# Patient Record
Sex: MALE | Race: WHITE | NOT HISPANIC OR LATINO | URBAN - METROPOLITAN AREA
[De-identification: names, ages, dates, MRNs, and addresses within clinical notes are randomized per-mention and may not be internally consistent; named-entity substitution may affect disease eponyms.]

---

## 2017-04-26 ENCOUNTER — INPATIENT (INPATIENT)
Facility: HOSPITAL | Age: 62
LOS: 3 days | Discharge: HOME | DRG: 603 | End: 2017-04-30
Attending: FAMILY MEDICINE | Admitting: HOSPITALIST
Payer: MEDICAID

## 2017-04-26 VITALS
OXYGEN SATURATION: 98 % | RESPIRATION RATE: 16 BRPM | HEART RATE: 90 BPM | HEIGHT: 68 IN | DIASTOLIC BLOOD PRESSURE: 78 MMHG | SYSTOLIC BLOOD PRESSURE: 132 MMHG | TEMPERATURE: 98 F | WEIGHT: 300.05 LBS

## 2017-04-26 DIAGNOSIS — Z96.642 PRESENCE OF LEFT ARTIFICIAL HIP JOINT: Chronic | ICD-10-CM

## 2017-04-26 DIAGNOSIS — L03.119 CELLULITIS OF UNSPECIFIED PART OF LIMB: ICD-10-CM

## 2017-04-26 LAB
ALBUMIN SERPL ELPH-MCNC: 3.6 G/DL — SIGNIFICANT CHANGE UP (ref 3.3–5.2)
ALP SERPL-CCNC: 60 U/L — SIGNIFICANT CHANGE UP (ref 40–120)
ALT FLD-CCNC: 33 U/L — SIGNIFICANT CHANGE UP
ANION GAP SERPL CALC-SCNC: 12 MMOL/L — SIGNIFICANT CHANGE UP (ref 5–17)
AST SERPL-CCNC: 32 U/L — SIGNIFICANT CHANGE UP
BASOPHILS NFR BLD AUTO: 1 % — SIGNIFICANT CHANGE UP (ref 0–2)
BILIRUB SERPL-MCNC: 0.3 MG/DL — LOW (ref 0.4–2)
BUN SERPL-MCNC: 14 MG/DL — SIGNIFICANT CHANGE UP (ref 8–20)
CALCIUM SERPL-MCNC: 9.7 MG/DL — SIGNIFICANT CHANGE UP (ref 8.6–10.2)
CHLORIDE SERPL-SCNC: 98 MMOL/L — SIGNIFICANT CHANGE UP (ref 98–107)
CO2 SERPL-SCNC: 32 MMOL/L — HIGH (ref 22–29)
CREAT SERPL-MCNC: 0.82 MG/DL — SIGNIFICANT CHANGE UP (ref 0.5–1.3)
EOSINOPHIL NFR BLD AUTO: 2 % — SIGNIFICANT CHANGE UP (ref 0–6)
GLUCOSE SERPL-MCNC: 77 MG/DL — SIGNIFICANT CHANGE UP (ref 70–115)
HCT VFR BLD CALC: 35.7 % — LOW (ref 42–52)
HGB BLD-MCNC: 11.3 G/DL — LOW (ref 14–18)
INR BLD: 1.14 RATIO — SIGNIFICANT CHANGE UP (ref 0.88–1.16)
LACTATE BLDV-MCNC: 3.3 MMOL/L — HIGH (ref 0.7–2)
LIDOCAIN IGE QN: 25 U/L — SIGNIFICANT CHANGE UP (ref 22–51)
LYMPHOCYTES # BLD AUTO: 23 % — SIGNIFICANT CHANGE UP (ref 20–55)
MCHC RBC-ENTMCNC: 26.7 PG — LOW (ref 27–31)
MCHC RBC-ENTMCNC: 31.7 G/DL — LOW (ref 32–36)
MCV RBC AUTO: 84.4 FL — SIGNIFICANT CHANGE UP (ref 80–94)
MONOCYTES NFR BLD AUTO: 6 % — SIGNIFICANT CHANGE UP (ref 3–10)
NEUTROPHILS NFR BLD AUTO: 67 % — SIGNIFICANT CHANGE UP (ref 37–73)
NT-PROBNP SERPL-SCNC: 72 PG/ML — SIGNIFICANT CHANGE UP (ref 0–300)
PLATELET # BLD AUTO: 275 K/UL — SIGNIFICANT CHANGE UP (ref 150–400)
POTASSIUM SERPL-MCNC: 4.4 MMOL/L — SIGNIFICANT CHANGE UP (ref 3.5–5.3)
POTASSIUM SERPL-SCNC: 4.4 MMOL/L — SIGNIFICANT CHANGE UP (ref 3.5–5.3)
PROT SERPL-MCNC: 8.9 G/DL — HIGH (ref 6.6–8.7)
PROTHROM AB SERPL-ACNC: 12.6 SEC — SIGNIFICANT CHANGE UP (ref 9.8–12.7)
RBC # BLD: 4.23 M/UL — LOW (ref 4.6–6.2)
RBC # FLD: 17.1 % — HIGH (ref 11–15.6)
SODIUM SERPL-SCNC: 142 MMOL/L — SIGNIFICANT CHANGE UP (ref 135–145)
WBC # BLD: 10.6 K/UL — SIGNIFICANT CHANGE UP (ref 4.8–10.8)
WBC # FLD AUTO: 10.6 K/UL — SIGNIFICANT CHANGE UP (ref 4.8–10.8)

## 2017-04-26 PROCEDURE — 99223 1ST HOSP IP/OBS HIGH 75: CPT

## 2017-04-26 PROCEDURE — 99284 EMERGENCY DEPT VISIT MOD MDM: CPT

## 2017-04-26 RX ORDER — NYSTATIN CREAM 100000 [USP'U]/G
1 CREAM TOPICAL
Qty: 0 | Refills: 0 | Status: DISCONTINUED | OUTPATIENT
Start: 2017-04-26 | End: 2017-04-30

## 2017-04-26 RX ORDER — ENOXAPARIN SODIUM 100 MG/ML
40 INJECTION SUBCUTANEOUS DAILY
Qty: 0 | Refills: 0 | Status: DISCONTINUED | OUTPATIENT
Start: 2017-04-26 | End: 2017-04-30

## 2017-04-26 RX ORDER — DEXTROSE 50 % IN WATER 50 %
25 SYRINGE (ML) INTRAVENOUS ONCE
Qty: 0 | Refills: 0 | Status: DISCONTINUED | OUTPATIENT
Start: 2017-04-26 | End: 2017-04-30

## 2017-04-26 RX ORDER — LISINOPRIL 2.5 MG/1
2.5 TABLET ORAL DAILY
Qty: 0 | Refills: 0 | Status: DISCONTINUED | OUTPATIENT
Start: 2017-04-26 | End: 2017-04-30

## 2017-04-26 RX ORDER — SODIUM CHLORIDE 9 MG/ML
2500 INJECTION INTRAMUSCULAR; INTRAVENOUS; SUBCUTANEOUS ONCE
Qty: 0 | Refills: 0 | Status: COMPLETED | OUTPATIENT
Start: 2017-04-26 | End: 2017-04-26

## 2017-04-26 RX ORDER — INSULIN LISPRO 100/ML
VIAL (ML) SUBCUTANEOUS AT BEDTIME
Qty: 0 | Refills: 0 | Status: DISCONTINUED | OUTPATIENT
Start: 2017-04-26 | End: 2017-04-30

## 2017-04-26 RX ORDER — LACTOBACILLUS ACIDOPHILUS 100MM CELL
1 CAPSULE ORAL
Qty: 0 | Refills: 0 | Status: DISCONTINUED | OUTPATIENT
Start: 2017-04-26 | End: 2017-04-30

## 2017-04-26 RX ORDER — CEFAZOLIN SODIUM 1 G
1000 VIAL (EA) INJECTION EVERY 8 HOURS
Qty: 0 | Refills: 0 | Status: DISCONTINUED | OUTPATIENT
Start: 2017-04-26 | End: 2017-04-30

## 2017-04-26 RX ORDER — DEXTROSE 50 % IN WATER 50 %
1 SYRINGE (ML) INTRAVENOUS ONCE
Qty: 0 | Refills: 0 | Status: DISCONTINUED | OUTPATIENT
Start: 2017-04-26 | End: 2017-04-30

## 2017-04-26 RX ORDER — INSULIN LISPRO 100/ML
VIAL (ML) SUBCUTANEOUS
Qty: 0 | Refills: 0 | Status: DISCONTINUED | OUTPATIENT
Start: 2017-04-26 | End: 2017-04-30

## 2017-04-26 RX ORDER — CEFAZOLIN SODIUM 1 G
1000 VIAL (EA) INJECTION ONCE
Qty: 0 | Refills: 0 | Status: COMPLETED | OUTPATIENT
Start: 2017-04-26 | End: 2017-04-26

## 2017-04-26 RX ORDER — SODIUM CHLORIDE 9 MG/ML
3 INJECTION INTRAMUSCULAR; INTRAVENOUS; SUBCUTANEOUS ONCE
Qty: 0 | Refills: 0 | Status: COMPLETED | OUTPATIENT
Start: 2017-04-26 | End: 2017-04-26

## 2017-04-26 RX ORDER — GLUCAGON INJECTION, SOLUTION 0.5 MG/.1ML
1 INJECTION, SOLUTION SUBCUTANEOUS ONCE
Qty: 0 | Refills: 0 | Status: DISCONTINUED | OUTPATIENT
Start: 2017-04-26 | End: 2017-04-30

## 2017-04-26 RX ORDER — SODIUM CHLORIDE 9 MG/ML
1000 INJECTION, SOLUTION INTRAVENOUS
Qty: 0 | Refills: 0 | Status: DISCONTINUED | OUTPATIENT
Start: 2017-04-26 | End: 2017-04-30

## 2017-04-26 RX ORDER — DEXTROSE 50 % IN WATER 50 %
12.5 SYRINGE (ML) INTRAVENOUS ONCE
Qty: 0 | Refills: 0 | Status: DISCONTINUED | OUTPATIENT
Start: 2017-04-26 | End: 2017-04-30

## 2017-04-26 RX ADMIN — SODIUM CHLORIDE 1250 MILLILITER(S): 9 INJECTION INTRAMUSCULAR; INTRAVENOUS; SUBCUTANEOUS at 21:42

## 2017-04-26 RX ADMIN — SODIUM CHLORIDE 3 MILLILITER(S): 9 INJECTION INTRAMUSCULAR; INTRAVENOUS; SUBCUTANEOUS at 21:42

## 2017-04-26 RX ADMIN — Medication 100 MILLIGRAM(S): at 21:42

## 2017-04-26 NOTE — ED ADULT NURSE REASSESSMENT NOTE - NS ED NURSE REASSESS COMMENT FT1
Received patient lying in bed, a&ox4, able to make needs known. Denies pain and discomfort. Dr. Dunlap at bedside. IV fluids infusing well on right forearm iv site without redness or swelling. Noted with redness and swelling on bilateral legs. DX: Cellulitis. Patient repositioned for comfort. Not in respiratory distress. To continue to monitor.

## 2017-04-26 NOTE — ED STATDOCS - MEDICAL DECISION MAKING DETAILS
eval for systemic infection and consult Dr. Walker and confirm plan for pt's care:  if he wanted pt admitted or have pt evaluated

## 2017-04-26 NOTE — ED STATDOCS - NS ED MD SCRIBE ATTENDING SCRIBE SECTIONS
VITAL SIGNS( Pullset)/REVIEW OF SYSTEMS/DISPOSITION/PAST MEDICAL/SURGICAL/SOCIAL HISTORY/INTAKE ASSESSMENT/SCREENINGS/PHYSICAL EXAM/HIV/HISTORY OF PRESENT ILLNESS

## 2017-04-26 NOTE — H&P ADULT - HISTORY OF PRESENT ILLNESS
60 y/o morbidly obese male with pmh of DM , ankylosing spondylosis , lupus was sent in by pcp for his b//l LE wounds. As per pt. his wounds started about 2 weeks ago as blisters and now they are weeping. pt. had no fever. no chills. no leg pains. no cp. no sob. no abd. pain no n/v. pt. denies any trauma to his lower ext.

## 2017-04-26 NOTE — ED STATDOCS - OBJECTIVE STATEMENT
This is a 62 y/o M presenting of b/l leg pain. He reports similar infection in past that required admission. He states that today he was seen by his PMD, Dr. Walker, and was told to come to the ED. Denies CP, SOB, and back pain.

## 2017-04-26 NOTE — H&P ADULT - SCARS
pt. has b/l LE erythema below calf, pt's RLE has about 3inch long and about 1 inch wide elongated venous stasis ulcer with moist base, no foul smelling discharge noted. pt's LLE has few  vesicular type lesions with moist base and surrounding erythema.  + grayish  to whitish membranes noted between multiple toes over both feet.  a callus formation is noted over  plantar aspect of left big toe.

## 2017-04-26 NOTE — ED ADULT NURSE NOTE - OBJECTIVE STATEMENT
recieved pt AOx3 pt presents to Ed with b/l LE infections x one week. sent by PMD. afebrile. breathing si even and unlabored. MAEx4, neruo intact. has had similar episode in past.

## 2017-04-26 NOTE — ED ADULT TRIAGE NOTE - CHIEF COMPLAINT QUOTE
pt presents to Ed with b/l LE infections x one week. sent by PMD. afebrile. breathing si even and unlabored.

## 2017-04-26 NOTE — H&P ADULT - PROBLEM SELECTOR PLAN 1
will keep on ancef. blood cx to follow. will request for wound consult . pt. will benefit from vascular surgery consult, will request pcp dr. Walker to call for one.

## 2017-04-27 DIAGNOSIS — M45.9 ANKYLOSING SPONDYLITIS OF UNSPECIFIED SITES IN SPINE: ICD-10-CM

## 2017-04-27 DIAGNOSIS — E66.01 MORBID (SEVERE) OBESITY DUE TO EXCESS CALORIES: ICD-10-CM

## 2017-04-27 DIAGNOSIS — L03.119 CELLULITIS OF UNSPECIFIED PART OF LIMB: ICD-10-CM

## 2017-04-27 DIAGNOSIS — B35.3 TINEA PEDIS: ICD-10-CM

## 2017-04-27 DIAGNOSIS — E11.9 TYPE 2 DIABETES MELLITUS WITHOUT COMPLICATIONS: ICD-10-CM

## 2017-04-27 LAB
ANION GAP SERPL CALC-SCNC: 14 MMOL/L — SIGNIFICANT CHANGE UP (ref 5–17)
BASOPHILS # BLD AUTO: 0 K/UL — SIGNIFICANT CHANGE UP (ref 0–0.2)
BASOPHILS NFR BLD AUTO: 0.5 % — SIGNIFICANT CHANGE UP (ref 0–2)
BUN SERPL-MCNC: 14 MG/DL — SIGNIFICANT CHANGE UP (ref 8–20)
CALCIUM SERPL-MCNC: 8.8 MG/DL — SIGNIFICANT CHANGE UP (ref 8.6–10.2)
CHLORIDE SERPL-SCNC: 97 MMOL/L — LOW (ref 98–107)
CO2 SERPL-SCNC: 27 MMOL/L — SIGNIFICANT CHANGE UP (ref 22–29)
CREAT SERPL-MCNC: 0.72 MG/DL — SIGNIFICANT CHANGE UP (ref 0.5–1.3)
EOSINOPHIL # BLD AUTO: 0.2 K/UL — SIGNIFICANT CHANGE UP (ref 0–0.5)
EOSINOPHIL NFR BLD AUTO: 1.9 % — SIGNIFICANT CHANGE UP (ref 0–5)
GLUCOSE SERPL-MCNC: 102 MG/DL — SIGNIFICANT CHANGE UP (ref 70–115)
HBA1C BLD-MCNC: 7.8 % — HIGH (ref 4–5.6)
HCT VFR BLD CALC: 33.7 % — LOW (ref 42–52)
HGB BLD-MCNC: 10.6 G/DL — LOW (ref 14–18)
LACTATE SERPL-SCNC: 2 MMOL/L — SIGNIFICANT CHANGE UP (ref 0.5–2)
LYMPHOCYTES # BLD AUTO: 2.9 K/UL — SIGNIFICANT CHANGE UP (ref 1–4.8)
LYMPHOCYTES # BLD AUTO: 29.6 % — SIGNIFICANT CHANGE UP (ref 20–55)
MCHC RBC-ENTMCNC: 26.5 PG — LOW (ref 27–31)
MCHC RBC-ENTMCNC: 31.5 G/DL — LOW (ref 32–36)
MCV RBC AUTO: 84.3 FL — SIGNIFICANT CHANGE UP (ref 80–94)
MONOCYTES # BLD AUTO: 0.8 K/UL — SIGNIFICANT CHANGE UP (ref 0–0.8)
MONOCYTES NFR BLD AUTO: 8.4 % — SIGNIFICANT CHANGE UP (ref 3–10)
NEUTROPHILS # BLD AUTO: 5.8 K/UL — SIGNIFICANT CHANGE UP (ref 1.8–8)
NEUTROPHILS NFR BLD AUTO: 59.3 % — SIGNIFICANT CHANGE UP (ref 37–73)
PLATELET # BLD AUTO: 276 K/UL — SIGNIFICANT CHANGE UP (ref 150–400)
POTASSIUM SERPL-MCNC: 3.5 MMOL/L — SIGNIFICANT CHANGE UP (ref 3.5–5.3)
POTASSIUM SERPL-SCNC: 3.5 MMOL/L — SIGNIFICANT CHANGE UP (ref 3.5–5.3)
RBC # BLD: 4 M/UL — LOW (ref 4.6–6.2)
RBC # FLD: 17.1 % — HIGH (ref 11–15.6)
SODIUM SERPL-SCNC: 138 MMOL/L — SIGNIFICANT CHANGE UP (ref 135–145)
WBC # BLD: 9.8 K/UL — SIGNIFICANT CHANGE UP (ref 4.8–10.8)
WBC # FLD AUTO: 9.8 K/UL — SIGNIFICANT CHANGE UP (ref 4.8–10.8)

## 2017-04-27 PROCEDURE — 71010: CPT | Mod: 26

## 2017-04-27 PROCEDURE — 93010 ELECTROCARDIOGRAM REPORT: CPT

## 2017-04-27 PROCEDURE — 93970 EXTREMITY STUDY: CPT | Mod: 26

## 2017-04-27 PROCEDURE — 99222 1ST HOSP IP/OBS MODERATE 55: CPT

## 2017-04-27 RX ORDER — METHADONE HYDROCHLORIDE 40 MG/1
5 TABLET ORAL
Qty: 0 | Refills: 0 | Status: DISCONTINUED | OUTPATIENT
Start: 2017-04-27 | End: 2017-04-30

## 2017-04-27 RX ORDER — MUPIROCIN 20 MG/G
1 OINTMENT TOPICAL DAILY
Qty: 0 | Refills: 0 | Status: DISCONTINUED | OUTPATIENT
Start: 2017-04-27 | End: 2017-04-30

## 2017-04-27 RX ORDER — HYDROMORPHONE HYDROCHLORIDE 2 MG/ML
4 INJECTION INTRAMUSCULAR; INTRAVENOUS; SUBCUTANEOUS
Qty: 0 | Refills: 0 | Status: DISCONTINUED | OUTPATIENT
Start: 2017-04-27 | End: 2017-04-30

## 2017-04-27 RX ADMIN — LISINOPRIL 2.5 MILLIGRAM(S): 2.5 TABLET ORAL at 06:30

## 2017-04-27 RX ADMIN — Medication 100 MILLIGRAM(S): at 23:54

## 2017-04-27 RX ADMIN — Medication 2: at 19:10

## 2017-04-27 RX ADMIN — Medication 100 MILLIGRAM(S): at 06:30

## 2017-04-27 RX ADMIN — Medication 1 TABLET(S): at 09:19

## 2017-04-27 RX ADMIN — NYSTATIN CREAM 1 APPLICATION(S): 100000 CREAM TOPICAL at 09:20

## 2017-04-27 RX ADMIN — Medication 100 MILLIGRAM(S): at 13:00

## 2017-04-27 RX ADMIN — NYSTATIN CREAM 1 APPLICATION(S): 100000 CREAM TOPICAL at 22:19

## 2017-04-27 RX ADMIN — Medication 100 MILLIGRAM(S): at 22:19

## 2017-04-27 RX ADMIN — Medication 1 TABLET(S): at 19:09

## 2017-04-27 RX ADMIN — METHADONE HYDROCHLORIDE 5 MILLIGRAM(S): 40 TABLET ORAL at 19:10

## 2017-04-27 RX ADMIN — METHADONE HYDROCHLORIDE 5 MILLIGRAM(S): 40 TABLET ORAL at 23:55

## 2017-04-27 RX ADMIN — HYDROMORPHONE HYDROCHLORIDE 4 MILLIGRAM(S): 2 INJECTION INTRAMUSCULAR; INTRAVENOUS; SUBCUTANEOUS at 16:30

## 2017-04-27 RX ADMIN — Medication 100 MILLIGRAM(S): at 13:51

## 2017-04-27 RX ADMIN — Medication 1 TABLET(S): at 12:59

## 2017-04-27 RX ADMIN — ENOXAPARIN SODIUM 40 MILLIGRAM(S): 100 INJECTION SUBCUTANEOUS at 13:00

## 2017-04-27 RX ADMIN — HYDROMORPHONE HYDROCHLORIDE 4 MILLIGRAM(S): 2 INJECTION INTRAMUSCULAR; INTRAVENOUS; SUBCUTANEOUS at 16:03

## 2017-04-27 NOTE — PROGRESS NOTE ADULT - SUBJECTIVE AND OBJECTIVE BOX
HPI:  62 y/o morbidly obese male with pmh of DM , ankylosing spondylosis , lupus was sent in by pcp for his b//l LE wounds. As per pt. his wounds started about 2 weeks ago as blisters and now they are weeping. pt. had no fever. no chills. no leg pains. no cp. no sob. no abd. pain no n/v. pt. denies any trauma to his lower ext. (26 Apr 2017 22:58)    Male  PAST MEDICAL & SURGICAL HISTORY:  Ankylosing spondylitis of site in spine  Lupus  Diabetes  History of hip replacement, total, left  attending to pain management [facility]  attending to endocrinologist for diabetes      REVIEW OF SYSTEMS      General:	morbid obesity,     Skin/Breast:  recurrent lower extremities infection,   	  Ophthalmologic: diabetes retinopathy.  	  ENMT:	normal     Respiratory and Thorax:  normal  	  negative     Gastrointestinal:	negative     Genitourinary: negative 	    Musculoskeletal:  morbid obesity	, anchyloses   spondylitis s/p  left hip  replacement, chronic pain, attending to interventional krystle management.    Neurological:	 negative     Psychiatric:	negative     Hematology/Lymphatics:  negative 	    Endocrine: morbid obesity., diabetes 	    Allergic/Immunologic:	negative     MEDICATIONS  (STANDING):  ceFAZolin   IVPB 1000milliGRAM(s) IV Intermittent every 8 hours  insulin lispro (HumaLOG) corrective regimen sliding scale  SubCutaneous three times a day before meals  insulin lispro (HumaLOG) corrective regimen sliding scale  SubCutaneous at bedtime  dextrose 5%. 1000milliLiter(s) IV Continuous <Continuous>  dextrose 50% Injectable 12.5Gram(s) IV Push once  dextrose 50% Injectable 25Gram(s) IV Push once  dextrose 50% Injectable 25Gram(s) IV Push once  enoxaparin Injectable 40milliGRAM(s) SubCutaneous daily  lactobacillus acidophilus 1Tablet(s) Oral three times a day with meals  lisinopril 2.5milliGRAM(s) Oral daily  nystatin Cream 1Application(s) Topical two times a day  clindamycin IVPB 600milliGRAM(s) IV Intermittent every 8 hours  clindamycin IVPB  IV Intermittent   methadone 5milliGRAM(s) Oral four times a day  HYDROmorphone   Tablet 4milliGRAM(s) Oral three times a day with meals  mupirocin 2% Ointment 1Application(s) Topical daily    MEDICATIONS  (PRN):  dextrose Gel 1Dose(s) Oral once PRN Blood Glucose LESS THAN 70 milliGRAM(s)/deciliter  glucagon  Injectable 1milliGRAM(s) IntraMuscular once PRN Glucose LESS THAN 70 milligrams/deciliter      Allergies    No Known Allergies    Intolerances none         SOCIAL HISTORY:  computer IT,  self employed , financial hardship. almost disable  due to his chronic pain and disabilities.  , lives with wife.    FAMILY HISTORY:  No pertinent family history      Vital Signs Last 24 Hrs  T(C): 36.6, Max: 36.8 (04-26 @ 22:49)  T(F): 97.8, Max: 98.2 (04-26 @ 22:49)  HR: 75 (75 - 86)  BP: 126/58 (114/57 - 131/78)  BP(mean): --  RR: 19 (19 - 20)  SpO2: 100% (97% - 100%)    PHYSICAL EXAM:      Constitutional:  morbid obesity, awake alert oriented,     Eyes:  refraction disorder.    ENMT:  normal      Neck:  normal ROM,  neck veins flat, no bruits,     Breasts:  normal     Back:  lumbar spine  limited range of  motion, and panful,      Respiratory:  normal     Cardiovascular:  NSR., no murmur     Gastrointestinal:  abundant adipose tissue     Genitourinary:  normal     Rectal:    Extremities: bilateral lower extremities  edematous, evidence of saphenofemoral insuffiencey lymphedema  venostasis, ulceration,  interdigital maceration, and tinea pedis  plantar callous macerated ,  skin erythematous, evidence of weeping blisters,     Vascular:  difficulty to evaluate , due to edema and  severe venous stasis      Neurological:  no evidence of peripheral neuropathy.    Skin:  normal     Lymph Nodes: normal     Musculoskeletal:  obesity    Psychiatric: normal         LABS:  CBC Full  -  ( 27 Apr 2017 07:40 )  WBC Count : 9.8 K/uL  Hemoglobin : 10.6 g/dL  Hematocrit : 33.7 %  Platelet Count - Automated : 276 K/uL  Mean Cell Volume : 84.3 fl  Mean Cell Hemoglobin : 26.5 pg  Mean Cell Hemoglobin Concentration : 31.5 g/dL  Auto Neutrophil # : 5.8 K/uL  Auto Lymphocyte # : 2.9 K/uL  Auto Monocyte # : 0.8 K/uL  Auto Eosinophil # : 0.2 K/uL  Auto Basophil # : 0.0 K/uL  Auto Neutrophil % : 59.3 %  Auto Lymphocyte % : 29.6 %  Auto Monocyte % : 8.4 %  Auto Eosinophil % : 1.9 %  Auto Basophil % : 0.5 %    04-27    138  |  97<L>  |  14.0  ----------------------------<  102  3.5   |  27.0  |  0.72    Ca    8.8      27 Apr 2017 07:40    TPro  8.9<H>  /  Alb  3.6  /  TBili  0.3<L>  /  DBili  x   /  AST  32  /  ALT  33  /  AlkPhos  60  04-26    PT/INR - ( 26 Apr 2017 20:46 )   PT: 12.6 sec;   INR: 1.14 ratio               RADIOLOGY & ADDITIONAL STUDIES:

## 2017-04-27 NOTE — ED ADULT NURSE REASSESSMENT NOTE - NS ED NURSE REASSESS COMMENT FT1
Dr. Dunlap made aware of patient's c/o sharp bilateral lower extremities pain, 5/10. Awaiting order as to medicate patient. Dr. Dunlap made aware of patient's c/o sharp bilateral lower extremities pain, 6/10. Awaiting order as to medicate patient.

## 2017-04-27 NOTE — CONSULT NOTE ADULT - PROBLEM SELECTOR RECOMMENDATION 9
Continue Cefazolin  Start Clindamycin to decrease toxin production x 3 days  Blood cultures pending  Needs vascular surgery consult  Follow up LE ultrasound  Anticipate 7 days of antibiotics

## 2017-04-27 NOTE — CONSULT NOTE ADULT - SUBJECTIVE AND OBJECTIVE BOX
Vascular Attending:        HPI:  62 y/o morbidly obese male with pmh of DM , ankylosing spondylosis , lupus was sent in by pcp for his b//l LE wounds. As per pt. his wounds started about 2 weeks ago as blisters and now they are weeping. pt. had no fever. no chills. no leg pains. no cp. no sob. no abd. pain no n/v. pt. denies any trauma to his lower ext. (26 Apr 2017 22:58). Vascular called to evalute B/L LE cellulitis and wounds.      PAST MEDICAL & SURGICAL HISTORY:  Ankylosing spondylitis of site in spine  Lupus  Diabetes  History of hip replacement, total, left      REVIEW OF SYSTEMS  General: NAD. Resting comfortably in bed.	  Skin: C/O pain and dry skin to B/L LE  Ophthalmologic: Denies blurry vision  ENMT:	Denies difficulty swallowing  Respiratory and Thorax: Denies SOB, no dyspnea  Cardiovascular:	Denies chest pain  Gastrointestinal:	 No V, V Diarrhea  Genitourinary: denies urgency, frequency  Neurological: A&O X3, Denies paresthesias,	  	    MEDICATIONS  (STANDING):  ceFAZolin   IVPB 1000milliGRAM(s) IV Intermittent every 8 hours  insulin lispro (HumaLOG) corrective regimen sliding scale  SubCutaneous three times a day before meals  insulin lispro (HumaLOG) corrective regimen sliding scale  SubCutaneous at bedtime  dextrose 5%. 1000milliLiter(s) IV Continuous <Continuous>  dextrose 50% Injectable 12.5Gram(s) IV Push once  dextrose 50% Injectable 25Gram(s) IV Push once  dextrose 50% Injectable 25Gram(s) IV Push once  enoxaparin Injectable 40milliGRAM(s) SubCutaneous daily  lactobacillus acidophilus 1Tablet(s) Oral three times a day with meals  lisinopril 2.5milliGRAM(s) Oral daily  nystatin Cream 1Application(s) Topical two times a day  clindamycin IVPB 600milliGRAM(s) IV Intermittent every 8 hours  clindamycin IVPB  IV Intermittent   methadone 5milliGRAM(s) Oral four times a day  HYDROmorphone   Tablet 4milliGRAM(s) Oral three times a day with meals    MEDICATIONS  (PRN):  dextrose Gel 1Dose(s) Oral once PRN Blood Glucose LESS THAN 70 milliGRAM(s)/deciliter  glucagon  Injectable 1milliGRAM(s) IntraMuscular once PRN Glucose LESS THAN 70 milligrams/deciliter      Allergies    No Known Allergies    Intolerances        SOCIAL HISTORY:      Vital Signs Last 24 Hrs  T(C): 36.6, Max: 36.8 (04-26 @ 17:23)  T(F): 97.8, Max: 98.2 (04-26 @ 17:23)  HR: 75 (75 - 90)  BP: 114/57 (114/57 - 132/78)  BP(mean): --  RR: 20 (16 - 20)  SpO2: 100% (97% - 100%)    PHYSICAL EXAM:  Constitutional: Wd, WN, NAD, resting comfortably in bed  Eyes: PEERLA  Neck: NL  Respiratory: RRR, Clear to bases.  Cardiovascular: Nl S1, S2. PMI at 5th MCI   Gastrointestinal: Soft non tender, BS X4  Extremities: B/L LE 2 + edema, from below knees to above ankles, erythema with R anterior lateral superficial ulcer 3 cm X 2 cm and left intermalleolar lesion superficial with granulation tissue, some weeping lesions with serosanguinous drainage. Left great toe plantar callus noted.  Neurological: A&O X3    Pulses:   Right:                                                                          Left:  POP [ ]2+ [X ]1+ [ ]doppler                                             POP [ ]2+ [X ]1+ [ ]doppler    DP [ ]2+ [X ]1+ [ ]doppler                                                DP [ ]2+ [X ]1+ [ ]doppler  PT[ ]2+ [X ]1+ [ ]doppler                                                  PT [ ]2+ [X ]1+ [ ]doppler      LABS:                        10.6   9.8   )-----------( 276      ( 27 Apr 2017 07:40 )             33.7     04-27    138  |  97<L>  |  14.0  ----------------------------<  102  3.5   |  27.0  |  0.72    Ca    8.8      27 Apr 2017 07:40    TPro  8.9<H>  /  Alb  3.6  /  TBili  0.3<L>  /  DBili  x   /  AST  32  /  ALT  33  /  AlkPhos  60  04-26    PT/INR - ( 26 Apr 2017 20:46 )   PT: 12.6 sec;   INR: 1.14 ratio           RADIOLOGY & ADDITIONAL STUDIES  US DPLX LWR EXT VEINS COMPL BI - unremarkable.          Impression and Plan: This is a 62 Y/o Male with B/L LE cellulitis. Vascular askrd to evaluate B/L LE wounds. PT has history of Diabetes with Venous stasis with current ulceration to B/L LE.    Continue ABX: Cefazolin and Clindamycin as per medicine.  Clobetasol cream, DSD, Ace to B/L legs. Plan to change to Maki Boots, Coban, ACE tomorrow.  Blood cultures pending  Continue DM management as per management  Seen and discussed with Dr Tyler Rojo
61 year old diabetic male seen in the ED for concern of possible cellulitis from Tinea pedis infection and left hallux callus. Patient is pleasant, AAOx3, in NAD. Patient states that he was sent by his PCP to the ED because he might have cellulitis in the legs. Patient states that he does have pain in the legs and feet which has been progressively getting worse over the past week with edema in the legs and feet. Patient admits poor glucose control with his diabetes. Patient notes recent weeping sores on his legs bilaterally. Patient denies any F/C/N/V/D/SOB.    PAST MEDICAL & SURGICAL HISTORY:  Ankylosing spondylitis of site in spine  Lupus  Diabetes  History of hip replacement, total, left    O:  Non-palpable pedal pulses due to significant pitting edema on the legs and feet bilaterally. Temperature gradient is within normal limits bilaterally, and CFT is within normal limits x10  Feet are hairless and exhibit multiple varicosities. +3 pitting edema of the lower extremities bilaterally.   Patient has interdigital maceration of the 3rd and 4th interspaces bilaterally, but no ulcerations in the interspaces bilaterally and no signs of cellulitis stemming from interspace breach  Patient does have macerated pinch callus of the left hallux, when macerated material is removed, shallow granular ulcer is expressed, approximately 4mm in size with no drainage, no exposed bone, no periwound erythema, no clinical signs of infection  Lower legs exhibit reddened, thickened, slightly nodular skin suggesting long standing venous stasis edema in pattern of midshaft tibia to anterior ankle bilaterally. Patient has shallow fibrogranular ulcerations on the lateral anterior right leg and medial anterior right leg consistent with venous stasis ulcerations. No lymphangitis, no active drainage, no purulence. Aforementioned rubor is partially dependent and lessens upon elevation of the legs. This is more suggestive of a vascular cause than an infectious one, although concurrent problems cannot be ruled out.                          10.6   9.8   )-----------( 276      ( 27 Apr 2017 07:40 )             33.7     04-27    138  |  97<L>  |  14.0  ----------------------------<  102  3.5   |  27.0  |  0.72    Ca    8.8      27 Apr 2017 07:40    TPro  8.9<H>  /  Alb  3.6  /  TBili  0.3<L>  /  DBili  x   /  AST  32  /  ALT  33  /  AlkPhos  60  04-26      A:  Bilateral venous stasis edema, bilateral venous stasis ulcerations, interspace tinea pedis, left hallux ulceration    P: Patient evaluated, chart reviewed  All wounds dressed with betadine and DSD  Discussed and seen with Vascular team who are consulted for, and assuming care for, the vascular edema and ulcerations  Continue to use nystatin for tinea as ordered in the chart  Bactroban ordered for the left hallux ulceration  Podiatry will monitor while in house.  Thank you for the consult
[facility]  INFECTIOUS DISEASES AND INTERNAL MEDICINE OF [place]  =======================================================  Raoul Elizabeth MD  Diplomates American Board of Internal Medicine and Infectious Diseases  =======================================================      [id number]  RANDY MARTINEZ       60y/o  Male presents with B/L LE swelling, and drainage from ulcers for 1-2 weeks. Patient did not pursue medical attention until yesterday when he went to see his PCP and was referred to the ER. Patient reports not taking antibiotics until coming to the hospital. Denies trauma. No fevers or chills.       Past Medical & Surgical Hx:  Ankylosing spondylitis of site in spine  Lupus  Diabetes  History of hip replacement, total, left      Social Hx:  Denies ETOH or smoking      FAMILY HISTORY:  No pertinent family history      Allergies  No Known Allergies      Antibiotics:  ceFAZolin   IVPB 1000milliGRAM(s) IV Intermittent every 8 hours  clindamycin IVPB  IV Intermittent        REVIEW OF SYSTEMS:  CONSTITUTIONAL:  No fevers or chills  HEENT:  No diplopia or blurred vision.  No earache, sore throat or runny nose.  CARDIOVASCULAR:  No pressure, squeezing, strangling, tightness, heaviness or aching about the chest, neck, axilla or epigastrium.  RESPIRATORY:  No cough, shortness of breath  GASTROINTESTINAL:  No nausea, vomiting or diarrhea.  GENITOURINARY:  No dysuria, frequency or urgency.   MUSCULOSKELETAL:  no joint aches, no muscle pain  SKIN:  B/L LE swelling and small ulcers  NEUROLOGIC:  No paresthesias, fasciculations, seizures or weakness.  PSYCHIATRIC:  No disorder of thought or mood.  ENDOCRINE:  No heat or cold intolerance, polyuria or polydipsia.  HEMATOLOGICAL:  No easy bruising or bleeding.       Physical Exam:  Vital Signs Last 24 Hrs  T(C): 36.6, Max: 36.8 (04-26 @ 17:23)  T(F): 97.8, Max: 98.2 (04-26 @ 17:23)  HR: 75 (75 - 90)  BP: 114/57 (114/57 - 132/78)  RR: 20 (16 - 20)  SpO2: 100% (97% - 100%)  Height (cm): 172.7 (04-26 @ 17:23)  Weight (kg): 136.1 (04-26 @ 17:23)  BMI (kg/m2): 45.6 (04-26 @ 17:23)  BSA (m2): 2.43 (04-26 @ 17:23)      GEN: NAD, pleasant  HEENT: normocephalic and atraumatic. EOMI. PERRL.    NECK: Supple.   LUNGS: Clear to auscultation.  HEART: Regular rate and rhythm without murmur.  ABDOMEN: Soft, nontender, and Obese.  Positive bowel sounds.    : No CVA tenderness  EXTREMITIES: B/L LE with weeping ulcers, + Swelling, + mild erythema, + warmth  MSK: No joint swelling  NEUROLOGIC: Cranial nerves II through XII are grossly intact.  PSYCHIATRIC: Appropriate affect .  SKIN: B/L LE small ulcers        Labs:   04-27    138  |  97<L>  |  14.0  ----------------------------<  102  3.5   |  27.0  |  0.72    Ca    8.8      27 Apr 2017 07:40    TPro  8.9<H>  /  Alb  3.6  /  TBili  0.3<L>  /  DBili  x   /  AST  32  /  ALT  33  /  AlkPhos  60  04-26                          10.6   9.8   )-----------( 276      ( 27 Apr 2017 07:40 )             33.7       PT/INR - ( 26 Apr 2017 20:46 )   PT: 12.6 sec;   INR: 1.14 ratio        LIVER FUNCTIONS - ( 26 Apr 2017 20:46 )  Alb: 3.6 g/dL / Pro: 8.9 g/dL / ALK PHOS: 60 U/L / ALT: 33 U/L / AST: 32 U/L / GGT: x           CARDIAC MARKERS ( 26 Apr 2017 20:46 )  x     / <0.01 ng/mL / 186 U/L / x     / 3.1 ng/mL      CXR  Impression: No acute pulmonary disease. No interval change.

## 2017-04-27 NOTE — PROGRESS NOTE ADULT - ASSESSMENT
cellulitis and  venous stasis  lymphedema  infected toes, maceration of skin  planta callous   uncontrolled diabetes  unknown  endocrinologist,   chronic  pain,  hip and lumbar, attending to pain management.  poor health maintenance

## 2017-04-28 DIAGNOSIS — E11.9 TYPE 2 DIABETES MELLITUS WITHOUT COMPLICATIONS: ICD-10-CM

## 2017-04-28 PROCEDURE — 99232 SBSQ HOSP IP/OBS MODERATE 35: CPT

## 2017-04-28 RX ADMIN — HYDROMORPHONE HYDROCHLORIDE 4 MILLIGRAM(S): 2 INJECTION INTRAMUSCULAR; INTRAVENOUS; SUBCUTANEOUS at 09:20

## 2017-04-28 RX ADMIN — LISINOPRIL 2.5 MILLIGRAM(S): 2.5 TABLET ORAL at 05:39

## 2017-04-28 RX ADMIN — MUPIROCIN 1 APPLICATION(S): 20 OINTMENT TOPICAL at 12:04

## 2017-04-28 RX ADMIN — ENOXAPARIN SODIUM 40 MILLIGRAM(S): 100 INJECTION SUBCUTANEOUS at 12:04

## 2017-04-28 RX ADMIN — HYDROMORPHONE HYDROCHLORIDE 4 MILLIGRAM(S): 2 INJECTION INTRAMUSCULAR; INTRAVENOUS; SUBCUTANEOUS at 17:55

## 2017-04-28 RX ADMIN — Medication 1 TABLET(S): at 12:04

## 2017-04-28 RX ADMIN — Medication 100 MILLIGRAM(S): at 14:49

## 2017-04-28 RX ADMIN — NYSTATIN CREAM 1 APPLICATION(S): 100000 CREAM TOPICAL at 05:39

## 2017-04-28 RX ADMIN — HYDROMORPHONE HYDROCHLORIDE 4 MILLIGRAM(S): 2 INJECTION INTRAMUSCULAR; INTRAVENOUS; SUBCUTANEOUS at 17:09

## 2017-04-28 RX ADMIN — HYDROMORPHONE HYDROCHLORIDE 4 MILLIGRAM(S): 2 INJECTION INTRAMUSCULAR; INTRAVENOUS; SUBCUTANEOUS at 12:04

## 2017-04-28 RX ADMIN — METHADONE HYDROCHLORIDE 5 MILLIGRAM(S): 40 TABLET ORAL at 12:04

## 2017-04-28 RX ADMIN — Medication 100 MILLIGRAM(S): at 22:25

## 2017-04-28 RX ADMIN — HYDROMORPHONE HYDROCHLORIDE 4 MILLIGRAM(S): 2 INJECTION INTRAMUSCULAR; INTRAVENOUS; SUBCUTANEOUS at 12:49

## 2017-04-28 RX ADMIN — HYDROMORPHONE HYDROCHLORIDE 4 MILLIGRAM(S): 2 INJECTION INTRAMUSCULAR; INTRAVENOUS; SUBCUTANEOUS at 08:47

## 2017-04-28 RX ADMIN — Medication 100 MILLIGRAM(S): at 14:48

## 2017-04-28 RX ADMIN — Medication 1 TABLET(S): at 17:10

## 2017-04-28 RX ADMIN — METHADONE HYDROCHLORIDE 5 MILLIGRAM(S): 40 TABLET ORAL at 05:39

## 2017-04-28 RX ADMIN — NYSTATIN CREAM 1 APPLICATION(S): 100000 CREAM TOPICAL at 17:10

## 2017-04-28 RX ADMIN — Medication 100 MILLIGRAM(S): at 06:43

## 2017-04-28 RX ADMIN — Medication 1 TABLET(S): at 08:47

## 2017-04-28 RX ADMIN — METHADONE HYDROCHLORIDE 5 MILLIGRAM(S): 40 TABLET ORAL at 17:10

## 2017-04-28 RX ADMIN — Medication 100 MILLIGRAM(S): at 05:37

## 2017-04-28 NOTE — PROGRESS NOTE ADULT - SUBJECTIVE AND OBJECTIVE BOX
HPI:  62 y/o morbidly obese male with pmh of DM , ankylosing spondylosis , lupus was sent in by pcp for his b//l LE wounds. As per pt. his wounds started about 2 weeks ago as blisters and now they are weeping. pt. had no fever. no chills. no leg pains. no cp. no sob. no abd. pain no n/v. pt. denies any trauma to his lower ext. (26 Apr 2017 22:58)    Male  PAST MEDICAL & SURGICAL HISTORY:  Ankylosing spondylitis of site in spine  Lupus  Diabetes  History of hip replacement, total, left      REVIEW OF SYSTEMS      General: obesity, morbid obesity	    Skin/Breast:  negative,  recurrent lower  extremities  cellulitis   	  Ophthalmologic:  retinopathy  	  ENMT:	  negative    Respiratory and Thorax:  negative   	  Cardiovascular:	no history of ischemia     Gastrointestinal:	  normal     Genitourinary:	 negative    Musculoskeletal:   negative 	    Neurological:	 negative     negative     Hematology/Lymphatics:   negative 	    Endocrine:	diabetes     Allergic/Immunologic:	    MEDICATIONS  (STANDING):  ceFAZolin   IVPB 1000milliGRAM(s) IV Intermittent every 8 hours  insulin lispro (HumaLOG) corrective regimen sliding scale  SubCutaneous three times a day before meals  insulin lispro (HumaLOG) corrective regimen sliding scale  SubCutaneous at bedtime  dextrose 5%. 1000milliLiter(s) IV Continuous <Continuous>  dextrose 50% Injectable 12.5Gram(s) IV Push once  dextrose 50% Injectable 25Gram(s) IV Push once  dextrose 50% Injectable 25Gram(s) IV Push once  enoxaparin Injectable 40milliGRAM(s) SubCutaneous daily  lactobacillus acidophilus 1Tablet(s) Oral three times a day with meals  lisinopril 2.5milliGRAM(s) Oral daily  nystatin Cream 1Application(s) Topical two times a day  clindamycin IVPB 600milliGRAM(s) IV Intermittent every 8 hours  clindamycin IVPB  IV Intermittent   methadone 5milliGRAM(s) Oral four times a day  HYDROmorphone   Tablet 4milliGRAM(s) Oral three times a day with meals  mupirocin 2% Ointment 1Application(s) Topical daily    MEDICATIONS  (PRN):  dextrose Gel 1Dose(s) Oral once PRN Blood Glucose LESS THAN 70 milliGRAM(s)/deciliter  glucagon  Injectable 1milliGRAM(s) IntraMuscular once PRN Glucose LESS THAN 70 milligrams/deciliter      Allergies    No Known Allergies    Intolerances        SOCIAL HISTORY:    FAMILY HISTORY:  No pertinent family history      Vital Signs Last 24 Hrs  T(C): 36.6, Max: 36.8 (04-27 @ 19:50)  T(F): 97.8, Max: 98.3 (04-28 @ 08:31)  HR: 86 (78 - 90)  BP: 151/85 (136/70 - 165/95)  BP(mean): --  RR: 18 (18 - 18)  SpO2: 96% (96% - 100%)    PHYSICAL EXAM:      Constitutional:   awake alert oriented     Eyes:  normal ,, refraction  error     ENMT:  normal     Neck:  normal     Breasts:  normal     Back:  normal     Respiratory:  normal     Cardiovascular:  normal     Gastrointestinal:  nomal     Genitourinary:   normal     Rectal:    Extremities: less, edema sores improving     Vascular:  normal     Neurological:  negative     Skin:  less erythema, very impressive the resolution of the   erythema     Lymph Nodes:  negative     Musculoskeletal:  negative     Psychiatric:  negative         LABS:  CBC Full  -  ( 27 Apr 2017 07:40 )  WBC Count : 9.8 K/uL  Hemoglobin : 10.6 g/dL  Hematocrit : 33.7 %  Platelet Count - Automated : 276 K/uL  Mean Cell Volume : 84.3 fl  Mean Cell Hemoglobin : 26.5 pg  Mean Cell Hemoglobin Concentration : 31.5 g/dL  Auto Neutrophil # : 5.8 K/uL  Auto Lymphocyte # : 2.9 K/uL  Auto Monocyte # : 0.8 K/uL  Auto Eosinophil # : 0.2 K/uL  Auto Basophil # : 0.0 K/uL  Auto Neutrophil % : 59.3 %  Auto Lymphocyte % : 29.6 %  Auto Monocyte % : 8.4 %  Auto Eosinophil % : 1.9 %  Auto Basophil % : 0.5 %    04-27    138  |  97<L>  |  14.0  ----------------------------<  102  3.5   |  27.0  |  0.72    Ca    8.8      27 Apr 2017 07:40    TPro  8.9<H>  /  Alb  3.6  /  TBili  0.3<L>  /  DBili  x   /  AST  32  /  ALT  33  /  AlkPhos  60  04-26    PT/INR - ( 26 Apr 2017 20:46 )   PT: 12.6 sec;   INR: 1.14 ratio               RADIOLOGY & ADDITIONAL STUDIES:

## 2017-04-28 NOTE — PROGRESS NOTE ADULT - SUBJECTIVE AND OBJECTIVE BOX
[facility]  INFECTIOUS DISEASES AND INTERNAL MEDICINE OF [place]  =======================================================  Raoul Elizabeth MD  Diplomates American Board of Internal Medicine and Infectious Diseases  =======================================================    RANDY MARTINEZ     Follow up cellulitis    c/o pain b/l LE      Allergies:  No Known Allergies      Medications:  ceFAZolin   IVPB 1000milliGRAM(s) IV Intermittent every 8 hours  clindamycin IVPB 600milliGRAM(s) IV Intermittent every 8 hours       REVIEW OF SYSTEMS:  CONSTITUTIONAL:  No fevers or chills  HEENT:  No diplopia or blurred vision.  No earache, sore throat or runny nose.  CARDIOVASCULAR:  No pressure, squeezing, strangling, tightness, heaviness or aching about the chest, neck, axilla or epigastrium.  RESPIRATORY:  No cough, shortness of breath  GASTROINTESTINAL:  No nausea, vomiting or diarrhea.  GENITOURINARY:  No dysuria, frequency or urgency.   MUSCULOSKELETAL:  no joint aches, no muscle pain  SKIN:  B/L LE swelling and small ulcers and pain  NEUROLOGIC:  No paresthesias, fasciculations, seizures or weakness.  PSYCHIATRIC:  No disorder of thought or mood.  ENDOCRINE:  No heat or cold intolerance, polyuria or polydipsia.  HEMATOLOGICAL:  No easy bruising or bleeding.       Physical Exam:  Vital Signs Last 24 Hrs  T(C): 36.8, Max: 36.8 (04-27 @ 19:50)  T(F): 98.3, Max: 98.3 (04-28 @ 08:31)  HR: 82 (78 - 90)  BP: 152/72 (126/58 - 165/95)  RR: 18 (18 - 19)  SpO2: 98% (98% - 100%)    GEN: NAD, pleasant  HEENT: normocephalic and atraumatic. EOMI. PERRL.    NECK: Supple.   LUNGS: Clear to auscultation.  HEART: Regular rate and rhythm without murmur.  ABDOMEN: Soft, nontender, and Obese.  Positive bowel sounds.    : No CVA tenderness  EXTREMITIES: B/L LE with weeping ulcers, Decreased Swelling, Decreased erythema, Decreased warmth  MSK: No joint swelling  NEUROLOGIC: Cranial nerves II through XII are grossly intact.  PSYCHIATRIC: Appropriate affect .  SKIN: B/L LE small ulcers      Labs:  04-27    138  |  97<L>  |  14.0  ----------------------------<  102  3.5   |  27.0  |  0.72    Ca    8.8      27 Apr 2017 07:40    TPro  8.9<H>  /  Alb  3.6  /  TBili  0.3<L>  /  DBili  x   /  AST  32  /  ALT  33  /  AlkPhos  60  04-26                          10.6   9.8   )-----------( 276      ( 27 Apr 2017 07:40 )             33.7       PT/INR - ( 26 Apr 2017 20:46 )   PT: 12.6 sec;   INR: 1.14 ratio        LIVER FUNCTIONS - ( 26 Apr 2017 20:46 )  Alb: 3.6 g/dL / Pro: 8.9 g/dL / ALK PHOS: 60 U/L / ALT: 33 U/L / AST: 32 U/L / GGT: x           CARDIAC MARKERS ( 26 Apr 2017 20:46 )  x     / <0.01 ng/mL / 186 U/L / x     / 3.1 ng/mL        EXAM:  US DPLX LWR EXT VEINS COMPL BI                        PROCEDURE DATE:  04/27/2017    INTERPRETATION:  Ultrasound of the lower extremity deep venous system       CLINICAL INFORMATION:  Bilateral lower extremity swelling., evaluate for   deep venous thrombosis.  TECHNIQUE:   Duplex ultrasonography with color and spectral doppler of   the bilateral lower extremity deep venous system was performed.  FINDINGS:    No previous examinations are  available for review.  The bilateral lower extremity deep venous system demonstrated no   abnormality.  The veins were patent with intact Doppler flow.  The flow   varied with respiration and augmented with distal calf compression.  The   veins were directly compressible by the ultrasound transducer.     The veins evaluated included the common femoral vein, the inflow of the   greater saphenous vein, the inflow of the deep femoral vein, the   superficial femoral vein, the popliteal vein and posterior tibial veins.    IMPRESSION:   Unremarkable ultrasound of the bilateral lower extremity   deep venous system.

## 2017-04-29 PROCEDURE — 99232 SBSQ HOSP IP/OBS MODERATE 35: CPT

## 2017-04-29 RX ADMIN — MUPIROCIN 1 APPLICATION(S): 20 OINTMENT TOPICAL at 12:36

## 2017-04-29 RX ADMIN — HYDROMORPHONE HYDROCHLORIDE 4 MILLIGRAM(S): 2 INJECTION INTRAMUSCULAR; INTRAVENOUS; SUBCUTANEOUS at 13:15

## 2017-04-29 RX ADMIN — Medication 100 MILLIGRAM(S): at 22:02

## 2017-04-29 RX ADMIN — METHADONE HYDROCHLORIDE 5 MILLIGRAM(S): 40 TABLET ORAL at 12:36

## 2017-04-29 RX ADMIN — ENOXAPARIN SODIUM 40 MILLIGRAM(S): 100 INJECTION SUBCUTANEOUS at 12:36

## 2017-04-29 RX ADMIN — HYDROMORPHONE HYDROCHLORIDE 4 MILLIGRAM(S): 2 INJECTION INTRAMUSCULAR; INTRAVENOUS; SUBCUTANEOUS at 12:36

## 2017-04-29 RX ADMIN — HYDROMORPHONE HYDROCHLORIDE 4 MILLIGRAM(S): 2 INJECTION INTRAMUSCULAR; INTRAVENOUS; SUBCUTANEOUS at 17:06

## 2017-04-29 RX ADMIN — Medication 1 TABLET(S): at 17:06

## 2017-04-29 RX ADMIN — LISINOPRIL 2.5 MILLIGRAM(S): 2.5 TABLET ORAL at 05:13

## 2017-04-29 RX ADMIN — Medication 2: at 08:25

## 2017-04-29 RX ADMIN — METHADONE HYDROCHLORIDE 5 MILLIGRAM(S): 40 TABLET ORAL at 17:09

## 2017-04-29 RX ADMIN — HYDROMORPHONE HYDROCHLORIDE 4 MILLIGRAM(S): 2 INJECTION INTRAMUSCULAR; INTRAVENOUS; SUBCUTANEOUS at 17:50

## 2017-04-29 RX ADMIN — HYDROMORPHONE HYDROCHLORIDE 4 MILLIGRAM(S): 2 INJECTION INTRAMUSCULAR; INTRAVENOUS; SUBCUTANEOUS at 08:22

## 2017-04-29 RX ADMIN — Medication 1 TABLET(S): at 08:22

## 2017-04-29 RX ADMIN — Medication 100 MILLIGRAM(S): at 05:13

## 2017-04-29 RX ADMIN — METHADONE HYDROCHLORIDE 5 MILLIGRAM(S): 40 TABLET ORAL at 06:28

## 2017-04-29 RX ADMIN — NYSTATIN CREAM 1 APPLICATION(S): 100000 CREAM TOPICAL at 05:13

## 2017-04-29 RX ADMIN — Medication 100 MILLIGRAM(S): at 15:20

## 2017-04-29 RX ADMIN — Medication 2: at 12:35

## 2017-04-29 RX ADMIN — HYDROMORPHONE HYDROCHLORIDE 4 MILLIGRAM(S): 2 INJECTION INTRAMUSCULAR; INTRAVENOUS; SUBCUTANEOUS at 09:00

## 2017-04-29 RX ADMIN — METHADONE HYDROCHLORIDE 5 MILLIGRAM(S): 40 TABLET ORAL at 01:53

## 2017-04-29 RX ADMIN — Medication 1 TABLET(S): at 12:36

## 2017-04-29 RX ADMIN — Medication 100 MILLIGRAM(S): at 05:12

## 2017-04-29 RX ADMIN — NYSTATIN CREAM 1 APPLICATION(S): 100000 CREAM TOPICAL at 17:12

## 2017-04-29 RX ADMIN — Medication 2: at 17:07

## 2017-04-29 NOTE — PROGRESS NOTE ADULT - SUBJECTIVE AND OBJECTIVE BOX
[facility]  INFECTIOUS DISEASES AND INTERNAL MEDICINE OF [place]  =======================================================  Raoul Elizabeth MD  Diplomates American Board of Internal Medicine and Infectious Diseases  =======================================================    RANDY MARTINEZ     Follow up cellulitis  CLINICALLY IMPROVED    c/o pain b/l LE      Allergies:  No Known Allergies      Medications:  ceFAZolin   IVPB 1000milliGRAM(s) IV Intermittent every 8 hours  clindamycin IVPB 600milliGRAM(s) IV Intermittent every 8 hours       REVIEW OF SYSTEMS:  CONSTITUTIONAL:  No fevers or chills  HEENT:  No diplopia or blurred vision.  No earache, sore throat or runny nose.  CARDIOVASCULAR:  No pressure, squeezing, strangling, tightness, heaviness or aching about the chest, neck, axilla or epigastrium.  RESPIRATORY:  No cough, shortness of breath  GASTROINTESTINAL:  No nausea, vomiting or diarrhea.  GENITOURINARY:  No dysuria, frequency or urgency.   MUSCULOSKELETAL:  no joint aches, no muscle pain  SKIN:  B/L LE swelling and small ulcers and pain  NEUROLOGIC:  No paresthesias, fasciculations, seizures or weakness.  PSYCHIATRIC:  No disorder of thought or mood.  ENDOCRINE:  No heat or cold intolerance, polyuria or polydipsia.  HEMATOLOGICAL:  No easy bruising or bleeding.       Physical Exam:  Vital Signs Last 24 Hrs  T(C): 36.8, Max: 36.8 (04-27 @ 19:50)  T(F): 98.3, Max: 98.3 (04-28 @ 08:31)  HR: 82 (78 - 90)  BP: 152/72 (126/58 - 165/95)  RR: 18 (18 - 19)  SpO2: 98% (98% - 100%)    GEN: NAD, pleasant  HEENT: normocephalic and atraumatic. EOMI. PERRL.    NECK: Supple.   LUNGS: Clear to auscultation.  HEART: Regular rate and rhythm without murmur.  ABDOMEN: Soft, nontender, and Obese.  Positive bowel sounds.    : No CVA tenderness  EXTREMITIES: B/L LE with weeping ulcers, Decreased Swelling, Decreased erythema, Decreased warmth  MSK: No joint swelling  NEUROLOGIC: Cranial nerves II through XII are grossly intact.  PSYCHIATRIC: Appropriate affect .  SKIN: B/L LE small ulcers      Labs:  04-27    138  |  97<L>  |  14.0  ----------------------------<  102  3.5   |  27.0  |  0.72    Ca    8.8      27 Apr 2017 07:40    TPro  8.9<H>  /  Alb  3.6  /  TBili  0.3<L>  /  DBili  x   /  AST  32  /  ALT  33  /  AlkPhos  60  04-26                          10.6   9.8   )-----------( 276      ( 27 Apr 2017 07:40 )             33.7       PT/INR - ( 26 Apr 2017 20:46 )   PT: 12.6 sec;   INR: 1.14 ratio        LIVER FUNCTIONS - ( 26 Apr 2017 20:46 )  Alb: 3.6 g/dL / Pro: 8.9 g/dL / ALK PHOS: 60 U/L / ALT: 33 U/L / AST: 32 U/L / GGT: x           CARDIAC MARKERS ( 26 Apr 2017 20:46 )  x     / <0.01 ng/mL / 186 U/L / x     / 3.1 ng/mL        EXAM:  US DPLX LWR EXT VEINS COMPL BI                        PROCEDURE DATE:  04/27/2017    INTERPRETATION:  Ultrasound of the lower extremity deep venous system       CLINICAL INFORMATION:  Bilateral lower extremity swelling., evaluate for   deep venous thrombosis.  TECHNIQUE:   Duplex ultrasonography with color and spectral doppler of   the bilateral lower extremity deep venous system was performed.  FINDINGS:    No previous examinations are  available for review.  The bilateral lower extremity deep venous system demonstrated no   abnormality.  The veins were patent with intact Doppler flow.  The flow   varied with respiration and augmented with distal calf compression.  The   veins were directly compressible by the ultrasound transducer.     The veins evaluated included the common femoral vein, the inflow of the   greater saphenous vein, the inflow of the deep femoral vein, the   superficial femoral vein, the popliteal vein and posterior tibial veins.    IMPRESSION:   Unremarkable ultrasound of the bilateral lower extremity   deep venous system.

## 2017-04-29 NOTE — DIETITIAN INITIAL EVALUATION ADULT. - OTHER INFO
Pt admitted for leg cellulitis. Pt consulted for BMI >40. Pt declined to speak with RD at this time. Made aware RD remains available. Pt tolerating cons cho, dash diet with good 100% PO intake.

## 2017-04-30 VITALS
HEART RATE: 88 BPM | SYSTOLIC BLOOD PRESSURE: 138 MMHG | RESPIRATION RATE: 18 BRPM | DIASTOLIC BLOOD PRESSURE: 72 MMHG | TEMPERATURE: 98 F | OXYGEN SATURATION: 95 %

## 2017-04-30 RX ORDER — CEPHALEXIN 500 MG
500 CAPSULE ORAL
Qty: 0 | Refills: 0 | Status: DISCONTINUED | OUTPATIENT
Start: 2017-04-30 | End: 2017-04-30

## 2017-04-30 RX ORDER — CEPHALEXIN 500 MG
1 CAPSULE ORAL
Qty: 0 | Refills: 0 | COMMUNITY
Start: 2017-04-30

## 2017-04-30 RX ORDER — LISINOPRIL 2.5 MG/1
1 TABLET ORAL
Qty: 0 | Refills: 0 | COMMUNITY
Start: 2017-04-30

## 2017-04-30 RX ORDER — NYSTATIN CREAM 100000 [USP'U]/G
1 CREAM TOPICAL
Qty: 0 | Refills: 0 | COMMUNITY
Start: 2017-04-30

## 2017-04-30 RX ORDER — HYDROMORPHONE HYDROCHLORIDE 2 MG/ML
1 INJECTION INTRAMUSCULAR; INTRAVENOUS; SUBCUTANEOUS
Qty: 0 | Refills: 0 | COMMUNITY
Start: 2017-04-30

## 2017-04-30 RX ORDER — MUPIROCIN 20 MG/G
1 OINTMENT TOPICAL
Qty: 0 | Refills: 0 | COMMUNITY
Start: 2017-04-30

## 2017-04-30 RX ADMIN — METHADONE HYDROCHLORIDE 5 MILLIGRAM(S): 40 TABLET ORAL at 00:01

## 2017-04-30 RX ADMIN — Medication 100 MILLIGRAM(S): at 06:03

## 2017-04-30 RX ADMIN — MUPIROCIN 1 APPLICATION(S): 20 OINTMENT TOPICAL at 12:28

## 2017-04-30 RX ADMIN — Medication 1 TABLET(S): at 12:28

## 2017-04-30 RX ADMIN — LISINOPRIL 2.5 MILLIGRAM(S): 2.5 TABLET ORAL at 06:03

## 2017-04-30 RX ADMIN — HYDROMORPHONE HYDROCHLORIDE 4 MILLIGRAM(S): 2 INJECTION INTRAMUSCULAR; INTRAVENOUS; SUBCUTANEOUS at 12:27

## 2017-04-30 RX ADMIN — Medication 1 TABLET(S): at 08:23

## 2017-04-30 RX ADMIN — HYDROMORPHONE HYDROCHLORIDE 4 MILLIGRAM(S): 2 INJECTION INTRAMUSCULAR; INTRAVENOUS; SUBCUTANEOUS at 09:15

## 2017-04-30 RX ADMIN — Medication 500 MILLIGRAM(S): at 14:17

## 2017-04-30 RX ADMIN — METHADONE HYDROCHLORIDE 5 MILLIGRAM(S): 40 TABLET ORAL at 12:27

## 2017-04-30 RX ADMIN — HYDROMORPHONE HYDROCHLORIDE 4 MILLIGRAM(S): 2 INJECTION INTRAMUSCULAR; INTRAVENOUS; SUBCUTANEOUS at 13:15

## 2017-04-30 RX ADMIN — METHADONE HYDROCHLORIDE 5 MILLIGRAM(S): 40 TABLET ORAL at 06:03

## 2017-04-30 RX ADMIN — ENOXAPARIN SODIUM 40 MILLIGRAM(S): 100 INJECTION SUBCUTANEOUS at 12:28

## 2017-04-30 RX ADMIN — Medication 2: at 12:28

## 2017-04-30 RX ADMIN — NYSTATIN CREAM 1 APPLICATION(S): 100000 CREAM TOPICAL at 05:55

## 2017-04-30 RX ADMIN — HYDROMORPHONE HYDROCHLORIDE 4 MILLIGRAM(S): 2 INJECTION INTRAMUSCULAR; INTRAVENOUS; SUBCUTANEOUS at 08:23

## 2017-04-30 NOTE — PROGRESS NOTE ADULT - SUBJECTIVE AND OBJECTIVE BOX
HPI:  60 y/o morbidly obese male with pmh of DM , ankylosing spondylosis , lupus was sent in by pcp for his b//l LE wounds. As per pt. his wounds started about 2 weeks ago as blisters and now they are weeping. pt. had no fever. no chills. no leg pains. no cp. no sob. no abd. pain no n/v. pt. denies any trauma to his lower ext. (26 Apr 2017 22:58)    Male  PAST MEDICAL & SURGICAL HISTORY:  Ankylosing spondylitis of site in spine  Lupus  Diabetes, using on ad off insulin,, no insulin  for the last  3 months,  insurance coverage issues.  History of hip replacement, total, left hip replacement  chronic pain, attending to  [facility], pain management ctr.      REVIEW OF SYSTEMS      General:  morbid obesity.	    Skin/Breast:  recurrent bilateral  lower extremities cellulitis,   	  Ophthalmologic:  refraction disorder.  	  ENMT:	normal     Respiratory and Thorax:  normal   	  Cardiovascular:	negative, never indication of coronary artery disease.    Gastrointestinal:	 negative.    Genitourinary:	negative     Musculoskeletal:	morbid obesity.    Neurological:	negative.    Psychiatric:	negative     Hematology/Lymphatics:	negative     Endocrine: obesity, diabetes 	    Allergic/Immunologic:	negative     MEDICATIONS  (STANDING):  ceFAZolin   IVPB 1000milliGRAM(s) IV Intermittent every 8 hours  insulin lispro (HumaLOG) corrective regimen sliding scale  SubCutaneous three times a day before meals  insulin lispro (HumaLOG) corrective regimen sliding scale  SubCutaneous at bedtime  dextrose 5%. 1000milliLiter(s) IV Continuous <Continuous>  dextrose 50% Injectable 12.5Gram(s) IV Push once  dextrose 50% Injectable 25Gram(s) IV Push once  dextrose 50% Injectable 25Gram(s) IV Push once  enoxaparin Injectable 40milliGRAM(s) SubCutaneous daily  lactobacillus acidophilus 1Tablet(s) Oral three times a day with meals  lisinopril 2.5milliGRAM(s) Oral daily  nystatin Cream 1Application(s) Topical two times a day  methadone 5milliGRAM(s) Oral four times a day  HYDROmorphone   Tablet 4milliGRAM(s) Oral three times a day with meals  mupirocin 2% Ointment 1Application(s) Topical daily    MEDICATIONS  (PRN):  dextrose Gel 1Dose(s) Oral once PRN Blood Glucose LESS THAN 70 milliGRAM(s)/deciliter  glucagon  Injectable 1milliGRAM(s) IntraMuscular once PRN Glucose LESS THAN 70 milligrams/deciliter      Allergies    No Known Allergies    Intolerances,  none         SOCIAL HISTORY:  ,  lives with wife, self employed, financial hardship.    FAMILY HISTORY:  No pertinent family history      Vital Signs Last 24 Hrs  T(C): 36.4, Max: 37 (04-29 @ 16:55)  T(F): 97.6, Max: 98.6 (04-29 @ 16:55)  HR: 81 (81 - 93)  BP: 141/89 (120/76 - 141/89)  BP(mean): --  RR: 18 (18 - 20)  SpO2: 98% (97% - 98%)    PHYSICAL EXAM:      Constitutional:  obesity, acutely ill,  awake, alert oriented, in pain, low back pain, left hip pain, lower extremities pain.    Eyes: refractory error.    ENMT: normal     Neck:  veins flat,  full ROM,  thyroid normal.    Breasts: normal.    Back: lumbar spine  limited ROM, painful,     Respiratory: normal.    Cardiovascular:  normal.    Gastrointestinal:  normal.    Genitourinary: normal.     Rectal:    Extremities: bilateral lower extremities bilateral cellulitis, persistent edema, no signs of saphenous femoral insuffiencey negative DVT, lymph edema,  cellitis changes improving,  still oozing    Vascular: normal    Neurological:  normal     Skin: lower extremities, cellulitis changes, chronic  changes of skin. hypertrophic.     Lymph Nodes:  negative     Musculoskeletal:   obesity, morbid obesity.    Psychiatric:  normal         LABS:                RADIOLOGY & ADDITIONAL STUDIES:

## 2017-04-30 NOTE — PROGRESS NOTE ADULT - ASSESSMENT
cellulitis improving,  ID note acknowledged discussed with ID,  appreciated consultation and follow up.  diabetes controlled.  foot, tinea  pedis, improving  s/p left hip replacement, no sign of infection, pain controlled.  lumbar spondylosis, pain controlled.  morbid obesity.

## 2017-04-30 NOTE — DISCHARGE NOTE ADULT - MEDICATION SUMMARY - MEDICATIONS TO TAKE
I will START or STAY ON the medications listed below when I get home from the hospital:    HYDROmorphone 4 mg oral tablet  -- 1 tab(s) by mouth 3 times a day (with meals)  -- Indication: For pain control / management     methadone 5 mg oral tablet  --  by mouth   -- Indication: For pain control     lisinopril 2.5 mg oral tablet  -- 1 tab(s) by mouth once a day  -- Indication: For Cardiovascular    metFORMIN  --  by mouth   -- Indication: For Metabolic agent     cephalexin 500 mg oral capsule  -- 1 cap(s) by mouth 4 times a day  -- Indication: For Antibiotic    mupirocin 2% topical ointment  -- 1 application on skin once a day  -- Indication: For Topical agent    nystatin 100,000 units/g topical cream  -- 1 application on skin 2 times a day  -- Indication: For Topical agent

## 2017-04-30 NOTE — DISCHARGE NOTE ADULT - CARE PLAN
Principal Discharge DX:	Cellulitis of lower leg  Goal:	take oral antibiotics for 10 days.  Instructions for follow-up, activity and diet:	continue with diabetes diet,   lower extremities elevated.  Secondary Diagnosis:	Diabetes  Goal:	continue oral metformin  Instructions for follow-up, activity and diet:	monitor your glucose daily.

## 2017-04-30 NOTE — DISCHARGE NOTE ADULT - HOSPITAL COURSE
admitted with history of lower extremities infection and plantar ulcer,  podiatrist and vascular consultation called, no indication of DVT, or saphenofemoral insuficency  edema secondary to lymphedema and infection.  initiated  Ancef IV and Clindamycin  bacteremia work up initiated was negative after 48 hours.  responded  well to this empiric  treatment,   today discussed with ID, and will be discharged on oral Keflex for  10 days.

## 2017-04-30 NOTE — PROGRESS NOTE ADULT - PROBLEM SELECTOR PROBLEM 3
Type 2 diabetes mellitus without complication, without long-term current use of insulin
Ankylosing spondylitis of site in spine
Cellulitis of lower leg
Type 2 diabetes mellitus without complication, without long-term current use of insulin

## 2017-04-30 NOTE — DISCHARGE NOTE ADULT - CARE PROVIDER_API CALL
Uziel Walker), Family Medicine  [address] [place] [address]  Phone: [phone]  Fax: [phone]

## 2017-04-30 NOTE — DISCHARGE NOTE ADULT - PLAN OF CARE
take oral antibiotics for 10 days. continue with diabetes diet,   lower extremities elevated. continue oral metformin monitor your glucose daily.

## 2017-04-30 NOTE — PROGRESS NOTE ADULT - PROBLEM SELECTOR PLAN 1
Much improvement   Continue Cefazolin   CAN D/C CLINDA  Blood cultures no growth x 24hours  Vascular surgery consult noted   Anticipate 7 days of antibiotics.
will continue IV antibiotic as indicated  by ID,  7 days
Much improvement   Continue Cefazolin  Continue Clindamycin to decrease toxin production till April 29  Blood cultures no growth x 24hours  Vascular surgery consult noted   Anticipate 7 days of antibiotics.
continue present care  of Ancef and Clindamycin.  appreciated  ID consultation.
diabetes controlled, no insulin on board, continue monitoring

## 2017-04-30 NOTE — PROGRESS NOTE ADULT - PROBLEM SELECTOR PLAN 3
Continue Dm control to prevent infections.
Continue Dm control to prevent infections.
continue Ancef IV antibiotic.  tinea pedis, continue nystatin.
continue present  care, should implement  PT as out patient to prevent totally disability.

## 2017-04-30 NOTE — DISCHARGE NOTE ADULT - INSTRUCTIONS
no fluid restriction,  low carbohydrate diet. clean lower extremeties daily twice a day, use Bactroban for the open lesion, ulcer  use Nystatin cream in between toes, twice daily.  Medications sent electronically to his pharmacy.

## 2017-04-30 NOTE — PROGRESS NOTE ADULT - PROBLEM SELECTOR PROBLEM 1
Cellulitis of lower leg
Diabetes

## 2017-04-30 NOTE — PROGRESS NOTE ADULT - PROBLEM SELECTOR PROBLEM 2
Tinea pedis of both feet
Diabetes
Ankylosing spondylitis of site in spine
Tinea pedis of both feet
Type 2 diabetes mellitus without complication, without long-term current use of insulin

## 2017-05-01 LAB
CULTURE RESULTS: SIGNIFICANT CHANGE UP
CULTURE RESULTS: SIGNIFICANT CHANGE UP
SPECIMEN SOURCE: SIGNIFICANT CHANGE UP
SPECIMEN SOURCE: SIGNIFICANT CHANGE UP

## 2017-05-02 ENCOUNTER — APPOINTMENT (OUTPATIENT)
Dept: INTERNAL MEDICINE | Facility: CLINIC | Age: 62
End: 2017-05-02

## 2017-12-15 PROCEDURE — 80048 BASIC METABOLIC PNL TOTAL CA: CPT

## 2017-12-15 PROCEDURE — 82553 CREATINE MB FRACTION: CPT

## 2017-12-15 PROCEDURE — 93005 ELECTROCARDIOGRAM TRACING: CPT

## 2017-12-15 PROCEDURE — 83036 HEMOGLOBIN GLYCOSYLATED A1C: CPT

## 2017-12-15 PROCEDURE — 83880 ASSAY OF NATRIURETIC PEPTIDE: CPT

## 2017-12-15 PROCEDURE — 84484 ASSAY OF TROPONIN QUANT: CPT

## 2017-12-15 PROCEDURE — 80053 COMPREHEN METABOLIC PANEL: CPT

## 2017-12-15 PROCEDURE — 96374 THER/PROPH/DIAG INJ IV PUSH: CPT

## 2017-12-15 PROCEDURE — 99285 EMERGENCY DEPT VISIT HI MDM: CPT | Mod: 25

## 2017-12-15 PROCEDURE — 71045 X-RAY EXAM CHEST 1 VIEW: CPT

## 2017-12-15 PROCEDURE — 83605 ASSAY OF LACTIC ACID: CPT

## 2017-12-15 PROCEDURE — 85610 PROTHROMBIN TIME: CPT

## 2017-12-15 PROCEDURE — 85027 COMPLETE CBC AUTOMATED: CPT

## 2017-12-15 PROCEDURE — 82550 ASSAY OF CK (CPK): CPT

## 2017-12-15 PROCEDURE — 36415 COLL VENOUS BLD VENIPUNCTURE: CPT

## 2017-12-15 PROCEDURE — 83690 ASSAY OF LIPASE: CPT

## 2017-12-15 PROCEDURE — 93970 EXTREMITY STUDY: CPT

## 2017-12-15 PROCEDURE — 87040 BLOOD CULTURE FOR BACTERIA: CPT

## 2018-02-13 ENCOUNTER — APPOINTMENT (OUTPATIENT)
Dept: VASCULAR SURGERY | Facility: CLINIC | Age: 63
End: 2018-02-13
Payer: MEDICAID

## 2018-02-13 VITALS
OXYGEN SATURATION: 97 % | HEIGHT: 65.5 IN | SYSTOLIC BLOOD PRESSURE: 129 MMHG | RESPIRATION RATE: 16 BRPM | BODY MASS INDEX: 51.85 KG/M2 | DIASTOLIC BLOOD PRESSURE: 54 MMHG | TEMPERATURE: 97.6 F | HEART RATE: 88 BPM | WEIGHT: 315 LBS

## 2018-02-13 DIAGNOSIS — Z82.61 FAMILY HISTORY OF ARTHRITIS: ICD-10-CM

## 2018-02-13 DIAGNOSIS — Z82.49 FAMILY HISTORY OF ISCHEMIC HEART DISEASE AND OTHER DISEASES OF THE CIRCULATORY SYSTEM: ICD-10-CM

## 2018-02-13 PROCEDURE — 99203 OFFICE O/P NEW LOW 30 MIN: CPT

## 2018-02-13 PROCEDURE — 93970 EXTREMITY STUDY: CPT

## 2018-02-25 PROBLEM — Z82.61 FAMILY HISTORY OF ARTHRITIS: Status: ACTIVE | Noted: 2018-02-13

## 2018-02-25 PROBLEM — Z82.49 FAMILY HISTORY OF HYPERTENSION: Status: ACTIVE | Noted: 2018-02-13

## 2018-02-27 ENCOUNTER — APPOINTMENT (OUTPATIENT)
Dept: VASCULAR SURGERY | Facility: CLINIC | Age: 63
End: 2018-02-27
Payer: MEDICAID

## 2018-02-27 VITALS
BODY MASS INDEX: 51.85 KG/M2 | OXYGEN SATURATION: 94 % | DIASTOLIC BLOOD PRESSURE: 83 MMHG | WEIGHT: 315 LBS | HEART RATE: 80 BPM | SYSTOLIC BLOOD PRESSURE: 138 MMHG | RESPIRATION RATE: 16 BRPM | TEMPERATURE: 98.2 F | HEIGHT: 65.5 IN

## 2018-02-27 PROCEDURE — 99215 OFFICE O/P EST HI 40 MIN: CPT

## 2019-02-01 ENCOUNTER — OUTPATIENT (OUTPATIENT)
Dept: OUTPATIENT SERVICES | Facility: HOSPITAL | Age: 64
LOS: 1 days | End: 2019-02-01
Payer: MEDICAID

## 2019-02-01 ENCOUNTER — INPATIENT (INPATIENT)
Facility: HOSPITAL | Age: 64
LOS: 4 days | Discharge: HOME | DRG: 603 | End: 2019-02-06
Attending: FAMILY MEDICINE | Admitting: GENERAL ACUTE CARE HOSPITAL
Payer: MEDICAID

## 2019-02-01 VITALS — HEIGHT: 66 IN | WEIGHT: 285.06 LBS

## 2019-02-01 DIAGNOSIS — L03.115 CELLULITIS OF RIGHT LOWER LIMB: ICD-10-CM

## 2019-02-01 DIAGNOSIS — Z96.642 PRESENCE OF LEFT ARTIFICIAL HIP JOINT: Chronic | ICD-10-CM

## 2019-02-01 LAB
ALBUMIN SERPL ELPH-MCNC: 3.5 G/DL — SIGNIFICANT CHANGE UP (ref 3.3–5.2)
ALP SERPL-CCNC: 76 U/L — SIGNIFICANT CHANGE UP (ref 40–120)
ALT FLD-CCNC: 27 U/L — SIGNIFICANT CHANGE UP
ANION GAP SERPL CALC-SCNC: 10 MMOL/L — SIGNIFICANT CHANGE UP (ref 5–17)
AST SERPL-CCNC: 24 U/L — SIGNIFICANT CHANGE UP
BASOPHILS # BLD AUTO: 0 K/UL — SIGNIFICANT CHANGE UP (ref 0–0.2)
BASOPHILS NFR BLD AUTO: 0.6 % — SIGNIFICANT CHANGE UP (ref 0–2)
BILIRUB SERPL-MCNC: 0.4 MG/DL — SIGNIFICANT CHANGE UP (ref 0.4–2)
BUN SERPL-MCNC: 22 MG/DL — HIGH (ref 8–20)
CALCIUM SERPL-MCNC: 8.8 MG/DL — SIGNIFICANT CHANGE UP (ref 8.6–10.2)
CHLORIDE SERPL-SCNC: 98 MMOL/L — SIGNIFICANT CHANGE UP (ref 98–107)
CO2 SERPL-SCNC: 28 MMOL/L — SIGNIFICANT CHANGE UP (ref 22–29)
CREAT SERPL-MCNC: 0.71 MG/DL — SIGNIFICANT CHANGE UP (ref 0.5–1.3)
EOSINOPHIL # BLD AUTO: 0.2 K/UL — SIGNIFICANT CHANGE UP (ref 0–0.5)
EOSINOPHIL NFR BLD AUTO: 2.2 % — SIGNIFICANT CHANGE UP (ref 0–5)
GLUCOSE BLDC GLUCOMTR-MCNC: 132 MG/DL — HIGH (ref 70–99)
GLUCOSE SERPL-MCNC: 157 MG/DL — HIGH (ref 70–115)
HBA1C BLD-MCNC: 7.8 % — HIGH (ref 4–5.6)
HCT VFR BLD CALC: 33.9 % — LOW (ref 42–52)
HGB BLD-MCNC: 10.3 G/DL — LOW (ref 14–18)
LACTATE BLDV-MCNC: 1.5 MMOL/L — SIGNIFICANT CHANGE UP (ref 0.5–2)
LYMPHOCYTES # BLD AUTO: 2 K/UL — SIGNIFICANT CHANGE UP (ref 1–4.8)
LYMPHOCYTES # BLD AUTO: 26.3 % — SIGNIFICANT CHANGE UP (ref 20–55)
MCHC RBC-ENTMCNC: 25.2 PG — LOW (ref 27–31)
MCHC RBC-ENTMCNC: 30.4 G/DL — LOW (ref 32–36)
MCV RBC AUTO: 83.1 FL — SIGNIFICANT CHANGE UP (ref 80–94)
MONOCYTES # BLD AUTO: 0.5 K/UL — SIGNIFICANT CHANGE UP (ref 0–0.8)
MONOCYTES NFR BLD AUTO: 6.8 % — SIGNIFICANT CHANGE UP (ref 3–10)
NEUTROPHILS # BLD AUTO: 5 K/UL — SIGNIFICANT CHANGE UP (ref 1.8–8)
NEUTROPHILS NFR BLD AUTO: 63.8 % — SIGNIFICANT CHANGE UP (ref 37–73)
PLATELET # BLD AUTO: 299 K/UL — SIGNIFICANT CHANGE UP (ref 150–400)
POTASSIUM SERPL-MCNC: 4.4 MMOL/L — SIGNIFICANT CHANGE UP (ref 3.5–5.3)
POTASSIUM SERPL-SCNC: 4.4 MMOL/L — SIGNIFICANT CHANGE UP (ref 3.5–5.3)
PROT SERPL-MCNC: 8.7 G/DL — SIGNIFICANT CHANGE UP (ref 6.6–8.7)
RBC # BLD: 4.08 M/UL — LOW (ref 4.6–6.2)
RBC # FLD: 18.3 % — HIGH (ref 11–15.6)
SODIUM SERPL-SCNC: 136 MMOL/L — SIGNIFICANT CHANGE UP (ref 135–145)
WBC # BLD: 7.8 K/UL — SIGNIFICANT CHANGE UP (ref 4.8–10.8)
WBC # FLD AUTO: 7.8 K/UL — SIGNIFICANT CHANGE UP (ref 4.8–10.8)

## 2019-02-01 PROCEDURE — 93971 EXTREMITY STUDY: CPT | Mod: 26,RT

## 2019-02-01 PROCEDURE — 99285 EMERGENCY DEPT VISIT HI MDM: CPT

## 2019-02-01 RX ORDER — METHADONE HYDROCHLORIDE 40 MG/1
5 TABLET ORAL DAILY
Qty: 0 | Refills: 0 | Status: DISCONTINUED | OUTPATIENT
Start: 2019-02-01 | End: 2019-02-06

## 2019-02-01 RX ORDER — VANCOMYCIN HCL 1 G
1000 VIAL (EA) INTRAVENOUS EVERY 12 HOURS
Qty: 0 | Refills: 0 | Status: DISCONTINUED | OUTPATIENT
Start: 2019-02-01 | End: 2019-02-03

## 2019-02-01 RX ORDER — SODIUM CHLORIDE 9 MG/ML
1000 INJECTION, SOLUTION INTRAVENOUS
Qty: 0 | Refills: 0 | Status: DISCONTINUED | OUTPATIENT
Start: 2019-02-01 | End: 2019-02-06

## 2019-02-01 RX ORDER — PIPERACILLIN AND TAZOBACTAM 4; .5 G/20ML; G/20ML
3.38 INJECTION, POWDER, LYOPHILIZED, FOR SOLUTION INTRAVENOUS EVERY 8 HOURS
Qty: 0 | Refills: 0 | Status: DISCONTINUED | OUTPATIENT
Start: 2019-02-01 | End: 2019-02-03

## 2019-02-01 RX ORDER — HYDROMORPHONE HYDROCHLORIDE 2 MG/ML
4 INJECTION INTRAMUSCULAR; INTRAVENOUS; SUBCUTANEOUS THREE TIMES A DAY
Qty: 0 | Refills: 0 | Status: DISCONTINUED | OUTPATIENT
Start: 2019-02-01 | End: 2019-02-06

## 2019-02-01 RX ORDER — DEXTROSE 50 % IN WATER 50 %
25 SYRINGE (ML) INTRAVENOUS ONCE
Qty: 0 | Refills: 0 | Status: DISCONTINUED | OUTPATIENT
Start: 2019-02-01 | End: 2019-02-06

## 2019-02-01 RX ORDER — VANCOMYCIN HCL 1 G
1000 VIAL (EA) INTRAVENOUS ONCE
Qty: 0 | Refills: 0 | Status: COMPLETED | OUTPATIENT
Start: 2019-02-01 | End: 2019-02-01

## 2019-02-01 RX ORDER — LISINOPRIL 2.5 MG/1
2.5 TABLET ORAL DAILY
Qty: 0 | Refills: 0 | Status: DISCONTINUED | OUTPATIENT
Start: 2019-02-01 | End: 2019-02-06

## 2019-02-01 RX ORDER — ENOXAPARIN SODIUM 100 MG/ML
40 INJECTION SUBCUTANEOUS DAILY
Qty: 0 | Refills: 0 | Status: DISCONTINUED | OUTPATIENT
Start: 2019-02-01 | End: 2019-02-06

## 2019-02-01 RX ORDER — DEXTROSE 50 % IN WATER 50 %
12.5 SYRINGE (ML) INTRAVENOUS ONCE
Qty: 0 | Refills: 0 | Status: DISCONTINUED | OUTPATIENT
Start: 2019-02-01 | End: 2019-02-06

## 2019-02-01 RX ORDER — DEXTROSE 50 % IN WATER 50 %
15 SYRINGE (ML) INTRAVENOUS ONCE
Qty: 0 | Refills: 0 | Status: DISCONTINUED | OUTPATIENT
Start: 2019-02-01 | End: 2019-02-06

## 2019-02-01 RX ORDER — GLUCAGON INJECTION, SOLUTION 0.5 MG/.1ML
1 INJECTION, SOLUTION SUBCUTANEOUS ONCE
Qty: 0 | Refills: 0 | Status: DISCONTINUED | OUTPATIENT
Start: 2019-02-01 | End: 2019-02-06

## 2019-02-01 RX ORDER — INSULIN LISPRO 100/ML
VIAL (ML) SUBCUTANEOUS
Qty: 0 | Refills: 0 | Status: DISCONTINUED | OUTPATIENT
Start: 2019-02-01 | End: 2019-02-06

## 2019-02-01 RX ORDER — GABAPENTIN 400 MG/1
800 CAPSULE ORAL THREE TIMES A DAY
Qty: 0 | Refills: 0 | Status: DISCONTINUED | OUTPATIENT
Start: 2019-02-01 | End: 2019-02-06

## 2019-02-01 RX ADMIN — Medication 250 MILLIGRAM(S): at 15:52

## 2019-02-01 RX ADMIN — Medication 1000 MILLIGRAM(S): at 16:52

## 2019-02-01 RX ADMIN — PIPERACILLIN AND TAZOBACTAM 25 GRAM(S): 4; .5 INJECTION, POWDER, LYOPHILIZED, FOR SOLUTION INTRAVENOUS at 22:07

## 2019-02-01 RX ADMIN — GABAPENTIN 800 MILLIGRAM(S): 400 CAPSULE ORAL at 22:07

## 2019-02-01 NOTE — ED STATDOCS - MEDICAL DECISION MAKING DETAILS
circumferential, extensive cellulitis, failed outpt, in diabetic pt, will require admisison for IV Abx

## 2019-02-01 NOTE — ED STATDOCS - PROGRESS NOTE DETAILS
64 y/o M with PMHx of DM and lupus presents to the ED chronic leg pain JOHN, onset weeks ago. Pt states pain in the legs but worse in the right leg and mild pain in the left leg. Pt reports going to PMD and told to report to ED for further admission. He reports "oozing in my right leg". Denies fevers, chills. No further complaints at this time.      PMD: Dr. Walker Pt. received from Dr. Foley. Pt. will need to be admitted to hospital for cellulitis. Pt. failed outpatient tx. Case discussed with Dr. Cox(tele-hospitalist).

## 2019-02-01 NOTE — CONSULT NOTE ADULT - ASSESSMENT
63 yr old male with known Morbid obesity, IDDM, Htn, ankylosing spondylitis  presents with few weeks of left leg redness and pain and swelling - cellulitis failed out patietn ABX- Zosyn and vanco- follow cultures  IDDM- FS with HISS  Htn- lisinopril  Hyperlipidemia ? check lipid profile  DVT PPX- Lovenox    history of ankylosing spondylitis- cont pain med from home methadone and oxycodone

## 2019-02-01 NOTE — ED STATDOCS - SKIN, MLM
L venous stasis changes, R leg with circumferential erythema, induration, warmth, drainage form lateral aspect

## 2019-02-01 NOTE — ED STATDOCS - OBJECTIVE STATEMENT
62 y/o M with PMHx of DM and lupus presents to the ED chronic leg pain JOHN, onset weeks ago. Pt states pain in the legs but worse in the right leg and mild pain in the left leg. Pt reports going to PMD and told to report to ED for further admission. He reports "oozing in my right leg". Denies fevers, chills. No further complaints at this time.       PMD: Dr. Walker

## 2019-02-01 NOTE — CONSULT NOTE ADULT - SUBJECTIVE AND OBJECTIVE BOX
REASON FOR Tele-evaluation    SUBJECTIVE: leg redness and swelling     HPI: 63 yr old male with known Morbid obesity, IDDM, Htn, ankylosing spondylitis  presents with few weeks of left leg redness and pain and swelling and oozing of liquid from his left leg. denied any fevr and chills . patient has been taking oral antibiotics started by his PMD over past few days without any improvement in the left redness and   swelling of left leg. no CP, no SOB, patietn had hip surgery several years ago and has chronic back and hip pain that limits his actibity       ROS: negative as per HPI  (***)    T(C): 36.3 (02-01-19 @ 14:17), Max: 36.3 (02-01-19 @ 14:17)  HR: 86 (02-01-19 @ 14:17) (86 - 86)  BP: 131/73 (02-01-19 @ 14:17) (131/73 - 131/73)  RR: 18 (02-01-19 @ 14:17) (18 - 18)  SpO2: 99% (02-01-19 @ 14:17) (99% - 99%)    PHYSICAL EXAM: evaluation precludes physical exam. Pertinent physical exam findings as per video conference with nurse at bedside is as follow: AAO times three , obese , left leg red and swollen                           10.3   7.8   )-----------( 299      ( 01 Feb 2019 16:11 )             33.9   02-01    136  |  98  |  22.0<H>  ----------------------------<  157<H>  4.4   |  28.0  |  0.71    Ca    8.8      01 Feb 2019 16:11    TPro  8.7  /  Alb  3.5  /  TBili  0.4  /  DBili  x   /  AST  24  /  ALT  27  /  AlkPhos  76  02-01          Radiology findings         Medication reconciliation done         Care plan discussed with hospitalist        Survey offered to patient - accepted

## 2019-02-01 NOTE — ED ADULT NURSE NOTE - NSIMPLEMENTINTERV_GEN_ALL_ED
Implemented All Fall Risk Interventions:  [place] to call system. Call bell, personal items and telephone within reach. Instruct patient to call for assistance. Room bathroom lighting operational. Non-slip footwear when patient is off stretcher. Physically safe environment: no spills, clutter or unnecessary equipment. Stretcher in lowest position, wheels locked, appropriate side rails in place. Provide visual cue, wrist band, yellow gown, etc. Monitor gait and stability. Monitor for mental status changes and reorient to person, place, and time. Review medications for side effects contributing to fall risk. Reinforce activity limits and safety measures with patient and family.

## 2019-02-02 DIAGNOSIS — L03.115 CELLULITIS OF RIGHT LOWER LIMB: ICD-10-CM

## 2019-02-02 DIAGNOSIS — E11.9 TYPE 2 DIABETES MELLITUS WITHOUT COMPLICATIONS: ICD-10-CM

## 2019-02-02 LAB
ALBUMIN SERPL ELPH-MCNC: 3.6 G/DL — SIGNIFICANT CHANGE UP (ref 3.3–5.2)
ALP SERPL-CCNC: 76 U/L — SIGNIFICANT CHANGE UP (ref 40–120)
ALT FLD-CCNC: 27 U/L — SIGNIFICANT CHANGE UP
ANION GAP SERPL CALC-SCNC: 11 MMOL/L — SIGNIFICANT CHANGE UP (ref 5–17)
AST SERPL-CCNC: 26 U/L — SIGNIFICANT CHANGE UP
BASOPHILS # BLD AUTO: 0 K/UL — SIGNIFICANT CHANGE UP (ref 0–0.2)
BASOPHILS NFR BLD AUTO: 0.6 % — SIGNIFICANT CHANGE UP (ref 0–2)
BILIRUB SERPL-MCNC: 0.6 MG/DL — SIGNIFICANT CHANGE UP (ref 0.4–2)
BUN SERPL-MCNC: 18 MG/DL — SIGNIFICANT CHANGE UP (ref 8–20)
CALCIUM SERPL-MCNC: 8.7 MG/DL — SIGNIFICANT CHANGE UP (ref 8.6–10.2)
CHLORIDE SERPL-SCNC: 96 MMOL/L — LOW (ref 98–107)
CHOLEST SERPL-MCNC: 140 MG/DL — SIGNIFICANT CHANGE UP (ref 110–199)
CO2 SERPL-SCNC: 29 MMOL/L — SIGNIFICANT CHANGE UP (ref 22–29)
CREAT SERPL-MCNC: 0.74 MG/DL — SIGNIFICANT CHANGE UP (ref 0.5–1.3)
EOSINOPHIL # BLD AUTO: 0.1 K/UL — SIGNIFICANT CHANGE UP (ref 0–0.5)
EOSINOPHIL NFR BLD AUTO: 1.6 % — SIGNIFICANT CHANGE UP (ref 0–6)
GLUCOSE BLDC GLUCOMTR-MCNC: 133 MG/DL — HIGH (ref 70–99)
GLUCOSE BLDC GLUCOMTR-MCNC: 202 MG/DL — HIGH (ref 70–99)
GLUCOSE BLDC GLUCOMTR-MCNC: 206 MG/DL — HIGH (ref 70–99)
GLUCOSE BLDC GLUCOMTR-MCNC: 91 MG/DL — SIGNIFICANT CHANGE UP (ref 70–99)
GLUCOSE SERPL-MCNC: 143 MG/DL — HIGH (ref 70–115)
HBA1C BLD-MCNC: 7.9 % — HIGH (ref 4–5.6)
HCT VFR BLD CALC: 35 % — LOW (ref 42–52)
HCV AB S/CO SERPL IA: 0.12 S/CO — SIGNIFICANT CHANGE UP
HCV AB SERPL-IMP: SIGNIFICANT CHANGE UP
HDLC SERPL-MCNC: 76 MG/DL — SIGNIFICANT CHANGE UP
HGB BLD-MCNC: 10.4 G/DL — LOW (ref 14–18)
LIPID PNL WITH DIRECT LDL SERPL: 54 MG/DL — SIGNIFICANT CHANGE UP
LYMPHOCYTES # BLD AUTO: 1.7 K/UL — SIGNIFICANT CHANGE UP (ref 1–4.8)
LYMPHOCYTES # BLD AUTO: 21.4 % — SIGNIFICANT CHANGE UP (ref 20–55)
MCHC RBC-ENTMCNC: 24.3 PG — LOW (ref 27–31)
MCHC RBC-ENTMCNC: 29.7 G/DL — LOW (ref 32–36)
MCV RBC AUTO: 81.8 FL — SIGNIFICANT CHANGE UP (ref 80–94)
MONOCYTES # BLD AUTO: 0.6 K/UL — SIGNIFICANT CHANGE UP (ref 0–0.8)
MONOCYTES NFR BLD AUTO: 8.1 % — SIGNIFICANT CHANGE UP (ref 3–10)
NEUTROPHILS # BLD AUTO: 5.4 K/UL — SIGNIFICANT CHANGE UP (ref 1.8–8)
NEUTROPHILS NFR BLD AUTO: 68 % — SIGNIFICANT CHANGE UP (ref 37–73)
PLATELET # BLD AUTO: 309 K/UL — SIGNIFICANT CHANGE UP (ref 150–400)
POTASSIUM SERPL-MCNC: 4 MMOL/L — SIGNIFICANT CHANGE UP (ref 3.5–5.3)
POTASSIUM SERPL-SCNC: 4 MMOL/L — SIGNIFICANT CHANGE UP (ref 3.5–5.3)
PROT SERPL-MCNC: 8.7 G/DL — SIGNIFICANT CHANGE UP (ref 6.6–8.7)
RBC # BLD: 4.28 M/UL — LOW (ref 4.6–6.2)
RBC # FLD: 18.6 % — HIGH (ref 11–15.6)
SODIUM SERPL-SCNC: 136 MMOL/L — SIGNIFICANT CHANGE UP (ref 135–145)
TOTAL CHOLESTEROL/HDL RATIO MEASUREMENT: 2 RATIO — LOW (ref 3.4–9.6)
TRIGL SERPL-MCNC: 49 MG/DL — SIGNIFICANT CHANGE UP (ref 10–200)
WBC # BLD: 7.9 K/UL — SIGNIFICANT CHANGE UP (ref 4.8–10.8)
WBC # FLD AUTO: 7.9 K/UL — SIGNIFICANT CHANGE UP (ref 4.8–10.8)

## 2019-02-02 PROCEDURE — 99223 1ST HOSP IP/OBS HIGH 75: CPT | Mod: GC

## 2019-02-02 RX ORDER — SACCHAROMYCES BOULARDII 250 MG
250 POWDER IN PACKET (EA) ORAL
Qty: 0 | Refills: 0 | Status: DISCONTINUED | OUTPATIENT
Start: 2019-02-02 | End: 2019-02-06

## 2019-02-02 RX ORDER — INSULIN GLARGINE 100 [IU]/ML
40 INJECTION, SOLUTION SUBCUTANEOUS AT BEDTIME
Qty: 0 | Refills: 0 | Status: DISCONTINUED | OUTPATIENT
Start: 2019-02-02 | End: 2019-02-06

## 2019-02-02 RX ADMIN — LISINOPRIL 2.5 MILLIGRAM(S): 2.5 TABLET ORAL at 05:35

## 2019-02-02 RX ADMIN — PIPERACILLIN AND TAZOBACTAM 25 GRAM(S): 4; .5 INJECTION, POWDER, LYOPHILIZED, FOR SOLUTION INTRAVENOUS at 05:36

## 2019-02-02 RX ADMIN — Medication 250 MILLIGRAM(S): at 16:39

## 2019-02-02 RX ADMIN — HYDROMORPHONE HYDROCHLORIDE 4 MILLIGRAM(S): 2 INJECTION INTRAMUSCULAR; INTRAVENOUS; SUBCUTANEOUS at 21:55

## 2019-02-02 RX ADMIN — Medication 250 MILLIGRAM(S): at 16:34

## 2019-02-02 RX ADMIN — GABAPENTIN 800 MILLIGRAM(S): 400 CAPSULE ORAL at 21:56

## 2019-02-02 RX ADMIN — PIPERACILLIN AND TAZOBACTAM 25 GRAM(S): 4; .5 INJECTION, POWDER, LYOPHILIZED, FOR SOLUTION INTRAVENOUS at 14:28

## 2019-02-02 RX ADMIN — HYDROMORPHONE HYDROCHLORIDE 4 MILLIGRAM(S): 2 INJECTION INTRAMUSCULAR; INTRAVENOUS; SUBCUTANEOUS at 22:25

## 2019-02-02 RX ADMIN — Medication 250 MILLIGRAM(S): at 05:36

## 2019-02-02 RX ADMIN — Medication 250 MILLIGRAM(S): at 05:35

## 2019-02-02 RX ADMIN — GABAPENTIN 800 MILLIGRAM(S): 400 CAPSULE ORAL at 05:35

## 2019-02-02 RX ADMIN — Medication 4: at 11:19

## 2019-02-02 RX ADMIN — ENOXAPARIN SODIUM 40 MILLIGRAM(S): 100 INJECTION SUBCUTANEOUS at 11:19

## 2019-02-02 RX ADMIN — METHADONE HYDROCHLORIDE 5 MILLIGRAM(S): 40 TABLET ORAL at 11:19

## 2019-02-02 RX ADMIN — PIPERACILLIN AND TAZOBACTAM 25 GRAM(S): 4; .5 INJECTION, POWDER, LYOPHILIZED, FOR SOLUTION INTRAVENOUS at 21:56

## 2019-02-02 RX ADMIN — GABAPENTIN 800 MILLIGRAM(S): 400 CAPSULE ORAL at 11:20

## 2019-02-02 RX ADMIN — HYDROMORPHONE HYDROCHLORIDE 4 MILLIGRAM(S): 2 INJECTION INTRAMUSCULAR; INTRAVENOUS; SUBCUTANEOUS at 05:35

## 2019-02-02 RX ADMIN — HYDROMORPHONE HYDROCHLORIDE 4 MILLIGRAM(S): 2 INJECTION INTRAMUSCULAR; INTRAVENOUS; SUBCUTANEOUS at 06:30

## 2019-02-02 RX ADMIN — INSULIN GLARGINE 40 UNIT(S): 100 INJECTION, SOLUTION SUBCUTANEOUS at 21:55

## 2019-02-02 NOTE — PROGRESS NOTE ADULT - SUBJECTIVE AND OBJECTIVE BOX
HPI:  Pt is a 64 YO M with PMHx of DM-2 (on insulin), HTN, obesity, SLE, ankylosing spondylitis, L hip surgery (complicated by chronic back pain which limits patient's activity) sent to the ED by his PMD for right leg cellulitis. Pt reports right leg swelling with oozing for several weeks. He went to his PMD several weeks ago and was given a medication (cant recall name) and saw no improvement. Per pt, the swelling worsened over the past few days and he went to see his PMD and was told to come to the ED. Pt denies fever, chills, trauma to the leg or recent illness. Prior episode 2 years ago; was tx with antibiotics at [facility].    Denies cough, fever, chills, n/v/d/c, palpitations, sob, abdominal pain, numbness or tingling, urinary or bowel changes, burning with urination, blood in urine or stool, calf tenderness (02 Feb 2019 00:14)    Male  PAST MEDICAL & SURGICAL HISTORY:  Ankylosing spondylitis of site in spine  Lupus  Diabetes  History of hip replacement, total, left      REVIEW OF SYSTEMS      General:	chronically ill secondary to hip problems and poor mobility, admitted due to  leg infection     Skin/Breast:  right leg cellulitis   	  Ophthalmologic:  diabetic  retinopathy  	  ENMT:	  negative     Respiratory and Thorax:  clear   	  Cardiovascular:	hypertension     Gastrointestinal:	  negative     Genitourinary:	 BPH     Musculoskeletal:	  osteoarthritis , hip replacement spondylitis     Neurological:	stable     Psychiatric:	    Hematology/Lymphatics:	    Endocrine:	morbid obesity, diabetes, hypercholesteremia     Allergic/Immunologic:	    MEDICATIONS  (STANDING):  dextrose 5%. 1000 milliLiter(s) (50 mL/Hr) IV Continuous <Continuous>  dextrose 50% Injectable 12.5 Gram(s) IV Push once  dextrose 50% Injectable 25 Gram(s) IV Push once  dextrose 50% Injectable 25 Gram(s) IV Push once  enoxaparin Injectable 40 milliGRAM(s) SubCutaneous daily  gabapentin 800 milliGRAM(s) Oral three times a day  insulin glargine Injectable (LANTUS) 40 Unit(s) SubCutaneous at bedtime  insulin lispro (HumaLOG) corrective regimen sliding scale   SubCutaneous three times a day before meals  lisinopril Oral Tab/Cap - Peds 2.5 milliGRAM(s) Oral daily  methadone    Tablet 5 milliGRAM(s) Oral daily  piperacillin/tazobactam IVPB. 3.375 Gram(s) IV Intermittent every 8 hours  saccharomyces boulardii 250 milliGRAM(s) Oral two times a day  vancomycin  IVPB 1000 milliGRAM(s) IV Intermittent every 12 hours    MEDICATIONS  (PRN):  dextrose 40% Gel 15 Gram(s) Oral once PRN Blood Glucose LESS THAN 70 milliGRAM(s)/deciliter  glucagon  Injectable 1 milliGRAM(s) IntraMuscular once PRN Glucose LESS THAN 70 milligrams/deciliter  HYDROmorphone   Tablet 4 milliGRAM(s) Oral three times a day PRN Severe Pain (7 - 10)      Allergies    No Known Allergies    Intolerances        SOCIAL HISTORY:    FAMILY HISTORY:  Family history of diabetes mellitus (DM) (Father, Mother, Sibling)      Vital Signs Last 24 Hrs  T(C): 36.9 (02 Feb 2019 08:41), Max: 37 (01 Feb 2019 20:32)  T(F): 98.5 (02 Feb 2019 08:41), Max: 98.6 (01 Feb 2019 20:32)  HR: 76 (02 Feb 2019 08:41) (76 - 88)  BP: 129/73 (02 Feb 2019 08:41) (129/73 - 138/81)  BP(mean): --  RR: 18 (02 Feb 2019 08:41) (18 - 20)  SpO2: 96% (02 Feb 2019 08:41) (96% - 98%)      no events overnight    PHYSICAL EXAM:      Constitutional:  awake alert oriented , no pain.    Eyes:  refraction disorder    ENMT:    Neck:  supple     Breasts:  normal     Back:  normal     Respiratory:  clear    Cardiovascular:  normal sins  rhythm     Gastrointestinal: normal abdominal examination    Genitourinary:    Rectal:    Extremities:  no edema     Vascular:  normal pulses     Neurological:  diabetic neuropathy    Skin:  right leg infected     Lymph Nodes:  negative     Musculoskeletal:  negative     Psychiatric:  negative         LABS:  CBC Full  -  ( 02 Feb 2019 08:47 )  WBC Count : 7.9 K/uL  Hemoglobin : 10.4 g/dL  Hematocrit : 35.0 %  Platelet Count - Automated : 309 K/uL  Mean Cell Volume : 81.8 fl  Mean Cell Hemoglobin : 24.3 pg  Mean Cell Hemoglobin Concentration : 29.7 g/dL  Auto Neutrophil # : 5.4 K/uL  Auto Lymphocyte # : 1.7 K/uL  Auto Monocyte # : 0.6 K/uL  Auto Eosinophil # : 0.1 K/uL  Auto Basophil # : 0.0 K/uL  Auto Neutrophil % : 68.0 %  Auto Lymphocyte % : 21.4 %  Auto Monocyte % : 8.1 %  Auto Eosinophil % : 1.6 %  Auto Basophil % : 0.6 %    02-02    136  |  96<L>  |  18.0  ----------------------------<  143<H>  4.0   |  29.0  |  0.74    Ca    8.7      02 Feb 2019 08:47    TPro  8.7  /  Alb  3.6  /  TBili  0.6  /  DBili  x   /  AST  26  /  ALT  27  /  AlkPhos  76  02-02          RADIOLOGY & ADDITIONAL STUDIES:

## 2019-02-02 NOTE — H&P ADULT - HISTORY OF PRESENT ILLNESS
Pt is a 62 YO M with PMHx of DM-2 (on insulin), HTN, obesity and ankylosing spondylitis sent to the ED by his PMD for right leg cellulitis. Pt reports right leg swelling with oozing for several weeks. He went to his PMD several weeks ago and was given a medication (cant recall name) and saw no improvement. Per pt, the swelling worse over the past few days and he went to see his PMD and was told to come to the ED. Pt denies fever, chills, trauma to the leg or recent illness. Prior episode last year during which he was given antibiotics.    Denies cough, fever, chills, n/v/d/c, palpitations, sob, abdominal pain, numbness or tingling, urinary or bowel changes, burning with urination, blood in urine or stool, calf tenderness Pt is a 64 YO M with PMHx of DM-2 (on insulin), HTN, obesity, SLE, ankylosing spondylitis, L hip surgery (complicated by chronic back pain which limits patient's activity) sent to the ED by his PMD for right leg cellulitis. Pt reports right leg swelling with oozing for several weeks. He went to his PMD several weeks ago and was given a medication (cant recall name) and saw no improvement. Per pt, the swelling worse over the past few days and he went to see his PMD and was told to come to the ED. Pt denies fever, chills, trauma to the leg or recent illness. Prior episode 2 years ago; was tx with antibiotics at [facility].    Denies cough, fever, chills, n/v/d/c, palpitations, sob, abdominal pain, numbness or tingling, urinary or bowel changes, burning with urination, blood in urine or stool, calf tenderness Pt is a 64 YO M with PMHx of DM-2 (on insulin), HTN, obesity, SLE, ankylosing spondylitis, L hip surgery (complicated by chronic back pain which limits patient's activity) sent to the ED by his PMD for right leg cellulitis. Pt reports right leg swelling with oozing for several weeks. He went to his PMD several weeks ago and was given a medication (cant recall name) and saw no improvement. Per pt, the swelling worsened over the past few days and he went to see his PMD and was told to come to the ED. Pt denies fever, chills, trauma to the leg or recent illness. Prior episode 2 years ago; was tx with antibiotics at [facility].    Denies cough, fever, chills, n/v/d/c, palpitations, sob, abdominal pain, numbness or tingling, urinary or bowel changes, burning with urination, blood in urine or stool, calf tenderness

## 2019-02-02 NOTE — H&P ADULT - FAMILY HISTORY
Father  Still living? Unknown  Family history of diabetes mellitus (DM), Age at diagnosis: Age Unknown     Mother  Still living? Unknown  Family history of diabetes mellitus (DM), Age at diagnosis: Age Unknown     Sibling  Still living? Unknown  Family history of diabetes mellitus (DM), Age at diagnosis: Age Unknown

## 2019-02-02 NOTE — H&P ADULT - ASSESSMENT
62 YO M with PMHx of DM-2 (on insulin), HTN, obesity, SLE, ankylosing spondylitis and L hip surgery with c/o right leg edema and erythema; admitted for cellulitis.    Right Leg Cellulitis   -C/w Zosyn and Vancomycin IV   -Lactate wnl  -F/u blood cultures   -B/l LE doppler negative   -Consult: Vascular surgery     DM-2 w/ A1c 7.8  -HISS   -HbA1c 7.8  -Consult: Diabetes education prior to d/c   -F/u lipid panel am     HTN  -Stable  -C/w home medication    Morbid Obesity  -Pt counseled on diet and exercise  -Consult nutrition     Anemia   -Asx, no bleeding  -H/h similar to admission in 2017  -F/u outpatient     Elevated BUN  -Likely 2/2 dehydration  -Encourage PO hydration    DVT ppx   -Lovenox 40mg qd 62 YO M with PMHx of DM-2 (on insulin), HTN, obesity, SLE, ankylosing spondylitis and L hip surgery with c/o right leg edema and erythema; admitted for cellulitis.    Right Leg Cellulitis   -C/w Zosyn and Vancomycin IV   -Lactate wnl  -F/u blood cultures   -B/l LE doppler negative   -Consult: Vascular surgery     DM-2 w/ A1c 7.8  -HISS   -HbA1c 7.8 (unchanged from 2017)  -Consult: Diabetes education prior to d/c   -F/u lipid panel am     HTN  -Stable  -C/w home medication    Morbid Obesity  -Pt counseled on diet and exercise  -Consult nutrition     Anemia   -Asx, no bleeding  -H/h similar to admission in 2017  -F/u outpatient     Elevated BUN  -Likely 2/2 dehydration  -Encourage PO hydration    DVT ppx   -Lovenox 40mg qd 62 YO M with PMHx of DM-2 (on insulin), HTN, obesity, SLE, ankylosing spondylitis and L hip surgery with c/o right leg edema and erythema; admitted for cellulitis.    Right Leg Cellulitis   -C/w Zosyn and Vancomycin IV  -C/w Florastor   -Lactate wnl  -F/u blood cultures   -B/l LE doppler negative   -Consult: Vascular surgery, ID     DM-2 w/ A1c 7.8  -HISS   -Glucose 132   -HbA1c 7.8 (unchanged from 2017)  -Consult: Diabetes education prior to d/c   -F/u lipid panel am     HTN  -Stable  -C/w home medication    Morbid Obesity  -Pt counseled on diet and exercise  -Consult nutrition     Chronic back pain   -C/w pain medications     Anemia   -Asx, no bleeding  -H/h similar to admission in 2017  -F/u outpatient     Elevated BUN  -Likely 2/2 dehydration  -Encourage PO hydration    DVT ppx   -Lovenox 40mg qd 62 YO M with PMHx of DM-2 (on insulin), HTN, obesity, SLE, ankylosing spondylitis and L hip surgery with c/o right leg edema and erythema; admitted for cellulitis.    Right Leg Cellulitis   -C/w Zosyn and Vancomycin IV, s/p 1 dose Levaquin in ED  -C/w Florastor   -Lactate wnl  -F/u blood cultures   -B/l LE doppler negative   -Consult: Vascular surgery, ID     DM-2 w/ A1c 7.8  -HISS   -Glucose 132   -HbA1c 7.8 (unchanged from 2017)  -Consult: Diabetes education prior to d/c   -F/u lipid panel am     HTN  -Stable  -C/w home medication    Morbid Obesity  -Pt counseled on diet and exercise  -Consult nutrition     Chronic back pain 2/2 AS and hip surgery   -C/w pain medications     Anemia   -Asx, no bleeding  -H/h similar to admission in 2017  -F/u outpatient     Elevated BUN  -Likely 2/2 dehydration  -Encourage PO hydration    DVT ppx   -Lovenox 40mg qd 62 YO M with PMHx of DM-2 (on insulin), HTN, obesity, SLE, ankylosing spondylitis and L hip surgery with c/o right leg edema and erythema; admitted for cellulitis.    Right Leg Cellulitis   -C/w Zosyn and Vancomycin IV, s/p 1 dose Levaquin in ED  -C/w Florastor   -Lactate wnl  -F/u blood cultures   -RLE doppler negative   -Consult: Vascular surgery, ID     DM-2 w/ A1c 7.8  -HISS   -Glucose 132   -HbA1c 7.8 (unchanged from 2017)  -Consult: Diabetes education prior to d/c   -F/u lipid panel am     HTN  -Stable  -C/w home medication    Morbid Obesity  -Pt counseled on diet and exercise  -Consult nutrition     Chronic back pain 2/2 AS and hip surgery   -C/w pain medications     Anemia   -Asx, no bleeding  -H/h similar to admission in 2017  -F/u outpatient     Elevated BUN  -Likely 2/2 dehydration  -Encourage PO hydration    DVT ppx   -Lovenox 40mg qd 64 YO M with PMHx of DM-2 (on insulin), HTN, obesity, SLE, ankylosing spondylitis and L hip surgery with c/o right leg edema and erythema; admitted for cellulitis.    Right Leg Cellulitis   -C/w Zosyn and Vancomycin IV, s/p 1 dose Levaquin in ED  -C/w Florastor   -Lactate wnl  -F/u blood cultures   -RLE doppler negative   -Consult: Vascular surgery, ID     DM-2 w/ A1c 7.8  -HISS   -Glucose 132   -HbA1c 7.8 (unchanged from 2017)  -Consult: Diabetes education prior to d/c   -F/u lipid panel am     HTN  -Stable  -C/w home medication    Morbid Obesity  -Pt counseled on diet and exercise    Chronic back pain 2/2 AS and hip surgery   -C/w pain medications     Anemia   -Asx, no bleeding  -H/h similar to admission in 2017  -F/u outpatient     Elevated BUN  -Likely 2/2 dehydration  -Encourage PO hydration    DVT ppx   -Lovenox 40mg qd 64 YO M with PMHx of DM-2 (on insulin), HTN, obesity, SLE, ankylosing spondylitis and L hip surgery with c/o right leg edema and erythema; admitted for cellulitis.    Right Leg Cellulitis   -C/w Zosyn and Vancomycin IV, s/p 1 dose Levaquin in ED  -C/w Florastor   -Lactate wnl  -F/u blood cultures   -RLE doppler negative   -Consult: Vascular surgery, ID     DM-2 w/ A1c 7.8  -HISS   -Lantus 40U (pt takes 80U at home)  -Glucose 132   -HbA1c 7.8 (unchanged from 2017)  -Consult: Diabetes education prior to d/c   -F/u lipid panel am     HTN  -Stable  -C/w home medication    Morbid Obesity  -Pt counseled on diet and exercise    Chronic back pain 2/2 AS and hip surgery   -C/w pain medications   -Pt sees Pain Management outpatient     Anemia   -Asx, no bleeding  -H/h similar to admission in 2017  -F/u outpatient     Elevated BUN  -Likely 2/2 dehydration  -Encourage PO hydration    DVT ppx   -Lovenox 40mg qd 62 YO M with PMHx of DM-2 (on insulin), HTN, obesity, SLE, ankylosing spondylitis and L hip surgery with c/o right leg edema and erythema; admitted for cellulitis.    Right Leg Cellulitis   -C/w Zosyn and Vancomycin IV, s/p 1 dose Levaquin in ED  -C/w Florastor   -Lactate wnl  -F/u blood cultures   -RLE doppler negative     DM-2 w/ A1c 7.8  -HISS   -Lantus 40U (pt takes 80U at home)  -Glucose 132   -HbA1c 7.8 (unchanged from 2017)  -Consult: Diabetes education prior to d/c   -F/u lipid panel am     HTN  -Stable  -C/w home medication    Morbid Obesity  -Pt counseled on diet and exercise    Chronic back pain 2/2 AS and hip surgery   -C/w pain medications   -Pt sees Pain Management outpatient     Anemia   -Asx, no bleeding  -H/h similar to admission in 2017  -F/u outpatient     Elevated BUN  -Likely 2/2 dehydration  -Encourage PO hydration    DVT ppx   -Lovenox 40mg qd

## 2019-02-02 NOTE — H&P ADULT - NSHPREVIEWOFSYSTEMS_GEN_ALL_CORE
Denies cough, fever, chills, n/v/d/c, palpitations, sob, abdominal pain, numbness or tingling, urinary or bowel changes, burning with urination, blood in urine or stool, calf tenderness

## 2019-02-03 LAB
GLUCOSE BLDC GLUCOMTR-MCNC: 116 MG/DL — HIGH (ref 70–99)
GLUCOSE BLDC GLUCOMTR-MCNC: 118 MG/DL — HIGH (ref 70–99)
GLUCOSE BLDC GLUCOMTR-MCNC: 134 MG/DL — HIGH (ref 70–99)
GLUCOSE BLDC GLUCOMTR-MCNC: 165 MG/DL — HIGH (ref 70–99)
VANCOMYCIN TROUGH SERPL-MCNC: 15.1 UG/ML — SIGNIFICANT CHANGE UP (ref 10–20)

## 2019-02-03 PROCEDURE — 99223 1ST HOSP IP/OBS HIGH 75: CPT

## 2019-02-03 RX ORDER — VANCOMYCIN HCL 1 G
1000 VIAL (EA) INTRAVENOUS
Qty: 0 | Refills: 0 | Status: DISCONTINUED | OUTPATIENT
Start: 2019-02-03 | End: 2019-02-03

## 2019-02-03 RX ORDER — CEFAZOLIN SODIUM 1 G
VIAL (EA) INJECTION
Qty: 0 | Refills: 0 | Status: DISCONTINUED | OUTPATIENT
Start: 2019-02-03 | End: 2019-02-05

## 2019-02-03 RX ORDER — CEFAZOLIN SODIUM 1 G
2000 VIAL (EA) INJECTION ONCE
Qty: 0 | Refills: 0 | Status: COMPLETED | OUTPATIENT
Start: 2019-02-03 | End: 2019-02-03

## 2019-02-03 RX ORDER — CEFAZOLIN SODIUM 1 G
2000 VIAL (EA) INJECTION EVERY 8 HOURS
Qty: 0 | Refills: 0 | Status: DISCONTINUED | OUTPATIENT
Start: 2019-02-03 | End: 2019-02-05

## 2019-02-03 RX ADMIN — GABAPENTIN 800 MILLIGRAM(S): 400 CAPSULE ORAL at 16:29

## 2019-02-03 RX ADMIN — Medication 2: at 11:09

## 2019-02-03 RX ADMIN — Medication 100 MILLIGRAM(S): at 16:29

## 2019-02-03 RX ADMIN — Medication 250 MILLIGRAM(S): at 06:19

## 2019-02-03 RX ADMIN — LISINOPRIL 2.5 MILLIGRAM(S): 2.5 TABLET ORAL at 06:19

## 2019-02-03 RX ADMIN — GABAPENTIN 800 MILLIGRAM(S): 400 CAPSULE ORAL at 22:42

## 2019-02-03 RX ADMIN — HYDROMORPHONE HYDROCHLORIDE 4 MILLIGRAM(S): 2 INJECTION INTRAMUSCULAR; INTRAVENOUS; SUBCUTANEOUS at 23:13

## 2019-02-03 RX ADMIN — GABAPENTIN 800 MILLIGRAM(S): 400 CAPSULE ORAL at 06:19

## 2019-02-03 RX ADMIN — INSULIN GLARGINE 40 UNIT(S): 100 INJECTION, SOLUTION SUBCUTANEOUS at 22:42

## 2019-02-03 RX ADMIN — PIPERACILLIN AND TAZOBACTAM 25 GRAM(S): 4; .5 INJECTION, POWDER, LYOPHILIZED, FOR SOLUTION INTRAVENOUS at 06:20

## 2019-02-03 RX ADMIN — Medication 250 MILLIGRAM(S): at 06:21

## 2019-02-03 RX ADMIN — ENOXAPARIN SODIUM 40 MILLIGRAM(S): 100 INJECTION SUBCUTANEOUS at 11:07

## 2019-02-03 RX ADMIN — Medication 100 MILLIGRAM(S): at 00:05

## 2019-02-03 RX ADMIN — HYDROMORPHONE HYDROCHLORIDE 4 MILLIGRAM(S): 2 INJECTION INTRAMUSCULAR; INTRAVENOUS; SUBCUTANEOUS at 22:43

## 2019-02-03 RX ADMIN — METHADONE HYDROCHLORIDE 5 MILLIGRAM(S): 40 TABLET ORAL at 11:07

## 2019-02-03 RX ADMIN — HYDROMORPHONE HYDROCHLORIDE 4 MILLIGRAM(S): 2 INJECTION INTRAMUSCULAR; INTRAVENOUS; SUBCUTANEOUS at 07:56

## 2019-02-03 RX ADMIN — Medication 250 MILLIGRAM(S): at 16:30

## 2019-02-03 RX ADMIN — HYDROMORPHONE HYDROCHLORIDE 4 MILLIGRAM(S): 2 INJECTION INTRAMUSCULAR; INTRAVENOUS; SUBCUTANEOUS at 06:38

## 2019-02-03 NOTE — PROGRESS NOTE ADULT - SUBJECTIVE AND OBJECTIVE BOX
HPI:  Pt is a 64 YO M with PMHx of DM-2 (on insulin), HTN, obesity, SLE, ankylosing spondylitis, L hip surgery (complicated by chronic back pain which limits patient's activity) sent to the ED by his PMD for right leg cellulitis. Pt reports right leg swelling with oozing for several weeks. He went to his PMD several weeks ago and was given a medication (cant recall name) and saw no improvement. Per pt, the swelling worsened over the past few days and he went to see his PMD and was told to come to the ED. Pt denies fever, chills, trauma to the leg or recent illness. Prior episode 2 years ago; was tx with antibiotics at [facility].    Denies cough, fever, chills, n/v/d/c, palpitations, sob, abdominal pain, numbness or tingling, urinary or bowel changes, burning with urination, blood in urine or stool, calf tenderness (02 Feb 2019 00:14)    Male  PAST MEDICAL & SURGICAL HISTORY:  Ankylosing spondylitis of site in spine  Lupus  Diabetes  History of hip replacement, total, left      REVIEW OF SYSTEMS      General:	admitted due to infected  lower extremety    Skin/Breast:  cellulitis leg  	  Ophthalmologic:  diabetic  reinopathy  	  ENMT:	  negative     Respiratory and Thorax:  clear   	  Cardiovascular:	 hypertension     Gastrointestinal:	 negative     Genitourinary:	BPH     Musculoskeletal:	  osteoarthritis ,  s/p hip replacement     Neurological:	 negative     Psychiatric:	negative     Hematology/Lymphatics:	    Endocrine:	obesity, hypercholesterolemia, diabetes     Allergic/Immunologic:	    MEDICATIONS  (STANDING):  dextrose 5%. 1000 milliLiter(s) (50 mL/Hr) IV Continuous <Continuous>  dextrose 50% Injectable 12.5 Gram(s) IV Push once  dextrose 50% Injectable 25 Gram(s) IV Push once  dextrose 50% Injectable 25 Gram(s) IV Push once  enoxaparin Injectable 40 milliGRAM(s) SubCutaneous daily  gabapentin 800 milliGRAM(s) Oral three times a day  insulin glargine Injectable (LANTUS) 40 Unit(s) SubCutaneous at bedtime  insulin lispro (HumaLOG) corrective regimen sliding scale   SubCutaneous three times a day before meals  lisinopril Oral Tab/Cap - Peds 2.5 milliGRAM(s) Oral daily  methadone    Tablet 5 milliGRAM(s) Oral daily  piperacillin/tazobactam IVPB. 3.375 Gram(s) IV Intermittent every 8 hours  saccharomyces boulardii 250 milliGRAM(s) Oral two times a day  vancomycin  IVPB 1000 milliGRAM(s) IV Intermittent <User Schedule>    MEDICATIONS  (PRN):  dextrose 40% Gel 15 Gram(s) Oral once PRN Blood Glucose LESS THAN 70 milliGRAM(s)/deciliter  glucagon  Injectable 1 milliGRAM(s) IntraMuscular once PRN Glucose LESS THAN 70 milligrams/deciliter  HYDROmorphone   Tablet 4 milliGRAM(s) Oral three times a day PRN Severe Pain (7 - 10)      Allergies    No Known Allergies    Intolerances        SOCIAL HISTORY:    FAMILY HISTORY:  Family history of diabetes mellitus (DM) (Father, Mother, Sibling)      Vital Signs Last 24 Hrs  T(C): 36.6 (03 Feb 2019 07:49), Max: 37 (02 Feb 2019 15:53)  T(F): 97.9 (03 Feb 2019 07:49), Max: 98.6 (02 Feb 2019 15:53)  HR: 74 (03 Feb 2019 07:49) (71 - 74)  BP: 151/75 (03 Feb 2019 07:49) (144/74 - 151/75)  BP(mean): --  RR: 18 (03 Feb 2019 07:49) (18 - 18)  SpO2: 98% (03 Feb 2019 07:49) (98% - 98%)    no events overnight    PHYSICAL EXAM:      Constitutional:  awake alert oriented    Eyes:   refraction disorder,      ENMT:  normal     Neck:  supple , no briuits    Breasts:  normal     Back:  normal     Respiratory:  clear    Cardiovascular:  normal sinus rhythm     Gastrointestinal:  normal     Genitourinary:    Rectal:    Extremities:  lower extremity  dry, no oozing     Vascular:  normal     Neurological:  normal     Skin:  normal     Lymph Nodes:  normal     Musculoskeletal:  normal     Psychiatric:  normal         LABS:  CBC Full  -  ( 02 Feb 2019 08:47 )  WBC Count : 7.9 K/uL  Hemoglobin : 10.4 g/dL  Hematocrit : 35.0 %  Platelet Count - Automated : 309 K/uL  Mean Cell Volume : 81.8 fl  Mean Cell Hemoglobin : 24.3 pg  Mean Cell Hemoglobin Concentration : 29.7 g/dL  Auto Neutrophil # : 5.4 K/uL  Auto Lymphocyte # : 1.7 K/uL  Auto Monocyte # : 0.6 K/uL  Auto Eosinophil # : 0.1 K/uL  Auto Basophil # : 0.0 K/uL  Auto Neutrophil % : 68.0 %  Auto Lymphocyte % : 21.4 %  Auto Monocyte % : 8.1 %  Auto Eosinophil % : 1.6 %  Auto Basophil % : 0.6 %    02-02    136  |  96<L>  |  18.0  ----------------------------<  143<H>  4.0   |  29.0  |  0.74    Ca    8.7      02 Feb 2019 08:47    TPro  8.7  /  Alb  3.6  /  TBili  0.6  /  DBili  x   /  AST  26  /  ALT  27  /  AlkPhos  76  02-02          RADIOLOGY & ADDITIONAL STUDIES:

## 2019-02-03 NOTE — CONSULT NOTE ADULT - SUBJECTIVE AND OBJECTIVE BOX
[facility] [place] RACHANA NINA MD FACP   KAMALJIT MARTÍNEZ MD  Diplomates American Board of Internal Medicine and Infecctious Diseases  [phone]  [id number] CAROLEE [id number]      HPI:  Pt is a 62 YO M with PMHx of DM-2 (on insulin), HTN, obesity, SLE, ankylosing spondylitis, L hip surgery (complicated by chronic back pain which limits patient's activity) sent to the ED by his PMD for right leg cellulitis. Pt reports right leg swelling with oozing for several weeks. He went to his PMD several weeks ago and was given a medication (cant recall name) and saw no improvement. Per pt, the swelling worsened over the past few days and he went to see his PMD and was told to come to the ED. Pt denies fever, chills, trauma to the leg or recent illness. Prior episode 2 years ago; was tx with antibiotics at [facility].  PT NO TOXIC ON IV ABX  ASKED TO EVALUATE FROM ID STANDPOINT       Denies cough, fever, chills, n/v/d/c, palpitations, sob, abdominal pain, numbness or tingling, urinary or bowel changes, burning with urination, blood in urine or stool, calf tenderness     PAST MEDICAL & SURGICAL HISTORY:  Ankylosing spondylitis of site in spine  Lupus  Diabetes  History of hip replacement, total, left      ANTIBIOTICS  piperacillin/tazobactam IVPB. 3.375 Gram(s) IV Intermittent every 8 hours  vancomycin  IVPB 1000 milliGRAM(s) IV Intermittent <User Schedule>      Allergies    No Known Allergies    Intolerances        SOCIAL HISTORY:       FAMILY HX   FAMILY HISTORY:  Family history of diabetes mellitus (DM) (Father, Mother, Sibling)      Vital Signs Last 24 Hrs  T(C): 36.6 (03 Feb 2019 07:49), Max: 37 (02 Feb 2019 15:53)  T(F): 97.9 (03 Feb 2019 07:49), Max: 98.6 (02 Feb 2019 15:53)  HR: 74 (03 Feb 2019 07:49) (71 - 74)  BP: 151/75 (03 Feb 2019 07:49) (144/74 - 151/75)  BP(mean): --  RR: 18 (03 Feb 2019 07:49) (18 - 18)  SpO2: 98% (03 Feb 2019 07:49) (98% - 98%)  Drug Dosing Weight  Height (cm): 167.64 (01 Feb 2019 14:10)  Weight (kg): 129.3 (01 Feb 2019 14:10)  BMI (kg/m2): 46 (01 Feb 2019 14:10)  BSA (m2): 2.33 (01 Feb 2019 14:10)      REVIEW OF SYSTEMS:    CONSTITUTIONAL:  As per HPI.    HEENT:  Eyes:  No diplopia or blurred vision. ENT:  No earache, sore throat or runny nose.    CARDIOVASCULAR:  No pressure, squeezing, strangling, tightness, heaviness or aching about the chest, neck, axilla or epigastrium.    RESPIRATORY:  No cough, shortness of breath, PND or orthopnea.    GASTROINTESTINAL:  No nausea, vomiting or diarrhea.    GENITOURINARY:  No dysuria, frequency or urgency.    MUSCULOSKELETAL:  As per HPI.    SKIN:  AS PER HPI   NEUROLOGIC:  No paresthesias, fasciculations, seizures or weakness.                  PHYSICAL EXAMINATION:    GENERAL: The patient is a well-developed, well-nourished __IN NAD     VITAL SIGNS: T(C): 36.6 (02-03-19 @ 07:49), Max: 37 (02-02-19 @ 15:53)  HR: 74 (02-03-19 @ 07:49) (71 - 74)  BP: 151/75 (02-03-19 @ 07:49) (144/74 - 151/75)  RR: 18 (02-03-19 @ 07:49) (18 - 18)  SpO2: 98% (02-03-19 @ 07:49) (98% - 98%)  Wt(kg): --    HEENT: Head is normocephalic and atraumatic.  ANICTERIC  NECK: Supple. No carotid bruits.  No lymphadenopathy or thyromegaly.    LUNGS:COARSE BREATH SOUNDS    HEART: Regular rate and rhythm without murmur.    ABDOMEN: Soft, nontender, and nondistended.  Positive bowel sounds.  No hepatosplenomegaly was noted. NO REBOUND NO GUARDING    EXTREMITIES: BILATERAL LOWER EXT EDEMA AND ERYTHEMA  RIGHT GREATER THAN LEFT   WITH CHRONIC STASIS DERMATITIS  SOME WEEPING RIGHT LEG NO CREPITUS    NEUROLOGIC: NON FOCAL      SKIN: No ulceration or induration present. NO RASH        BLOOD CULTURES       URINE CX          LABS:                        10.4   7.9   )-----------( 309      ( 02 Feb 2019 08:47 )             35.0     02-02    136  |  96<L>  |  18.0  ----------------------------<  143<H>  4.0   |  29.0  |  0.74    Ca    8.7      02 Feb 2019 08:47    TPro  8.7  /  Alb  3.6  /  TBili  0.6  /  DBili  x   /  AST  26  /  ALT  27  /  AlkPhos  76  02-02          RADIOLOGY & ADDITIONAL STUDIES:      ASSESSMENT/PLAN    Pt is a 62 YO M with PMHx of DM-2 (on insulin), HTN, obesity, SLE, ankylosing spondylitis, L hip surgery (complicated by chronic back pain which limits patient's activity) sent to the ED by his PMD for right leg cellulitis. Pt reports right leg swelling with oozing for several weeks. He went to his PMD several weeks ago and was given a medication (cant recall name) and saw no improvement. Per pt, the swelling worsened over the past few days and he went to see his PMD and was told to come to the ED. Pt denies fever, chills, trauma to the leg or recent illness. Prior episode 2 years ago; was tx with antibiotics at [facility].  PT NON  TOXIC ON IV ABX   PT WITH  CELLULITIS ON RIGHT  LEG  SUPER IMPOSED ON STASIS DERMATITIS   NO EVIDENCE OF MRSA  WILL CHANGE TO KEFZOL   WILL FOLLOW UP CX   OVERALL STABLE              KAMALJIT JONES MD

## 2019-02-04 DIAGNOSIS — Z71.89 OTHER SPECIFIED COUNSELING: ICD-10-CM

## 2019-02-04 LAB
GLUCOSE BLDC GLUCOMTR-MCNC: 159 MG/DL — HIGH (ref 70–99)
GLUCOSE BLDC GLUCOMTR-MCNC: 216 MG/DL — HIGH (ref 70–99)
GLUCOSE BLDC GLUCOMTR-MCNC: 92 MG/DL — SIGNIFICANT CHANGE UP (ref 70–99)
GLUCOSE BLDC GLUCOMTR-MCNC: 93 MG/DL — SIGNIFICANT CHANGE UP (ref 70–99)
VANCOMYCIN TROUGH SERPL-MCNC: 4.7 UG/ML — LOW (ref 10–20)

## 2019-02-04 PROCEDURE — 99232 SBSQ HOSP IP/OBS MODERATE 35: CPT

## 2019-02-04 RX ORDER — FLUOCINONIDE/EMOLLIENT BASE 0.05 %
1 CREAM (GRAM) TOPICAL EVERY 12 HOURS
Qty: 0 | Refills: 0 | Status: DISCONTINUED | OUTPATIENT
Start: 2019-02-04 | End: 2019-02-06

## 2019-02-04 RX ADMIN — GABAPENTIN 800 MILLIGRAM(S): 400 CAPSULE ORAL at 14:06

## 2019-02-04 RX ADMIN — GABAPENTIN 800 MILLIGRAM(S): 400 CAPSULE ORAL at 20:31

## 2019-02-04 RX ADMIN — Medication 100 MILLIGRAM(S): at 14:08

## 2019-02-04 RX ADMIN — Medication 1 APPLICATION(S): at 17:07

## 2019-02-04 RX ADMIN — Medication 100 MILLIGRAM(S): at 21:15

## 2019-02-04 RX ADMIN — INSULIN GLARGINE 40 UNIT(S): 100 INJECTION, SOLUTION SUBCUTANEOUS at 21:15

## 2019-02-04 RX ADMIN — Medication 4: at 12:59

## 2019-02-04 RX ADMIN — HYDROMORPHONE HYDROCHLORIDE 4 MILLIGRAM(S): 2 INJECTION INTRAMUSCULAR; INTRAVENOUS; SUBCUTANEOUS at 21:20

## 2019-02-04 RX ADMIN — Medication 250 MILLIGRAM(S): at 06:42

## 2019-02-04 RX ADMIN — ENOXAPARIN SODIUM 40 MILLIGRAM(S): 100 INJECTION SUBCUTANEOUS at 12:12

## 2019-02-04 RX ADMIN — GABAPENTIN 800 MILLIGRAM(S): 400 CAPSULE ORAL at 06:42

## 2019-02-04 RX ADMIN — LISINOPRIL 2.5 MILLIGRAM(S): 2.5 TABLET ORAL at 06:42

## 2019-02-04 RX ADMIN — Medication 250 MILLIGRAM(S): at 17:07

## 2019-02-04 RX ADMIN — Medication 100 MILLIGRAM(S): at 06:43

## 2019-02-04 RX ADMIN — HYDROMORPHONE HYDROCHLORIDE 4 MILLIGRAM(S): 2 INJECTION INTRAMUSCULAR; INTRAVENOUS; SUBCUTANEOUS at 20:30

## 2019-02-04 RX ADMIN — METHADONE HYDROCHLORIDE 5 MILLIGRAM(S): 40 TABLET ORAL at 12:12

## 2019-02-04 NOTE — PROGRESS NOTE ADULT - SUBJECTIVE AND OBJECTIVE BOX
[facility]  INFECTIOUS DISEASES AND INTERNAL MEDICINE at [place]  =======================================================  Raoul Peterson MD  Diplomates American Board of Internal Medicine and Infectious Diseases  =======================================================    RANDY MARTINEZ [id number]    Follow up: CELLULITIS    Allergies:  No Known Allergies      Medications:  ceFAZolin   IVPB      ceFAZolin   IVPB 2000 milliGRAM(s) IV Intermittent every 8 hours  dextrose 40% Gel 15 Gram(s) Oral once PRN  dextrose 5%. 1000 milliLiter(s) IV Continuous <Continuous>  dextrose 50% Injectable 12.5 Gram(s) IV Push once  dextrose 50% Injectable 25 Gram(s) IV Push once  dextrose 50% Injectable 25 Gram(s) IV Push once  enoxaparin Injectable 40 milliGRAM(s) SubCutaneous daily  gabapentin 800 milliGRAM(s) Oral three times a day  glucagon  Injectable 1 milliGRAM(s) IntraMuscular once PRN  HYDROmorphone   Tablet 4 milliGRAM(s) Oral three times a day PRN  insulin glargine Injectable (LANTUS) 40 Unit(s) SubCutaneous at bedtime  insulin lispro (HumaLOG) corrective regimen sliding scale   SubCutaneous three times a day before meals  lisinopril Oral Tab/Cap - Peds 2.5 milliGRAM(s) Oral daily  methadone    Tablet 5 milliGRAM(s) Oral daily  saccharomyces boulardii 250 milliGRAM(s) Oral two times a day    SOCIAL       FAMILY   FAMILY HISTORY:  Family history of diabetes mellitus (DM) (Father, Mother, Sibling)    REVIEW OF SYSTEMS:  CONSTITUTIONAL:  No Fever or chills  HEENT:   No diplopia or blurred vision.  No earache, sore throat or runny nose.  CARDIOVASCULAR:  No pressure, squeezing, strangling, tightness, heaviness or aching about the chest, neck, axilla or epigastrium.  RESPIRATORY:  No cough, shortness of breath, PND or orthopnea.  GASTROINTESTINAL:  No nausea, vomiting or diarrhea.  GENITOURINARY:  No dysuria, frequency or urgency. No Blood in urine  MUSCULOSKELETAL:   AS PER HPI  SKIN:  No change in skin, hair or nails.  NEUROLOGIC:  No paresthesias, fasciculations, seizures or weakness.  PSYCHIATRIC:  No disorder of thought or mood.  ENDOCRINE:  No heat or cold intolerance, polyuria or polydipsia.  HEMATOLOGICAL:  No easy bruising or bleeding.            Physical Exam:  ICU Vital Signs Last 24 Hrs  T(C): 36.6 (04 Feb 2019 07:37), Max: 37.2 (03 Feb 2019 15:34)  T(F): 97.8 (04 Feb 2019 07:37), Max: 99 (03 Feb 2019 15:34)  HR: 65 (04 Feb 2019 07:37) (65 - 78)  BP: 148/79 (04 Feb 2019 07:37) (121/64 - 148/79)  BP(mean): --  ABP: --  ABP(mean): --  RR: 18 (04 Feb 2019 07:37) (18 - 18)  SpO2: 97% (04 Feb 2019 07:37) (97% - 98%)    GEN: NAD, pleasant  HEENT: normocephalic and atraumatic. EOMI. DANNY.    NECK: Supple. No carotid bruits.  No lymphadenopathy or thyromegaly.  LUNGS: Clear to auscultation.  HEART: Regular rate and rhythm without murmur.  ABDOMEN: Soft, nontender, and nondistended.  Positive bowel sounds.    : No CVA tenderness  EXTREMITIES:  DECREASED EDEMA AND ERYTHEMA   MSK: no joint swelling  NEUROLOGIC: Cranial nerves II through XII are grossly intact.  PSYCHIATRIC: Appropriate affect .         Labs:                      CAPILLARY BLOOD GLUCOSE      POCT Blood Glucose.: 92 mg/dL (04 Feb 2019 08:07)  POCT Blood Glucose.: 134 mg/dL (03 Feb 2019 22:40)  POCT Blood Glucose.: 116 mg/dL (03 Feb 2019 16:28)  POCT Blood Glucose.: 165 mg/dL (03 Feb 2019 11:08)        RECENT CULTURES:  02-01 @ 16:47 .Blood     No growth at 48 hours        02-01 @ 16:39 .Blood     No growth at 48 hours

## 2019-02-04 NOTE — PROGRESS NOTE ADULT - ASSESSMENT
Pt is a 62 YO M with PMHx of DM-2 (on insulin), HTN, obesity, SLE, ankylosing spondylitis, L hip surgery (complicated by chronic back pain which limits patient's activity) sent to the ED by his PMD for right leg cellulitis. Pt reports right leg swelling with oozing for several weeks. He went to his PMD several weeks ago and was given a medication (cant recall name) and saw no improvement. Per pt, the swelling worsened over the past few days and he went to see his PMD and was told to come to the ED. Pt denies fever, chills, trauma to the leg or recent illness. Prior episode 2 years ago; was tx with antibiotics at [facility].  PT NON  TOXIC ON IV ABX   PT WITH  CELLULITIS ON RIGHT  LEG  SUPER IMPOSED ON STASIS DERMATITIS   NO EVIDENCE OF MRSA  CHANGED TO  KEFZOL   CLINICALLY IMPROVING   WOULD SUGGEST LIDEX TOPICALLY FOR STASIS DERMATITIS COMPONENT  HOPEFULLY D/C SOON

## 2019-02-04 NOTE — DIETITIAN INITIAL EVALUATION ADULT. - OTHER INFO
Pt admitted with cellulitis RLE, hx of lupus, DM (A1c 7.9%), HTN. Pt complaints of weeping edema and having difficulty ambulating, making procurement or meals a challenge. Pt states preparing meals at home mostly usually adding salt to foods and will occasionally having [employer] or [employer]. Current documented weight 285lbs, though Pt states that it is likely higher, unable to weigh Pt at this time. Pt was provided with written and verbal education for Low Na, Meal Planning with [employer] and identified some barriers to promote gradual, desired weight loss. Pt was receptive to the information and was understanding of the materials provided, RD to remain available.

## 2019-02-05 LAB
ANION GAP SERPL CALC-SCNC: 10 MMOL/L — SIGNIFICANT CHANGE UP (ref 5–17)
BUN SERPL-MCNC: 19 MG/DL — SIGNIFICANT CHANGE UP (ref 8–20)
CALCIUM SERPL-MCNC: 8.8 MG/DL — SIGNIFICANT CHANGE UP (ref 8.6–10.2)
CHLORIDE SERPL-SCNC: 102 MMOL/L — SIGNIFICANT CHANGE UP (ref 98–107)
CO2 SERPL-SCNC: 26 MMOL/L — SIGNIFICANT CHANGE UP (ref 22–29)
CREAT SERPL-MCNC: 0.81 MG/DL — SIGNIFICANT CHANGE UP (ref 0.5–1.3)
GLUCOSE BLDC GLUCOMTR-MCNC: 122 MG/DL — HIGH (ref 70–99)
GLUCOSE BLDC GLUCOMTR-MCNC: 129 MG/DL — HIGH (ref 70–99)
GLUCOSE BLDC GLUCOMTR-MCNC: 134 MG/DL — HIGH (ref 70–99)
GLUCOSE BLDC GLUCOMTR-MCNC: 174 MG/DL — HIGH (ref 70–99)
GLUCOSE SERPL-MCNC: 100 MG/DL — SIGNIFICANT CHANGE UP (ref 70–115)
HCT VFR BLD CALC: 31.7 % — LOW (ref 42–52)
HGB BLD-MCNC: 9.6 G/DL — LOW (ref 14–18)
MCHC RBC-ENTMCNC: 24.8 PG — LOW (ref 27–31)
MCHC RBC-ENTMCNC: 30.3 G/DL — LOW (ref 32–36)
MCV RBC AUTO: 81.9 FL — SIGNIFICANT CHANGE UP (ref 80–94)
PLATELET # BLD AUTO: 265 K/UL — SIGNIFICANT CHANGE UP (ref 150–400)
POTASSIUM SERPL-MCNC: 4 MMOL/L — SIGNIFICANT CHANGE UP (ref 3.5–5.3)
POTASSIUM SERPL-SCNC: 4 MMOL/L — SIGNIFICANT CHANGE UP (ref 3.5–5.3)
RBC # BLD: 3.87 M/UL — LOW (ref 4.6–6.2)
RBC # FLD: 18.3 % — HIGH (ref 11–15.6)
SODIUM SERPL-SCNC: 138 MMOL/L — SIGNIFICANT CHANGE UP (ref 135–145)
WBC # BLD: 7.2 K/UL — SIGNIFICANT CHANGE UP (ref 4.8–10.8)
WBC # FLD AUTO: 7.2 K/UL — SIGNIFICANT CHANGE UP (ref 4.8–10.8)

## 2019-02-05 PROCEDURE — 99232 SBSQ HOSP IP/OBS MODERATE 35: CPT

## 2019-02-05 PROCEDURE — ZZZZZ: CPT

## 2019-02-05 PROCEDURE — 93306 TTE W/DOPPLER COMPLETE: CPT | Mod: 26

## 2019-02-05 RX ORDER — CEPHALEXIN 500 MG
500 CAPSULE ORAL
Qty: 0 | Refills: 0 | Status: DISCONTINUED | OUTPATIENT
Start: 2019-02-05 | End: 2019-02-06

## 2019-02-05 RX ADMIN — Medication 1 APPLICATION(S): at 21:21

## 2019-02-05 RX ADMIN — METHADONE HYDROCHLORIDE 5 MILLIGRAM(S): 40 TABLET ORAL at 11:55

## 2019-02-05 RX ADMIN — HYDROMORPHONE HYDROCHLORIDE 4 MILLIGRAM(S): 2 INJECTION INTRAMUSCULAR; INTRAVENOUS; SUBCUTANEOUS at 22:08

## 2019-02-05 RX ADMIN — Medication 250 MILLIGRAM(S): at 05:31

## 2019-02-05 RX ADMIN — Medication 500 MILLIGRAM(S): at 21:48

## 2019-02-05 RX ADMIN — Medication 250 MILLIGRAM(S): at 19:32

## 2019-02-05 RX ADMIN — GABAPENTIN 800 MILLIGRAM(S): 400 CAPSULE ORAL at 05:31

## 2019-02-05 RX ADMIN — HYDROMORPHONE HYDROCHLORIDE 4 MILLIGRAM(S): 2 INJECTION INTRAMUSCULAR; INTRAVENOUS; SUBCUTANEOUS at 16:53

## 2019-02-05 RX ADMIN — Medication 100 MILLIGRAM(S): at 05:30

## 2019-02-05 RX ADMIN — INSULIN GLARGINE 40 UNIT(S): 100 INJECTION, SOLUTION SUBCUTANEOUS at 21:21

## 2019-02-05 RX ADMIN — LISINOPRIL 2.5 MILLIGRAM(S): 2.5 TABLET ORAL at 05:31

## 2019-02-05 RX ADMIN — GABAPENTIN 800 MILLIGRAM(S): 400 CAPSULE ORAL at 14:45

## 2019-02-05 RX ADMIN — Medication 1 APPLICATION(S): at 05:30

## 2019-02-05 RX ADMIN — HYDROMORPHONE HYDROCHLORIDE 4 MILLIGRAM(S): 2 INJECTION INTRAMUSCULAR; INTRAVENOUS; SUBCUTANEOUS at 21:21

## 2019-02-05 RX ADMIN — Medication 100 MILLIGRAM(S): at 14:15

## 2019-02-05 RX ADMIN — HYDROMORPHONE HYDROCHLORIDE 4 MILLIGRAM(S): 2 INJECTION INTRAMUSCULAR; INTRAVENOUS; SUBCUTANEOUS at 17:50

## 2019-02-05 RX ADMIN — HYDROMORPHONE HYDROCHLORIDE 4 MILLIGRAM(S): 2 INJECTION INTRAMUSCULAR; INTRAVENOUS; SUBCUTANEOUS at 10:36

## 2019-02-05 RX ADMIN — HYDROMORPHONE HYDROCHLORIDE 4 MILLIGRAM(S): 2 INJECTION INTRAMUSCULAR; INTRAVENOUS; SUBCUTANEOUS at 11:30

## 2019-02-05 RX ADMIN — GABAPENTIN 800 MILLIGRAM(S): 400 CAPSULE ORAL at 21:21

## 2019-02-05 NOTE — PROGRESS NOTE ADULT - SUBJECTIVE AND OBJECTIVE BOX
HPI:  Pt is a 62 YO M with PMHx of DM-2 (on insulin), HTN, obesity, SLE, ankylosing spondylitis, L hip surgery (complicated by chronic back pain which limits patient's activity) sent to the ED by his PMD for right leg cellulitis. Pt reports right leg swelling with oozing for several weeks. He went to his PMD several weeks ago and was given a medication (cant recall name) and saw no improvement. Per pt, the swelling worsened over the past few days and he went to see his PMD and was told to come to the ED. Pt denies fever, chills, trauma to the leg or recent illness. Prior episode 2 years ago; was tx with antibiotics at [facility].    Denies cough, fever, chills, n/v/d/c, palpitations, sob, abdominal pain, numbness or tingling, urinary or bowel changes, burning with urination, blood in urine or stool, calf tenderness (02 Feb 2019 00:14)    Male  PAST MEDICAL & SURGICAL HISTORY:  Ankylosing spondylitis of site in spine  Lupus  Diabetes  History of hip replacement, total, left      REVIEW OF SYSTEMS      General:	morbid obesity, poor ambulation, due to  obesity and hip  surgeries, , bilateral  pedal edema,     Skin/Breast:   recurrent leg infection, cellulitis  	  Ophthalmologic:  diabetic retinopathy,  refraction disorder  	  ENMT:	  normal     Respiratory and Thorax:  exertion dyspnea   	  Cardiovascular:	  exertion dyspnea    Gastrointestinal:	  negative     Genitourinary:	negative     Musculoskeletal:	  s/p hip surgery , replacement in [facility] SLE.  chroc hip pain on  pain management  [facility] , out patient    Neurological:	diabetic  neuropathy    Psychiatric:	negative     Hematology/Lymphatics:	 bilateral leg lymphedema    Endocrine:	morbid obesity, diabetes,     Allergic/Immunologic:	    MEDICATIONS  (STANDING):  ceFAZolin   IVPB      ceFAZolin   IVPB 2000 milliGRAM(s) IV Intermittent every 8 hours  dextrose 5%. 1000 milliLiter(s) (50 mL/Hr) IV Continuous <Continuous>  dextrose 50% Injectable 12.5 Gram(s) IV Push once  dextrose 50% Injectable 25 Gram(s) IV Push once  dextrose 50% Injectable 25 Gram(s) IV Push once  enoxaparin Injectable 40 milliGRAM(s) SubCutaneous daily  fluocinonide 0.05% Cream 1 Application(s) Topical every 12 hours  gabapentin 800 milliGRAM(s) Oral three times a day  insulin glargine Injectable (LANTUS) 40 Unit(s) SubCutaneous at bedtime  insulin lispro (HumaLOG) corrective regimen sliding scale   SubCutaneous three times a day before meals  lisinopril Oral Tab/Cap - Peds 2.5 milliGRAM(s) Oral daily  methadone    Tablet 5 milliGRAM(s) Oral daily  saccharomyces boulardii 250 milliGRAM(s) Oral two times a day    MEDICATIONS  (PRN):  dextrose 40% Gel 15 Gram(s) Oral once PRN Blood Glucose LESS THAN 70 milliGRAM(s)/deciliter  glucagon  Injectable 1 milliGRAM(s) IntraMuscular once PRN Glucose LESS THAN 70 milligrams/deciliter  HYDROmorphone   Tablet 4 milliGRAM(s) Oral three times a day PRN Severe Pain (7 - 10)      Allergies    No Known Allergies    Intolerances        SOCIAL HISTORY:    FAMILY HISTORY:  Family history of diabetes mellitus (DM) (Father, Mother, Sibling)      Vital Signs Last 24 Hrs  T(C): 36.6 (04 Feb 2019 23:37), Max: 36.7 (04 Feb 2019 15:15)  T(F): 97.9 (04 Feb 2019 23:37), Max: 98.1 (04 Feb 2019 15:15)  HR: 74 (04 Feb 2019 23:37) (65 - 78)  BP: 144/71 (04 Feb 2019 23:37) (144/71 - 148/79)  BP(mean): --  RR: 18 (04 Feb 2019 23:37) (18 - 18)  SpO2: 97% (04 Feb 2019 23:37) (97% - 97%)      overnight events none    PHYSICAL EXAM:      Constitutional:     awake alert oriented , no pain     Eyes:  refraction disorder       ENMT:  negative     Neck:    supple     Breasts:  normal     Back:  negative/normal     Respiratory:  clear lungs     Cardiovascular:  normal sinus rhythm     Gastrointestinal:    normal     Genitourinary:    Rectal:    Extremities:  bilateral legs  , skin  cellulitis  improvig    Vascular:  normal   pulses     Neurological:  diabetic neuropathy    Skin:  bilateral lower extremities cellulitis     Lymph Nodes:  negative     Musculoskeletal:  hip  limited  ROM and pain associated  with any movement    Psychiatric: normal         LABS:  CBC Full  -  ( 05 Feb 2019 05:38 )  WBC Count : 7.2 K/uL  Hemoglobin : 9.6 g/dL  Hematocrit : 31.7 %  Platelet Count - Automated : 265 K/uL  Mean Cell Volume : 81.9 fl  Mean Cell Hemoglobin : 24.8 pg  Mean Cell Hemoglobin Concentration : 30.3 g/dL  Auto Neutrophil # : x  Auto Lymphocyte # : x  Auto Monocyte # : x  Auto Eosinophil # : x  Auto Basophil # : x  Auto Neutrophil % : x  Auto Lymphocyte % : x  Auto Monocyte % : x  Auto Eosinophil % : x  Auto Basophil % : x    02-05    138  |  102  |  19.0  ----------------------------<  100  4.0   |  26.0  |  0.81    Ca    8.8      05 Feb 2019 05:38            RADIOLOGY & ADDITIONAL STUDIES:    < from: Xray Chest 1 View AP/PA. (04.27.17 @ 02:26) >     EXAM:  CHEST SINGLE VIEW FRONTAL                          PROCEDURE DATE:  04/27/2017        INTERPRETATION:  History: Morbid obesity.    Technique:  AP portable    Comparisons:  Chest x-ray dated 10/23/2013    Findings: The lungs are clear. Thereare no infiltrates, congestion or   pleural effusions. The pulmonary vasculature and aorta are normal for   age. Heart size is unremarkable. The thorax is normal for age.    Impression: No acute pulmonary disease. No interval change.                OMER PARNELL M.D., ATTENDING RADIOLOGIST  This document has been electronically signed. Apr 27 2017  8:25AM                  < end of copied text >

## 2019-02-05 NOTE — PROGRESS NOTE ADULT - SUBJECTIVE AND OBJECTIVE BOX
[facility]  INFECTIOUS DISEASES AND INTERNAL MEDICINE at [place]  =======================================================  Raoul Peterson MD  Diplomates American Board of Internal Medicine and Infectious Diseases  =======================================================    RANDY MARTINEZ [id number]    Follow up: CELLULITIS    Allergies:  No Known Allergies      Medications:  ceFAZolin   IVPB      ceFAZolin   IVPB 2000 milliGRAM(s) IV Intermittent every 8 hours  dextrose 40% Gel 15 Gram(s) Oral once PRN  dextrose 5%. 1000 milliLiter(s) IV Continuous <Continuous>  dextrose 50% Injectable 12.5 Gram(s) IV Push once  dextrose 50% Injectable 25 Gram(s) IV Push once  dextrose 50% Injectable 25 Gram(s) IV Push once  enoxaparin Injectable 40 milliGRAM(s) SubCutaneous daily  gabapentin 800 milliGRAM(s) Oral three times a day  glucagon  Injectable 1 milliGRAM(s) IntraMuscular once PRN  HYDROmorphone   Tablet 4 milliGRAM(s) Oral three times a day PRN  insulin glargine Injectable (LANTUS) 40 Unit(s) SubCutaneous at bedtime  insulin lispro (HumaLOG) corrective regimen sliding scale   SubCutaneous three times a day before meals  lisinopril Oral Tab/Cap - Peds 2.5 milliGRAM(s) Oral daily  methadone    Tablet 5 milliGRAM(s) Oral daily  saccharomyces boulardii 250 milliGRAM(s) Oral two times a day    SOCIAL       FAMILY   FAMILY HISTORY:  Family history of diabetes mellitus (DM) (Father, Mother, Sibling)    REVIEW OF SYSTEMS:  CONSTITUTIONAL:  No Fever or chills  HEENT:   No diplopia or blurred vision.  No earache, sore throat or runny nose.  CARDIOVASCULAR:  No pressure, squeezing, strangling, tightness, heaviness or aching about the chest, neck, axilla or epigastrium.  RESPIRATORY:  No cough, shortness of breath, PND or orthopnea.  GASTROINTESTINAL:  No nausea, vomiting or diarrhea.  GENITOURINARY:  No dysuria, frequency or urgency. No Blood in urine  MUSCULOSKELETAL:   AS PER HPI  SKIN:  No change in skin, hair or nails.  NEUROLOGIC:  No paresthesias, fasciculations, seizures or weakness.  PSYCHIATRIC:  No disorder of thought or mood.  ENDOCRINE:  No heat or cold intolerance, polyuria or polydipsia.  HEMATOLOGICAL:  No easy bruising or bleeding.            Physical Exam:  I Vital Signs Last 24 Hrs  T(C): 36.6 (05 Feb 2019 08:35), Max: 36.7 (04 Feb 2019 15:15)  T(F): 97.9 (05 Feb 2019 08:35), Max: 98.1 (04 Feb 2019 15:15)  HR: 65 (05 Feb 2019 08:35) (65 - 78)  BP: 111/58 (05 Feb 2019 08:35) (111/58 - 146/69)  BP(mean): --  RR: 18 (05 Feb 2019 08:35) (18 - 18)  SpO2: 96% (05 Feb 2019 08:35) (96% - 97%)    GEN: NAD, pleasant  HEENT: normocephalic and atraumatic. EOMI. DANNY.    NECK: Supple. No carotid bruits.  No lymphadenopathy or thyromegaly.  LUNGS: Clear to auscultation.  HEART: Regular rate and rhythm without murmur.  ABDOMEN: Soft, nontender, and nondistended.  Positive bowel sounds.    : No CVA tenderness  EXTREMITIES:  DECREASED EDEMA AND ERYTHEMA   MSK: no joint swelling  NEUROLOGIC: Cranial nerves II through XII are grossly intact.  PSYCHIATRIC: Appropriate affect .         Labs:                            9.6    7.2   )-----------( 265      ( 05 Feb 2019 05:38 )             31.7   02-05    138  |  102  |  19.0  ----------------------------<  100  4.0   |  26.0  |  0.81    Ca    8.8      05 Feb 2019 05:38                     RECENT CULTURES:  02-01 @ 16:47 .Blood     No growth at 48 hours        02-01 @ 16:39 .Blood     No growth at 48 hours

## 2019-02-05 NOTE — PROCEDURE NOTE - NSPROCDETAILS_GEN_ALL_CORE
location identified, draped/prepped, sterile technique used/flushes easily/secured in place/ultrasound utilization/blood seen on insertion/dressing applied

## 2019-02-05 NOTE — PROGRESS NOTE ADULT - ASSESSMENT
Pt is a 62 YO M with PMHx of DM-2 (on insulin), HTN, obesity, SLE, ankylosing spondylitis, L hip surgery (complicated by chronic back pain which limits patient's activity) sent to the ED by his PMD for right leg cellulitis. Pt reports right leg swelling with oozing for several weeks. He went to his PMD several weeks ago and was given a medication (cant recall name) and saw no improvement. Per pt, the swelling worsened over the past few days and he went to see his PMD and was told to come to the ED. Pt denies fever, chills, trauma to the leg or recent illness. Prior episode 2 years ago; was tx with antibiotics at [facility].  PT NON  TOXIC ON IV ABX   PT WITH  CELLULITIS ON RIGHT  LEG  SUPER IMPOSED ON STASIS DERMATITIS   NO EVIDENCE OF MRSA  on   KEFZOL   CLINICALLY IMPROVING     LIDEX TOPICALLY FOR STASIS DERMATITIS COMPONENT  IF CONTINUES TO IMPROVE POSSIBLE D/C IN AM ON ORAL ABX  D/W DR ZAZUETA

## 2019-02-06 ENCOUNTER — TRANSCRIPTION ENCOUNTER (OUTPATIENT)
Age: 64
End: 2019-02-06

## 2019-02-06 VITALS
SYSTOLIC BLOOD PRESSURE: 120 MMHG | DIASTOLIC BLOOD PRESSURE: 62 MMHG | TEMPERATURE: 98 F | OXYGEN SATURATION: 98 % | HEART RATE: 72 BPM | RESPIRATION RATE: 18 BRPM

## 2019-02-06 LAB
CULTURE RESULTS: SIGNIFICANT CHANGE UP
CULTURE RESULTS: SIGNIFICANT CHANGE UP
GLUCOSE BLDC GLUCOMTR-MCNC: 133 MG/DL — HIGH (ref 70–99)
SPECIMEN SOURCE: SIGNIFICANT CHANGE UP
SPECIMEN SOURCE: SIGNIFICANT CHANGE UP

## 2019-02-06 PROCEDURE — 87040 BLOOD CULTURE FOR BACTERIA: CPT

## 2019-02-06 PROCEDURE — 93306 TTE W/DOPPLER COMPLETE: CPT

## 2019-02-06 PROCEDURE — 86803 HEPATITIS C AB TEST: CPT

## 2019-02-06 PROCEDURE — 85027 COMPLETE CBC AUTOMATED: CPT

## 2019-02-06 PROCEDURE — 80202 ASSAY OF VANCOMYCIN: CPT

## 2019-02-06 PROCEDURE — 82962 GLUCOSE BLOOD TEST: CPT

## 2019-02-06 PROCEDURE — 96367 TX/PROPH/DG ADDL SEQ IV INF: CPT

## 2019-02-06 PROCEDURE — 96365 THER/PROPH/DIAG IV INF INIT: CPT

## 2019-02-06 PROCEDURE — 83605 ASSAY OF LACTIC ACID: CPT

## 2019-02-06 PROCEDURE — 80061 LIPID PANEL: CPT

## 2019-02-06 PROCEDURE — 83036 HEMOGLOBIN GLYCOSYLATED A1C: CPT

## 2019-02-06 PROCEDURE — 99285 EMERGENCY DEPT VISIT HI MDM: CPT | Mod: 25

## 2019-02-06 PROCEDURE — 36415 COLL VENOUS BLD VENIPUNCTURE: CPT

## 2019-02-06 PROCEDURE — 93971 EXTREMITY STUDY: CPT

## 2019-02-06 PROCEDURE — 99232 SBSQ HOSP IP/OBS MODERATE 35: CPT

## 2019-02-06 PROCEDURE — 80048 BASIC METABOLIC PNL TOTAL CA: CPT

## 2019-02-06 PROCEDURE — 80053 COMPREHEN METABOLIC PANEL: CPT

## 2019-02-06 RX ORDER — CEPHALEXIN 500 MG
1 CAPSULE ORAL
Qty: 0 | Refills: 0 | COMMUNITY
Start: 2019-02-06

## 2019-02-06 RX ORDER — GABAPENTIN 400 MG/1
2 CAPSULE ORAL
Qty: 0 | Refills: 0 | COMMUNITY
Start: 2019-02-06

## 2019-02-06 RX ORDER — LISINOPRIL 2.5 MG/1
1 TABLET ORAL
Qty: 0 | Refills: 0 | COMMUNITY
Start: 2019-02-06

## 2019-02-06 RX ORDER — METHADONE HYDROCHLORIDE 40 MG/1
1 TABLET ORAL
Qty: 0 | Refills: 0 | COMMUNITY
Start: 2019-02-06

## 2019-02-06 RX ORDER — FLUOCINONIDE/EMOLLIENT BASE 0.05 %
1 CREAM (GRAM) TOPICAL
Qty: 0 | Refills: 0 | COMMUNITY
Start: 2019-02-06

## 2019-02-06 RX ADMIN — Medication 500 MILLIGRAM(S): at 11:07

## 2019-02-06 RX ADMIN — LISINOPRIL 2.5 MILLIGRAM(S): 2.5 TABLET ORAL at 05:18

## 2019-02-06 RX ADMIN — Medication 500 MILLIGRAM(S): at 05:18

## 2019-02-06 RX ADMIN — Medication 1 APPLICATION(S): at 11:07

## 2019-02-06 RX ADMIN — METHADONE HYDROCHLORIDE 5 MILLIGRAM(S): 40 TABLET ORAL at 11:11

## 2019-02-06 RX ADMIN — GABAPENTIN 800 MILLIGRAM(S): 400 CAPSULE ORAL at 05:18

## 2019-02-06 RX ADMIN — Medication 250 MILLIGRAM(S): at 05:18

## 2019-02-06 NOTE — DISCHARGE NOTE ADULT - HOSPITAL COURSE
admitted  due to outpatient failure to management of  lower extremity cellulitis.  initiated  IV empiric antibiotic,  responded well to the management no indication of sepsis.  initiated topical  treatment for stasis dermatitis  , with  topical steroids improved.   Today stable  will DC home  oral antibiotic

## 2019-02-06 NOTE — DISCHARGE NOTE ADULT - CARE PROVIDER_API CALL
Uziel Walker)  Family Medicine  [address] [place] [address]  Phone: [phone]  Fax: [phone]  Follow Up Time:

## 2019-02-06 NOTE — DISCHARGE NOTE ADULT - PATIENT PORTAL LINK FT
You can access the [facility] Patient Portal, offered by [facility], by registering with the following website: [email or web address]

## 2019-02-06 NOTE — PROGRESS NOTE ADULT - SUBJECTIVE AND OBJECTIVE BOX
[facility]  INFECTIOUS DISEASES AND INTERNAL MEDICINE at [place]  =======================================================  Raoul Peterson MD  Diplomates American Board of Internal Medicine and Infectious Diseases  =======================================================    RANDY MARTINEZ [id number]    Follow up: CELLULITIS    Allergies:  No Known Allergies      Medications:  ceFAZolin   IVPB      ceFAZolin   IVPB 2000 milliGRAM(s) IV Intermittent every 8 hours  dextrose 40% Gel 15 Gram(s) Oral once PRN  dextrose 5%. 1000 milliLiter(s) IV Continuous <Continuous>  dextrose 50% Injectable 12.5 Gram(s) IV Push once  dextrose 50% Injectable 25 Gram(s) IV Push once  dextrose 50% Injectable 25 Gram(s) IV Push once  enoxaparin Injectable 40 milliGRAM(s) SubCutaneous daily  gabapentin 800 milliGRAM(s) Oral three times a day  glucagon  Injectable 1 milliGRAM(s) IntraMuscular once PRN  HYDROmorphone   Tablet 4 milliGRAM(s) Oral three times a day PRN  insulin glargine Injectable (LANTUS) 40 Unit(s) SubCutaneous at bedtime  insulin lispro (HumaLOG) corrective regimen sliding scale   SubCutaneous three times a day before meals  lisinopril Oral Tab/Cap - Peds 2.5 milliGRAM(s) Oral daily  methadone    Tablet 5 milliGRAM(s) Oral daily  saccharomyces boulardii 250 milliGRAM(s) Oral two times a day    SOCIAL       FAMILY   FAMILY HISTORY:  Family history of diabetes mellitus (DM) (Father, Mother, Sibling)    REVIEW OF SYSTEMS:  CONSTITUTIONAL:  No Fever or chills  HEENT:   No diplopia or blurred vision.  No earache, sore throat or runny nose.  CARDIOVASCULAR:  No pressure, squeezing, strangling, tightness, heaviness or aching about the chest, neck, axilla or epigastrium.  RESPIRATORY:  No cough, shortness of breath, PND or orthopnea.  GASTROINTESTINAL:  No nausea, vomiting or diarrhea.  GENITOURINARY:  No dysuria, frequency or urgency. No Blood in urine  MUSCULOSKELETAL:   AS PER HPI  SKIN:  No change in skin, hair or nails.  NEUROLOGIC:  No paresthesias, fasciculations, seizures or weakness.  PSYCHIATRIC:  No disorder of thought or mood.  ENDOCRINE:  No heat or cold intolerance, polyuria or polydipsia.  HEMATOLOGICAL:  No easy bruising or bleeding.            Physical Exam:  I  Vital Signs Last 24 Hrs  T(C): 36.9 (06 Feb 2019 08:13), Max: 36.9 (06 Feb 2019 08:13)  T(F): 98.4 (06 Feb 2019 08:13), Max: 98.4 (06 Feb 2019 08:13)  HR: 74 (06 Feb 2019 08:13) (72 - 74)  BP: 130/65 (06 Feb 2019 08:13) (98/59 - 130/65)  BP(mean): --  RR: 18 (06 Feb 2019 08:13) (18 - 18)  SpO2: 96% (06 Feb 2019 08:13) (96% - 96%)    GEN: NAD, pleasant  HEENT: normocephalic and atraumatic. EOMI. DANNY.    NECK: Supple. No carotid bruits.  No lymphadenopathy or thyromegaly.  LUNGS: Clear to auscultation.  HEART: Regular rate and rhythm without murmur.  ABDOMEN: Soft, nontender, and nondistended.  Positive bowel sounds.    : No CVA tenderness  EXTREMITIES:  DECREASED EDEMA AND ERYTHEMA   MSK: no joint swelling  NEUROLOGIC: Cranial nerves II through XII are grossly intact.  PSYCHIATRIC: Appropriate affect .         Labs:                            9.                       9.6    7.2   )-----------( 265      ( 05 Feb 2019 05:38 )             31.7   02-05    138  |  102  |  19.0  ----------------------------<  100  4.0   |  26.0  |  0.81    Ca    8.8      05 Feb 2019 05:38                 RECENT CULTURES:  02-01 @ 16:47 .Blood     No growth at 48 hours        02-01 @ 16:39 .Blood     No growth at 48 hours

## 2019-02-06 NOTE — DISCHARGE NOTE ADULT - MEDICATION SUMMARY - MEDICATIONS TO TAKE
I will START or STAY ON the medications listed below when I get home from the hospital:    HYDROmorphone 4 mg oral tablet  -- 1 tab(s) by mouth 3 times a day (with meals)  -- Indication: For analgesic    methadone 5 mg oral tablet  -- 1 tab(s) by mouth once a day  -- Indication: For analgesic    lisinopril 2.5 mg oral tablet  -- 1 tab(s) by mouth once a day  -- Indication: For Cardiovascular    gabapentin 400 mg oral capsule  -- 2 cap(s) by mouth 3 times a day  -- Indication: For Diabetic neuropathy    metFORMIN  --  by mouth   -- Indication: For metabolic agent    cephalexin 500 mg oral capsule  -- 1 cap(s) by mouth 4 times a day  -- Indication: For antibiotic    fluocinonide 0.05% topical cream  -- 1 application on skin every 12 hours  -- Indication: For topical agent

## 2019-02-06 NOTE — PROGRESS NOTE ADULT - ASSESSMENT
Pt is a 64 YO M with PMHx of DM-2 (on insulin), HTN, obesity, SLE, ankylosing spondylitis, L hip surgery (complicated by chronic back pain which limits patient's activity) sent to the ED by his PMD for right leg cellulitis. Pt reports right leg swelling with oozing for several weeks. He went to his PMD several weeks ago and was given a medication (cant recall name) and saw no improvement. Per pt, the swelling worsened over the past few days and he went to see his PMD and was told to come to the ED. Pt denies fever, chills, trauma to the leg or recent illness. Prior episode 2 years ago; was tx with antibiotics at [facility].  PT NON  TOXIC ON IV ABX   PT WITH  CELLULITIS ON RIGHT  LEG  SUPER IMPOSED ON STASIS DERMATITIS   NO EVIDENCE OF MRSA  on   KEFZOL   CLINICALLY IMPROVING     LIDEX TOPICALLY FOR STASIS DERMATITIS COMPONENT  ok FOR  D/C   ON ORAL ABX  WILL FOLLOW U0P AS NEEDED PLEASE CALL IF QUESTIONS

## 2019-02-06 NOTE — PROGRESS NOTE ADULT - REASON FOR ADMISSION
right leg cellulitis

## 2019-02-06 NOTE — DISCHARGE NOTE ADULT - PLAN OF CARE
will oral  antibiotic and topical cream fo stasis dermatitis improving will continue  diet poral hypoglycemic medication and  dietitian/endocrinologist  follow up stable.

## 2019-02-06 NOTE — DISCHARGE NOTE ADULT - MEDICATION SUMMARY - MEDICATIONS TO STOP TAKING
I will STOP taking the medications listed below when I get home from the hospital:    mupirocin 2% topical ointment  -- 1 application on skin once a day    nystatin 100,000 units/g topical cream  -- 1 application on skin 2 times a day

## 2019-02-06 NOTE — DISCHARGE NOTE ADULT - CARE PLAN
Principal Discharge DX:	Cellulitis of right lower extremity  Goal:	will oral  antibiotic and topical cream fo stasis dermatitis  Assessment and plan of treatment:	improving  Secondary Diagnosis:	Diabetes  Goal:	will continue  diet poral hypoglycemic medication and  dietitian/endocrinologist  follow up  Assessment and plan of treatment:	stable.

## 2019-03-14 ENCOUNTER — INPATIENT (INPATIENT)
Facility: HOSPITAL | Age: 64
LOS: 3 days | Discharge: HOME | DRG: 603 | End: 2019-03-18
Attending: FAMILY MEDICINE | Admitting: HOSPITALIST
Payer: MEDICAID

## 2019-03-14 VITALS
DIASTOLIC BLOOD PRESSURE: 68 MMHG | TEMPERATURE: 98 F | SYSTOLIC BLOOD PRESSURE: 159 MMHG | HEIGHT: 68 IN | HEART RATE: 94 BPM | OXYGEN SATURATION: 96 % | RESPIRATION RATE: 18 BRPM | WEIGHT: 289.03 LBS

## 2019-03-14 DIAGNOSIS — Z96.642 PRESENCE OF LEFT ARTIFICIAL HIP JOINT: Chronic | ICD-10-CM

## 2019-03-14 LAB
ALBUMIN SERPL ELPH-MCNC: 3.6 G/DL — SIGNIFICANT CHANGE UP (ref 3.3–5.2)
ALP SERPL-CCNC: 77 U/L — SIGNIFICANT CHANGE UP (ref 40–120)
ALT FLD-CCNC: 25 U/L — SIGNIFICANT CHANGE UP
ANION GAP SERPL CALC-SCNC: 12 MMOL/L — SIGNIFICANT CHANGE UP (ref 5–17)
AST SERPL-CCNC: 26 U/L — SIGNIFICANT CHANGE UP
BASOPHILS # BLD AUTO: 0.1 K/UL — SIGNIFICANT CHANGE UP (ref 0–0.2)
BASOPHILS NFR BLD AUTO: 0.7 % — SIGNIFICANT CHANGE UP (ref 0–2)
BILIRUB SERPL-MCNC: 0.4 MG/DL — SIGNIFICANT CHANGE UP (ref 0.4–2)
BUN SERPL-MCNC: 21 MG/DL — HIGH (ref 8–20)
CALCIUM SERPL-MCNC: 9 MG/DL — SIGNIFICANT CHANGE UP (ref 8.6–10.2)
CHLORIDE SERPL-SCNC: 102 MMOL/L — SIGNIFICANT CHANGE UP (ref 98–107)
CO2 SERPL-SCNC: 24 MMOL/L — SIGNIFICANT CHANGE UP (ref 22–29)
CREAT SERPL-MCNC: 0.79 MG/DL — SIGNIFICANT CHANGE UP (ref 0.5–1.3)
EOSINOPHIL # BLD AUTO: 0.2 K/UL — SIGNIFICANT CHANGE UP (ref 0–0.5)
EOSINOPHIL NFR BLD AUTO: 2.5 % — SIGNIFICANT CHANGE UP (ref 0–5)
GLUCOSE SERPL-MCNC: 104 MG/DL — SIGNIFICANT CHANGE UP (ref 70–115)
HCT VFR BLD CALC: 33.5 % — LOW (ref 42–52)
HGB BLD-MCNC: 10.3 G/DL — LOW (ref 14–18)
LACTATE BLDV-MCNC: 1.3 MMOL/L — SIGNIFICANT CHANGE UP (ref 0.5–2)
LYMPHOCYTES # BLD AUTO: 2.5 K/UL — SIGNIFICANT CHANGE UP (ref 1–4.8)
LYMPHOCYTES # BLD AUTO: 24.9 % — SIGNIFICANT CHANGE UP (ref 20–55)
MCHC RBC-ENTMCNC: 25.4 PG — LOW (ref 27–31)
MCHC RBC-ENTMCNC: 30.7 G/DL — LOW (ref 32–36)
MCV RBC AUTO: 82.7 FL — SIGNIFICANT CHANGE UP (ref 80–94)
MONOCYTES # BLD AUTO: 0.7 K/UL — SIGNIFICANT CHANGE UP (ref 0–0.8)
MONOCYTES NFR BLD AUTO: 7.2 % — SIGNIFICANT CHANGE UP (ref 3–10)
NEUTROPHILS # BLD AUTO: 6.4 K/UL — SIGNIFICANT CHANGE UP (ref 1.8–8)
NEUTROPHILS NFR BLD AUTO: 64.3 % — SIGNIFICANT CHANGE UP (ref 37–73)
PLATELET # BLD AUTO: 306 K/UL — SIGNIFICANT CHANGE UP (ref 150–400)
POTASSIUM SERPL-MCNC: 4.7 MMOL/L — SIGNIFICANT CHANGE UP (ref 3.5–5.3)
POTASSIUM SERPL-SCNC: 4.7 MMOL/L — SIGNIFICANT CHANGE UP (ref 3.5–5.3)
PROT SERPL-MCNC: 8.8 G/DL — HIGH (ref 6.6–8.7)
RBC # BLD: 4.05 M/UL — LOW (ref 4.6–6.2)
RBC # FLD: 18.2 % — HIGH (ref 11–15.6)
SODIUM SERPL-SCNC: 138 MMOL/L — SIGNIFICANT CHANGE UP (ref 135–145)
WBC # BLD: 10 K/UL — SIGNIFICANT CHANGE UP (ref 4.8–10.8)
WBC # FLD AUTO: 10 K/UL — SIGNIFICANT CHANGE UP (ref 4.8–10.8)

## 2019-03-14 PROCEDURE — 99285 EMERGENCY DEPT VISIT HI MDM: CPT

## 2019-03-14 PROCEDURE — 99223 1ST HOSP IP/OBS HIGH 75: CPT

## 2019-03-14 RX ORDER — FLUOCINONIDE/EMOLLIENT BASE 0.05 %
1 CREAM (GRAM) TOPICAL EVERY 12 HOURS
Qty: 0 | Refills: 0 | Status: DISCONTINUED | OUTPATIENT
Start: 2019-03-14 | End: 2019-03-18

## 2019-03-14 RX ORDER — INSULIN LISPRO 100/ML
VIAL (ML) SUBCUTANEOUS
Qty: 0 | Refills: 0 | Status: DISCONTINUED | OUTPATIENT
Start: 2019-03-14 | End: 2019-03-18

## 2019-03-14 RX ORDER — METHADONE HYDROCHLORIDE 40 MG/1
5 TABLET ORAL DAILY
Qty: 0 | Refills: 0 | Status: DISCONTINUED | OUTPATIENT
Start: 2019-03-14 | End: 2019-03-18

## 2019-03-14 RX ORDER — DEXTROSE 50 % IN WATER 50 %
25 SYRINGE (ML) INTRAVENOUS ONCE
Qty: 0 | Refills: 0 | Status: DISCONTINUED | OUTPATIENT
Start: 2019-03-14 | End: 2019-03-18

## 2019-03-14 RX ORDER — GLUCAGON INJECTION, SOLUTION 0.5 MG/.1ML
1 INJECTION, SOLUTION SUBCUTANEOUS ONCE
Qty: 0 | Refills: 0 | Status: DISCONTINUED | OUTPATIENT
Start: 2019-03-14 | End: 2019-03-18

## 2019-03-14 RX ORDER — DEXTROSE 50 % IN WATER 50 %
12.5 SYRINGE (ML) INTRAVENOUS ONCE
Qty: 0 | Refills: 0 | Status: DISCONTINUED | OUTPATIENT
Start: 2019-03-14 | End: 2019-03-18

## 2019-03-14 RX ORDER — DEXTROSE 50 % IN WATER 50 %
15 SYRINGE (ML) INTRAVENOUS ONCE
Qty: 0 | Refills: 0 | Status: DISCONTINUED | OUTPATIENT
Start: 2019-03-14 | End: 2019-03-18

## 2019-03-14 RX ORDER — ACETAMINOPHEN 500 MG
650 TABLET ORAL EVERY 6 HOURS
Qty: 0 | Refills: 0 | Status: DISCONTINUED | OUTPATIENT
Start: 2019-03-14 | End: 2019-03-18

## 2019-03-14 RX ORDER — LISINOPRIL 2.5 MG/1
2.5 TABLET ORAL DAILY
Qty: 0 | Refills: 0 | Status: DISCONTINUED | OUTPATIENT
Start: 2019-03-14 | End: 2019-03-18

## 2019-03-14 RX ORDER — HYDROMORPHONE HYDROCHLORIDE 2 MG/ML
4 INJECTION INTRAMUSCULAR; INTRAVENOUS; SUBCUTANEOUS THREE TIMES A DAY
Qty: 0 | Refills: 0 | Status: DISCONTINUED | OUTPATIENT
Start: 2019-03-14 | End: 2019-03-18

## 2019-03-14 RX ORDER — SODIUM CHLORIDE 9 MG/ML
1000 INJECTION, SOLUTION INTRAVENOUS
Qty: 0 | Refills: 0 | Status: DISCONTINUED | OUTPATIENT
Start: 2019-03-14 | End: 2019-03-18

## 2019-03-14 RX ORDER — GABAPENTIN 400 MG/1
800 CAPSULE ORAL THREE TIMES A DAY
Qty: 0 | Refills: 0 | Status: DISCONTINUED | OUTPATIENT
Start: 2019-03-14 | End: 2019-03-18

## 2019-03-14 RX ORDER — ENOXAPARIN SODIUM 100 MG/ML
40 INJECTION SUBCUTANEOUS EVERY 24 HOURS
Qty: 0 | Refills: 0 | Status: DISCONTINUED | OUTPATIENT
Start: 2019-03-14 | End: 2019-03-18

## 2019-03-14 RX ADMIN — Medication 100 MILLIGRAM(S): at 22:43

## 2019-03-14 NOTE — ED ADULT NURSE NOTE - OBJECTIVE STATEMENT
Pt is here with c/o BLE cellulitis, pt was sent from his PCP's office for IV abx.  Weeping noted to BLE

## 2019-03-14 NOTE — ED PROVIDER NOTE - OBJECTIVE STATEMENT
63y year old Male presented to ED form PCP office for bilateral lower extremity pain and infection. Pt says that his leg has been getting since he was released form the Hospital just over a month ago. Pt explained that Dr. Walker have been treating him with medication but nothing seems to work. Pt admits to a hx of DM, Lupus with chronic cellulitis of his lower extremity. Pt says that his lower legs has been oozing a lot and nothing seems to help. Pt denies any fever, chills or lower extremity weakness. PMD: Dr. Walker 63 year old Male presented to ED form PCP office for bilateral lower extremity pain and infection. Pt says that his leg has been getting since he was released form the Hospital just over a month ago. Pt explained that Dr. Walker have been treating him with medication( Keflex) but nothing seems to work. Pt admits to a hx of DM, Lupus, HTN, obesity, Ankylosis spondylosis   with chronic cellulitis of his lower extremity. Pt says that his lower legs has been oozing a lot and nothing seems to help. Pt denies any fever, chills or lower extremity weakness. Pt says that he was seen by Dr. Walker today who instructed him to come to the ED for an evaluation due top fail out patient therapy. PMD: Dr. Walker

## 2019-03-14 NOTE — H&P ADULT - NSICDXFAMILYHX_GEN_ALL_CORE_FT
FAMILY HISTORY:  Father  Still living? Unknown  Family history of diabetes mellitus (DM), Age at diagnosis: Age Unknown    Mother  Still living? Unknown  Family history of diabetes mellitus (DM), Age at diagnosis: Age Unknown    Sibling  Still living? Unknown  Family history of diabetes mellitus (DM), Age at diagnosis: Age Unknown

## 2019-03-14 NOTE — ED PROVIDER NOTE - CARE PLAN
Principal Discharge DX:	Cellulitis of lower extremity  Goal:	Evaluate and treat appropriate  Assessment and plan of treatment:	Admit to Medicine

## 2019-03-14 NOTE — ED PROVIDER NOTE - PROGRESS NOTE DETAILS
Pt stable afebrile and non toxic. Pt Labs +Normal lactic and WBC. Pt made aware of result and rest of labs still pending. Case discussed with hospitalist and pt will be admitted to [facility] floor.

## 2019-03-14 NOTE — ED ADULT TRIAGE NOTE - CHIEF COMPLAINT QUOTE
Patient arrived to ED today with c/o left and right lower leg cellulitis.  Patient sent from Dr. Walker's office for evaluation.

## 2019-03-14 NOTE — H&P ADULT - HISTORY OF PRESENT ILLNESS
Pt is a 63 year old Male with a pmh/o DMII, lupus, HTN, ankylosis spondylosis who was referred to ED after pt has not had improvement in b/l leg cellulitis on keflex.  Pt says that his lower legs has been oozing and that his legs have foul odor. Pt denies any fever, chills or lower extremity weakness. Dr. Walker sent pt to ED for evaluation and admission for cellulitis due to out patient therapy.

## 2019-03-14 NOTE — ED PROVIDER NOTE - PHYSICAL EXAMINATION
Lower extremity : Bilateral lower extremity cellulitis   Weeping discolored discharge to both lower extremity   Some open areas noted.

## 2019-03-14 NOTE — H&P ADULT - NSICDXPASTMEDICALHX_GEN_ALL_CORE_FT
PAST MEDICAL HISTORY:  Ankylosing spondylitis of site in spine     Cellulitis, leg     Chronic pain disorder     Chronic venous stasis dermatitis     Diabetes     Diabetic neuropathy     Lupus

## 2019-03-14 NOTE — H&P ADULT - ASSESSMENT
64 yo male with pmh/o DMII, HTN, chronic venous stasis with repeat episodes of superimposed cellulitis, admitted for cellulitis with failed outpt treatement on keflex.     1) Cellulitis with failed outpt treatment  -cont Clindamycin  -see previous ID note, consider consultation however pt may benefit from vascular consult for SHANE boot placement as underlying venous stasis more likely to cause repeat cellulitic complication if not treated  -pt afebrile and non septic  -f/u AM cbc, cmp    2) Anemia, chronic  -no active bleeding  -iron studies as outpatient with PMD    3) chronic pain syndrome  -cont dilaudid po as per ISTOP  -ISTOP ran and methadone hcl 10 q 4 ordered?  -cont gabapentin    4) HTN  -cont lisinopril 2.5 mg po qd

## 2019-03-14 NOTE — ED PROVIDER NOTE - CLINICAL SUMMARY MEDICAL DECISION MAKING FREE TEXT BOX
63 year old Male presented to ED form PCP office for bilateral lower extremity pain and infection. Pt says that his leg has been getting since he was released form the Hospital just over a month ago. Pt explained that Dr. Walker have been treating him with medication( Keflex) but nothing seems to work.

## 2019-03-14 NOTE — ED PROVIDER NOTE - PMH
Ankylosing spondylitis of site in spine    Cellulitis, leg    Chronic pain disorder    Chronic venous stasis dermatitis    Diabetes    Diabetic neuropathy    Lupus

## 2019-03-14 NOTE — ED PROVIDER NOTE - CONSTITUTIONAL NEGATIVE STATEMENT, MLM
Problem: Falls - Risk of  Goal: *Absence of Falls  Document Kurtis Fall Risk and appropriate interventions in the flowsheet.    Outcome: Progressing Towards Goal  Fall Risk Interventions:  Mobility Interventions: Communicate number of staff needed for ambulation/transfer, Patient to call before getting OOB         Medication Interventions: Patient to call before getting OOB    Elimination Interventions: Call light in reach, Urinal in reach no fever and no chills.

## 2019-03-15 DIAGNOSIS — L03.119 CELLULITIS OF UNSPECIFIED PART OF LIMB: ICD-10-CM

## 2019-03-15 LAB
ALBUMIN SERPL ELPH-MCNC: 3.3 G/DL — SIGNIFICANT CHANGE UP (ref 3.3–5.2)
ALP SERPL-CCNC: 70 U/L — SIGNIFICANT CHANGE UP (ref 40–120)
ALT FLD-CCNC: 23 U/L — SIGNIFICANT CHANGE UP
ANION GAP SERPL CALC-SCNC: 13 MMOL/L — SIGNIFICANT CHANGE UP (ref 5–17)
AST SERPL-CCNC: 21 U/L — SIGNIFICANT CHANGE UP
BASOPHILS # BLD AUTO: 0 K/UL — SIGNIFICANT CHANGE UP (ref 0–0.2)
BASOPHILS NFR BLD AUTO: 0.6 % — SIGNIFICANT CHANGE UP (ref 0–2)
BILIRUB SERPL-MCNC: 0.5 MG/DL — SIGNIFICANT CHANGE UP (ref 0.4–2)
BUN SERPL-MCNC: 18 MG/DL — SIGNIFICANT CHANGE UP (ref 8–20)
CALCIUM SERPL-MCNC: 8.5 MG/DL — LOW (ref 8.6–10.2)
CHLORIDE SERPL-SCNC: 101 MMOL/L — SIGNIFICANT CHANGE UP (ref 98–107)
CO2 SERPL-SCNC: 24 MMOL/L — SIGNIFICANT CHANGE UP (ref 22–29)
CREAT SERPL-MCNC: 0.77 MG/DL — SIGNIFICANT CHANGE UP (ref 0.5–1.3)
EOSINOPHIL # BLD AUTO: 0.2 K/UL — SIGNIFICANT CHANGE UP (ref 0–0.5)
EOSINOPHIL NFR BLD AUTO: 1.8 % — SIGNIFICANT CHANGE UP (ref 0–5)
GLUCOSE BLDC GLUCOMTR-MCNC: 122 MG/DL — HIGH (ref 70–99)
GLUCOSE BLDC GLUCOMTR-MCNC: 128 MG/DL — HIGH (ref 70–99)
GLUCOSE BLDC GLUCOMTR-MCNC: 153 MG/DL — HIGH (ref 70–99)
GLUCOSE BLDC GLUCOMTR-MCNC: 88 MG/DL — SIGNIFICANT CHANGE UP (ref 70–99)
GLUCOSE SERPL-MCNC: 81 MG/DL — SIGNIFICANT CHANGE UP (ref 70–115)
HCT VFR BLD CALC: 32.4 % — LOW (ref 42–52)
HGB BLD-MCNC: 9.9 G/DL — LOW (ref 14–18)
LYMPHOCYTES # BLD AUTO: 1.8 K/UL — SIGNIFICANT CHANGE UP (ref 1–4.8)
LYMPHOCYTES # BLD AUTO: 21.7 % — SIGNIFICANT CHANGE UP (ref 20–55)
MAGNESIUM SERPL-MCNC: 2 MG/DL — SIGNIFICANT CHANGE UP (ref 1.6–2.6)
MCHC RBC-ENTMCNC: 25 PG — LOW (ref 27–31)
MCHC RBC-ENTMCNC: 30.6 G/DL — LOW (ref 32–36)
MCV RBC AUTO: 81.8 FL — SIGNIFICANT CHANGE UP (ref 80–94)
MONOCYTES # BLD AUTO: 0.7 K/UL — SIGNIFICANT CHANGE UP (ref 0–0.8)
MONOCYTES NFR BLD AUTO: 8.4 % — SIGNIFICANT CHANGE UP (ref 3–10)
NEUTROPHILS # BLD AUTO: 5.5 K/UL — SIGNIFICANT CHANGE UP (ref 1.8–8)
NEUTROPHILS NFR BLD AUTO: 67.3 % — SIGNIFICANT CHANGE UP (ref 37–73)
PHOSPHATE SERPL-MCNC: 3.7 MG/DL — SIGNIFICANT CHANGE UP (ref 2.4–4.7)
PLATELET # BLD AUTO: 277 K/UL — SIGNIFICANT CHANGE UP (ref 150–400)
POTASSIUM SERPL-MCNC: 4.2 MMOL/L — SIGNIFICANT CHANGE UP (ref 3.5–5.3)
POTASSIUM SERPL-SCNC: 4.2 MMOL/L — SIGNIFICANT CHANGE UP (ref 3.5–5.3)
PROT SERPL-MCNC: 8.4 G/DL — SIGNIFICANT CHANGE UP (ref 6.6–8.7)
RBC # BLD: 3.96 M/UL — LOW (ref 4.6–6.2)
RBC # FLD: 18.3 % — HIGH (ref 11–15.6)
SODIUM SERPL-SCNC: 138 MMOL/L — SIGNIFICANT CHANGE UP (ref 135–145)
WBC # BLD: 8.2 K/UL — SIGNIFICANT CHANGE UP (ref 4.8–10.8)
WBC # FLD AUTO: 8.2 K/UL — SIGNIFICANT CHANGE UP (ref 4.8–10.8)

## 2019-03-15 RX ADMIN — GABAPENTIN 800 MILLIGRAM(S): 400 CAPSULE ORAL at 22:05

## 2019-03-15 RX ADMIN — Medication 100 MILLIGRAM(S): at 13:44

## 2019-03-15 RX ADMIN — Medication 100 MILLIGRAM(S): at 22:05

## 2019-03-15 RX ADMIN — HYDROMORPHONE HYDROCHLORIDE 4 MILLIGRAM(S): 2 INJECTION INTRAMUSCULAR; INTRAVENOUS; SUBCUTANEOUS at 16:28

## 2019-03-15 RX ADMIN — Medication 2: at 17:54

## 2019-03-15 RX ADMIN — Medication 1 APPLICATION(S): at 17:53

## 2019-03-15 RX ADMIN — GABAPENTIN 800 MILLIGRAM(S): 400 CAPSULE ORAL at 13:43

## 2019-03-15 RX ADMIN — LISINOPRIL 2.5 MILLIGRAM(S): 2.5 TABLET ORAL at 05:59

## 2019-03-15 RX ADMIN — Medication 1 APPLICATION(S): at 06:00

## 2019-03-15 RX ADMIN — HYDROMORPHONE HYDROCHLORIDE 4 MILLIGRAM(S): 2 INJECTION INTRAMUSCULAR; INTRAVENOUS; SUBCUTANEOUS at 06:27

## 2019-03-15 RX ADMIN — HYDROMORPHONE HYDROCHLORIDE 4 MILLIGRAM(S): 2 INJECTION INTRAMUSCULAR; INTRAVENOUS; SUBCUTANEOUS at 13:43

## 2019-03-15 RX ADMIN — ENOXAPARIN SODIUM 40 MILLIGRAM(S): 100 INJECTION SUBCUTANEOUS at 13:57

## 2019-03-15 RX ADMIN — HYDROMORPHONE HYDROCHLORIDE 4 MILLIGRAM(S): 2 INJECTION INTRAMUSCULAR; INTRAVENOUS; SUBCUTANEOUS at 22:40

## 2019-03-15 RX ADMIN — GABAPENTIN 800 MILLIGRAM(S): 400 CAPSULE ORAL at 05:58

## 2019-03-15 RX ADMIN — HYDROMORPHONE HYDROCHLORIDE 4 MILLIGRAM(S): 2 INJECTION INTRAMUSCULAR; INTRAVENOUS; SUBCUTANEOUS at 22:05

## 2019-03-15 RX ADMIN — Medication 100 MILLIGRAM(S): at 05:56

## 2019-03-15 RX ADMIN — HYDROMORPHONE HYDROCHLORIDE 4 MILLIGRAM(S): 2 INJECTION INTRAMUSCULAR; INTRAVENOUS; SUBCUTANEOUS at 05:57

## 2019-03-15 RX ADMIN — METHADONE HYDROCHLORIDE 5 MILLIGRAM(S): 40 TABLET ORAL at 13:43

## 2019-03-15 NOTE — PROGRESS NOTE ADULT - ASSESSMENT
Stable  No septic,  cellulitis lower  extremities, failed  out patient treatement  morbid obesity  diabetes  s/p post hip replacement  LVH.

## 2019-03-15 NOTE — PROGRESS NOTE ADULT - SUBJECTIVE AND OBJECTIVE BOX
HPI:  Pt is a 63 year old Male with a pmh/o DMII, lupus, HTN, ankylosis spondylosis who was referred to ED after pt has not had improvement in b/l leg cellulitis on keflex.  Pt says that his lower legs has been oozing and that his legs have foul odor. Pt denies any fever, chills or lower extremity weakness. Dr. Walker sent pt to ED for evaluation and admission for cellulitis due to out patient therapy. (14 Mar 2019 23:10)    Male  PAST MEDICAL & SURGICAL HISTORY:  Diabetic neuropathy  Chronic pain disorder  Cellulitis, leg  Chronic venous stasis dermatitis  Ankylosing spondylitis of site in spine  Lupus  Diabetes  History of hip replacement, total, left      REVIEW OF SYSTEMS      General:	 chronically secondary to por mobility due to obesity chronic pain,   and  recurrent lower extremities cellulitis.,      Skin/Breast:  cellulitis of recurrent lower  extremities  	  Ophthalmologic:   refraction disorder.   	  ENMT:	  negative     Respiratory and Thorax:  negative   	  Cardiovascular:	negative  , LVH  enlarged atria by ECHO.  EF 60-65%.    Gastrointestinal:	  negative     Genitourinary:	negative     Musculoskeletal:	  morbid obesity.    Neurological:	  diabetic neuropathy.    Psychiatric:	negative     Hematology/Lymphatics:	 negative     Endocrine:	obesity, diabetes     Allergic/Immunologic:	NKA.    MEDICATIONS  (STANDING):  clindamycin IVPB 600 milliGRAM(s) IV Intermittent every 8 hours  dextrose 5%. 1000 milliLiter(s) (50 mL/Hr) IV Continuous <Continuous>  dextrose 50% Injectable 25 Gram(s) IV Push once  dextrose 50% Injectable 25 Gram(s) IV Push once  dextrose 50% Injectable 12.5 Gram(s) IV Push once  enoxaparin Injectable 40 milliGRAM(s) SubCutaneous every 24 hours  fluocinonide 0.05% Cream 1 Application(s) Topical every 12 hours  gabapentin 800 milliGRAM(s) Oral three times a day  HYDROmorphone   Tablet 4 milliGRAM(s) Oral three times a day  insulin lispro (HumaLOG) corrective regimen sliding scale   SubCutaneous three times a day before meals  lisinopril 2.5 milliGRAM(s) Oral daily  methadone    Tablet 5 milliGRAM(s) Oral daily    MEDICATIONS  (PRN):  acetaminophen   Tablet .. 650 milliGRAM(s) Oral every 6 hours PRN Temp greater or equal to 38C (100.4F), Moderate Pain (4 - 6)  dextrose 40% Gel 15 Gram(s) Oral once PRN Blood Glucose LESS THAN 70 milliGRAM(s)/deciliter  glucagon  Injectable 1 milliGRAM(s) IntraMuscular once PRN Glucose LESS THAN 70 milligrams/deciliter      Allergies    No Known Allergies    Intolerances        SOCIAL HISTORY:    FAMILY HISTORY:  Family history of diabetes mellitus (DM) (Sibling)      Vital Signs Last 24 Hrs  T(C): 36.8 (15 Mar 2019 16:58), Max: 36.9 (14 Mar 2019 23:02)  T(F): 98.2 (15 Mar 2019 16:58), Max: 98.4 (14 Mar 2019 23:02)  HR: 75 (15 Mar 2019 16:58) (70 - 85)  BP: 143/75 (15 Mar 2019 16:58) (113/68 - 171/78)  BP(mean): --  RR: 18 (15 Mar 2019 16:58) (18 - 20)  SpO2: 92% (15 Mar 2019 16:58) (91% - 96%)    PHYSICAL EXAM:      Constitutional:   awake alert oriented,     Eyes:   refraction disorder    ENMT:  normal     Neck:  supple, no JVD    Breasts:   normal     Back:  negative     Respiratory:    normal     Cardiovascular:  normal sinus rhythm     Gastrointestinal:   negative     Genitourinary:  negative     Rectal:    Extremities:  severe edema, foul  odor,  blisters skin maceration.    Vascular:  palpable  pulses    Neurological:  diabetic neuropathy    Skin:   lower extremitas cellulitis.    Lymph Nodes:  negative     Musculoskeletal:  obesity    Psychiatric:  normal         LABS:  CBC Full  -  ( 15 Mar 2019 08:32 )  WBC Count : 8.2 K/uL  Hemoglobin : 9.9 g/dL  Hematocrit : 32.4 %  Platelet Count - Automated : 277 K/uL  Mean Cell Volume : 81.8 fl  Mean Cell Hemoglobin : 25.0 pg  Mean Cell Hemoglobin Concentration : 30.6 g/dL  Auto Neutrophil # : 5.5 K/uL  Auto Lymphocyte # : 1.8 K/uL  Auto Monocyte # : 0.7 K/uL  Auto Eosinophil # : 0.2 K/uL  Auto Basophil # : 0.0 K/uL  Auto Neutrophil % : 67.3 %  Auto Lymphocyte % : 21.7 %  Auto Monocyte % : 8.4 %  Auto Eosinophil % : 1.8 %  Auto Basophil % : 0.6 %    03-15    138  |  101  |  18.0  ----------------------------<  81  4.2   |  24.0  |  0.77    Ca    8.5<L>      15 Mar 2019 08:32  Phos  3.7     03-15  Mg     2.0     03-15    TPro  8.4  /  Alb  3.3  /  TBili  0.5  /  DBili  x   /  AST  21  /  ALT  23  /  AlkPhos  70  03-15          RADIOLOGY & ADDITIONAL STUDIES:    < from: TTE Echo Complete w/Doppler (02.05.19 @ 16:01) >    PROCEDURE DATE:  Feb 5 2019   .      INTERPRETATION:  REPORT:    TRANSTHORACIC ECHOCARDIOGRAM REPORT         Patient Name:   RANDY MARTINEZ Patient Location: [facility]  Medical Rec #:  [id number] Accession #:      [id number]  Account #:                        Height:           66.1 in 168.0 cm  YOB: 1955        Weight:           284.4 lb 129.00 kg  Patient Age:    63 years          BSA:              2.33 m²  Patient Gender: M               BP:               144/71 mmHg       Date of Exam:        2/5/2019 4:01:20 PM  Sonographer:         Waylon Tapia  Referring Physician: Uziel Walker MD    Procedure:   2D Echo/Doppler/Color Doppler Complete.  Indications: Cardiomyopathy, unspecified - I42.9  Diagnosis:   Cardiomyopathy, unspecified - I42.9         2D AND M-MODE MEASUREMENTS (normal ranges within parentheses):  Left                Normal    Aorta/Left           Normal  Ventricle:                    Atrium:  IVSd (2D):    1.25  (0.7-1.1) Left Atrium  4.09 cm (1.9-4.0)                cm              (2D):  LVPWd (2D):   1.33  (0.7-1.1) LA Volume    35.6                cm              Index        ml/m²  LVIDd (2D):   5.54  (3.4-5.7) Right Ventricle:  cm              RVd (2D):        4.58 cm  LVIDs (2D):   3.49            TAPSE:           2.48 cm                cm  LV FS (2D):   37.0  (>25%)                %  Relative Wall 0.48  (<0.42)  Thickness    LV SYSTOLIC FUNCTION BY 2D PLANIMETRY (MOD):  EF-Biplane: 67 %    LV DIASTOLIC FUNCTION:  MV Peak E: 0.92 m/s E/e' Ratio: 12.90  MV Peak A: 1.12 m/s  E/A Ratio: 0.82    SPECTRAL DOPPLER ANALYSIS (where applicable):     PHYSICIAN INTERPRETATION:  Left Ventricle:  Global LV systolic function was normal. Left ventricular ejection   fraction, by visual estimation, is 60 to 65%. Spectral Doppler shows   pseudonormal pattern of left ventricular myocardial filling (Grade II   diastolic dysfunction). Elevated mean left atrial pressure.  Right Ventricle: Normal right ventricular size and function. TV S' 0.2   m/s.  Left Atrium: Mildly enlarged left atrium.  Right Atrium: Normal right atrial size.  Pericardium: Trivial pericardial effusion is present. The pericardial   effusion is anterior to the right ventricle.  Mitral Valve: The mitral valve is normal in structure. No evidence of   mitral valve regurgitation is seen.  Tricuspid Valve: The tricuspid valve is normal. No tricuspid   regurgitation is visualized.  Aortic Valve: The aortic valve istrileaflet. No evidence of aortic valve   regurgitation is seen.  Pulmonic Valve: No indication of pulmonic valve regurgitation.  Aorta: The aortic root is normal in size and structure.  Pulmonary Artery: The pulmonary artery is mildly dilated.  Venous: The inferior vena cava is normal.       Summary:   1. Mildly enlarged left atrium.   2. There is mild concentric left ventricular hypertrophy.   3. Normal wall motion. Left ventricular ejection fraction, by visual   estimation, is 60 to 65%. GradeII diastolic dysfunction.   4. Elevated mean left atrial pressure.   5. Normal right atrial size.   6. Normal right ventricular size and function.   7. No significant valvular abnormality.   8. Trivial pericardial effusion.    [id number] HOMERO Rocha, Electronically signed on 2/5/2019 at   5:16:02 PM              *** Final ***                  CUCO MOORE M.D., ATTENDING CARDIOLOGY  This document has been electronically signed. Feb 5 2019  4:01PM            < end of copied text >

## 2019-03-16 LAB
GLUCOSE BLDC GLUCOMTR-MCNC: 105 MG/DL — HIGH (ref 70–99)
GLUCOSE BLDC GLUCOMTR-MCNC: 108 MG/DL — HIGH (ref 70–99)
GLUCOSE BLDC GLUCOMTR-MCNC: 126 MG/DL — HIGH (ref 70–99)
GLUCOSE BLDC GLUCOMTR-MCNC: 132 MG/DL — HIGH (ref 70–99)

## 2019-03-16 RX ADMIN — Medication 100 MILLIGRAM(S): at 14:00

## 2019-03-16 RX ADMIN — ENOXAPARIN SODIUM 40 MILLIGRAM(S): 100 INJECTION SUBCUTANEOUS at 11:59

## 2019-03-16 RX ADMIN — LISINOPRIL 2.5 MILLIGRAM(S): 2.5 TABLET ORAL at 06:24

## 2019-03-16 RX ADMIN — HYDROMORPHONE HYDROCHLORIDE 4 MILLIGRAM(S): 2 INJECTION INTRAMUSCULAR; INTRAVENOUS; SUBCUTANEOUS at 14:00

## 2019-03-16 RX ADMIN — METHADONE HYDROCHLORIDE 5 MILLIGRAM(S): 40 TABLET ORAL at 12:00

## 2019-03-16 RX ADMIN — Medication 1 APPLICATION(S): at 06:00

## 2019-03-16 RX ADMIN — GABAPENTIN 800 MILLIGRAM(S): 400 CAPSULE ORAL at 06:24

## 2019-03-16 RX ADMIN — HYDROMORPHONE HYDROCHLORIDE 4 MILLIGRAM(S): 2 INJECTION INTRAMUSCULAR; INTRAVENOUS; SUBCUTANEOUS at 22:50

## 2019-03-16 RX ADMIN — HYDROMORPHONE HYDROCHLORIDE 4 MILLIGRAM(S): 2 INJECTION INTRAMUSCULAR; INTRAVENOUS; SUBCUTANEOUS at 15:00

## 2019-03-16 RX ADMIN — Medication 1 APPLICATION(S): at 16:49

## 2019-03-16 RX ADMIN — Medication 100 MILLIGRAM(S): at 22:50

## 2019-03-16 RX ADMIN — GABAPENTIN 800 MILLIGRAM(S): 400 CAPSULE ORAL at 22:50

## 2019-03-16 RX ADMIN — HYDROMORPHONE HYDROCHLORIDE 4 MILLIGRAM(S): 2 INJECTION INTRAMUSCULAR; INTRAVENOUS; SUBCUTANEOUS at 06:24

## 2019-03-16 RX ADMIN — Medication 100 MILLIGRAM(S): at 06:25

## 2019-03-16 RX ADMIN — HYDROMORPHONE HYDROCHLORIDE 4 MILLIGRAM(S): 2 INJECTION INTRAMUSCULAR; INTRAVENOUS; SUBCUTANEOUS at 07:20

## 2019-03-16 RX ADMIN — HYDROMORPHONE HYDROCHLORIDE 4 MILLIGRAM(S): 2 INJECTION INTRAMUSCULAR; INTRAVENOUS; SUBCUTANEOUS at 23:30

## 2019-03-16 RX ADMIN — GABAPENTIN 800 MILLIGRAM(S): 400 CAPSULE ORAL at 14:00

## 2019-03-16 NOTE — PROGRESS NOTE ADULT - SUBJECTIVE AND OBJECTIVE BOX
HPI:  Pt is a 63 year old Male with a pmh/o DMII, lupus, HTN, ankylosis spondylosis who was referred to ED after pt has not had improvement in b/l leg cellulitis on keflex.  Pt says that his lower legs has been oozing and that his legs have foul odor. Pt denies any fever, chills or lower extremity weakness. Dr. Walker sent pt to ED for evaluation and admission for cellulitis due to out patient therapy. (14 Mar 2019 23:10)    Male  PAST MEDICAL & SURGICAL HISTORY:  Ankylosing spondylitis of site in spine  Cellulitis, leg  Lupus  Diabetes  Diabetic neuropathy  Chronic pain disorder  Chronic venous stasis dermatitis  History of hip replacement, total, left      REVIEW OF SYSTEMS      General:	   chronically ill secondary to poor mobility  and  chronic pain.     Skin/Breast:    bilateral leg infection, cellulitis   	  Ophthalmologic:  refraction disorder   	  ENMT:	  negative     Respiratory and Thorax:  negative   	  Cardiovascular:	 negative     Gastrointestinal:	  negative     Genitourinary:	negative     Musculoskeletal:	  s/p left hip replacement    Neurological:	diabetic neuropathy    Psychiatric:  negative 	    Hematology/Lymphatics:	  negative     Endocrine:	obesity  diabetes     Allergic/Immunologic:	    MEDICATIONS  (STANDING):  clindamycin IVPB 600 milliGRAM(s) IV Intermittent every 8 hours  dextrose 5%. 1000 milliLiter(s) (50 mL/Hr) IV Continuous <Continuous>  dextrose 50% Injectable 25 Gram(s) IV Push once  dextrose 50% Injectable 25 Gram(s) IV Push once  dextrose 50% Injectable 12.5 Gram(s) IV Push once  enoxaparin Injectable 40 milliGRAM(s) SubCutaneous every 24 hours  fluocinonide 0.05% Cream 1 Application(s) Topical every 12 hours  gabapentin 800 milliGRAM(s) Oral three times a day  HYDROmorphone   Tablet 4 milliGRAM(s) Oral three times a day  insulin lispro (HumaLOG) corrective regimen sliding scale   SubCutaneous three times a day before meals  lisinopril 2.5 milliGRAM(s) Oral daily  methadone    Tablet 5 milliGRAM(s) Oral daily    MEDICATIONS  (PRN):  acetaminophen   Tablet .. 650 milliGRAM(s) Oral every 6 hours PRN Temp greater or equal to 38C (100.4F), Moderate Pain (4 - 6)  dextrose 40% Gel 15 Gram(s) Oral once PRN Blood Glucose LESS THAN 70 milliGRAM(s)/deciliter  glucagon  Injectable 1 milliGRAM(s) IntraMuscular once PRN Glucose LESS THAN 70 milligrams/deciliter      Allergies    No Known Allergies    Intolerances        SOCIAL HISTORY:    FAMILY HISTORY:  Family history of diabetes mellitus (DM) (Sibling)      Vital Signs Last 24 Hrs  T(C): 36.6 (16 Mar 2019 04:20), Max: 36.9 (15 Mar 2019 11:15)  T(F): 97.8 (16 Mar 2019 04:20), Max: 98.4 (15 Mar 2019 11:15)  HR: 71 (16 Mar 2019 04:20) (71 - 85)  BP: 142/78 (16 Mar 2019 04:20) (130/72 - 171/78)  BP(mean): --  RR: 19 (16 Mar 2019 04:20) (18 - 20)  SpO2: 93% (16 Mar 2019 04:20) (92% - 93%)    PHYSICAL EXAM:      Constitutional:  awake alert oriented     Eyes:   refraction disorder     ENMT:  negative     Neck:  supple     Breasts:  normal     Back:  normal     Respiratory:  negative     Cardiovascular:  normal     Gastrointestinal:  normal     Genitourinary:  normal     Rectal:    Extremities:  bilateral  leg infection,  cellulitis     Vascular:  palpable pulses,  venostasis     Neurological:  diabetic neuropathy     Skin:  leg infection     Lymph Nodes:    Musculoskeletal:  negative     Psychiatric:  negative         LABS:  CBC Full  -  ( 15 Mar 2019 08:32 )  WBC Count : 8.2 K/uL  Hemoglobin : 9.9 g/dL  Hematocrit : 32.4 %  Platelet Count - Automated : 277 K/uL  Mean Cell Volume : 81.8 fl  Mean Cell Hemoglobin : 25.0 pg  Mean Cell Hemoglobin Concentration : 30.6 g/dL  Auto Neutrophil # : 5.5 K/uL  Auto Lymphocyte # : 1.8 K/uL  Auto Monocyte # : 0.7 K/uL  Auto Eosinophil # : 0.2 K/uL  Auto Basophil # : 0.0 K/uL  Auto Neutrophil % : 67.3 %  Auto Lymphocyte % : 21.7 %  Auto Monocyte % : 8.4 %  Auto Eosinophil % : 1.8 %  Auto Basophil % : 0.6 %    03-15    138  |  101  |  18.0  ----------------------------<  81  4.2   |  24.0  |  0.77    Ca    8.5<L>      15 Mar 2019 08:32  Phos  3.7     03-15  Mg     2.0     03-15    TPro  8.4  /  Alb  3.3  /  TBili  0.5  /  DBili  x   /  AST  21  /  ALT  23  /  AlkPhos  70  03-15          RADIOLOGY & ADDITIONAL STUDIES:

## 2019-03-16 NOTE — PROGRESS NOTE ADULT - ASSESSMENT
stable  leg wound   poor hygiene, must take shower  daily   will continue present  IV antibiotic  will  consult with vascular surgery

## 2019-03-17 DIAGNOSIS — I87.2 VENOUS INSUFFICIENCY (CHRONIC) (PERIPHERAL): ICD-10-CM

## 2019-03-17 LAB
GLUCOSE BLDC GLUCOMTR-MCNC: 114 MG/DL — HIGH (ref 70–99)
GLUCOSE BLDC GLUCOMTR-MCNC: 118 MG/DL — HIGH (ref 70–99)
GLUCOSE BLDC GLUCOMTR-MCNC: 123 MG/DL — HIGH (ref 70–99)

## 2019-03-17 PROCEDURE — 99222 1ST HOSP IP/OBS MODERATE 55: CPT

## 2019-03-17 RX ADMIN — HYDROMORPHONE HYDROCHLORIDE 4 MILLIGRAM(S): 2 INJECTION INTRAMUSCULAR; INTRAVENOUS; SUBCUTANEOUS at 05:38

## 2019-03-17 RX ADMIN — HYDROMORPHONE HYDROCHLORIDE 4 MILLIGRAM(S): 2 INJECTION INTRAMUSCULAR; INTRAVENOUS; SUBCUTANEOUS at 22:00

## 2019-03-17 RX ADMIN — HYDROMORPHONE HYDROCHLORIDE 4 MILLIGRAM(S): 2 INJECTION INTRAMUSCULAR; INTRAVENOUS; SUBCUTANEOUS at 13:54

## 2019-03-17 RX ADMIN — METHADONE HYDROCHLORIDE 5 MILLIGRAM(S): 40 TABLET ORAL at 12:18

## 2019-03-17 RX ADMIN — HYDROMORPHONE HYDROCHLORIDE 4 MILLIGRAM(S): 2 INJECTION INTRAMUSCULAR; INTRAVENOUS; SUBCUTANEOUS at 13:59

## 2019-03-17 RX ADMIN — GABAPENTIN 800 MILLIGRAM(S): 400 CAPSULE ORAL at 21:09

## 2019-03-17 RX ADMIN — Medication 100 MILLIGRAM(S): at 13:54

## 2019-03-17 RX ADMIN — Medication 1 APPLICATION(S): at 17:00

## 2019-03-17 RX ADMIN — Medication 100 MILLIGRAM(S): at 21:09

## 2019-03-17 RX ADMIN — ENOXAPARIN SODIUM 40 MILLIGRAM(S): 100 INJECTION SUBCUTANEOUS at 13:55

## 2019-03-17 RX ADMIN — LISINOPRIL 2.5 MILLIGRAM(S): 2.5 TABLET ORAL at 05:30

## 2019-03-17 RX ADMIN — Medication 1 APPLICATION(S): at 05:30

## 2019-03-17 RX ADMIN — HYDROMORPHONE HYDROCHLORIDE 4 MILLIGRAM(S): 2 INJECTION INTRAMUSCULAR; INTRAVENOUS; SUBCUTANEOUS at 06:15

## 2019-03-17 RX ADMIN — GABAPENTIN 800 MILLIGRAM(S): 400 CAPSULE ORAL at 13:54

## 2019-03-17 RX ADMIN — HYDROMORPHONE HYDROCHLORIDE 4 MILLIGRAM(S): 2 INJECTION INTRAMUSCULAR; INTRAVENOUS; SUBCUTANEOUS at 21:09

## 2019-03-17 RX ADMIN — Medication 100 MILLIGRAM(S): at 05:29

## 2019-03-17 RX ADMIN — GABAPENTIN 800 MILLIGRAM(S): 400 CAPSULE ORAL at 05:30

## 2019-03-17 NOTE — CONSULT NOTE ADULT - NSICDXPROBLEM_GEN_ALL_CORE_FT
PROBLEM DIAGNOSES  Problem: Venous stasis dermatitis  Recommendation:     Problem: Cellulitis of lower extremity  Recommendation:

## 2019-03-17 NOTE — CONSULT NOTE ADULT - SUBJECTIVE AND OBJECTIVE BOX
[facility]  INFECTIOUS DISEASES AND INTERNAL MEDICINE at [place]  =======================================================  Raoul Peterson MD  Diplomates American Board of Internal Medicine and Infectious Diseases  =======================================================      [id number]  ARNDY MARTINEZ     CC: Patient is a 63y old  Male who presents with a chief complaint of leg infection (17 Mar 2019 08:50)    64y/o  Male h/o DMII, lupus, HTN, ankylosis spondylosis. Patient presents to the ER after being referred to ED for cellulitis. Patient was on Keflex for b/l leg cellulitis.  He has been complaining of lower legs have been oozing and his legs have foul odor. He denies any fever, chills or lower extremity weakness. Patient has been on IV Clindamycin since admission. No fevers or leukocytosis. ID input requested.       Past Medical & Surgical Hx:  Diabetic neuropathy  Chronic pain disorder  Cellulitis, leg  Chronic venous stasis dermatitis  Ankylosing spondylitis of site in spine  Lupus  Diabetes  History of hip replacement, total, left      Social Hx:  Denies smoking, ETOH or drug use      FAMILY HISTORY:  Family history of diabetes mellitus (DM) (Sibling)      Allergies  No Known Allergies      Antibiotics:  clindamycin IVPB 600 milliGRAM(s) IV Intermittent every 8 hours      REVIEW OF SYSTEMS:  CONSTITUTIONAL:  No Fever or chills  HEENT:  No diplopia or blurred vision.  No earache, sore throat or runny nose.  CARDIOVASCULAR:  No pressure, squeezing, strangling, tightness, heaviness or aching about the chest, neck, axilla or epigastrium.  RESPIRATORY:  No cough, shortness of breath  GASTROINTESTINAL:  No nausea, vomiting or diarrhea.  GENITOURINARY:  No dysuria, frequency or urgency.   MUSCULOSKELETAL:  no joint aches, no muscle pain  SKIN:  + B/L LE swelling and weeping  NEUROLOGIC:  No  seizures or headaches  PSYCHIATRIC:  No disorder of thought or mood.  ENDOCRINE:  No heat or cold intolerance  HEMATOLOGICAL:  No easy bruising or bleeding.       Physical Exam:  Vital Signs Last 24 Hrs  T(C): 36.9 (17 Mar 2019 04:36), Max: 37.2 (16 Mar 2019 16:37)  T(F): 98.4 (17 Mar 2019 04:36), Max: 98.9 (16 Mar 2019 16:37)  HR: 60 (17 Mar 2019 04:36) (60 - 74)  BP: 135/74 (17 Mar 2019 04:36) (135/74 - 151/71)  RR: 18 (17 Mar 2019 04:36) (18 - 18)  SpO2: 92% (17 Mar 2019 04:36) (92% - 92%)      GEN: NAD, pleasant  HEENT: normocephalic and atraumatic. EOMI. PERRL.  Anicteric  NECK: Supple.   LUNGS: Clear to auscultation.  HEART: Regular rate and rhythm   ABDOMEN: Soft, nontender, and Obese.  Positive bowel sounds.    : No CVA tenderness  EXTREMITIES: + edema. Venus stasis ulcers  MSK: No joint swelling  NEUROLOGIC: No Focal Deficits  PSYCHIATRIC: Appropriate affect .  SKIN: No Rash      Labs:  RECENT CULTURES:  03-14 @ 21:12 .Blood     No growth at 48 hours      03-14 @ 21:11 .Blood     No growth at 48 hours

## 2019-03-17 NOTE — CONSULT NOTE ADULT - ASSESSMENT
64y/o  Male h/o DMII, lupus, HTN, ankylosis spondylosis. Patient here with cellulitis.      Cellulitis   Stasis dermatitis  PVD      - Blood cultures no growth  - Will plan for 5 days of clindamycin  - patient needs vascular surgery eval for Unna boot  - Trend Fever  - Trend Leukocytosis    Will Follow

## 2019-03-17 NOTE — PROGRESS NOTE ADULT - SUBJECTIVE AND OBJECTIVE BOX
HPI:  Pt is a 63 year old Male with a pmh/o DMII, lupus, HTN, ankylosis spondylosis who was referred to ED after pt has not had improvement in b/l leg cellulitis on keflex.  Pt says that his lower legs has been oozing and that his legs have foul odor. Pt denies any fever, chills or lower extremity weakness. Dr. Walker sent pt to ED for evaluation and admission for cellulitis due to out patient therapy. (14 Mar 2019 23:10)    Male  PAST MEDICAL & SURGICAL HISTORY:  Ankylosing spondylitis of site in spine  Cellulitis, leg  Lupus  Diabetes  Diabetic neuropathy  Chronic pain disorder  Chronic venous stasis dermatitis  History of hip replacement, total, left      REVIEW OF SYSTEMS      General:	chronically secondary to  poor mobility,  bilateral  lower extremities cellulitis ,    Skin/Breast:     recurrent  lower extremities cellulitis   	  Ophthalmologic:   refraction disorder   	  ENMT:	   clear     Respiratory and Thorax:  negative   	  Cardiovascular:	negative     Gastrointestinal:	  negative     Genitourinary:	  negative     Musculoskeletal:	    osteoarthritis     Neurological:	  diabetic neuropathy    Psychiatric:	negative     Hematology/Lymphatics:	   negative     Endocrine:	obesity,  diabetes     Allergic/Immunologic:	    MEDICATIONS  (STANDING):  clindamycin IVPB 600 milliGRAM(s) IV Intermittent every 8 hours  dextrose 5%. 1000 milliLiter(s) (50 mL/Hr) IV Continuous <Continuous>  dextrose 50% Injectable 25 Gram(s) IV Push once  dextrose 50% Injectable 25 Gram(s) IV Push once  dextrose 50% Injectable 12.5 Gram(s) IV Push once  enoxaparin Injectable 40 milliGRAM(s) SubCutaneous every 24 hours  fluocinonide 0.05% Cream 1 Application(s) Topical every 12 hours  gabapentin 800 milliGRAM(s) Oral three times a day  HYDROmorphone   Tablet 4 milliGRAM(s) Oral three times a day  insulin lispro (HumaLOG) corrective regimen sliding scale   SubCutaneous three times a day before meals  lisinopril 2.5 milliGRAM(s) Oral daily  methadone    Tablet 5 milliGRAM(s) Oral daily    MEDICATIONS  (PRN):  acetaminophen   Tablet .. 650 milliGRAM(s) Oral every 6 hours PRN Temp greater or equal to 38C (100.4F), Moderate Pain (4 - 6)  dextrose 40% Gel 15 Gram(s) Oral once PRN Blood Glucose LESS THAN 70 milliGRAM(s)/deciliter  glucagon  Injectable 1 milliGRAM(s) IntraMuscular once PRN Glucose LESS THAN 70 milligrams/deciliter      Allergies    No Known Allergies    Intolerances        SOCIAL HISTORY:    FAMILY HISTORY:  Family history of diabetes mellitus (DM) (Sibling)      Vital Signs Last 24 Hrs  T(C): 36.9 (17 Mar 2019 04:36), Max: 37.2 (16 Mar 2019 16:37)  T(F): 98.4 (17 Mar 2019 04:36), Max: 98.9 (16 Mar 2019 16:37)  HR: 60 (17 Mar 2019 04:36) (60 - 83)  BP: 135/74 (17 Mar 2019 04:36) (135/74 - 151/71)  BP(mean):   RR: 18 (17 Mar 2019 04:36) (18 - 20)  SpO2: 92% (17 Mar 2019 04:36) (92% - 92%)    PHYSICAL EXAM:      Constitutional:   awake alert oriented.     no complains     Eyes:  refraction disorder    ENMT:  negative     Neck:  supple , no JVD     Breasts:   normal     Back:  normal    Respiratory:  clear lugs, good ventilation     Cardiovascular:  NSR, no murmur     Gastrointestinal:    abdomen  soft, no murmur     Genitourinary:    Rectal:    Extremities:   bilateral  lower extremities cellulitis,  erythema worsening, right lower extremity open wound and left swollen no open wounds       Vascular:    palpable    Neurological:  paraesthesia   lower extremities     Skin:   cellulitis     Lymph Nodes:   negative     Musculoskeletal:  obesity/ morbid obesity,  s/p left hip replacement,  pain  associated     Psychiatric:  negative         LABS:                RADIOLOGY & ADDITIONAL STUDIES:

## 2019-03-17 NOTE — CONSULT NOTE ADULT - ASSESSMENT
63y Male presents with bilateral lower extremity cellulitis and chronic venous stasis ulcer    PLAN:  - Wounds dressed with petroleum gauze, kerlix, and coban  - GATITO/PVR ordered  - Likely will need unna boot  - Vascular Surgery to follow      Discussed with Dr. Juárez

## 2019-03-17 NOTE — PROGRESS NOTE ADULT - ASSESSMENT
cellulitis, worsening  skin edema  worsening  called   Vascular consult, R/O  vascular compromised  and or saphenofemoral insufficiency  called ID consult to re evaluate  AB approach.  diabetes controlled

## 2019-03-17 NOTE — CONSULT NOTE ADULT - SUBJECTIVE AND OBJECTIVE BOX
VASCULAR SURGERY CONSULT    Consulting surgical team: Vascular Care Surgery   Consulting attending: Franck  Patient seen and examined: 03-17-19 @ 20:43      HPI:  Pt is a 63 year old Male with a pmh/o DMII, lupus, HTN, ankylosis spondylosis who was referred to ED after pt has not had improvement in b/l leg cellulitis on keflex.  Pt says that his lower legs has been oozing and that his legs have foul odor. Pt denies any fever, chills or lower extremity weakness. With failed outpatient antibiotic therapy, he was referred to ED for evaluation. Symptoms have persisted for approximately 2 months. Vascular Surgery was consulted for evaluation for       PAST MEDICAL HISTORY:  Diabetic neuropathy  Chronic pain disorder  Cellulitis, leg  Chronic venous stasis dermatitis  Ankylosing spondylitis of site in spine  Lupus  Diabetes      PAST SURGICAL HISTORY:  History of hip replacement, total, left      ALLERGIES:  No Known Allergies      MEDICATIONS:  acetaminophen   Tablet .. 650 milliGRAM(s) Oral every 6 hours PRN  clindamycin IVPB 600 milliGRAM(s) IV Intermittent every 8 hours  dextrose 40% Gel 15 Gram(s) Oral once PRN  dextrose 5%. 1000 milliLiter(s) IV Continuous <Continuous>  dextrose 50% Injectable 25 Gram(s) IV Push once  dextrose 50% Injectable 25 Gram(s) IV Push once  dextrose 50% Injectable 12.5 Gram(s) IV Push once  enoxaparin Injectable 40 milliGRAM(s) SubCutaneous every 24 hours  fluocinonide 0.05% Cream 1 Application(s) Topical every 12 hours  gabapentin 800 milliGRAM(s) Oral three times a day  glucagon  Injectable 1 milliGRAM(s) IntraMuscular once PRN  HYDROmorphone   Tablet 4 milliGRAM(s) Oral three times a day  insulin lispro (HumaLOG) corrective regimen sliding scale   SubCutaneous three times a day before meals  lisinopril 2.5 milliGRAM(s) Oral daily  methadone    Tablet 5 milliGRAM(s) Oral daily      VITALS & I/Os:  Vital Signs Last 24 Hrs  T(C): 36.2 (17 Mar 2019 16:49), Max: 37.1 (17 Mar 2019 11:15)  T(F): 97.2 (17 Mar 2019 16:49), Max: 98.7 (17 Mar 2019 11:15)  HR: 76 (17 Mar 2019 16:49) (60 - 78)  BP: 151/76 (17 Mar 2019 16:49) (133/64 - 151/76)  BP(mean): --  RR: 16 (17 Mar 2019 16:49) (16 - 20)  SpO2: 94% (17 Mar 2019 16:49) (92% - 94%)    I&O's Summary    16 Mar 2019 07:01  -  17 Mar 2019 07:00  --------------------------------------------------------  IN: 480 mL / OUT: 0 mL / NET: 480 mL        PHYSICAL EXAM:  General: No acute distress  Respiratory: Nonlabored  Cardiovascular: RRR  Abdominal: Soft, nontender  Extremities: Bilateral lower extremities-Venus stasis ulcers  Vascular: Bilateral doppler signals to DP/PT    LABS:          Lactate:                    IMAGING: VASCULAR SURGERY CONSULT    Consulting surgical team: [facility] Surgery   Consulting attending: Franck  Patient seen and examined: 03-17-19 @ 20:43      HPI:  Pt is a 63 year old Male with a pmh/o DMII, lupus, HTN, ankylosis spondylosis who was referred to ED after pt has not had improvement in b/l leg cellulitis on keflex.  Pt says that his lower legs has been oozing and that his legs have foul odor. Pt denies any fever, chills or lower extremity weakness. With failed outpatient antibiotic therapy, he was referred to ED for evaluation. Symptoms have persisted for approximately 2 months. Vascular Surgery was consulted for evaluation for bilateral lower extremity cellulitis r/o venous insufficiency      PAST MEDICAL HISTORY:  Diabetic neuropathy  Chronic pain disorder  Cellulitis, leg  Chronic venous stasis dermatitis  Ankylosing spondylitis of site in spine  Lupus  Diabetes      PAST SURGICAL HISTORY:  History of hip replacement, total, left      ALLERGIES:  No Known Allergies      MEDICATIONS:  acetaminophen   Tablet .. 650 milliGRAM(s) Oral every 6 hours PRN  clindamycin IVPB 600 milliGRAM(s) IV Intermittent every 8 hours  dextrose 40% Gel 15 Gram(s) Oral once PRN  dextrose 5%. 1000 milliLiter(s) IV Continuous <Continuous>  dextrose 50% Injectable 25 Gram(s) IV Push once  dextrose 50% Injectable 25 Gram(s) IV Push once  dextrose 50% Injectable 12.5 Gram(s) IV Push once  enoxaparin Injectable 40 milliGRAM(s) SubCutaneous every 24 hours  fluocinonide 0.05% Cream 1 Application(s) Topical every 12 hours  gabapentin 800 milliGRAM(s) Oral three times a day  glucagon  Injectable 1 milliGRAM(s) IntraMuscular once PRN  HYDROmorphone   Tablet 4 milliGRAM(s) Oral three times a day  insulin lispro (HumaLOG) corrective regimen sliding scale   SubCutaneous three times a day before meals  lisinopril 2.5 milliGRAM(s) Oral daily  methadone    Tablet 5 milliGRAM(s) Oral daily      VITALS & I/Os:  Vital Signs Last 24 Hrs  T(C): 36.2 (17 Mar 2019 16:49), Max: 37.1 (17 Mar 2019 11:15)  T(F): 97.2 (17 Mar 2019 16:49), Max: 98.7 (17 Mar 2019 11:15)  HR: 76 (17 Mar 2019 16:49) (60 - 78)  BP: 151/76 (17 Mar 2019 16:49) (133/64 - 151/76)  BP(mean): --  RR: 16 (17 Mar 2019 16:49) (16 - 20)  SpO2: 94% (17 Mar 2019 16:49) (92% - 94%)    I&O's Summary    16 Mar 2019 07:01  -  17 Mar 2019 07:00  --------------------------------------------------------  IN: 480 mL / OUT: 0 mL / NET: 480 mL        PHYSICAL EXAM:  General: No acute distress  Respiratory: Nonlabored  Cardiovascular: RRR  Abdominal: Soft, nontender  Extremities: Bilateral lower extremities-Venus stasis ulcers  Vascular: Bilateral doppler signals to DP/PT    LABS:          Lactate:                    IMAGING:

## 2019-03-18 ENCOUNTER — TRANSCRIPTION ENCOUNTER (OUTPATIENT)
Age: 64
End: 2019-03-18

## 2019-03-18 VITALS
DIASTOLIC BLOOD PRESSURE: 63 MMHG | HEART RATE: 93 BPM | RESPIRATION RATE: 18 BRPM | SYSTOLIC BLOOD PRESSURE: 127 MMHG | OXYGEN SATURATION: 94 % | TEMPERATURE: 98 F

## 2019-03-18 LAB
GLUCOSE BLDC GLUCOMTR-MCNC: 119 MG/DL — HIGH (ref 70–99)
GLUCOSE BLDC GLUCOMTR-MCNC: 144 MG/DL — HIGH (ref 70–99)
GLUCOSE BLDC GLUCOMTR-MCNC: 158 MG/DL — HIGH (ref 70–99)

## 2019-03-18 PROCEDURE — 99232 SBSQ HOSP IP/OBS MODERATE 35: CPT

## 2019-03-18 RX ORDER — GABAPENTIN 400 MG/1
2 CAPSULE ORAL
Qty: 0 | Refills: 0 | DISCHARGE
Start: 2019-03-18

## 2019-03-18 RX ORDER — METHADONE HYDROCHLORIDE 40 MG/1
1 TABLET ORAL
Qty: 0 | Refills: 0 | DISCHARGE
Start: 2019-03-18

## 2019-03-18 RX ORDER — LISINOPRIL 2.5 MG/1
1 TABLET ORAL
Qty: 0 | Refills: 0 | DISCHARGE
Start: 2019-03-18

## 2019-03-18 RX ADMIN — Medication 100 MILLIGRAM(S): at 05:57

## 2019-03-18 RX ADMIN — HYDROMORPHONE HYDROCHLORIDE 4 MILLIGRAM(S): 2 INJECTION INTRAMUSCULAR; INTRAVENOUS; SUBCUTANEOUS at 06:54

## 2019-03-18 RX ADMIN — HYDROMORPHONE HYDROCHLORIDE 4 MILLIGRAM(S): 2 INJECTION INTRAMUSCULAR; INTRAVENOUS; SUBCUTANEOUS at 05:56

## 2019-03-18 RX ADMIN — GABAPENTIN 800 MILLIGRAM(S): 400 CAPSULE ORAL at 05:57

## 2019-03-18 RX ADMIN — GABAPENTIN 800 MILLIGRAM(S): 400 CAPSULE ORAL at 14:25

## 2019-03-18 RX ADMIN — ENOXAPARIN SODIUM 40 MILLIGRAM(S): 100 INJECTION SUBCUTANEOUS at 14:27

## 2019-03-18 RX ADMIN — Medication 2: at 12:11

## 2019-03-18 RX ADMIN — LISINOPRIL 2.5 MILLIGRAM(S): 2.5 TABLET ORAL at 05:58

## 2019-03-18 RX ADMIN — METHADONE HYDROCHLORIDE 5 MILLIGRAM(S): 40 TABLET ORAL at 12:12

## 2019-03-18 RX ADMIN — HYDROMORPHONE HYDROCHLORIDE 4 MILLIGRAM(S): 2 INJECTION INTRAMUSCULAR; INTRAVENOUS; SUBCUTANEOUS at 14:25

## 2019-03-18 RX ADMIN — HYDROMORPHONE HYDROCHLORIDE 4 MILLIGRAM(S): 2 INJECTION INTRAMUSCULAR; INTRAVENOUS; SUBCUTANEOUS at 15:15

## 2019-03-18 NOTE — PROGRESS NOTE ADULT - SUBJECTIVE AND OBJECTIVE BOX
Patient is a 63y old  Male who presents with a chief complaint of leg infection (18 Mar 2019 06:53)  Pt reports chronic lower extremity edema and worsening oozing especially from LLE  Pt denies any pain to LE however unable to walk    PAST MEDICAL & SURGICAL HISTORY:  Diabetic neuropathy  Chronic pain disorder  Cellulitis, leg  Chronic venous stasis dermatitis  Ankylosing spondylitis of site in spine  Lupus  Diabetes  History of hip replacement, total, left    MEDICATIONS  (STANDING):  clindamycin IVPB 600 milliGRAM(s) IV Intermittent every 8 hours  dextrose 5%. 1000 milliLiter(s) (50 mL/Hr) IV Continuous <Continuous>  dextrose 50% Injectable 25 Gram(s) IV Push once  dextrose 50% Injectable 25 Gram(s) IV Push once  dextrose 50% Injectable 12.5 Gram(s) IV Push once  enoxaparin Injectable 40 milliGRAM(s) SubCutaneous every 24 hours  fluocinonide 0.05% Cream 1 Application(s) Topical every 12 hours  gabapentin 800 milliGRAM(s) Oral three times a day  HYDROmorphone   Tablet 4 milliGRAM(s) Oral three times a day  insulin lispro (HumaLOG) corrective regimen sliding scale   SubCutaneous three times a day before meals  lisinopril 2.5 milliGRAM(s) Oral daily  methadone    Tablet 5 milliGRAM(s) Oral daily    MEDICATIONS  (PRN):  acetaminophen   Tablet .. 650 milliGRAM(s) Oral every 6 hours PRN Temp greater or equal to 38C (100.4F), Moderate Pain (4 - 6)  dextrose 40% Gel 15 Gram(s) Oral once PRN Blood Glucose LESS THAN 70 milliGRAM(s)/deciliter  glucagon  Injectable 1 milliGRAM(s) IntraMuscular once PRN Glucose LESS THAN 70 milligrams/deciliter    Allergies  No Known Allergies    Vital Signs Last 24 Hrs  T(C): 36.6 (18 Mar 2019 05:47), Max: 37.1 (17 Mar 2019 11:15)  T(F): 97.9 (18 Mar 2019 05:47), Max: 98.7 (17 Mar 2019 11:15)  HR: 70 (18 Mar 2019 05:47) (70 - 78)  BP: 130/73 (18 Mar 2019 05:47) (130/73 - 151/76)  BP(mean): --  RR: 18 (18 Mar 2019 05:47) (16 - 20)  SpO2: 95% (18 Mar 2019 05:47) (94% - 95%)  I&O's Detail      Physical Exam:  General: NAD, resting comfortably in bed  Extremities: BLE WWP with 2-3+ edema, LLE with oozing serous fluid, no clear ulcerations, no erythema. RLE without any ulcerations or erythema. Hyperkeratotic lesions noted.  Pulses:   Right:                                                                          Left:  DP [ ]2+ [x ]1+ [ ]doppler                                                DP [ ]2+ [x ]1+ [ ]doppler  PT[ ]2+ [x ]1+ [ ]doppler                                                  PT [ ]2+ [x ]1+ [ ]doppler    CAPILLARY BLOOD GLUCOSE  POCT Blood Glucose.: 119 mg/dL (18 Mar 2019 08:08)  POCT Blood Glucose.: 114 mg/dL (17 Mar 2019 16:58)  POCT Blood Glucose.: 118 mg/dL (17 Mar 2019 11:23)    Radiology and Additional Studies:    Assessment and Plan: 63y Male Cellulitis of extremity  On abx without evidence of cellulitis  Pt with chronic venous stasis and sig lower extremity edema  Needs compression with ACE and elevation  Would use unna boot as outpt for management.  Pt is amenable

## 2019-03-18 NOTE — PROGRESS NOTE ADULT - ASSESSMENT
62y/o  Male h/o DMII, lupus, HTN, ankylosis spondylosis. Patient here with cellulitis.      Cellulitis   Stasis dermatitis  PVD      - Blood cultures no growth  - D/C clindamycin  - patient needs vascular surgery eval for Unna boot  - Trend Fever  - Trend Leukocytosis    Will Sign Off. please call PRN.

## 2019-03-18 NOTE — DISCHARGE NOTE PROVIDER - NSDCFUADDINST_GEN_ALL_CORE_FT
do not get wet  the UNNA BOOT'  Follow up in one week  with Dr Lakeisha Rogers  [address] [place] [address]  [phone]

## 2019-03-18 NOTE — PROGRESS NOTE ADULT - SUBJECTIVE AND OBJECTIVE BOX
[facility]  INFECTIOUS DISEASES AND INTERNAL MEDICINE at [place]  =======================================================  Raoul Peterson MD  Diplomates American Board of Internal Medicine and Infectious Diseases  =======================================================    RANDY MARTINEZ [id number]    Follow up: cellulitis/venous stasis     No fevers or chills  No diarrhea  No abdominal pain    Allergies:  No Known Allergies      Antibiotics:  clindamycin IVPB 600 milliGRAM(s) IV Intermittent every 8 hours      REVIEW OF SYSTEMS:  CONSTITUTIONAL:  No Fever or chills  HEENT:  No diplopia or blurred vision.  No earache, sore throat or runny nose.  CARDIOVASCULAR:  No pressure, squeezing, strangling, tightness, heaviness or aching about the chest, neck, axilla or epigastrium.  RESPIRATORY:  No cough, shortness of breath  GASTROINTESTINAL:  No nausea, vomiting or diarrhea.  GENITOURINARY:  No dysuria, frequency or urgency.   MUSCULOSKELETAL:  no joint aches, no muscle pain  SKIN:  + B/L LE swelling and weeping  NEUROLOGIC:  No  seizures or headaches  PSYCHIATRIC:  No disorder of thought or mood.  ENDOCRINE:  No heat or cold intolerance  HEMATOLOGICAL:  No easy bruising or bleeding.       Physical Exam:  ICU Vital Signs Last 24 Hrs  T(C): 37.1 (18 Mar 2019 11:52), Max: 37.1 (18 Mar 2019 11:52)  T(F): 98.8 (18 Mar 2019 11:52), Max: 98.8 (18 Mar 2019 11:52)  HR: 65 (18 Mar 2019 11:52) (65 - 76)  BP: 114/65 (18 Mar 2019 11:52) (114/65 - 151/76)  RR: 16 (18 Mar 2019 11:52) (16 - 18)  SpO2: 95% (18 Mar 2019 05:47) (94% - 95%)      GEN: NAD, pleasant  HEENT: normocephalic and atraumatic. EOMI. PERRL.  Anicteric  NECK: Supple.   LUNGS: Clear to auscultation.  HEART: Regular rate and rhythm   ABDOMEN: Soft, nontender, and Obese.  Positive bowel sounds.    : No CVA tenderness  EXTREMITIES: + edema. Venus stasis ulcers  MSK: No joint swelling  NEUROLOGIC: No Focal Deficits  PSYCHIATRIC: Appropriate affect .  SKIN: No Rash      Labs  RECENT CULTURES:  03-14 @ 21:12 .Blood     No growth at 48 hours      03-14 @ 21:11 .Blood     No growth at 48 hours

## 2019-03-18 NOTE — CHART NOTE - NSCHARTNOTEFT_GEN_A_CORE
Vascular resident spoke to Dr. Walker regarding pt's goals of care. Per Dr. Walker, patient was admitted primarily due to what looked like cellulitis and failure of management as an outpatient. From vascular standpoint, patient does not have cellulitis. He skin changes representative of chronic venous insufficiency and lymphadema. UNNA boot was placed on patient. Patient advised to follow up as outpatient in the vascular office in a week to remove UNNA boot and reassess BLE. Patient is not to get the UNNA boot wet and advised to either cover BLE, or can do a cloth wipe bath.     Vascular Office  Dr. Rojo in 1 week  [address] [place] [address]  [phone]    Patient to be d/c'ed by Dr. Aaron ann, 3/18/19.

## 2019-03-18 NOTE — DISCHARGE NOTE PROVIDER - HOSPITAL COURSE
chet Note:     · Provider Specialty    Family Medicine        Reason for Admission:     Reason for Admission:    · Reason for Admission    leg infection            · Subjective and Objective:     HPI:    Pt is a 63 year old Male with a pmh/o DMII, lupus, HTN, ankylosis spondylosis who was referred to ED after pt has not had improvement in b/l leg cellulitis on keflex.  Pt says that his lower legs has been oozing and that his legs have foul odor. Pt denies any fever, chills or lower extremity weakness. Dr. Walker sent pt to ED for evaluation and admission for cellulitis due to out patient therapy. (14 Mar 2019 23:10)        Male    PAST MEDICAL & SURGICAL HISTORY:    Ankylosing spondylitis of site in spine    Cellulitis, leg    Lupus    Diabetes    Diabetic neuropathy    Chronic pain disorder    Chronic venous stasis dermatitis    History of hip replacement, total, left        During  hospital  admission , blood culture were run   and were negative , ID  consult was call, suggested , vascular  consult  answered , by the  Staff   Dr Lakeisha Rogers and favored  the diagnosis     of stasis dermatitis + lymphedema  and will improve  with  Unna Boot treatement.  IV antibiotic was  stop and explained  to patient the new approach  for   his condition .    Unna boot in place and will DC patient home , will follow up with Lakeisha Ashley.

## 2019-03-18 NOTE — DISCHARGE NOTE NURSING/CASE MANAGEMENT/SOCIAL WORK - NSDCDPATPORTLINK_GEN_ALL_CORE
You can access the [facility] Patient Portal, offered by [facility], by registering with the following website: [email or web address]

## 2019-03-18 NOTE — DISCHARGE NOTE PROVIDER - NSDCCPCAREPLAN_GEN_ALL_CORE_FT
PRINCIPAL DISCHARGE DIAGNOSIS  Diagnosis: Cellulitis of lower extremity  Assessment and Plan of Treatment:       SECONDARY DISCHARGE DIAGNOSES  Diagnosis: Chronic pain disorder  Assessment and Plan of Treatment:     Diagnosis: Chronic venous stasis dermatitis  Assessment and Plan of Treatment:     Diagnosis: Diabetic neuropathy  Assessment and Plan of Treatment: will continue   DIANA analog

## 2019-03-18 NOTE — DISCHARGE NOTE PROVIDER - CARE PROVIDER_API CALL
Uziel Walker)  Family Medicine  [address] [place] [address]  Phone: [phone]  Fax: [phone]  Follow Up Time:

## 2019-03-18 NOTE — PROGRESS NOTE ADULT - SUBJECTIVE AND OBJECTIVE BOX
HPI:  Pt is a 63 year old Male with a pmh/o DMII, lupus, HTN, ankylosis spondylosis who was referred to ED after pt has not had improvement in b/l leg cellulitis on keflex.  Pt says that his lower legs has been oozing and that his legs have foul odor. Pt denies any fever, chills or lower extremity weakness. Dr. Walker sent pt to ED for evaluation and admission for cellulitis due to out patient therapy. (14 Mar 2019 23:10)    Male  PAST MEDICAL & SURGICAL HISTORY:  Ankylosing spondylitis of site in spine  Cellulitis, leg  Lupus  Diabetes  Diabetic neuropathy  Chronic pain disorder  Chronic venous stasis dermatitis  History of hip replacement, total, left      REVIEW OF SYSTEMS      General:	morbid obesity, poor mobility, chronically ill,      Skin/Breast:  chronic recurrent  skin infection  	  Ophthalmologic:  blurred vision   	  ENMT:	 negative     Respiratory and Thorax:  negative   	  Cardiovascular:	  negative     Gastrointestinal:	  negative     Genitourinary:	  negative     Musculoskeletal:	   left hip replacement chromic pain syndrome    Neurological:	peripheral neuropathy, diabetes.    Psychiatric:	negative     Hematology/Lymphatics:	  negative     Endocrine:	morbid obesity, diabetes.    Allergic/Immunologic:	    MEDICATIONS  (STANDING):  clindamycin IVPB 600 milliGRAM(s) IV Intermittent every 8 hours  dextrose 5%. 1000 milliLiter(s) (50 mL/Hr) IV Continuous <Continuous>  dextrose 50% Injectable 25 Gram(s) IV Push once  dextrose 50% Injectable 25 Gram(s) IV Push once  dextrose 50% Injectable 12.5 Gram(s) IV Push once  enoxaparin Injectable 40 milliGRAM(s) SubCutaneous every 24 hours  fluocinonide 0.05% Cream 1 Application(s) Topical every 12 hours  gabapentin 800 milliGRAM(s) Oral three times a day  HYDROmorphone   Tablet 4 milliGRAM(s) Oral three times a day  insulin lispro (HumaLOG) corrective regimen sliding scale   SubCutaneous three times a day before meals  lisinopril 2.5 milliGRAM(s) Oral daily  methadone    Tablet 5 milliGRAM(s) Oral daily    MEDICATIONS  (PRN):  acetaminophen   Tablet .. 650 milliGRAM(s) Oral every 6 hours PRN Temp greater or equal to 38C (100.4F), Moderate Pain (4 - 6)  dextrose 40% Gel 15 Gram(s) Oral once PRN Blood Glucose LESS THAN 70 milliGRAM(s)/deciliter  glucagon  Injectable 1 milliGRAM(s) IntraMuscular once PRN Glucose LESS THAN 70 milligrams/deciliter      Allergies    No Known Allergies    Intolerances        SOCIAL HISTORY:    FAMILY HISTORY:  Family history of diabetes mellitus (DM) (Sibling)      Vital Signs Last 24 Hrs  T(C): 36.6 (18 Mar 2019 05:47), Max: 37.1 (17 Mar 2019 11:15)  T(F): 97.9 (18 Mar 2019 05:47), Max: 98.7 (17 Mar 2019 11:15)  HR: 70 (18 Mar 2019 05:47) (70 - 78)  BP: 130/73 (18 Mar 2019 05:47) (130/73 - 151/76)  BP(mean): --  RR: 18 (18 Mar 2019 05:47) (16 - 20)  SpO2: 95% (18 Mar 2019 05:47) (94% - 95%)    PHYSICAL EXAM:      Constitutional:    no complains.    Eyes:  negative     ENMT:  normal    Neck:   normal, No JVD, no bruits.    Breasts:  normal     Back:  negative     Respiratory:  normal     Cardiovascular:   NSR, no  murmur     Gastrointestinal:  negative     Genitourinary:    Rectal:    Extremities:  bilateral lower extremities , edematous,  cellulitis,  skin macerated and open   in the right calf.    Vascular:  palpable pulses     Neurological:  negative     Skin:  bilateral  legs + cellulitis    Lymph Nodes:  negative     Musculoskeletal:  negative     Psychiatric:  negative         LABS:                RADIOLOGY & ADDITIONAL STUDIES:  < from: TTE Echo Complete w/Doppler (02.05.19 @ 16:01) >  ORACIC COMP W DOPP      PROCEDURE DATE:  Feb 5 2019   .      INTERPRETATION:  REPORT:    TRANSTHORACIC ECHOCARDIOGRAM REPORT         Patient Name:   RANDY MARTINEZ Patient Location: Inpatient  Medical Rec #:  [id number] Accession #:      [id number]  Account #:                        Height:           66.1 in 168.0 cm  YOB: 1955        Weight:           284.4 lb 129.00 kg  Patient Age:    63 years          BSA:              2.33 m²  Patient Gender: M               BP:               144/71 mmHg       Date of Exam:        2/5/2019 4:01:20 PM  Sonographer:         Waylon Tapia  Referring Physician: Uziel Walker MD    Procedure:   2D Echo/Doppler/Color Doppler Complete.  Indications: Cardiomyopathy, unspecified - I42.9  Diagnosis:   Cardiomyopathy, unspecified - I42.9         2D AND M-MODE MEASUREMENTS (normal ranges within parentheses):  Left                Normal    Aorta/Left           Normal  Ventricle:                    Atrium:  IVSd (2D):    1.25  (0.7-1.1) Left Atrium  4.09 cm (1.9-4.0)                cm              (2D):  LVPWd (2D):   1.33  (0.7-1.1) LA Volume    35.6                cm              Index        ml/m²  LVIDd (2D):   5.54  (3.4-5.7) Right Ventricle:  cm              RVd (2D):        4.58 cm  LVIDs (2D):   3.49            TAPSE:           2.48 cm                cm  LV FS (2D):   37.0  (>25%)                %  Relative Wall 0.48  (<0.42)  Thickness    LV SYSTOLIC FUNCTION BY 2D PLANIMETRY (MOD):  EF-Biplane: 67 %    LV DIASTOLIC FUNCTION:  MV Peak E: 0.92 m/s E/e' Ratio: 12.90  MV Peak A: 1.12 m/s  E/A Ratio: 0.82    SPECTRAL DOPPLER ANALYSIS (where applicable):     PHYSICIAN INTERPRETATION:  Left Ventricle:  Global LV systolic function was normal. Left ventricular ejection   fraction, by visual estimation, is 60 to 65%. Spectral Doppler shows   pseudonormal pattern of left ventricular myocardial filling (Grade II   diastolic dysfunction). Elevated mean left atrial pressure.  Right Ventricle: Normal right ventricular size and function. TV S' 0.2   m/s.  Left Atrium: Mildly enlarged left atrium.  Right Atrium: Normal right atrial size.  Pericardium: Trivial pericardial effusion is present. The pericardial   effusion is anterior to the right ventricle.  Mitral Valve: The mitral valve is normal in structure. No evidence of   mitral valve regurgitation is seen.  Tricuspid Valve: The tricuspid valve is normal. No tricuspid   regurgitation is visualized.  Aortic Valve: The aortic valve istrileaflet. No evidence of aortic valve   regurgitation is seen.  Pulmonic Valve: No indication of pulmonic valve regurgitation.  Aorta: The aortic root is normal in size and structure.  Pulmonary Artery: The pulmonary artery is mildly dilated.  Venous: The inferior vena cava is normal.       Summary:   1. Mildly enlarged left atrium.   2. There is mild concentric left ventricular hypertrophy.   3. Normal wall motion. Left ventricular ejection fraction, by visual   estimation, is 60 to 65%. GradeII diastolic dysfunction.   4. Elevated mean left atrial pressure.   5. Normal right atrial size.   6. Normal right ventricular size and function.   7. No significant valvular abnormality.   8. Trivial pericardial effusion.    [id number] HOMERO Rocha, Electronically signed on 2/5/2019 at   5:16:02 PM              *** Final ***                  CUCO MOORE M.D., ATTENDING CARDIOLOGY  This document has been electronically signed. Feb 5 2019  4:01PM            < end of copied text >

## 2019-03-18 NOTE — PROGRESS NOTE ADULT - ASSESSMENT
stable  ID agrees IV Clindamycin  Vascular agrees ,  Jose Leahy,  , r/o  vascular pathology  will continue present  care  discussed with patient.

## 2019-03-20 PROBLEM — G89.4 CHRONIC PAIN SYNDROME: Chronic | Status: ACTIVE | Noted: 2019-03-14

## 2019-03-20 PROBLEM — E11.40 TYPE 2 DIABETES MELLITUS WITH DIABETIC NEUROPATHY, UNSPECIFIED: Chronic | Status: ACTIVE | Noted: 2019-03-14

## 2019-03-20 PROBLEM — I87.2 VENOUS INSUFFICIENCY (CHRONIC) (PERIPHERAL): Chronic | Status: ACTIVE | Noted: 2019-03-14

## 2019-03-22 ENCOUNTER — APPOINTMENT (OUTPATIENT)
Age: 64
End: 2019-03-22
Payer: MEDICAID

## 2019-03-22 VITALS
BODY MASS INDEX: 52.48 KG/M2 | HEIGHT: 65 IN | TEMPERATURE: 97.8 F | OXYGEN SATURATION: 92 % | DIASTOLIC BLOOD PRESSURE: 76 MMHG | HEART RATE: 76 BPM | WEIGHT: 315 LBS | SYSTOLIC BLOOD PRESSURE: 152 MMHG

## 2019-03-22 PROCEDURE — 29580 STRAPPING UNNA BOOT: CPT | Mod: 50

## 2019-03-22 PROCEDURE — 99213 OFFICE O/P EST LOW 20 MIN: CPT | Mod: 25

## 2019-03-25 RX ORDER — FLUOCINONIDE 0.5 MG/G
0.05 CREAM TOPICAL
Qty: 60 | Refills: 0 | Status: ACTIVE | COMMUNITY
Start: 2019-02-06

## 2019-03-25 NOTE — ASSESSMENT
[Arterial/Venous Disease] : arterial/venous disease [FreeTextEntry1] : 61 yo M with history of HTN, diabetes, lupus, arthritis, sleep apnea, and ankylosing spondylitis presenting for bilateral deep venous insufficiency and draining superficial stasis ulcers.\par \par Unna boots applied to bilateral LE (3 layers).

## 2019-03-25 NOTE — HISTORY OF PRESENT ILLNESS
[FreeTextEntry1] : 61 yo M with history of HTN, diabetes, lupus, arthritis, sleep apnea, and ankylosing spondylitis presenting for bilateral LE edema and superficial ulcerations. Pt  has had recurrent, draining ulcers on medial bilateral LEs, which heal and then recur. History of DVT in LLE in the past, but patient uncertain where in the leg. No evidence of draining ulcer at this time. Pt complains of R knee pain and heaviness of legs while standing. Legs feel so heavy at times that he can't "lift them". No history of claudication, rest pain, or gangrene. Pt denies cardiac, renal or hepatic failure. Currently elevates legs while seated.\par  [de-identified] : Pt was recently hospitalized in [facility] for bilateral LE cellulitis and draining leg wounds. He was treated with IV abx and discharged home with Unna boots. He has tolerated them well. He has experienced improvement in edema and wounds are draining less. Denies fever, chills.

## 2019-03-25 NOTE — PHYSICAL EXAM
[Normal Breath Sounds] : Normal breath sounds [Normal Heart Sounds] : normal heart sounds [Normal Rate and Rhythm] : normal rate and rhythm [2+] : left 2+ [Ankle Swelling (On Exam)] : present [Ankle Swelling Bilaterally] : severe [Varicose Veins Of Lower Extremities] : bilaterally [] : bilaterally [Ankle Swelling On The Left] : moderate [No Rash or Lesion] : No rash or lesion [Alert] : alert [Oriented to Person] : oriented to person [Oriented to Place] : oriented to place [Oriented to Time] : oriented to time [Calm] : calm [de-identified] : NAD [de-identified] : EOMI, MMM [de-identified] : supple [FreeTextEntry1] : <2 sec cap refill\par Lipodermatosclerosis\par Clear drainage from open legs wounds bilaterally [de-identified] : soft, obese, NT, ND [de-identified] : FROM of all 4 extremities

## 2019-03-25 NOTE — REVIEW OF SYSTEMS
[Lower Ext Edema] : lower extremity edema [Limb Swelling] : limb swelling [Negative] : Heme/Lymph [Leg Claudication] : no intermittent leg claudication [Limb Pain] : no limb pain [Skin Wound] : skin wound

## 2019-03-29 ENCOUNTER — APPOINTMENT (OUTPATIENT)
Dept: VASCULAR SURGERY | Facility: CLINIC | Age: 64
End: 2019-03-29
Payer: MEDICAID

## 2019-03-29 VITALS
SYSTOLIC BLOOD PRESSURE: 144 MMHG | OXYGEN SATURATION: 94 % | HEART RATE: 77 BPM | HEIGHT: 65 IN | TEMPERATURE: 97.7 F | DIASTOLIC BLOOD PRESSURE: 88 MMHG

## 2019-03-29 PROCEDURE — 29580 STRAPPING UNNA BOOT: CPT | Mod: 50

## 2019-03-29 RX ORDER — CEFUROXIME AXETIL 500 MG/1
500 TABLET ORAL
Qty: 20 | Refills: 0 | Status: DISCONTINUED | COMMUNITY
Start: 2019-01-28 | End: 2019-03-29

## 2019-03-29 RX ORDER — CEPHALEXIN 500 MG/1
500 CAPSULE ORAL
Qty: 40 | Refills: 0 | Status: DISCONTINUED | COMMUNITY
Start: 2019-03-02 | End: 2019-03-29

## 2019-03-29 NOTE — ASSESSMENT
[Arterial/Venous Disease] : arterial/venous disease [FreeTextEntry1] : 62 yo M with BLE edema and venous stasis dermatitis. I cleaned his legs and applied moisturizer to legs followed by Unna Boots, Coban and an Ace for mild compression. I sent a script for Triamcinolone to the pharmacy to apply to legs when Unna Boots are off for dermatitis. Will contact [facility] for Lymphedema Pumps. RTC in 1 week.

## 2019-03-29 NOTE — HISTORY OF PRESENT ILLNESS
[FreeTextEntry1] : 62 yo M with history of HTN, diabetes, lupus, arthritis, sleep apnea, and ankylosing spondylitis presenting for bilateral LE edema and superficial ulcerations. Pt  has had recurrent, draining ulcers on medial bilateral LEs, which heal and then recur. History of DVT in LLE in the past, but patient uncertain where in the leg. No evidence of draining ulcer at this time. Pt complains of R knee pain and heaviness of legs while standing. Legs feel so heavy at times that he can't "lift them". No history of claudication, rest pain, or gangrene. Pt denies cardiac, renal or hepatic failure. Currently elevates legs while seated.\par  [de-identified] : 62 y/o male with recent Hx of bilateral LE cellulitis and draining leg wounds. He completed antibiotic therapy and his legs continue to show progressive improvement. They are no longer weeping and are now very dry and scaly. The edema continues to improve. Denies fever, chills. He states he has had swollen legs for an extremely long time with intermittent episodes of weeping, ulcers and cellulitis.

## 2019-03-29 NOTE — REVIEW OF SYSTEMS
[Lower Ext Edema] : lower extremity edema [Limb Swelling] : limb swelling [As Noted in HPI] : as noted in HPI [Skin Wound] : skin wound [Dry Skin] : dry skin [Negative] : Heme/Lymph [Leg Claudication] : no intermittent leg claudication [Limb Pain] : no limb pain

## 2019-03-29 NOTE — PHYSICAL EXAM
[Normal Breath Sounds] : Normal breath sounds [Normal Heart Sounds] : normal heart sounds [Normal Rate and Rhythm] : normal rate and rhythm [2+] : left 2+ [Ankle Swelling (On Exam)] : present [Ankle Swelling On The Right] : mild [Varicose Veins Of Lower Extremities] : bilaterally [Ankle Swelling On The Left] : moderate [] : bilaterally [Ankle Swelling Bilaterally] : severe [No Rash or Lesion] : No rash or lesion [Alert] : alert [Oriented to Person] : oriented to person [Oriented to Place] : oriented to place [Oriented to Time] : oriented to time [Calm] : calm [de-identified] : WD, WN, NAD. Awake, alert, interactive. Ambulates without difficulty [de-identified] : TAYLOR, PERGEOVANNIL [de-identified] : supple [de-identified] : soft, obese, NT, ND [de-identified] : no cyanosis or deformity. full ROM, MS 5/5\par  [de-identified] : NANNETTE. Chronic venous stasis hyperpigmentation and lipodermatosclerosis. His legs demonstrate venous stasis dermatitis. No open or draining wounds. No s/s of infection.

## 2019-04-04 DIAGNOSIS — Z76.89 PERSONS ENCOUNTERING HEALTH SERVICES IN OTHER SPECIFIED CIRCUMSTANCES: ICD-10-CM

## 2019-04-05 ENCOUNTER — APPOINTMENT (OUTPATIENT)
Age: 64
End: 2019-04-05
Payer: MEDICAID

## 2019-04-05 VITALS
WEIGHT: 315 LBS | TEMPERATURE: 97.8 F | SYSTOLIC BLOOD PRESSURE: 144 MMHG | DIASTOLIC BLOOD PRESSURE: 77 MMHG | HEIGHT: 65 IN | OXYGEN SATURATION: 95 % | BODY MASS INDEX: 52.48 KG/M2 | HEART RATE: 84 BPM

## 2019-04-05 PROCEDURE — 29580 STRAPPING UNNA BOOT: CPT | Mod: 50

## 2019-04-05 NOTE — HISTORY OF PRESENT ILLNESS
[FreeTextEntry1] : 62 y/o male with longstanding history of lymphedema, lymphorrhea and recurrent ulcers and cellulitis. # weeks ago he was seen at [facility] and by infectious disease for weeping of legs and legs ulcers. He continues to experience recurrent, draining ulcers on medial and posterior BLE. He reports a sense of heaviness of legs while standing and has difficulty lifting them at times. He sits for long hours at work. He is active, ambulates as frequently as possible. The wounds have presently resolved with dry exudate to posterior legs. No open or draining ulcers. The previous are involved measures 9 x 6 cm RLE and 4 cm LLE. No history of claudication, rest pain, or gangrene. Pt denies cardiac, renal or hepatic failure. Currently elevates legs while seated. Hx includes: HTN, diabetes, lupus, arthritis, sleep apnea, ankylosing spondylitis and DVT in LLE in the past, uncertain laterality. Denies fever, chills. He has tried to wear compression stockings in the past but they were too difficult to put on and would roll, causing pain in his calves.

## 2019-04-05 NOTE — REASON FOR VISIT
[Follow-Up: _____] : a [unfilled] follow-up visit [FreeTextEntry1] : Lymphedema, Lymphorrhea, Hx of DVT and recurrent cellulitis with ulcers

## 2019-04-05 NOTE — ASSESSMENT
[Arterial/Venous Disease] : arterial/venous disease [FreeTextEntry1] : 62 yo male with longstanding lymphedema, lymphorrhea, recurrent ulcers and cellulitis. His legs continue to demonstrate improvement in healing. Wound remain resolved for 2-3 weeks. Severe edema persists. He should use triamcinolone cream on legs after removing an Unna Boot. Discussed obtaining compression stockings. Will order Sigvaris device and Adaptic for wounds from  [employer]. He will begin using Lymphedema pumps as soon as they arrive. I applied Unna Boots, Coban and an Ace for mild compression. RTC in 1 week.

## 2019-04-05 NOTE — PHYSICAL EXAM
[Normal Breath Sounds] : Normal breath sounds [Normal Heart Sounds] : normal heart sounds [Normal Rate and Rhythm] : normal rate and rhythm [2+] : left 2+ [Ankle Swelling (On Exam)] : present [Ankle Swelling On The Right] : mild [Varicose Veins Of Lower Extremities] : bilaterally [Ankle Swelling On The Left] : moderate [] : bilaterally [Ankle Swelling Bilaterally] : severe [No Rash or Lesion] : No rash or lesion [Alert] : alert [Oriented to Person] : oriented to person [Oriented to Place] : oriented to place [Oriented to Time] : oriented to time [Calm] : calm [de-identified] : WD, WN, NAD. Awake, alert, interactive. Ambulates slowly without difficulty [de-identified] : TAYLOR, PERGEOVANNIL [de-identified] : supple [de-identified] : soft, obese, NT, ND [de-identified] : no cyanosis or deformity. full ROM, MS 5/5\par  [de-identified] : WWP. Chronic venous stasis hyperpigmentation and lipodermatosclerosis. His legs demonstrate venous stasis dermatitis. No open or draining wounds. No s/s of infection. Circumference: Right -  calf: 47  cm, ankle:  32 cm; Left - calf: 46 cm, ankle: 31 cm.

## 2019-04-05 NOTE — REVIEW OF SYSTEMS
[Lower Ext Edema] : lower extremity edema [Limb Swelling] : limb swelling [As Noted in HPI] : as noted in HPI [Skin Wound] : skin wound [Dry Skin] : dry skin [Negative] : Psychiatric [Leg Claudication] : no intermittent leg claudication [Limb Pain] : no limb pain

## 2019-04-15 ENCOUNTER — APPOINTMENT (OUTPATIENT)
Dept: VASCULAR SURGERY | Facility: CLINIC | Age: 64
End: 2019-04-15
Payer: MEDICAID

## 2019-04-15 VITALS
DIASTOLIC BLOOD PRESSURE: 65 MMHG | RESPIRATION RATE: 16 BRPM | TEMPERATURE: 98.3 F | OXYGEN SATURATION: 98 % | HEART RATE: 83 BPM | SYSTOLIC BLOOD PRESSURE: 130 MMHG | HEIGHT: 65 IN

## 2019-04-15 PROCEDURE — 29580 STRAPPING UNNA BOOT: CPT | Mod: 50

## 2019-04-15 NOTE — PHYSICAL EXAM
[Normal Breath Sounds] : Normal breath sounds [Normal Heart Sounds] : normal heart sounds [Normal Rate and Rhythm] : normal rate and rhythm [Ankle Swelling (On Exam)] : present [2+] : left 2+ [Ankle Swelling On The Right] : mild [Ankle Swelling On The Left] : moderate [Varicose Veins Of Lower Extremities] : bilaterally [] : bilaterally [Ankle Swelling Bilaterally] : severe [No Rash or Lesion] : No rash or lesion [Alert] : alert [Oriented to Place] : oriented to place [Oriented to Person] : oriented to person [Oriented to Time] : oriented to time [de-identified] : WD, WN, NAD. Awake, alert, interactive. Ambulates slowly without difficulty [Calm] : calm [de-identified] : supple [de-identified] : TAYLOR, PERGEOVANNIL [de-identified] : soft, obese, NT, ND [de-identified] : WWP. Chronic venous stasis hyperpigmentation and lipodermatosclerosis. His legs demonstrate venous stasis dermatitis. No open or draining wounds. No s/s of infection. Circumference: Right -  calf: 59  cm, ankle: 37.5 cm; Left - calf: 54 cm, ankle: 32 cm. [de-identified] : no cyanosis or deformity. full ROM, MS 5/5\par

## 2019-04-15 NOTE — REVIEW OF SYSTEMS
[Leg Claudication] : no intermittent leg claudication [Lower Ext Edema] : lower extremity edema [Limb Pain] : no limb pain [Limb Swelling] : limb swelling [Skin Wound] : skin wound [Dry Skin] : dry skin [As Noted in HPI] : as noted in HPI [Negative] : Neurological

## 2019-04-15 NOTE — ASSESSMENT
[Arterial/Venous Disease] : arterial/venous disease [FreeTextEntry1] : 62 yo male with longstanding lymphedema. No new ulcers, no weeping, drainage or cellulitis. His legs have increased pitting edema. I applied BLE Unna Boots, Coban and Ace and advised him that if her takes the Unna Boots off, he needs to put compression stockings on immediately to avoid edema. Awaiting receipt of Lymphedema pumps. RTC in 1 week.

## 2019-04-15 NOTE — HISTORY OF PRESENT ILLNESS
[FreeTextEntry1] : 64 y/o male with longstanding history of lymphedema, lymphorrhea and recurrent ulcers and cellulitis. # weeks ago he was seen at [facility] and by infectious disease for weeping of legs and legs ulcers. He continues to experience recurrent, draining ulcers on medial and posterior BLE. He reports a sense of heaviness of legs while standing and has difficulty lifting them at times. He sits for long hours at work. He is active, ambulates as frequently as possible. The wounds have presently resolved with dry exudate to posterior legs. No open or draining ulcers. The previous are involved measures 9 x 6 cm RLE and 4 cm LLE. No history of claudication, rest pain, or gangrene. Pt denies cardiac, renal or hepatic failure. Currently elevates legs while seated. Hx includes: HTN, diabetes, lupus, arthritis, sleep apnea, ankylosing spondylitis and DVT in LLE in the past, uncertain laterality. Denies fever, chills. He has tried to wear compression stockings in the past but they were too difficult to put on and would roll, causing pain in his calves. [de-identified] : 62 y/o male RTC for followup. He wore Unna Boots for 3-4 days and removed them 3-4 days ago. There has been increased swelling and pain to BLE. No weeping, no new ulcers. He has not place compressive device on legs since removing the Unna Boots. No erythema, weeping or odor. No fever or chills. He needs to ambulate slowly but does not report pain with walking or at rest.

## 2019-04-22 ENCOUNTER — APPOINTMENT (OUTPATIENT)
Dept: VASCULAR SURGERY | Facility: CLINIC | Age: 64
End: 2019-04-22
Payer: MEDICAID

## 2019-04-22 VITALS
TEMPERATURE: 97.9 F | HEART RATE: 100 BPM | SYSTOLIC BLOOD PRESSURE: 188 MMHG | DIASTOLIC BLOOD PRESSURE: 95 MMHG | OXYGEN SATURATION: 98 %

## 2019-04-22 VITALS
WEIGHT: 315 LBS | OXYGEN SATURATION: 96 % | BODY MASS INDEX: 52.48 KG/M2 | TEMPERATURE: 97.9 F | HEART RATE: 70 BPM | SYSTOLIC BLOOD PRESSURE: 151 MMHG | HEIGHT: 65 IN | DIASTOLIC BLOOD PRESSURE: 79 MMHG

## 2019-04-22 PROCEDURE — 29580 STRAPPING UNNA BOOT: CPT | Mod: 50

## 2019-04-22 NOTE — ASSESSMENT
[Arterial/Venous Disease] : arterial/venous disease [FreeTextEntry1] : 62 yo male with longstanding lymphedema and lymphorrhea. He continues to demonstrate intermittent pitting edema and lymphorrhea, however ulcers remain healed.  He would like to obtain a compressive device that he will be able to apply himself without ripping or difficulty staying in place. I will order a [employer] ready wrap and farrow hybrid socks so that he may be able to use independently. He will continue with compression, elevation and ambulation. Awaiting receipt of Lymphedema pumps. RTC in 1 week.

## 2019-04-22 NOTE — HISTORY OF PRESENT ILLNESS
[FreeTextEntry1] : 64 y/o male with longstanding history of lymphedema, lymphorrhea and recurrent ulcers and cellulitis. He was seen at [facility] 2 months ago for Lymphedema, lymphorrhea, ulcers and cellulitis. He was evaluated by infectious disease as well. He continues to have intermittent weeping to BLE but ulcers are now healed. He has been wearing Unna Boots consistently for the past 2-3 months. He has been tolerating them well for the most part. His legs continue to demonstrate some pitting edema, lymphorrhea but no tenderness or pain. He has tried to wear compression stockings in the past but they were too difficult to put on and would roll, causing pain in his calves. He does not report fever or chills. He ambulates slowly. He does not report walk or rest pain. He elevates legs while seated. Hx includes: HTN, diabetes, lupus, arthritis, sleep apnea, ankylosing spondylitis and DVT in LLE.  \par \par Previous ulcer Hx: wounds RLE:  9 x 6 cm and LLE: 4 cm.

## 2019-04-22 NOTE — PHYSICAL EXAM
[Normal Breath Sounds] : Normal breath sounds [Normal Heart Sounds] : normal heart sounds [Normal Rate and Rhythm] : normal rate and rhythm [Ankle Swelling (On Exam)] : present [2+] : left 2+ [Ankle Swelling On The Right] : mild [Varicose Veins Of Lower Extremities] : bilaterally [Ankle Swelling On The Left] : moderate [] : present [Ankle Swelling Bilaterally] : severe [Alert] : alert [No Rash or Lesion] : No rash or lesion [Oriented to Person] : oriented to person [Oriented to Place] : oriented to place [Oriented to Time] : oriented to time [Calm] : calm [de-identified] : WD, WN, NAD. Awake, alert, interactive. Ambulates slowly without difficulty [de-identified] : TAYLOR, PERGEOVANNIL [de-identified] : soft, obese, NT, ND [de-identified] : no cyanosis or deformity. full ROM, MS 5/5\par  [de-identified] : supple [de-identified] : WWP. Chronic venous stasis hyperpigmentation and lipodermatosclerosis. His legs demonstrate venous stasis dermatitis. There is 1+ pitting edema scattered through BLE. There is mild lymphorrhea BLE> No open ulcers, the previous ulcers remain closed. No s/s of infection. Circumference: Right -  calf: 56 cm, ankle: 32 cm; Left - calf: 52.5 cm, ankle: 31.5 cm.

## 2019-04-29 ENCOUNTER — APPOINTMENT (OUTPATIENT)
Dept: VASCULAR SURGERY | Facility: CLINIC | Age: 64
End: 2019-04-29
Payer: MEDICAID

## 2019-04-29 VITALS
TEMPERATURE: 97.9 F | HEIGHT: 65 IN | DIASTOLIC BLOOD PRESSURE: 67 MMHG | RESPIRATION RATE: 16 BRPM | SYSTOLIC BLOOD PRESSURE: 132 MMHG | OXYGEN SATURATION: 97 % | HEART RATE: 79 BPM

## 2019-04-29 PROCEDURE — 29580 STRAPPING UNNA BOOT: CPT | Mod: 50

## 2019-04-29 NOTE — HISTORY OF PRESENT ILLNESS
[FreeTextEntry1] : 62 y/o male with longstanding history of lymphedema, lymphorrhea and recurrent ulcers and cellulitis. He continues to wear an Unna Boot weekly. There has been resolution of cellulitis, weeping and ulcers. He continues to have moderate edema to BLE. He sits for extended time every day while working and is sedentary. He walks slowly without the need for a cane or walker. He has tried to wear compression stockings in the past but they were too difficult to put on and would roll, causing pain in his calves. No fever or chills. He does not report walk or rest pain. He elevates legs while seated. Hx includes: HTN, diabetes, lupus, arthritis, sleep apnea, ankylosing spondylitis and DVT in LLE.

## 2019-04-29 NOTE — ASSESSMENT
[Arterial/Venous Disease] : arterial/venous disease [FreeTextEntry1] : 64 yo male with longstanding lymphedema. Lymphorrhea has resolved. He will continue to wear Unna Boots until he has obtained compression stockings. I have ordered Dyneflex ready wrap and farrow hybrid socks, but I am not sure if they will be cost effective for him to purchase. He will continue with compression, elevation and ambulation. Awaiting receipt of Lymphedema pumps. RTC in 1 week.

## 2019-04-29 NOTE — PHYSICAL EXAM
[Normal Breath Sounds] : Normal breath sounds [Normal Heart Sounds] : normal heart sounds [Normal Rate and Rhythm] : normal rate and rhythm [2+] : left 2+ [Ankle Swelling (On Exam)] : present [Ankle Swelling On The Right] : mild [Varicose Veins Of Lower Extremities] : bilaterally [Ankle Swelling On The Left] : moderate [] : bilaterally [Ankle Swelling Bilaterally] : severe [No Rash or Lesion] : No rash or lesion [Alert] : alert [Oriented to Person] : oriented to person [Oriented to Place] : oriented to place [Oriented to Time] : oriented to time [Calm] : calm [de-identified] : WD, WN, NAD. Awake, alert, interactive. Ambulates slowly without difficulty [de-identified] : TAYLOR, PERGEOVANNIL [de-identified] : supple [de-identified] : soft, obese, NT, ND [de-identified] : no cyanosis or deformity. full ROM, MS 5/5\par  [de-identified] : WWP. Chronic venous stasis hyperpigmentation and lipodermatosclerosis. His legs demonstrate venous stasis dermatitis. There is 1+ pitting edema scattered through BLE. No lymphorrhea, ulcers or pain. No s/s of infection. Circumference: Right -  calf: 55.5 cm, ankle: 31 cm; Left - calf: 52 cm, ankle: 30 cm.

## 2019-05-06 ENCOUNTER — APPOINTMENT (OUTPATIENT)
Dept: VASCULAR SURGERY | Facility: CLINIC | Age: 64
End: 2019-05-06
Payer: MEDICAID

## 2019-05-06 VITALS
BODY MASS INDEX: 52.48 KG/M2 | DIASTOLIC BLOOD PRESSURE: 76 MMHG | SYSTOLIC BLOOD PRESSURE: 133 MMHG | WEIGHT: 315 LBS | HEIGHT: 65 IN | HEART RATE: 88 BPM | OXYGEN SATURATION: 96 % | RESPIRATION RATE: 16 BRPM | TEMPERATURE: 98.2 F

## 2019-05-06 PROCEDURE — 29580 STRAPPING UNNA BOOT: CPT | Mod: 50

## 2019-05-06 NOTE — PHYSICAL EXAM
[Normal Breath Sounds] : Normal breath sounds [Normal Rate and Rhythm] : normal rate and rhythm [Normal Heart Sounds] : normal heart sounds [Ankle Swelling (On Exam)] : present [2+] : left 2+ [Ankle Swelling On The Right] : mild [] : present [Varicose Veins Of Lower Extremities] : bilaterally [Ankle Swelling On The Left] : moderate [Ankle Swelling Bilaterally] : severe [No Rash or Lesion] : No rash or lesion [Alert] : alert [Oriented to Time] : oriented to time [Oriented to Person] : oriented to person [Oriented to Place] : oriented to place [Calm] : calm [de-identified] : TAYLOR, PERGEOVANNIL [de-identified] : WD, WN, NAD. Awake, alert, interactive. Ambulates slowly without difficulty [de-identified] : soft, obese, NT, ND [de-identified] : no cyanosis or deformity. full ROM, MS 5/5\par  [de-identified] : supple [de-identified] : WWP. Chronic venous stasis hyperpigmentation and lipodermatosclerosis. His legs demonstrate venous stasis dermatitis. There is 1+ pitting edema scattered through BLE. No lymphorrhea, ulcers or pain. No s/s of infection. There is a small ulcer to the plantar surface of his left ing toe. No erythema, no drainage, non-tender, no discoloration. Circumference: Right -  calf: 55.5 cm, ankle: 31 cm; Left - calf: 52 cm, ankle: 30 cm.

## 2019-05-06 NOTE — ASSESSMENT
[Arterial/Venous Disease] : arterial/venous disease [FreeTextEntry1] : 62 yo male with longstanding lymphedema. Lymphorrhea has resolved. He will continue to wear Unna Boots until he has obtained compression stockings. I have ordered Dyneflex ready wrap and farrow hybrid socks, but I am not sure if they will be cost effective for him to purchase. He will continue with compression, elevation and ambulation. Awaiting receipt of Lymphedema pumps. He will continue as planned to see Podiatry for Diabetic ulcer to left Big toe. RTC in 1 week.

## 2019-05-06 NOTE — REVIEW OF SYSTEMS
[Lower Ext Edema] : lower extremity edema [Limb Swelling] : limb swelling [Skin Wound] : skin wound [As Noted in HPI] : as noted in HPI [Dry Skin] : dry skin [Negative] : Respiratory [Leg Claudication] : no intermittent leg claudication [Limb Pain] : no limb pain

## 2019-05-06 NOTE — HISTORY OF PRESENT ILLNESS
[FreeTextEntry1] : 64 y/o male with longstanding history of lymphedema, lymphorrhea and recurrent ulcers and cellulitis. He continues to wear an Unna Boot weekly. There has been resolution of cellulitis, weeping and ulcers. He continues to have moderate edema to BLE. He sits for extended time every day while working and is sedentary. He walks slowly without the need for a cane or walker. He has tried to wear compression stockings in the past but they were too difficult to put on and would roll, causing pain in his calves. No fever or chills. He does not report walk or rest pain. He elevates legs while seated. He noticed a wound to the bottom of the big toe on his left foot. He has an appointment to see his Podiatrist in 2 days. Hx includes: HTN, diabetes, lupus, arthritis, sleep apnea, ankylosing spondylitis and DVT in LLE.

## 2019-05-13 NOTE — ED ADULT NURSE NOTE - TEMPLATE
Anticoagulation Progress Note    Indication: AFIB  Goal INR: 2.0-3.0  Duration: Indefinite  Pertinent History: HTN, DM    Assessment  High-Risk Medications: none  Significant Findings: none  Hospitalizations / Procedures: none  Vitamin K goal: avoids  Other: n/a    Plan  5mg tablets  INR Result: 2.0  The INR result for today is therapeutic based on the INR goal 2.0-3.0.  Etiology: n/a  Recommended Dose: Continue 5mg daily    Follow-Up: 6/10/19  Comments: none    Counseling  Counseled about prevention and management of nosebleeds  Discussed measures to reduce risk for falls and appropriate action when serious injury occurs  Instructed to notify clinic about medication changes or health status changes  Instructed to notify clinic about scheduled procedures    Patient (or family/caregiver) verbalized understanding and agreement with plan.    London Duran RN  5/13/2019        General

## 2019-05-14 ENCOUNTER — APPOINTMENT (OUTPATIENT)
Dept: VASCULAR SURGERY | Facility: CLINIC | Age: 64
End: 2019-05-14
Payer: MEDICAID

## 2019-05-14 PROCEDURE — 29580 STRAPPING UNNA BOOT: CPT | Mod: 50

## 2019-05-14 NOTE — PHYSICAL EXAM
[Normal Breath Sounds] : Normal breath sounds [Normal Heart Sounds] : normal heart sounds [Normal Rate and Rhythm] : normal rate and rhythm [Ankle Swelling (On Exam)] : present [2+] : left 2+ [Ankle Swelling On The Right] : mild [Varicose Veins Of Lower Extremities] : present [Ankle Swelling On The Left] : moderate [] : bilaterally [Ankle Swelling Bilaterally] : severe [No Rash or Lesion] : No rash or lesion [Alert] : alert [Oriented to Place] : oriented to place [Oriented to Time] : oriented to time [Oriented to Person] : oriented to person [Calm] : calm [de-identified] : WD, WN, NAD. Awake, alert, interactive. Ambulates slowly without difficulty [de-identified] : TAYLOR, PERGEOVANNIL [de-identified] : supple [de-identified] : soft, obese, NT, ND [de-identified] : no cyanosis or deformity. full ROM, MS 5/5\par  [de-identified] : WWP. Chronic venous stasis hyperpigmentation and lipodermatosclerosis. Chronic venous stasis dermatitis. 1+ pitting edema scattered through BLE. No lymphorrhea, ulcers or pain. No s/s of infection. DSD on left big toe. Circumference: Right -  calf: 55.5 cm, ankle: 31 cm; Left - calf: 52 cm, ankle: 30 cm.

## 2019-05-14 NOTE — HISTORY OF PRESENT ILLNESS
[FreeTextEntry1] : 64 y/o male with longstanding history of lymphedema, lymphorrhea and recurrent ulcers and cellulitis. He continues to wear an Unna Boot weekly. There has been resolution of cellulitis, weeping and ulcers. He continues to have moderate edema to BLE. He sits for extended time every day while working and is sedentary. He walks slowly without the need for a cane or walker. He has tried to wear compression stockings in the past but they were too difficult to put on and would roll, causing pain in his calves. No fever or chills. He does not report walk or rest pain. He elevates legs while seated. He noticed a wound to the bottom of the big toe on his left foot. He has an appointment to see his Podiatrist in 2 days. Hx includes: HTN, diabetes, lupus, arthritis, sleep apnea, ankylosing spondylitis and DVT in LLE.   [de-identified] : 64 y/o male RTC for followup. No changes. His legs continue to have edema, improved while wearing Unna Boots. he was seen by Podiatry who cleaned the diabetic ulcer to his left big to and has been seeing him weekly. No fever or chills. No walk or rest pain.

## 2019-05-14 NOTE — ASSESSMENT
[Arterial/Venous Disease] : arterial/venous disease [FreeTextEntry1] : 62 yo male with longstanding lymphedema. Lymphorrhea has resolved. He will continue to wear Unna Boots until he has obtained compression stockings. I have ordered Dyneflex ready wrap and farrow hybrid socks, but I am not sure if they will be cost effective for him to purchase. He will continue with compression, elevation and ambulation. Awaiting receipt of Lymphedema pumps. He will continue with Podiatry for Diabetic ulcer. RTC in 1 week.

## 2019-05-20 ENCOUNTER — APPOINTMENT (OUTPATIENT)
Dept: VASCULAR SURGERY | Facility: CLINIC | Age: 64
End: 2019-05-20

## 2019-05-21 ENCOUNTER — APPOINTMENT (OUTPATIENT)
Dept: VASCULAR SURGERY | Facility: CLINIC | Age: 64
End: 2019-05-21
Payer: MEDICAID

## 2019-05-21 VITALS
OXYGEN SATURATION: 96 % | SYSTOLIC BLOOD PRESSURE: 144 MMHG | TEMPERATURE: 98.4 F | DIASTOLIC BLOOD PRESSURE: 75 MMHG | HEART RATE: 99 BPM

## 2019-05-21 PROCEDURE — 29580 STRAPPING UNNA BOOT: CPT | Mod: 50

## 2019-05-21 NOTE — ASSESSMENT
[Arterial/Venous Disease] : arterial/venous disease [FreeTextEntry1] : 64 yo male with longstanding lymphedema. He will continue to wear Unna Boots until he has obtained compression stockings. He will continue with compression, elevation and ambulation. Awaiting receipt of Lymphedema pumps. He will continue with Podiatry for Diabetic ulcer. RTC in 1 week.

## 2019-05-21 NOTE — HISTORY OF PRESENT ILLNESS
[FreeTextEntry1] : 62 y/o male with longstanding history of lymphedema, lymphorrhea and recurrent ulcers and cellulitis. He continues to wear an Unna Boot weekly. There has been resolution of cellulitis, weeping and ulcers. He continues to have moderate edema to BLE. He sits for extended time every day while working and is sedentary. He walks slowly without the need for a cane or walker. He has tried to wear compression stockings in the past but they were too difficult to put on and would roll, causing pain in his calves. No fever or chills. He does not report walk or rest pain. He elevates legs while seated. He noticed a wound to the bottom of the big toe on his left foot. He has an appointment to see his Podiatrist in 2 days. Hx includes: HTN, diabetes, lupus, arthritis, sleep apnea, ankylosing spondylitis and DVT in LLE.   [de-identified] : 64 y/o male RTC for followup. No changes. His legs continue to have edema, improved while wearing Unna Boots. He continues with Podiatry for wound care to left 1st toe. No fever or chills. No walk or rest pain.

## 2019-05-21 NOTE — PHYSICAL EXAM
[Normal Breath Sounds] : Normal breath sounds [Normal Heart Sounds] : normal heart sounds [Normal Rate and Rhythm] : normal rate and rhythm [2+] : left 2+ [Ankle Swelling (On Exam)] : present [Ankle Swelling On The Right] : of the right ankle [Varicose Veins Of Lower Extremities] : bilaterally [Ankle Swelling On The Left] : moderate [] : bilaterally [Ankle Swelling Bilaterally] : severe [No Rash or Lesion] : No rash or lesion [Alert] : alert [Oriented to Person] : oriented to person [Oriented to Place] : oriented to place [Oriented to Time] : oriented to time [Calm] : calm [de-identified] : WD, WN, NAD. Awake, alert, interactive. Ambulates slowly without difficulty [de-identified] : TAYLOR, PERGEOVANNIL [de-identified] : supple [FreeTextEntry1] : Chronic venous stasis hyperpigmentation and lipodermatosclerosis. Chronic venous stasis dermatitis. 1+ pitting edema scattered through BLE. No lymphorrhea, ulcers or pain. No s/s of infection. DSD on left big toe. Circumference: Right -  calf: 57 cm, mid-calf: 38.5, ankle:29 cm; Left - calf: 52.5 cm, ankle: 28.5 cm. [de-identified] : soft, obese, NT, ND [de-identified] : no cyanosis or deformity. full ROM, MS 5/5\par  [de-identified] : NANNETTE.

## 2019-05-28 ENCOUNTER — APPOINTMENT (OUTPATIENT)
Dept: VASCULAR SURGERY | Facility: CLINIC | Age: 64
End: 2019-05-28
Payer: MEDICAID

## 2019-05-28 VITALS
SYSTOLIC BLOOD PRESSURE: 144 MMHG | OXYGEN SATURATION: 93 % | DIASTOLIC BLOOD PRESSURE: 64 MMHG | HEART RATE: 104 BPM | TEMPERATURE: 98.5 F

## 2019-05-28 PROCEDURE — 29580 STRAPPING UNNA BOOT: CPT | Mod: 50

## 2019-05-28 NOTE — PHYSICAL EXAM
[Normal Breath Sounds] : Normal breath sounds [Normal Heart Sounds] : normal heart sounds [Normal Rate and Rhythm] : normal rate and rhythm [Ankle Swelling On The Right] : of the right ankle [2+] : left 2+ [Ankle Swelling (On Exam)] : present [Varicose Veins Of Lower Extremities] : bilaterally [Ankle Swelling On The Left] : moderate [] : bilaterally [Ankle Swelling Bilaterally] : severe [No Rash or Lesion] : No rash or lesion [Alert] : alert [Oriented to Place] : oriented to place [Oriented to Time] : oriented to time [Oriented to Person] : oriented to person [Calm] : calm [de-identified] : WD, WN, NAD. Awake, alert, interactive. Ambulates slowly without difficulty [de-identified] : TAYLOR, PERGEOVANNIL [de-identified] : supple [FreeTextEntry1] : Chronic venous stasis hyperpigmentation and lipodermatosclerosis. Chronic venous stasis dermatitis. 1+ pitting edema scattered through BLE. No lymphorrhea, ulcers or pain. No s/s of infection. DSD on left big toe. Circumference: Right -  calf: 57 cm, mid-calf: 38.5, ankle:29 cm; Left - calf: 52.5 cm, ankle: 28.5 cm. [de-identified] : soft, obese, NT, ND [de-identified] : no cyanosis or deformity. full ROM, MS 5/5\par  [de-identified] : NANNETTE.

## 2019-05-28 NOTE — HISTORY OF PRESENT ILLNESS
[FreeTextEntry1] : 64 y/o male with longstanding history of lymphedema, lymphorrhea and recurrent ulcers and cellulitis. He continues to wear an Unna Boot weekly. There has been resolution of cellulitis, weeping and ulcers. He continues to have moderate edema to BLE. He sits for extended time every day while working and is sedentary. He walks slowly without the need for a cane or walker. He has tried to wear compression stockings in the past but they were too difficult to put on and would roll, causing pain in his calves. No fever or chills. He does not report walk or rest pain. He elevates legs while seated. He noticed a wound to the bottom of the big toe on his left foot. He has an appointment to see his Podiatrist in 2 days. Hx includes: HTN, diabetes, lupus, arthritis, sleep apnea, ankylosing spondylitis and DVT in LLE.   [de-identified] : 62 y/o male RTC for followup. No changes. His legs continue to have edema, improved while wearing Unna Boots. He continues with Podiatry for wound care to left 1st toe. No fever or chills. No walk or rest pain.

## 2019-05-28 NOTE — REVIEW OF SYSTEMS
[Lower Ext Edema] : lower extremity edema [As Noted in HPI] : as noted in HPI [Limb Swelling] : limb swelling [Dry Skin] : dry skin [Skin Wound] : skin wound [Negative] : Psychiatric [Leg Claudication] : no intermittent leg claudication [Limb Pain] : no limb pain

## 2019-05-28 NOTE — ASSESSMENT
[Arterial/Venous Disease] : arterial/venous disease [FreeTextEntry1] : 62 yo male with longstanding lymphedema. He will continue to wear Unna Boots until he has obtained compression stockings. He will continue with compression, elevation and ambulation. Awaiting receipt of Lymphedema pumps. He will continue with Podiatry for Diabetic ulcer. RTC in 1 week.

## 2019-06-03 ENCOUNTER — APPOINTMENT (OUTPATIENT)
Dept: VASCULAR SURGERY | Facility: CLINIC | Age: 64
End: 2019-06-03
Payer: MEDICAID

## 2019-06-03 VITALS
OXYGEN SATURATION: 94 % | HEART RATE: 102 BPM | TEMPERATURE: 98.3 F | DIASTOLIC BLOOD PRESSURE: 72 MMHG | SYSTOLIC BLOOD PRESSURE: 145 MMHG | RESPIRATION RATE: 16 BRPM

## 2019-06-03 PROCEDURE — 29580 STRAPPING UNNA BOOT: CPT | Mod: 50

## 2019-06-03 NOTE — REVIEW OF SYSTEMS
[Lower Ext Edema] : lower extremity edema [Limb Swelling] : limb swelling [As Noted in HPI] : as noted in HPI [Dry Skin] : dry skin [Skin Wound] : skin wound [Negative] : Psychiatric [Leg Claudication] : no intermittent leg claudication [Limb Pain] : no limb pain

## 2019-06-03 NOTE — PHYSICAL EXAM
[Normal Rate and Rhythm] : normal rate and rhythm [Normal Breath Sounds] : Normal breath sounds [Normal Heart Sounds] : normal heart sounds [Ankle Swelling (On Exam)] : present [2+] : left 2+ [Ankle Swelling On The Right] : of the right ankle [Varicose Veins Of Lower Extremities] : bilaterally [] : present [Ankle Swelling On The Left] : moderate [Ankle Swelling Bilaterally] : severe [No Rash or Lesion] : No rash or lesion [Oriented to Person] : oriented to person [Alert] : alert [Oriented to Time] : oriented to time [Oriented to Place] : oriented to place [Calm] : calm [de-identified] : supple [de-identified] : WD, WN, NAD. Awake, alert, interactive. Ambulates slowly without difficulty [de-identified] : TAYLOR, PERGEOVANNIL [de-identified] : no cyanosis or deformity. full ROM, MS 5/5\par  [de-identified] : NANNETTE. [FreeTextEntry1] : Chronic venous stasis hyperpigmentation and lipodermatosclerosis. Chronic venous stasis dermatitis. \par Nonpitting edema scattered through BLE. No lymphorrhea, ulcers or pain. No s/s of infection. \par Circumference: Right - calf: 57 cm, mid-calf: 38.5, ankle:29 cm; Left - calf: 52.5 cm, ankle: 28.5 cm. [de-identified] : soft, obese, NT, ND

## 2019-06-03 NOTE — HISTORY OF PRESENT ILLNESS
[de-identified] : 64 y/o male RTC for followup. No changes. His legs continue to have edema, improved while wearing Unna Boots. He continues with Podiatry for wound care to left 1st toe. No fever or chills. No walk or rest pain. He states lymphedema pumps were declined by insurance and he cannot put typical compression stockings on his legs.  [FreeTextEntry1] : 64 y/o male with longstanding history of lymphedema, lymphorrhea and recurrent ulcers and cellulitis. He continues to wear an Unna Boot weekly. There has been resolution of cellulitis, weeping and ulcers. He continues to have moderate edema to BLE. He sits for extended time every day while working and is sedentary. He walks slowly without the need for a cane or walker. He has tried to wear compression stockings in the past but they were too difficult to put on and would roll, causing pain in his calves. No fever or chills. He does not report walk or rest pain. He elevates legs while seated. He noticed a wound to the bottom of the big toe on his left foot. He has an appointment to see his Podiatrist in 2 days. Hx includes: HTN, diabetes, lupus, arthritis, sleep apnea, ankylosing spondylitis and DVT in LLE.

## 2019-06-03 NOTE — ASSESSMENT
[Arterial/Venous Disease] : arterial/venous disease [FreeTextEntry1] : 64 yo male with longstanding lymphedema. He will continue to wear Unna Boots until he has obtained compression stockings. He will continue with compression, elevation and ambulation. He is awaiting a call from [facility] to see if they were able to get other pumps approved. He will continue with Podiatry for Diabetic ulcer. RTC in 1 week.

## 2019-06-10 ENCOUNTER — APPOINTMENT (OUTPATIENT)
Dept: VASCULAR SURGERY | Facility: CLINIC | Age: 64
End: 2019-06-10
Payer: MEDICAID

## 2019-06-10 VITALS
DIASTOLIC BLOOD PRESSURE: 75 MMHG | RESPIRATION RATE: 16 BRPM | SYSTOLIC BLOOD PRESSURE: 146 MMHG | TEMPERATURE: 98.1 F | HEART RATE: 82 BPM | HEIGHT: 65 IN | OXYGEN SATURATION: 98 %

## 2019-06-10 PROCEDURE — 29580 STRAPPING UNNA BOOT: CPT | Mod: 50

## 2019-06-17 ENCOUNTER — APPOINTMENT (OUTPATIENT)
Dept: VASCULAR SURGERY | Facility: CLINIC | Age: 64
End: 2019-06-17
Payer: MEDICAID

## 2019-06-17 VITALS
BODY MASS INDEX: 52.48 KG/M2 | HEART RATE: 87 BPM | DIASTOLIC BLOOD PRESSURE: 72 MMHG | OXYGEN SATURATION: 93 % | WEIGHT: 315 LBS | HEIGHT: 65 IN | SYSTOLIC BLOOD PRESSURE: 133 MMHG | TEMPERATURE: 98.1 F

## 2019-06-17 PROCEDURE — 29580 STRAPPING UNNA BOOT: CPT | Mod: 50

## 2019-06-17 NOTE — HISTORY OF PRESENT ILLNESS
[FreeTextEntry1] : 64 y/o male with longstanding history of lymphedema, lymphorrhea and recurrent ulcers and cellulitis. He continues to wear an Unna Boot weekly. There has been resolution of cellulitis, weeping and ulcers. He continues to have moderate edema to BLE. He sits for extended time every day while working and is sedentary. He walks slowly without the need for a cane or walker. He has tried to wear compression stockings in the past but they were too difficult to put on and would roll, causing pain in his calves. No fever or chills. He does not report walk or rest pain. He elevates legs while seated. He noticed a wound to the bottom of the big toe on his left foot. He has an appointment to see his Podiatrist in 2 days. Hx includes: HTN, diabetes, lupus, arthritis, sleep apnea, ankylosing spondylitis and DVT in LLE.   [de-identified] : 64 y/o male RTC for followup. No changes. His legs continue to have edema, improved while wearing Unna Boots. He continues with Podiatry for wound care to left 1st toe. No fever or chills. No walk or rest pain. He cannot put typical compression stockings on his legs. No fever or chills.

## 2019-06-17 NOTE — REVIEW OF SYSTEMS
[Lower Ext Edema] : lower extremity edema [As Noted in HPI] : as noted in HPI [Limb Swelling] : limb swelling [Dry Skin] : dry skin [Negative] : Psychiatric [Leg Claudication] : no intermittent leg claudication [Limb Pain] : no limb pain [Skin Wound] : no skin wound

## 2019-06-17 NOTE — PHYSICAL EXAM
[Normal Breath Sounds] : Normal breath sounds [Normal Heart Sounds] : normal heart sounds [Normal Rate and Rhythm] : normal rate and rhythm [2+] : left 2+ [Ankle Swelling (On Exam)] : present [Ankle Swelling On The Right] : of the right ankle [Ankle Swelling On The Left] : moderate [] : bilaterally [Varicose Veins Of Lower Extremities] : bilaterally [No Rash or Lesion] : No rash or lesion [Ankle Swelling Bilaterally] : severe [Alert] : alert [Oriented to Place] : oriented to place [Oriented to Person] : oriented to person [Oriented to Time] : oriented to time [Calm] : calm [de-identified] : WD, WN, NAD. Awake, alert, interactive. Ambulates slowly without difficulty [de-identified] : supple [de-identified] : TAYLOR, PERGEOVANNIL [FreeTextEntry1] : Chronic venous stasis hyperpigmentation and lipodermatosclerosis. \par Chronic venous stasis dermatitis. \par Nonpitting edema scattered through BLE. No lymphorrhea, ulcers or pain. No s/s of infection. \par Circumference: Right - calf: 55.5 cm, mid-calf: 38.5, ankle:32 cm; Left - calf: 50 cm, ankle: 30 cm. [de-identified] : soft, obese, NT, ND [de-identified] : no cyanosis or deformity. full ROM, MS 5/5\par  [de-identified] : NANNETTE.

## 2019-06-17 NOTE — ASSESSMENT
[Arterial/Venous Disease] : arterial/venous disease [FreeTextEntry1] : 64 yo male with longstanding lymphedema. He will continue to wear Unna Boots until he has obtained compression stockings. He will continue with compression, elevation and ambulation. He is awaiting a call from [facility] to see if they were able to get other pumps approved. He will continue with Podiatry for Diabetic ulcer.

## 2019-06-24 ENCOUNTER — APPOINTMENT (OUTPATIENT)
Dept: VASCULAR SURGERY | Facility: CLINIC | Age: 64
End: 2019-06-24
Payer: MEDICAID

## 2019-06-24 VITALS
SYSTOLIC BLOOD PRESSURE: 143 MMHG | RESPIRATION RATE: 16 BRPM | HEART RATE: 88 BPM | HEIGHT: 65 IN | OXYGEN SATURATION: 98 % | TEMPERATURE: 98.3 F | DIASTOLIC BLOOD PRESSURE: 74 MMHG

## 2019-06-24 PROCEDURE — 29580 STRAPPING UNNA BOOT: CPT | Mod: 50

## 2019-07-01 ENCOUNTER — APPOINTMENT (OUTPATIENT)
Dept: VASCULAR SURGERY | Facility: CLINIC | Age: 64
End: 2019-07-01
Payer: MEDICAID

## 2019-07-01 VITALS
WEIGHT: 315 LBS | DIASTOLIC BLOOD PRESSURE: 74 MMHG | TEMPERATURE: 98 F | SYSTOLIC BLOOD PRESSURE: 122 MMHG | HEIGHT: 63 IN | BODY MASS INDEX: 55.81 KG/M2 | OXYGEN SATURATION: 92 % | HEART RATE: 87 BPM

## 2019-07-01 PROCEDURE — 99214 OFFICE O/P EST MOD 30 MIN: CPT | Mod: 25

## 2019-07-01 PROCEDURE — 93923 UPR/LXTR ART STDY 3+ LVLS: CPT

## 2019-07-01 PROCEDURE — 29580 STRAPPING UNNA BOOT: CPT | Mod: 50

## 2019-07-01 NOTE — HISTORY OF PRESENT ILLNESS
[FreeTextEntry1] : 62 y/o male with longstanding history of lymphedema, lymphorrhea and recurrent ulcers and cellulitis. He continues to wear an Unna Boot weekly. There has been resolution of cellulitis, weeping and ulcers. He continues to have moderate edema to BLE. He sits for extended time every day while working and is sedentary. He walks slowly without the need for a cane or walker. He has tried to wear compression stockings in the past but they were too difficult to put on and would roll, causing pain in his calves. No fever or chills. He does not report walk or rest pain. He elevates legs while seated. He noticed a wound to the bottom of the big toe on his left foot. He has an appointment to see his Podiatrist in 2 days. Hx includes: HTN, diabetes, lupus, arthritis, sleep apnea, ankylosing spondylitis and DVT in LLE.   [de-identified] : 64 y/o male RTC for followup. His legs have decreased edema with wearing Unna Boots. He continues with Podiatry for wound care to left 1st toe. No fever or chills. No walk or rest pain. He cannot put typical compression stockings on his legs. He would like to purchase Ready Wraps to transition to a manual compression device. No fever or chills.\par \par 7/1/2019\par Returns for follow up. Last seen by Pat 1 week ago on 6/25/19. Legs continue to do well. Edema partially controlled with unnaboots. Still awaiting compression stockings. Remains free of any leg ulcers or weeping. He complains of shooting pains sometimes along lateral aspect of thigh/leg. He also persists with L 1st toe ulcer on plantar aspect, which is very slowly healing. Now present for past 3 months. Hx significant for DM.

## 2019-07-01 NOTE — PHYSICAL EXAM
[Normal Breath Sounds] : Normal breath sounds [Normal Heart Sounds] : normal heart sounds [Normal Rate and Rhythm] : normal rate and rhythm [2+] : left 2+ [Ankle Swelling (On Exam)] : present [Ankle Swelling On The Right] : of the right ankle [Varicose Veins Of Lower Extremities] : bilaterally [Ankle Swelling On The Left] : moderate [] : bilaterally [Ankle Swelling Bilaterally] : severe [No Rash or Lesion] : No rash or lesion [Alert] : alert [Oriented to Person] : oriented to person [Oriented to Place] : oriented to place [Oriented to Time] : oriented to time [Calm] : calm [0] : left 0 [de-identified] : WD, WN, NAD. Awake, alert, interactive. Ambulates slowly without difficulty [de-identified] : TAYLOR, PERGEOVANNIL [de-identified] : supple [FreeTextEntry1] : Chronic venous stasis hyperpigmentation and lipodermatosclerosis. \par Chronic venous stasis dermatitis. \par Nonpitting edema scattered through BLE. No lymphorrhea, ulcers or pain. No s/s of infection. \par Circumference: Right - calf: 54 cm, mid-calf: 38.5, ankle:32 cm; Left - calf: 48.5 cm, ankle: 29 cm. [de-identified] : soft, obese, NT, ND [de-identified] : no cyanosis or deformity. full ROM, MS 5/5\par  [de-identified] : L 1st toe with subcm ulcer on plantar aspect, clean with red granulation base. No pus or surrounding erythema or tenderness

## 2019-07-01 NOTE — ASSESSMENT
[Arterial/Venous Disease] : arterial/venous disease [FreeTextEntry1] : 62 yo male with longstanding lymphedema. He will continue to wear Unna Boots until he has obtained compression stockings. He will continue with compression, elevation and ambulation. A prescription for Ready Wraps has been provided. He will continue with Podiatry for Diabetic ulcer.

## 2019-07-08 ENCOUNTER — APPOINTMENT (OUTPATIENT)
Dept: VASCULAR SURGERY | Facility: CLINIC | Age: 64
End: 2019-07-08
Payer: MEDICAID

## 2019-07-08 VITALS
OXYGEN SATURATION: 93 % | DIASTOLIC BLOOD PRESSURE: 71 MMHG | SYSTOLIC BLOOD PRESSURE: 136 MMHG | BODY MASS INDEX: 55.81 KG/M2 | HEIGHT: 63 IN | WEIGHT: 315 LBS | HEART RATE: 95 BPM | TEMPERATURE: 98.5 F

## 2019-07-08 PROCEDURE — 29580 STRAPPING UNNA BOOT: CPT | Mod: 50

## 2019-07-10 ENCOUNTER — OUTPATIENT (OUTPATIENT)
Dept: OUTPATIENT SERVICES | Facility: HOSPITAL | Age: 64
LOS: 1 days | End: 2019-07-10
Payer: MEDICAID

## 2019-07-10 ENCOUNTER — APPOINTMENT (OUTPATIENT)
Dept: CT IMAGING | Facility: CLINIC | Age: 64
End: 2019-07-10
Payer: MEDICAID

## 2019-07-10 DIAGNOSIS — Z96.642 PRESENCE OF LEFT ARTIFICIAL HIP JOINT: Chronic | ICD-10-CM

## 2019-07-10 DIAGNOSIS — L97.529 NON-PRESSURE CHRONIC ULCER OF OTHER PART OF LEFT FOOT WITH UNSPECIFIED SEVERITY: ICD-10-CM

## 2019-07-10 DIAGNOSIS — I73.9 PERIPHERAL VASCULAR DISEASE, UNSPECIFIED: ICD-10-CM

## 2019-07-10 PROCEDURE — 75635 CT ANGIO ABDOMINAL ARTERIES: CPT

## 2019-07-10 PROCEDURE — 83605 ASSAY OF LACTIC ACID: CPT

## 2019-07-10 PROCEDURE — 83735 ASSAY OF MAGNESIUM: CPT

## 2019-07-10 PROCEDURE — 82565 ASSAY OF CREATININE: CPT

## 2019-07-10 PROCEDURE — G0378: CPT

## 2019-07-10 PROCEDURE — 99285 EMERGENCY DEPT VISIT HI MDM: CPT | Mod: 25

## 2019-07-10 PROCEDURE — 87040 BLOOD CULTURE FOR BACTERIA: CPT

## 2019-07-10 PROCEDURE — 82962 GLUCOSE BLOOD TEST: CPT

## 2019-07-10 PROCEDURE — 85027 COMPLETE CBC AUTOMATED: CPT

## 2019-07-10 PROCEDURE — 80053 COMPREHEN METABOLIC PANEL: CPT

## 2019-07-10 PROCEDURE — 84100 ASSAY OF PHOSPHORUS: CPT

## 2019-07-10 PROCEDURE — 36415 COLL VENOUS BLD VENIPUNCTURE: CPT

## 2019-07-10 PROCEDURE — 96374 THER/PROPH/DIAG INJ IV PUSH: CPT

## 2019-07-10 PROCEDURE — 75635 CT ANGIO ABDOMINAL ARTERIES: CPT | Mod: 26

## 2019-07-15 ENCOUNTER — APPOINTMENT (OUTPATIENT)
Dept: VASCULAR SURGERY | Facility: CLINIC | Age: 64
End: 2019-07-15
Payer: MEDICAID

## 2019-07-15 VITALS
HEART RATE: 85 BPM | SYSTOLIC BLOOD PRESSURE: 135 MMHG | RESPIRATION RATE: 16 BRPM | DIASTOLIC BLOOD PRESSURE: 66 MMHG | WEIGHT: 315 LBS | HEIGHT: 63 IN | BODY MASS INDEX: 55.81 KG/M2 | TEMPERATURE: 98.3 F | OXYGEN SATURATION: 98 %

## 2019-07-15 PROCEDURE — 29580 STRAPPING UNNA BOOT: CPT | Mod: 50

## 2019-07-22 ENCOUNTER — APPOINTMENT (OUTPATIENT)
Dept: VASCULAR SURGERY | Facility: CLINIC | Age: 64
End: 2019-07-22
Payer: MEDICAID

## 2019-07-22 VITALS
HEIGHT: 63 IN | TEMPERATURE: 98.3 F | WEIGHT: 315 LBS | OXYGEN SATURATION: 100 % | SYSTOLIC BLOOD PRESSURE: 125 MMHG | HEART RATE: 85 BPM | BODY MASS INDEX: 55.81 KG/M2 | DIASTOLIC BLOOD PRESSURE: 72 MMHG

## 2019-07-22 PROCEDURE — 29580 STRAPPING UNNA BOOT: CPT | Mod: 50

## 2019-07-29 ENCOUNTER — APPOINTMENT (OUTPATIENT)
Dept: VASCULAR SURGERY | Facility: CLINIC | Age: 64
End: 2019-07-29
Payer: MEDICAID

## 2019-07-29 VITALS
TEMPERATURE: 98.3 F | HEIGHT: 63 IN | RESPIRATION RATE: 16 BRPM | OXYGEN SATURATION: 97 % | DIASTOLIC BLOOD PRESSURE: 75 MMHG | SYSTOLIC BLOOD PRESSURE: 153 MMHG | HEART RATE: 87 BPM

## 2019-07-29 PROCEDURE — 99213 OFFICE O/P EST LOW 20 MIN: CPT

## 2019-08-05 ENCOUNTER — OUTPATIENT (OUTPATIENT)
Dept: OUTPATIENT SERVICES | Facility: HOSPITAL | Age: 64
LOS: 1 days | End: 2019-08-05
Payer: MEDICAID

## 2019-08-05 VITALS
RESPIRATION RATE: 16 BRPM | SYSTOLIC BLOOD PRESSURE: 147 MMHG | WEIGHT: 315 LBS | HEIGHT: 68 IN | TEMPERATURE: 98 F | HEART RATE: 88 BPM | DIASTOLIC BLOOD PRESSURE: 77 MMHG | OXYGEN SATURATION: 98 %

## 2019-08-05 VITALS — WEIGHT: 315 LBS | HEIGHT: 68 IN

## 2019-08-05 DIAGNOSIS — I74.5 EMBOLISM AND THROMBOSIS OF ILIAC ARTERY: ICD-10-CM

## 2019-08-05 DIAGNOSIS — Z96.642 PRESENCE OF LEFT ARTIFICIAL HIP JOINT: Chronic | ICD-10-CM

## 2019-08-05 DIAGNOSIS — I73.9 PERIPHERAL VASCULAR DISEASE, UNSPECIFIED: ICD-10-CM

## 2019-08-05 LAB
ANION GAP SERPL CALC-SCNC: 11 MMOL/L — SIGNIFICANT CHANGE UP (ref 5–17)
APTT BLD: 33 SEC — SIGNIFICANT CHANGE UP (ref 27.5–36.3)
BUN SERPL-MCNC: 15 MG/DL — SIGNIFICANT CHANGE UP (ref 8–20)
CALCIUM SERPL-MCNC: 9.3 MG/DL — SIGNIFICANT CHANGE UP (ref 8.6–10.2)
CHLORIDE SERPL-SCNC: 96 MMOL/L — LOW (ref 98–107)
CO2 SERPL-SCNC: 30 MMOL/L — HIGH (ref 22–29)
CREAT SERPL-MCNC: 0.73 MG/DL — SIGNIFICANT CHANGE UP (ref 0.5–1.3)
GLUCOSE SERPL-MCNC: 106 MG/DL — SIGNIFICANT CHANGE UP (ref 70–115)
HBA1C BLD-MCNC: 8.6 % — HIGH (ref 4–5.6)
HCT VFR BLD CALC: 34.5 % — LOW (ref 39–50)
HGB BLD-MCNC: 10.8 G/DL — LOW (ref 13–17)
INR BLD: 1.14 RATIO — SIGNIFICANT CHANGE UP (ref 0.88–1.16)
MAGNESIUM SERPL-MCNC: 1.9 MG/DL — SIGNIFICANT CHANGE UP (ref 1.8–2.6)
MCHC RBC-ENTMCNC: 27 PG — SIGNIFICANT CHANGE UP (ref 27–34)
MCHC RBC-ENTMCNC: 31.3 GM/DL — LOW (ref 32–36)
MCV RBC AUTO: 86.3 FL — SIGNIFICANT CHANGE UP (ref 80–100)
PLATELET # BLD AUTO: 281 K/UL — SIGNIFICANT CHANGE UP (ref 150–400)
POTASSIUM SERPL-MCNC: 4.5 MMOL/L — SIGNIFICANT CHANGE UP (ref 3.5–5.3)
POTASSIUM SERPL-SCNC: 4.5 MMOL/L — SIGNIFICANT CHANGE UP (ref 3.5–5.3)
PROTHROM AB SERPL-ACNC: 13.2 SEC — HIGH (ref 10–12.9)
RBC # BLD: 4 M/UL — LOW (ref 4.2–5.8)
RBC # FLD: 18 % — HIGH (ref 10.3–14.5)
SODIUM SERPL-SCNC: 137 MMOL/L — SIGNIFICANT CHANGE UP (ref 135–145)
WBC # BLD: 9.85 K/UL — SIGNIFICANT CHANGE UP (ref 3.8–10.5)
WBC # FLD AUTO: 9.85 K/UL — SIGNIFICANT CHANGE UP (ref 3.8–10.5)

## 2019-08-05 PROCEDURE — 85027 COMPLETE CBC AUTOMATED: CPT

## 2019-08-05 PROCEDURE — 83036 HEMOGLOBIN GLYCOSYLATED A1C: CPT

## 2019-08-05 PROCEDURE — 83735 ASSAY OF MAGNESIUM: CPT

## 2019-08-05 PROCEDURE — 85610 PROTHROMBIN TIME: CPT

## 2019-08-05 PROCEDURE — 80048 BASIC METABOLIC PNL TOTAL CA: CPT

## 2019-08-05 PROCEDURE — G0463: CPT

## 2019-08-05 PROCEDURE — 93005 ELECTROCARDIOGRAM TRACING: CPT

## 2019-08-05 PROCEDURE — 93010 ELECTROCARDIOGRAM REPORT: CPT

## 2019-08-05 PROCEDURE — 36415 COLL VENOUS BLD VENIPUNCTURE: CPT

## 2019-08-05 PROCEDURE — 85730 THROMBOPLASTIN TIME PARTIAL: CPT

## 2019-08-05 RX ORDER — METFORMIN HYDROCHLORIDE 850 MG/1
500 TABLET ORAL
Qty: 0 | Refills: 0 | DISCHARGE

## 2019-08-05 NOTE — H&P PST ADULT - RS GEN PE MLT RESP DETAILS PC
respirations non-labored/airway patent/normal/breath sounds equal/clear to auscultation bilaterally/good air movement

## 2019-08-05 NOTE — H&P PST ADULT - HISTORY OF PRESENT ILLNESS
This is a 62 y/o obese, white M, former smoker (1.5PPD x9 years quit 20 years ago), with a PMHx of DM (on insulin), DM, HTN, ASHIA (non-compliant with CPAP; sleeps in recliner daily), SLE, PVD, chronic lymphedema, lymphorrhea, recurrent ulcers/ cellulitis to BLLE, remote hx DVT (tx with coumadin 25 yrs ago), chronic left foot ulcer (specifically plantar aspect of great toe) presents to PST today in anticipation of a left lower extremity angiogram; possible angioplasty/ stent placement with Dr. Craft.  Pt has had chronic bilateral lower extremity weeping edema and has had legs wrapped and treated with triamcinolone acetonide He follows up with a Podiatrist (doesn't know name), who frequently checks his feet.  He continues to ambulate with a cane.  He recently had a CTA abdomen/pelvis (7/16/2019) revealed left external iliac artery occlusion with suboptimal distal arterial opacification. He denies claudication to his lower extremities, endorses long standing weeping lymphedema, right knee pain (chronic) and occasional CHOU, denies chest pain, palps, SOB.      < from: CT Angio Abd Aorta w/run-off w/ IV Cont (07.10.19 @ 15:35) >  Interpretation: The abdominal aorta shows no evidence of stenosis or   aneurysm formation.  The renal arteries are unremarkable.    The iliac arteries show shows occlusion of the left external iliac artery   involving its middle and distal thirds. Fine detail distally is limited   by metal artifact from the patient's left hip hardware, but there is   collateral reconstitution of the left common femoral artery.    The right leg runoff shows unremarkable superficial femoral and popliteal   arteries tothe knee joints. Below knee arterial opacification is   suboptimal.    The left leg runoff shows unremarkable superficial femoral and popliteal   arteries to the knee joints. Below knee arterial opacification is   suboptimal.    Bilateral renal cysts are present. Circumferential subcutaneous edema is   seen in the lower extremities starting at the level of the knees.    Impression: Left external iliac artery occlusion. Suboptimal distal   arterial opacification as noted.    GATITO/PVR 7/1/2019 revealed:  Right: pulsatile waveforms noted throughout the lower extremity, both segmental pressures and ankle brachia indices are within normal limits consistent with the absence of significant arterial occlusive disease at rest.   Left: blunted waveforms with monophasic appearance noted in the lower extremity. Moderate disease in pressure and abnormal ankle brachial index suggestive of arterial occlusive disease consistent with arterial claudication.    Right  Brachial 135  Ankle  (PT) 135 index 0.95  Ankle (DP) 145 index 1.02    Left  Brachial 142  Ankle (PT) 104 index 0.73  Ankle (DP) 109 index 0.77    ASA: 2  Mallampati: 2  Bleeding Risk: 1.7%  Creatinine: 0.73  GFR: 98 This is a 64 y/o obese, white M, former smoker (1.5PPD x9 years quit 20 years ago), with a PMHx of DM (on insulin), DM (Hgb a1c 8.6), HTN, ASHIA (non-compliant with CPAP; sleeps in recliner daily), SLE, PVD, chronic lymphedema, lymphorrhea, recurrent ulcers/ cellulitis to BLLE, remote hx DVT (tx with coumadin 25 yrs ago), chronic left foot ulcer (specifically plantar aspect of great toe) presents to PST today in anticipation of a left lower extremity angiogram; possible angioplasty/ stent placement with Dr. Craft.  Pt has had chronic bilateral lower extremity weeping edema and has had legs wrapped and treated with triamcinolone acetonide He follows up with a Podiatrist (doesn't know name), who frequently checks his feet.  He continues to ambulate with a cane.  He recently had a CTA abdomen/pelvis (7/16/2019) revealed left external iliac artery occlusion with suboptimal distal arterial opacification. He denies claudication to his lower extremities, endorses long standing weeping lymphedema, right knee pain (chronic) and occasional CHOU, denies chest pain, palps, SOB.      < from: CT Angio Abd Aorta w/run-off w/ IV Cont (07.10.19 @ 15:35) >  Interpretation: The abdominal aorta shows no evidence of stenosis or   aneurysm formation.  The renal arteries are unremarkable.    The iliac arteries show shows occlusion of the left external iliac artery   involving its middle and distal thirds. Fine detail distally is limited   by metal artifact from the patient's left hip hardware, but there is   collateral reconstitution of the left common femoral artery.    The right leg runoff shows unremarkable superficial femoral and popliteal   arteries tothe knee joints. Below knee arterial opacification is   suboptimal.    The left leg runoff shows unremarkable superficial femoral and popliteal   arteries to the knee joints. Below knee arterial opacification is   suboptimal.    Bilateral renal cysts are present. Circumferential subcutaneous edema is   seen in the lower extremities starting at the level of the knees.    Impression: Left external iliac artery occlusion. Suboptimal distal   arterial opacification as noted.    GATITO/PVR 7/1/2019 revealed:  Right: pulsatile waveforms noted throughout the lower extremity, both segmental pressures and ankle brachia indices are within normal limits consistent with the absence of significant arterial occlusive disease at rest.   Left: blunted waveforms with monophasic appearance noted in the lower extremity. Moderate disease in pressure and abnormal ankle brachial index suggestive of arterial occlusive disease consistent with arterial claudication.    Right  Brachial 135  Ankle  (PT) 135 index 0.95  Ankle (DP) 145 index 1.02    Left  Brachial 142  Ankle (PT) 104 index 0.73  Ankle (DP) 109 index 0.77    ASA: 2  Mallampati: 2  Bleeding Risk: 1.7%  Creatinine: 0.73  GFR: 98

## 2019-08-05 NOTE — H&P PST ADULT - NSICDXFAMILYHX_GEN_ALL_CORE_FT
FAMILY HISTORY:  Sibling  Still living? Unknown  Family history of diabetes mellitus (DM), Age at diagnosis: Age Unknown

## 2019-08-05 NOTE — H&P PST ADULT - NSICDXPASTMEDICALHX_GEN_ALL_CORE_FT
PAST MEDICAL HISTORY:  Ankylosing spondylitis of site in spine     Cellulitis, leg     Chronic pain disorder     Chronic venous stasis dermatitis     Diabetes     Diabetic neuropathy     External iliac artery occlusion     Former smoker     Ischemic ulcer diabetic foot     Lupus     Obesity     Peripheral vascular disease

## 2019-08-05 NOTE — H&P PST ADULT - NEGATIVE NEUROLOGICAL SYMPTOMS
no focal seizures/no headache/no syncope/no weakness/no generalized seizures/no tremors/no transient paralysis/no paresthesias

## 2019-08-05 NOTE — H&P PST ADULT - MUSCULOSKELETAL COMMENTS
right knee pain and BLLE swelling bilateral leg weakness; ambulates with cane due to chronic lymphedema

## 2019-08-05 NOTE — H&P PST ADULT - ASSESSMENT
This is a 64 y/o obese, white M, former smoker (1.5PPD x9 years quit 20 years ago), with a PMHx of DM (on insulin), DM, HTN, ASHIA (non-compliant with CPAP; sleeps in recliner daily), SLE, PVD, chronic lymphedema, lymphorrhea, recurrent ulcers/ cellulitis to BLLE, remote hx DVT (tx with coumadin 25 yrs ago), chronic left foot ulcer (specifically plantar aspect of great toe) presents to PST today in anticipation of a left lower extremity angiogram; possible angioplasty/ stent placement with Dr. Craft.

## 2019-08-08 ENCOUNTER — TRANSCRIPTION ENCOUNTER (OUTPATIENT)
Age: 64
End: 2019-08-08

## 2019-08-08 ENCOUNTER — OUTPATIENT (OUTPATIENT)
Dept: OUTPATIENT SERVICES | Facility: HOSPITAL | Age: 64
LOS: 1 days | Discharge: HOME | End: 2019-08-08
Payer: MEDICAID

## 2019-08-08 VITALS
OXYGEN SATURATION: 96 % | DIASTOLIC BLOOD PRESSURE: 90 MMHG | HEART RATE: 82 BPM | RESPIRATION RATE: 19 BRPM | SYSTOLIC BLOOD PRESSURE: 143 MMHG

## 2019-08-08 VITALS
RESPIRATION RATE: 18 BRPM | TEMPERATURE: 98 F | SYSTOLIC BLOOD PRESSURE: 140 MMHG | HEART RATE: 75 BPM | OXYGEN SATURATION: 97 % | DIASTOLIC BLOOD PRESSURE: 84 MMHG

## 2019-08-08 DIAGNOSIS — I73.9 PERIPHERAL VASCULAR DISEASE, UNSPECIFIED: ICD-10-CM

## 2019-08-08 DIAGNOSIS — Z96.642 PRESENCE OF LEFT ARTIFICIAL HIP JOINT: Chronic | ICD-10-CM

## 2019-08-08 LAB
GLUCOSE BLDC GLUCOMTR-MCNC: 115 MG/DL — HIGH (ref 70–99)
GLUCOSE BLDC GLUCOMTR-MCNC: 182 MG/DL — HIGH (ref 70–99)

## 2019-08-08 PROCEDURE — C1887: CPT

## 2019-08-08 PROCEDURE — 75630 X-RAY AORTA LEG ARTERIES: CPT | Mod: 26,59

## 2019-08-08 PROCEDURE — 36246 INS CATH ABD/L-EXT ART 2ND: CPT

## 2019-08-08 PROCEDURE — C1760: CPT

## 2019-08-08 PROCEDURE — 99153 MOD SED SAME PHYS/QHP EA: CPT

## 2019-08-08 PROCEDURE — 75716 ARTERY X-RAYS ARMS/LEGS: CPT

## 2019-08-08 PROCEDURE — C1769: CPT

## 2019-08-08 PROCEDURE — 82962 GLUCOSE BLOOD TEST: CPT

## 2019-08-08 PROCEDURE — 75710 ARTERY X-RAYS ARM/LEG: CPT | Mod: 26

## 2019-08-08 PROCEDURE — 36140 INTRO NDL ICATH UPR/LXTR ART: CPT | Mod: XS

## 2019-08-08 PROCEDURE — C1894: CPT

## 2019-08-08 PROCEDURE — 76937 US GUIDE VASCULAR ACCESS: CPT | Mod: 26

## 2019-08-08 PROCEDURE — 36245 INS CATH ABD/L-EXT ART 1ST: CPT

## 2019-08-08 PROCEDURE — 99152 MOD SED SAME PHYS/QHP 5/>YRS: CPT

## 2019-08-08 RX ORDER — METFORMIN HYDROCHLORIDE 850 MG/1
1 TABLET ORAL
Qty: 0 | Refills: 0 | DISCHARGE

## 2019-08-08 NOTE — BRIEF OPERATIVE NOTE - NSICDXBRIEFPREOP_GEN_ALL_CORE_FT
PRE-OP DIAGNOSIS:  Peripheral vascular disease with claudication 08-Aug-2019 15:29:42  Debbie Marshall

## 2019-08-08 NOTE — DISCHARGE NOTE PROVIDER - CARE PROVIDER_API CALL
ManvarSingh, Pallavi B (MD)  Vascular Surgery  [address] [place] [address], [address] [place] [address]  Phone: [phone]  Fax: [phone]  Follow Up Time:

## 2019-08-08 NOTE — DISCHARGE NOTE PROVIDER - NSDCFUSCHEDAPPT_GEN_ALL_CORE_FT
RANDY MARTINEZ ; 08/26/2019 ; [facility] V[facility] [address] [facility] RANDY MARTINEZ ; 08/26/2019 ; [facility] Vascular [address] [facility]

## 2019-08-08 NOTE — DISCHARGE NOTE PROVIDER - NSDCCPTREATMENT_GEN_ALL_CORE_FT
PRINCIPAL PROCEDURE  Procedure: Angiogram, lower extremity, left  Findings and Treatment: -bilateral groin precautions  -continue current medical therapy  -follow up with Dr. Craft outpt to discuss surgical intervention

## 2019-08-08 NOTE — PROGRESS NOTE ADULT - SUBJECTIVE AND OBJECTIVE BOX
Department of Cardiology                                                                  [facility]/[place] [address] [place] [address]                                                            Telephone: [phone]. Fax:[phone]                                                                                Cardiac Cath NP Note       Narrative:  63y  Male is now s/p left lower extremity angiogram via (right or left radial or groin) approach with  ____which showed:         PAST MEDICAL & SURGICAL HISTORY:  External iliac artery occlusion  Ischemic ulcer diabetic foot  Peripheral vascular disease  Former smoker  Obesity  Diabetic neuropathy  Chronic pain disorder  Cellulitis, leg  Chronic venous stasis dermatitis  Ankylosing spondylitis of site in spine  Lupus  Diabetes  History of hip replacement, total, left: x2 surgeries    FAMILY HISTORY:  Family history of diabetes mellitus (DM) (Sibling)    Home Medications:  Basaglar KwikPen 100 units/mL subcutaneous solution: 80 unit(s) subcutaneous once a day (08 Aug 2019 16:49)  gabapentin 800 mg oral tablet: 1600 milligram(s) orally 3 times a day (08 Aug 2019 16:49)  glimepiride 4 mg oral tablet: 2 tab(s) orally once a day (08 Aug 2019 16:49)  hydroCHLOROthiazide 25 mg oral tablet: 1 tab(s) orally once a day (08 Aug 2019 16:49)  HYDROmorphone 4 mg oral tablet: orally every 8 hours (08 Aug 2019 16:49)  lisinopril 2.5 mg oral tablet: 1 tab(s) orally once a day (08 Aug 2019 16:49)  metFORMIN 1000 mg oral tablet: 1 tab(s) orally 2 times a day (08 Aug 2019 16:49)  methadone 10 mg oral tablet: orally 4 times a day (08 Aug 2019 16:49)  pioglitazone 30 mg oral tablet: 1 tab(s) orally once a day (08 Aug 2019 16:49)  simvastatin 20 mg oral tablet: 1 tab(s) orally once a day (at bedtime) (08 Aug 2019 16:49    General: No fatigue, no fevers/chills  Respiratory: No dyspnea, no cough, no wheeze  CV: No chest pain, no palpitations  Abd: No nausea  Neuro: No headache, no dizziness  No Known Allergies    Objective:  Vital Signs Last 24 Hrs  T(C): 36.9 (08 Aug 2019 13:09), Max: 36.9 (08 Aug 2019 13:09)  T(F): 98.4 (08 Aug 2019 13:09), Max: 98.4 (08 Aug 2019 13:09)  HR: 76 (08 Aug 2019 16:45) (64 - 76)  BP: 120/66 (08 Aug 2019 16:45) (120/66 - 152/75)  BP(mean): --  RR: 20 (08 Aug 2019 16:45) (18 - 20)  SpO2: 100% (08 Aug 2019 16:45) (95% - 100%)    CM: SR  Neuro: A&OX3, CN 2-12 intact  HEENT: NC, AT  Lungs: CTA B/L  CV: S1, S2, no murmur, RRR  Abd: Soft  Right Groin: Soft, no bleeding, no hematoma, slightly tender, mildly swollen  Left groin: soft, no bleeding, no hematoma  Extremity: + distal pulses      -post cardiac cath orders  -bilateral groin precautions  -continue current medical therapy  -DAPT (ASA and plavix)  -statin  -BB  -follow up outpt in 2 weeks Department of Cardiology                                                                  [facility]/[place] [address] [address]                                                            Telephone: [phone]. Fax:[phone]                                                                                Cardiac Cath NP Note       Narrative:  63y  Male is now s/p left lower extremity angiogram via bilateral groin approach with Dr. Craft.  No flow noted through external iliac artery  collateral runoff to the SFA and CFA from internal iliac artery  complete occlusion from left external iliac artery to the distal common femoral artery  wire unable to pass occlusion and retrograde approach via left groin access   left SFA wire unable to pass through occlusion  Right groin mynx  left groin manual pressure in lab  contrast used 50ml  IVF 200cc  EBL 10cc         PAST MEDICAL & SURGICAL HISTORY:  External iliac artery occlusion  Ischemic ulcer diabetic foot  Peripheral vascular disease  Former smoker  Obesity  Diabetic neuropathy  Chronic pain disorder  Cellulitis, leg  Chronic venous stasis dermatitis  Ankylosing spondylitis of site in spine  Lupus  Diabetes  History of hip replacement, total, left: x2 surgeries    FAMILY HISTORY:  Family history of diabetes mellitus (DM) (Sibling)    Home Medications:  Basaglar KwikPen 100 units/mL subcutaneous solution: 80 unit(s) subcutaneous once a day (08 Aug 2019 16:49)  gabapentin 800 mg oral tablet: 1600 milligram(s) orally 3 times a day (08 Aug 2019 16:49)  glimepiride 4 mg oral tablet: 2 tab(s) orally once a day (08 Aug 2019 16:49)  hydroCHLOROthiazide 25 mg oral tablet: 1 tab(s) orally once a day (08 Aug 2019 16:49)  HYDROmorphone 4 mg oral tablet: orally every 8 hours (08 Aug 2019 16:49)  lisinopril 2.5 mg oral tablet: 1 tab(s) orally once a day (08 Aug 2019 16:49)  metFORMIN 1000 mg oral tablet: 1 tab(s) orally 2 times a day (08 Aug 2019 16:49)  methadone 10 mg oral tablet: orally 4 times a day (08 Aug 2019 16:49)  pioglitazone 30 mg oral tablet: 1 tab(s) orally once a day (08 Aug 2019 16:49)  simvastatin 20 mg oral tablet: 1 tab(s) orally once a day (at bedtime) (08 Aug 2019 16:49    General: No fatigue, no fevers/chills  Respiratory: No dyspnea, no cough, no wheeze  CV: No chest pain, no palpitations  Abd: No nausea  Neuro: No headache, no dizziness  No Known Allergies    Objective:  Vital Signs Last 24 Hrs  T(C): 36.9 (08 Aug 2019 13:09), Max: 36.9 (08 Aug 2019 13:09)  T(F): 98.4 (08 Aug 2019 13:09), Max: 98.4 (08 Aug 2019 13:09)  HR: 76 (08 Aug 2019 16:45) (64 - 76)  BP: 120/66 (08 Aug 2019 16:45) (120/66 - 152/75)  BP(mean): --  RR: 20 (08 Aug 2019 16:45) (18 - 20)  SpO2: 100% (08 Aug 2019 16:45) (95% - 100%)    CM: SR  Neuro: A&OX3, CN 2-12 intact  HEENT: NC, AT  Lungs: CTA B/L  CV: S1, S2, no murmur, RRR  Abd: Soft  Right Groin: Soft, no bleeding, no hematoma, slightly tender, mildly swollen  Left groin: soft, no bleeding, no hematoma, mild tenderness  Extremity: + distal pulses      -post cardiac cath orders  -bilateral groin precautions  -continue current medical therapy  -flat x4 hours; up   -follow up outpt in 2 weeks to schedule surgery with Dr. Kristin Jaime

## 2019-08-08 NOTE — DISCHARGE NOTE PROVIDER - NSDCCPCAREPLAN_GEN_ALL_CORE_FT
PRINCIPAL DISCHARGE DIAGNOSIS  Diagnosis: PVD (peripheral vascular disease)  Assessment and Plan of Treatment: external iliac artery; no flow identified; complete occlusion  collateral runoff to jarrod SFA and CFA from internal iliac artery  right groin mynx

## 2019-08-08 NOTE — BRIEF OPERATIVE NOTE - NSICDXBRIEFPOSTOP_GEN_ALL_CORE_FT
POST-OP DIAGNOSIS:  Peripheral vascular disease with claudication 08-Aug-2019 15:30:03  Debbie Marshall

## 2019-08-08 NOTE — DISCHARGE NOTE NURSING/CASE MANAGEMENT/SOCIAL WORK - NSDCDPATPORTLINK_GEN_ALL_CORE
You can access the [facility] Patient Portal, offered by [facility], by registering with the following website: [email or web address]

## 2019-08-08 NOTE — BRIEF OPERATIVE NOTE - OPERATION/FINDINGS
Femoral artery was accessed on the right groin. Wire and catheter were placed and Aortogram was taken showing no flow through the external iliac artery. Collateral runoff to the SFA and common femoral artery from the internal iliac artery. Complete occlusion from the Left external iliac artery to the distal common femoral artery. Wire was unable to be passed through the occlusion at the origin of the L external iliac artery and the decision was made to gain L groin access to attempt to pass the occlusion from a retrograde approach. L SFA was catheterized, however wire was unable to be passed through the occlusion and intervention was aborted. R groin Mynx device. L groin was decannulated and pressure held for 15 minutes.

## 2019-08-08 NOTE — PROGRESS NOTE ADULT - SUBJECTIVE AND OBJECTIVE BOX
Department of Cardiology                                                                  [facility]/[place] [address] [address] [facility]                                                            Telephone: [phone]. Fax:[phone]                                                                                Cardiac Cath NP Note      Narrative:  This is a 64 y/o obese, white M, former smoker (1.5PPD x9 years quit 20 years ago), with a PMHx of DM (on insulin), DM (Hgb a1c 8.6), HTN, ASHIA (non-compliant with CPAP; sleeps in recliner daily), SLE, PVD, chronic lymphedema, lymphorrhea, recurrent ulcers/ cellulitis to BLLE, remote hx DVT (tx with coumadin 25 yrs ago), chronic left foot ulcer (specifically plantar aspect of great toe) presents to PST today in anticipation of a left lower extremity angiogram; possible angioplasty/ stent placement with Dr. Craft.  Pt has had chronic bilateral lower extremity weeping edema and has had legs wrapped and treated with triamcinolone acetonide He follows up with a Podiatrist (doesn't know name), who frequently checks his feet.  He continues to ambulate with a cane.  He recently had a CTA abdomen/pelvis (7/16/2019) revealed left external iliac artery occlusion with suboptimal distal arterial opacification. He denies claudication to his lower extremities, endorses long standing weeping lymphedema, right knee pain (chronic) and occasional CHOU, denies chest pain, palps, SOB.      LLE angio due to Left external iliac artery occlusion    ASA: 2  Mallampati: 2  Bleeding Risk: 1.7%  Creatinine: 0.73  GFR: 98	    PHYSICAL EXAM:  T(C): 36.9 (08-08-19 @ 13:09), Max: 36.9 (08-08-19 @ 13:09)  HR: 75 (08-08-19 @ 13:09) (75 - 75)  BP: 140/84 (08-08-19 @ 13:09) (140/84 - 140/84)  RR: 18 (08-08-19 @ 13:09) (18 - 18)  SpO2: 97% (08-08-19 @ 13:09) (97% - 97%)  Wt(kg): --      Constitutional: A & O x 3  HEENT:   Normal oral mucosa, PERRL, EOMI	  Cardiovascular: Normal S1 S2, No JVD, No murmurs  Respiratory: Lungs clear to auscultation	  Gastrointestinal:  Soft, Non-tender, + BS	  Skin: No rashes, No ecchymoses, No cyanosis  Neurologic: Non-focal  Extremities: Normal range of motion, No clubbing, cyanosis BLLE edema +3  Vascular: Peripheral pulses palpable 2+ bilaterally      DIAGNOSTIC TESTING:  [ ] Echocardiogram:  < from: TTE Echo Complete w/Doppler (02.05.19 @ 16:01) >  Summary:   1. Mildly enlarged left atrium.   2. There is mild concentric left ventricular hypertrophy.   3. Normal wall motion. Left ventricular ejection fraction, by visual   estimation, is 60 to 65%. GradeII diastolic dysfunction.   4. Elevated mean left atrial pressure.   5. Normal right atrial size.   6. Normal right ventricular size and function.   7. No significant valvular abnormality.   8. Trivial pericardial effusion.    < from: CT Angio Abd Aorta w/run-off w/ IV Cont (07.10.19 @ 15:35) >  INTERPRETATION:  CTA of the Abdominal Aorta and Lower Extremities with   Contrast    History: Peripheral vascular disease with left foot ulcer    Technique: Axial CT imaging from the level of the lower thoracic aorta   to the feet after administration of 120 mL of Omnipaque 350 IV contrast   material. Additional sagittal, coronal and 3-D angiographic images were   then created from the acquired axial data.    Interpretation: The abdominal aorta shows no evidence of stenosis or   aneurysm formation.  The renal arteries are unremarkable.    The iliac arteries show shows occlusion of the left external iliac artery   involving its middle and distal thirds. Fine detail distally is limited   by metal artifact from the patient's left hip hardware, but there is   collateral reconstitution of the left common femoral artery.    The right leg runoff shows unremarkable superficial femoral and popliteal   arteries tothe knee joints. Below knee arterial opacification is   suboptimal.    The left leg runoff shows unremarkable superficial femoral and popliteal   arteries to the knee joints. Below knee arterial opacification is   suboptimal.    Bilateral renal cystsare present. Circumferential subcutaneous edema is   seen in the lower extremities starting at the level of the knees.    Impression: Left external iliac artery occlusion. Suboptimal distal   arterial opacification as noted.      ASSESSMENT: This is a 64 y/o obese, white M, former smoker (1.5PPD x9 years quit 20 years ago), with a PMHx of DM (on insulin), DM, HTN, ASHIA (non-compliant with CPAP; sleeps in recliner daily), SLE, PVD, chronic lymphedema, lymphorrhea, recurrent ulcers/ cellulitis to BLLE, remote hx DVT (tx with coumadin 25 yrs ago), chronic left foot ulcer (specifically plantar aspect of great toe) presents to PST today in anticipation of a left lower extremity angiogram; possible angioplasty/ stent placement with Dr. Craft.        -consent obtained  -procedure discussed with patient; risks and benefits explained, questions answered  -labs ECG H&P, diagnostics reviewed

## 2019-08-15 DIAGNOSIS — I70.245 ATHEROSCLEROSIS OF NATIVE ARTERIES OF LEFT LEG WITH ULCERATION OF OTHER PART OF FOOT: ICD-10-CM

## 2019-08-19 PROBLEM — E66.9 OBESITY, UNSPECIFIED: Chronic | Status: ACTIVE | Noted: 2019-08-05

## 2019-08-19 PROBLEM — I73.9 PERIPHERAL VASCULAR DISEASE, UNSPECIFIED: Chronic | Status: ACTIVE | Noted: 2019-08-05

## 2019-08-19 PROBLEM — I74.5 EMBOLISM AND THROMBOSIS OF ILIAC ARTERY: Chronic | Status: ACTIVE | Noted: 2019-08-05

## 2019-08-19 PROBLEM — Z87.891 PERSONAL HISTORY OF NICOTINE DEPENDENCE: Chronic | Status: ACTIVE | Noted: 2019-08-05

## 2019-08-26 ENCOUNTER — APPOINTMENT (OUTPATIENT)
Dept: VASCULAR SURGERY | Facility: CLINIC | Age: 64
End: 2019-08-26

## 2019-09-18 ENCOUNTER — APPOINTMENT (OUTPATIENT)
Dept: VASCULAR SURGERY | Facility: CLINIC | Age: 64
End: 2019-09-18
Payer: MEDICAID

## 2019-09-18 VITALS
DIASTOLIC BLOOD PRESSURE: 65 MMHG | SYSTOLIC BLOOD PRESSURE: 120 MMHG | TEMPERATURE: 98.4 F | HEART RATE: 68 BPM | OXYGEN SATURATION: 95 %

## 2019-09-18 PROCEDURE — 29580 STRAPPING UNNA BOOT: CPT | Mod: 50

## 2019-09-23 ENCOUNTER — APPOINTMENT (OUTPATIENT)
Dept: VASCULAR SURGERY | Facility: CLINIC | Age: 64
End: 2019-09-23
Payer: MEDICAID

## 2019-09-23 VITALS
HEIGHT: 63 IN | OXYGEN SATURATION: 97 % | TEMPERATURE: 98.4 F | HEART RATE: 84 BPM | RESPIRATION RATE: 16 BRPM | SYSTOLIC BLOOD PRESSURE: 122 MMHG | DIASTOLIC BLOOD PRESSURE: 65 MMHG

## 2019-09-23 PROCEDURE — 99214 OFFICE O/P EST MOD 30 MIN: CPT

## 2019-09-25 ENCOUNTER — APPOINTMENT (OUTPATIENT)
Dept: VASCULAR SURGERY | Facility: CLINIC | Age: 64
End: 2019-09-25
Payer: MEDICAID

## 2019-09-25 VITALS
SYSTOLIC BLOOD PRESSURE: 125 MMHG | RESPIRATION RATE: 16 BRPM | HEART RATE: 72 BPM | DIASTOLIC BLOOD PRESSURE: 71 MMHG | TEMPERATURE: 97.8 F | WEIGHT: 311.03 LBS | OXYGEN SATURATION: 98 % | BODY MASS INDEX: 55.11 KG/M2 | HEIGHT: 63 IN

## 2019-09-25 PROCEDURE — 29580 STRAPPING UNNA BOOT: CPT | Mod: 50

## 2019-09-27 ENCOUNTER — APPOINTMENT (OUTPATIENT)
Dept: CARDIOLOGY | Facility: CLINIC | Age: 64
End: 2019-09-27
Payer: MEDICAID

## 2019-09-27 VITALS
HEIGHT: 63 IN | BODY MASS INDEX: 54.93 KG/M2 | WEIGHT: 310 LBS | HEART RATE: 69 BPM | SYSTOLIC BLOOD PRESSURE: 127 MMHG | OXYGEN SATURATION: 99 % | DIASTOLIC BLOOD PRESSURE: 76 MMHG

## 2019-09-27 VITALS — DIASTOLIC BLOOD PRESSURE: 68 MMHG | SYSTOLIC BLOOD PRESSURE: 113 MMHG

## 2019-09-27 DIAGNOSIS — Z01.810 ENCOUNTER FOR PREPROCEDURAL CARDIOVASCULAR EXAMINATION: ICD-10-CM

## 2019-09-27 PROCEDURE — 93000 ELECTROCARDIOGRAM COMPLETE: CPT

## 2019-09-27 PROCEDURE — 99203 OFFICE O/P NEW LOW 30 MIN: CPT | Mod: 25

## 2019-09-27 RX ORDER — OXYCODONE HYDROCHLORIDE 30 MG/1
TABLET ORAL
Refills: 0 | Status: DISCONTINUED | COMMUNITY
End: 2019-09-27

## 2019-09-27 RX ORDER — SERTRALINE HYDROCHLORIDE 50 MG/1
50 TABLET, FILM COATED ORAL
Qty: 30 | Refills: 0 | Status: DISCONTINUED | COMMUNITY
Start: 2018-03-20 | End: 2019-09-27

## 2019-09-27 RX ORDER — TRIAMCINOLONE ACETONIDE 1 MG/G
0.1 CREAM TOPICAL TWICE DAILY
Qty: 1 | Refills: 3 | Status: DISCONTINUED | COMMUNITY
Start: 2019-03-29 | End: 2019-09-27

## 2019-09-27 RX ORDER — INSULIN GLARGINE 100 [IU]/ML
100 INJECTION, SOLUTION SUBCUTANEOUS
Qty: 24 | Refills: 0 | Status: ACTIVE | COMMUNITY
Start: 2019-01-31

## 2019-09-27 RX ORDER — INSULIN GLARGINE 100 [IU]/ML
INJECTION, SOLUTION SUBCUTANEOUS
Refills: 0 | Status: ACTIVE | COMMUNITY

## 2019-09-27 RX ORDER — ASPIRIN 81 MG/1
81 TABLET, CHEWABLE ORAL
Refills: 0 | Status: ACTIVE | COMMUNITY

## 2019-09-27 RX ORDER — SILVER SULFADIAZINE 10 MG/G
1 CREAM TOPICAL
Qty: 50 | Refills: 0 | Status: DISCONTINUED | COMMUNITY
Start: 2019-01-28 | End: 2019-09-27

## 2019-09-30 ENCOUNTER — OUTPATIENT (OUTPATIENT)
Dept: OUTPATIENT SERVICES | Facility: HOSPITAL | Age: 64
LOS: 1 days | End: 2019-09-30
Payer: MEDICAID

## 2019-09-30 VITALS
HEIGHT: 68 IN | TEMPERATURE: 98 F | RESPIRATION RATE: 20 BRPM | SYSTOLIC BLOOD PRESSURE: 112 MMHG | HEART RATE: 83 BPM | DIASTOLIC BLOOD PRESSURE: 80 MMHG | WEIGHT: 286.6 LBS

## 2019-09-30 DIAGNOSIS — I80.229 PHLEBITIS AND THROMBOPHLEBITIS OF UNSPECIFIED POPLITEAL VEIN: ICD-10-CM

## 2019-09-30 DIAGNOSIS — I73.9 PERIPHERAL VASCULAR DISEASE, UNSPECIFIED: ICD-10-CM

## 2019-09-30 DIAGNOSIS — Z29.9 ENCOUNTER FOR PROPHYLACTIC MEASURES, UNSPECIFIED: ICD-10-CM

## 2019-09-30 DIAGNOSIS — Z01.818 ENCOUNTER FOR OTHER PREPROCEDURAL EXAMINATION: ICD-10-CM

## 2019-09-30 DIAGNOSIS — Z96.642 PRESENCE OF LEFT ARTIFICIAL HIP JOINT: Chronic | ICD-10-CM

## 2019-09-30 LAB
ALBUMIN SERPL ELPH-MCNC: 3.9 G/DL — SIGNIFICANT CHANGE UP (ref 3.3–5.2)
ALP SERPL-CCNC: 83 U/L — SIGNIFICANT CHANGE UP (ref 40–120)
ALT FLD-CCNC: 22 U/L — SIGNIFICANT CHANGE UP
ANION GAP SERPL CALC-SCNC: 12 MMOL/L — SIGNIFICANT CHANGE UP (ref 5–17)
APTT BLD: 33.3 SEC — SIGNIFICANT CHANGE UP (ref 27.5–36.3)
AST SERPL-CCNC: 21 U/L — SIGNIFICANT CHANGE UP
BASOPHILS # BLD AUTO: 0.08 K/UL — SIGNIFICANT CHANGE UP (ref 0–0.2)
BASOPHILS NFR BLD AUTO: 0.9 % — SIGNIFICANT CHANGE UP (ref 0–2)
BILIRUB SERPL-MCNC: 0.4 MG/DL — SIGNIFICANT CHANGE UP (ref 0.4–2)
BLD GP AB SCN SERPL QL: SIGNIFICANT CHANGE UP
BUN SERPL-MCNC: 22 MG/DL — HIGH (ref 8–20)
CALCIUM SERPL-MCNC: 9.6 MG/DL — SIGNIFICANT CHANGE UP (ref 8.6–10.2)
CHLORIDE SERPL-SCNC: 95 MMOL/L — LOW (ref 98–107)
CO2 SERPL-SCNC: 26 MMOL/L — SIGNIFICANT CHANGE UP (ref 22–29)
CREAT SERPL-MCNC: 0.79 MG/DL — SIGNIFICANT CHANGE UP (ref 0.5–1.3)
EOSINOPHIL # BLD AUTO: 0.13 K/UL — SIGNIFICANT CHANGE UP (ref 0–0.5)
EOSINOPHIL NFR BLD AUTO: 1.5 % — SIGNIFICANT CHANGE UP (ref 0–6)
GLUCOSE SERPL-MCNC: 197 MG/DL — HIGH (ref 70–115)
HBA1C BLD-MCNC: 8.2 % — HIGH (ref 4–5.6)
HCT VFR BLD CALC: 36.7 % — LOW (ref 39–50)
HGB BLD-MCNC: 11.2 G/DL — LOW (ref 13–17)
IMM GRANULOCYTES NFR BLD AUTO: 0.4 % — SIGNIFICANT CHANGE UP (ref 0–1.5)
INR BLD: 1.17 RATIO — HIGH (ref 0.88–1.16)
LYMPHOCYTES # BLD AUTO: 1.78 K/UL — SIGNIFICANT CHANGE UP (ref 1–3.3)
LYMPHOCYTES # BLD AUTO: 21 % — SIGNIFICANT CHANGE UP (ref 13–44)
MCHC RBC-ENTMCNC: 26.2 PG — LOW (ref 27–34)
MCHC RBC-ENTMCNC: 30.5 GM/DL — LOW (ref 32–36)
MCV RBC AUTO: 85.9 FL — SIGNIFICANT CHANGE UP (ref 80–100)
MONOCYTES # BLD AUTO: 0.54 K/UL — SIGNIFICANT CHANGE UP (ref 0–0.9)
MONOCYTES NFR BLD AUTO: 6.4 % — SIGNIFICANT CHANGE UP (ref 2–14)
NEUTROPHILS # BLD AUTO: 5.9 K/UL — SIGNIFICANT CHANGE UP (ref 1.8–7.4)
NEUTROPHILS NFR BLD AUTO: 69.8 % — SIGNIFICANT CHANGE UP (ref 43–77)
PLATELET # BLD AUTO: 325 K/UL — SIGNIFICANT CHANGE UP (ref 150–400)
POTASSIUM SERPL-MCNC: 4.3 MMOL/L — SIGNIFICANT CHANGE UP (ref 3.5–5.3)
POTASSIUM SERPL-SCNC: 4.3 MMOL/L — SIGNIFICANT CHANGE UP (ref 3.5–5.3)
PROT SERPL-MCNC: 9.4 G/DL — HIGH (ref 6.6–8.7)
PROTHROM AB SERPL-ACNC: 13.5 SEC — HIGH (ref 10–12.9)
RBC # BLD: 4.27 M/UL — SIGNIFICANT CHANGE UP (ref 4.2–5.8)
RBC # FLD: 17.1 % — HIGH (ref 10.3–14.5)
SODIUM SERPL-SCNC: 133 MMOL/L — LOW (ref 135–145)
WBC # BLD: 8.46 K/UL — SIGNIFICANT CHANGE UP (ref 3.8–10.5)
WBC # FLD AUTO: 8.46 K/UL — SIGNIFICANT CHANGE UP (ref 3.8–10.5)

## 2019-09-30 PROCEDURE — 93010 ELECTROCARDIOGRAM REPORT: CPT

## 2019-09-30 PROCEDURE — G0463: CPT

## 2019-09-30 PROCEDURE — 93005 ELECTROCARDIOGRAM TRACING: CPT

## 2019-09-30 RX ORDER — SODIUM CHLORIDE 9 MG/ML
3 INJECTION INTRAMUSCULAR; INTRAVENOUS; SUBCUTANEOUS EVERY 8 HOURS
Refills: 0 | Status: DISCONTINUED | OUTPATIENT
Start: 2019-10-24 | End: 2019-10-28

## 2019-09-30 NOTE — PATIENT PROFILE ADULT - NSPROCHRONICPAINLOC_GEN_A_NUR
leg/back/Pain at rest - 6/10  Pain with activity - 8/10  Duration > 1 year  constant, throbbing pain/Bilateral:

## 2019-09-30 NOTE — PATIENT PROFILE ADULT - NSPROEDALEARNPREF_GEN_A_NUR
written material/NP, Markos Murdock instructed pt on pre-op instructions/teaching & pre-surgical infection prevention instructions. Pt verbalized understanding of all instructions given by NP. Tips for safer surgery, pain management scale, diabetes club schedule given to pt./verbal instruction/individual instruction

## 2019-09-30 NOTE — H&P PST ADULT - ASSESSMENT
CAPRINI SCORE [CLOT]    AGE RELATED RISK FACTORS                                                       MOBILITY RELATED FACTORS  [ ] Age 41-60 years                                            (1 Point)                  [ ] Bed rest                                                        (1 Point)  [x ] Age: 61-74 years                                           (2 Points)                 [ ] Plaster cast                                                   (2 Points)  [ ] Age= 75 years                                              (3 Points)                 [ ] Bed bound for more than 72 hours                 (2 Points)    DISEASE RELATED RISK FACTORS                                               GENDER SPECIFIC FACTORS  [ x] Edema in the lower extremities                       (1 Point)                  [ ] Pregnancy                                                     (1 Point)  [ ] Varicose veins                                               (1 Point)                  [ ] Post-partum < 6 weeks                                   (1 Point)             [x ] BMI > 25 Kg/m2                                            (1 Point)                  [ ] Hormonal therapy  or oral contraception          (1 Point)                 [ ] Sepsis (in the previous month)                        (1 Point)                  [ ] History of pregnancy complications                 (1 point)  [ ] Pneumonia or serious lung disease                                               [ ] Unexplained or recurrent                     (1 Point)           (in the previous month)                               (1 Point)  [ ] Abnormal pulmonary function test                     (1 Point)                 SURGERY RELATED RISK FACTORS  [ ] Acute myocardial infarction                              (1 Point)                 [ ]  Section                                             (1 Point)  [ ] Congestive heart failure (in the previous month)  (1 Point)               [ ] Minor surgery                                                  (1 Point)   [ ] Inflammatory bowel disease                             (1 Point)                 [ ] Arthroscopic surgery                                        (2 Points)  [ ] Central venous access                                      (2 Points)                [x ] General surgery lasting more than 45 minutes   (2 Points)       [ ] Stroke (in the previous month)                          (5 Points)               [ ] Elective arthroplasty                                         (5 Points)                                                                                                                                               HEMATOLOGY RELATED FACTORS                                                 TRAUMA RELATED RISK FACTORS  [ ] Prior episodes of VTE                                     (3 Points)                [ ] Fracture of the hip, pelvis, or leg                       (5 Points)  [ ] Positive family history for VTE                         (3 Points)                 [ ] Acute spinal cord injury (in the previous month)  (5 Points)  [ ] Prothrombin 10813 A                                     (3 Points)                 [ ] Paralysis  (less than 1 month)                             (5 Points)  [ ] Factor V Leiden                                             (3 Points)                  [ ] Multiple Trauma within 1 month                        (5 Points)  [ ] Lupus anticoagulants                                     (3 Points)                                                           [ ] Anticardiolipin antibodies                               (3 Points)                                                       [ ] High homocysteine in the blood                      (3 Points)                                             [ ] Other congenital or acquired thrombophilia      (3 Points)                                                [ ] Heparin induced thrombocytopenia                  (3 Points)                                          Total Score [   4       ]    Caprini Score 0 - 2:  Low Risk, No VTE Prophylaxis required for most patients, encourage ambulation  Caprini Score 3 - 6:  At Risk, pharmacologic VTE prophylaxis is indicated for most patients (in the absence of a contraindication)  Caprini Score Greater than or = 7:  High Risk, pharmacologic VTE prophylaxis is indicated for most patients (in the absence of a contraindication OPIOID RISK TOOL    AMANDA EACH BOX THAT APPLIES AND ADD TOTALS AT THE END    FAMILY HISTORY OF SUBSTANCE ABUSE                 FEMALE         MALE                                                Alcohol                             [  ]1 pt          [ x ]3pts                                               Illegal Drugs                     [  ]2 pts        [  ]3pts                                               Rx Drugs                           [  ]4 pts        [  ]4 pts    PERSONAL HISTORY OF SUBSTANCE ABUSE                                                                                          Alcohol                             [  ]3 pts       [  ]3 pts                                               Illegal Drugs                     [  ]4 pts        [  ]4 pts                                               Rx Drugs                           [  ]5 pts        [  ]5 pts    AGE BETWEEN 16-45 YEARS                                      [  ]1 pt         [  ]1 pt    HISTORY OF PREADOLESCENT   SEXUAL ABUSE                                                             [  ]3 pts        [  ]0pts    PSYCHOLOGICAL DISEASE                     ADD, OCD, Bipolar, Schizophrenia        [  ]2 pts         [  ]2 pts                      Depression                                               [  ]1 pt           [  ]1 pt           SCORING TOTAL   (add numbers and type here)              (**3*)                                     A score of 3 or lower indicated LOW risk for future opioid abuse  A score of 4 to 7 indicated moderate risk for future opioid abuse  A score of 8 or higher indicates a high risk for opioid abuse  AGE RELATED RISK FACTORS                                                       MOBILITY RELATED FACTORS  [ ] Age 41-60 years                                            (1 Point)                  [ ] Bed rest                                                        (1 Point)  [x ] Age: 61-74 years                                           (2 Points)                 [ ] Plaster cast                                                   (2 Points)  [ ] Age= 75 years                                              (3 Points)                 [ ] Bed bound for more than 72 hours                 (2 Points)    DISEASE RELATED RISK FACTORS                                               GENDER SPECIFIC FACTORS  [ x] Edema in the lower extremities                       (1 Point)                  [ ] Pregnancy                                                     (1 Point)  [ ] Varicose veins                                               (1 Point)                  [ ] Post-partum < 6 weeks                                   (1 Point)             [x ] BMI > 25 Kg/m2                                            (1 Point)                  [ ] Hormonal therapy  or oral contraception          (1 Point)                 [ ] Sepsis (in the previous month)                        (1 Point)                  [ ] History of pregnancy complications                 (1 point)  [ ] Pneumonia or serious lung disease                                               [ ] Unexplained or recurrent                     (1 Point)           (in the previous month)                               (1 Point)  [ ] Abnormal pulmonary function test                     (1 Point)                 SURGERY RELATED RISK FACTORS  [ ] Acute myocardial infarction                              (1 Point)                 [ ]  Section                                             (1 Point)  [ ] Congestive heart failure (in the previous month)  (1 Point)               [ ] Minor surgery                                                  (1 Point)   [ ] Inflammatory bowel disease                             (1 Point)                 [ ] Arthroscopic surgery                                        (2 Points)  [ ] Central venous access                                      (2 Points)                [x ] General surgery lasting more than 45 minutes   (2 Points)       [ ] Stroke (in the previous month)                          (5 Points)               [ ] Elective arthroplasty                                         (5 Points)                                                                                                                                               HEMATOLOGY RELATED FACTORS                                                 TRAUMA RELATED RISK FACTORS  [x ] Prior episodes of VTE                                     (3 Points)                [ ] Fracture of the hip, pelvis, or leg                       (5 Points)  [ ] Positive family history for VTE                         (3 Points)                 [ ] Acute spinal cord injury (in the previous month)  (5 Points)  [ ] Prothrombin 52296 A                                     (3 Points)                 [ ] Paralysis  (less than 1 month)                             (5 Points)  [ ] Factor V Leiden                                             (3 Points)                  [ ] Multiple Trauma within 1 month                        (5 Points)  [ ] Lupus anticoagulants                                     (3 Points)                                                           [ ] Anticardiolipin antibodies                               (3 Points)                                                       [ ] High homocysteine in the blood                      (3 Points)                                             [ ] Other congenital or acquired thrombophilia      (3 Points)                                                [ ] Heparin induced thrombocytopenia                  (3 Points)                                          Total Score [   7  ]    Caprini Score 0 - 2:  Low Risk, No VTE Prophylaxis required for most patients, encourage ambulation  Caprini Score 3 - 6:  At Risk, pharmacologic VTE prophylaxis is indicated for most patients (in the absence of a contraindication)  Caprini Score Greater than or = 7:  High Risk, pharmacologic VTE prophylaxis is indicated for most patients (in the absence of a pt medication was reviewed patient was advise to take his B/P medication day of surgery with small sip of water     OPIOID RISK TOOL    AMANDA EACH BOX THAT APPLIES AND ADD TOTALS AT THE END    FAMILY HISTORY OF SUBSTANCE ABUSE                 FEMALE         MALE                                                Alcohol                             [  ]1 pt          [ x ]3pts                                               Illegal Drugs                     [  ]2 pts        [  ]3pts                                               Rx Drugs                           [  ]4 pts        [  ]4 pts    PERSONAL HISTORY OF SUBSTANCE ABUSE                                                                                          Alcohol                             [  ]3 pts       [  ]3 pts                                               Illegal Drugs                     [  ]4 pts        [  ]4 pts                                               Rx Drugs                           [  ]5 pts        [  ]5 pts    AGE BETWEEN 16-45 YEARS                                      [  ]1 pt         [  ]1 pt    HISTORY OF PREADOLESCENT   SEXUAL ABUSE                                                             [  ]3 pts        [  ]0pts    PSYCHOLOGICAL DISEASE                     ADD, OCD, Bipolar, Schizophrenia        [  ]2 pts         [  ]2 pts                      Depression                                               [  ]1 pt           [  ]1 pt           SCORING TOTAL   (add numbers and type here)              (**3*)                                     A score of 3 or lower indicated LOW risk for future opioid abuse  A score of 4 to 7 indicated moderate risk for future opioid abuse  A score of 8 or higher indicates a high risk for opioid abuse  AGE RELATED RISK FACTORS                                                       MOBILITY RELATED FACTORS  [ ] Age 41-60 years                                            (1 Point)                  [ ] Bed rest                                                        (1 Point)  [x ] Age: 61-74 years                                           (2 Points)                 [ ] Plaster cast                                                   (2 Points)  [ ] Age= 75 years                                              (3 Points)                 [ ] Bed bound for more than 72 hours                 (2 Points)    DISEASE RELATED RISK FACTORS                                               GENDER SPECIFIC FACTORS  [ x] Edema in the lower extremities                       (1 Point)                  [ ] Pregnancy                                                     (1 Point)  [ ] Varicose veins                                               (1 Point)                  [ ] Post-partum < 6 weeks                                   (1 Point)             [x ] BMI > 25 Kg/m2                                            (1 Point)                  [ ] Hormonal therapy  or oral contraception          (1 Point)                 [ ] Sepsis (in the previous month)                        (1 Point)                  [ ] History of pregnancy complications                 (1 point)  [ ] Pneumonia or serious lung disease                                               [ ] Unexplained or recurrent                     (1 Point)           (in the previous month)                               (1 Point)  [ ] Abnormal pulmonary function test                     (1 Point)                 SURGERY RELATED RISK FACTORS  [ ] Acute myocardial infarction                              (1 Point)                 [ ]  Section                                             (1 Point)  [ ] Congestive heart failure (in the previous month)  (1 Point)               [ ] Minor surgery                                                  (1 Point)   [ ] Inflammatory bowel disease                             (1 Point)                 [ ] Arthroscopic surgery                                        (2 Points)  [ ] Central venous access                                      (2 Points)                [x ] General surgery lasting more than 45 minutes   (2 Points)       [ ] Stroke (in the previous month)                          (5 Points)               [ ] Elective arthroplasty                                         (5 Points)                                                                                                                                               HEMATOLOGY RELATED FACTORS                                                 TRAUMA RELATED RISK FACTORS  [x ] Prior episodes of VTE                                     (3 Points)                [ ] Fracture of the hip, pelvis, or leg                       (5 Points)  [ ] Positive family history for VTE                         (3 Points)                 [ ] Acute spinal cord injury (in the previous month)  (5 Points)  [ ] Prothrombin 25008 A                                     (3 Points)                 [ ] Paralysis  (less than 1 month)                             (5 Points)  [ ] Factor V Leiden                                             (3 Points)                  [ ] Multiple Trauma within 1 month                        (5 Points)  [ ] Lupus anticoagulants                                     (3 Points)                                                           [ ] Anticardiolipin antibodies                               (3 Points)                                                       [ ] High homocysteine in the blood                      (3 Points)                                             [ ] Other congenital or acquired thrombophilia      (3 Points)                                                [ ] Heparin induced thrombocytopenia                  (3 Points)                                          Total Score [   8 ]    Caprini Score 0 - 2:  Low Risk, No VTE Prophylaxis required for most patients, encourage ambulation  Caprini Score 3 - 6:  At Risk, pharmacologic VTE prophylaxis is indicated for most patients (in the absence of a contraindication)  Caprini Score Greater than or = 7:  High Risk, pharmacologic VTE prophylaxis is indicated for most patients (in the absence of a

## 2019-09-30 NOTE — H&P PST ADULT - HISTORY OF PRESENT ILLNESS
Patient is a 62 y/o male AxOx3, Patient c/o chronic left leg pain, patient  reports have a blockage to left femoral Patient is a 62 y/o male AxOx3, Patient c/o chronic left leg pain for the last year , patient reports have a blockage to left leg artery , patient has history chronic pvd .  Patient is having a left common femoral endarterectomy, angioplasty /stent left iliac artery

## 2019-09-30 NOTE — H&P PST ADULT - NSICDXPROBLEM_GEN_ALL_CORE_FT
PROBLEM DIAGNOSES  Problem: Need for prophylactic measure  Assessment and Plan: caprini score is 8 patient is high risk for venous thrombosis event surgical team screen for dvt proph.    Problem: Peripheral artery disease  Assessment and Plan: left common femoral endarterectomy ,angioplasty /stent left iliac artery. medical and cardiac clearance pending

## 2019-09-30 NOTE — H&P PST ADULT - LAB RESULTS AND INTERPRETATION
9-30-19: A1C is 8.2, called Dr. Walker's office and spoke to Marquita in the office and also called Dr. Kristin Jaime's office left message for Corina.

## 2019-09-30 NOTE — H&P PST ADULT - NSICDXFAMILYHX_GEN_ALL_CORE_FT
FAMILY HISTORY:  Family history of diabetes mellitus (DM)    Father  Still living? No  Family history of diabetes mellitus (DM), Age at diagnosis: Age Unknown    Mother  Still living? Unknown  Family history of diabetes mellitus (DM), Age at diagnosis: Age Unknown    Sibling  Still living? Unknown  Family history of diabetes mellitus (DM), Age at diagnosis: Age Unknown

## 2019-09-30 NOTE — H&P PST ADULT - NSICDXPASTMEDICALHX_GEN_ALL_CORE_FT
PAST MEDICAL HISTORY:  Ankylosing spondylitis of site in spine     Cellulitis, leg bilateral    Chronic pain disorder     Chronic venous stasis dermatitis     Diabetes     Diabetic neuropathy     External iliac artery occlusion     Former smoker     Ischemic ulcer diabetic foot left toe    Lupus     Obesity     Peripheral vascular disease PAST MEDICAL HISTORY:  Ankylosing spondylitis of site in spine     Cellulitis, leg bilateral    Chronic pain disorder     Chronic venous stasis dermatitis     Deep vein thrombosis (DVT) 25 years ago pt had THR was on coumadin for 6 months    Diabetes     Diabetic neuropathy     External iliac artery occlusion     Former smoker     Ischemic ulcer diabetic foot left toe    Lupus     Obesity     Peripheral vascular disease

## 2019-10-01 ENCOUNTER — APPOINTMENT (OUTPATIENT)
Dept: CARDIOLOGY | Facility: CLINIC | Age: 64
End: 2019-10-01
Payer: MEDICAID

## 2019-10-01 PROCEDURE — 93015 CV STRESS TEST SUPVJ I&R: CPT

## 2019-10-01 PROCEDURE — 78452 HT MUSCLE IMAGE SPECT MULT: CPT

## 2019-10-01 PROCEDURE — A9500: CPT

## 2019-10-01 PROCEDURE — 93306 TTE W/DOPPLER COMPLETE: CPT

## 2019-10-02 ENCOUNTER — APPOINTMENT (OUTPATIENT)
Dept: VASCULAR SURGERY | Facility: CLINIC | Age: 64
End: 2019-10-02
Payer: MEDICAID

## 2019-10-02 VITALS
TEMPERATURE: 98.1 F | OXYGEN SATURATION: 98 % | HEIGHT: 63 IN | RESPIRATION RATE: 16 BRPM | SYSTOLIC BLOOD PRESSURE: 110 MMHG | HEART RATE: 75 BPM | DIASTOLIC BLOOD PRESSURE: 70 MMHG

## 2019-10-02 PROBLEM — E11.621 TYPE 2 DIABETES MELLITUS WITH FOOT ULCER: Chronic | Status: ACTIVE | Noted: 2019-08-05

## 2019-10-02 PROBLEM — L03.119 CELLULITIS OF UNSPECIFIED PART OF LIMB: Chronic | Status: ACTIVE | Noted: 2019-03-14

## 2019-10-02 PROCEDURE — 29580 STRAPPING UNNA BOOT: CPT | Mod: 50

## 2019-10-03 ENCOUNTER — APPOINTMENT (OUTPATIENT)
Dept: CARDIOLOGY | Facility: CLINIC | Age: 64
End: 2019-10-03

## 2019-10-09 ENCOUNTER — APPOINTMENT (OUTPATIENT)
Dept: VASCULAR SURGERY | Facility: CLINIC | Age: 64
End: 2019-10-09

## 2019-10-11 ENCOUNTER — TRANSCRIPTION ENCOUNTER (OUTPATIENT)
Age: 64
End: 2019-10-11

## 2019-10-11 ENCOUNTER — OUTPATIENT (OUTPATIENT)
Dept: OUTPATIENT SERVICES | Facility: HOSPITAL | Age: 64
LOS: 1 days | Discharge: HOME | End: 2019-10-11
Payer: MEDICAID

## 2019-10-11 VITALS
RESPIRATION RATE: 18 BRPM | HEART RATE: 66 BPM | OXYGEN SATURATION: 96 % | DIASTOLIC BLOOD PRESSURE: 61 MMHG | SYSTOLIC BLOOD PRESSURE: 125 MMHG

## 2019-10-11 VITALS — HEIGHT: 68 IN | WEIGHT: 294.98 LBS

## 2019-10-11 DIAGNOSIS — Z96.642 PRESENCE OF LEFT ARTIFICIAL HIP JOINT: Chronic | ICD-10-CM

## 2019-10-11 DIAGNOSIS — R94.39 ABNORMAL RESULT OF OTHER CARDIOVASCULAR FUNCTION STUDY: ICD-10-CM

## 2019-10-11 DIAGNOSIS — Z01.818 ENCOUNTER FOR OTHER PREPROCEDURAL EXAMINATION: ICD-10-CM

## 2019-10-11 LAB
ALBUMIN SERPL ELPH-MCNC: 3.8 G/DL — SIGNIFICANT CHANGE UP (ref 3.3–5.2)
ALP SERPL-CCNC: 71 U/L — SIGNIFICANT CHANGE UP (ref 40–120)
ALT FLD-CCNC: 26 U/L — SIGNIFICANT CHANGE UP
ANION GAP SERPL CALC-SCNC: 15 MMOL/L — SIGNIFICANT CHANGE UP (ref 5–17)
APTT BLD: 32.1 SEC — SIGNIFICANT CHANGE UP (ref 27.5–36.3)
AST SERPL-CCNC: 32 U/L — SIGNIFICANT CHANGE UP
BILIRUB SERPL-MCNC: 0.4 MG/DL — SIGNIFICANT CHANGE UP (ref 0.4–2)
BUN SERPL-MCNC: 21 MG/DL — HIGH (ref 8–20)
CALCIUM SERPL-MCNC: 9.1 MG/DL — SIGNIFICANT CHANGE UP (ref 8.6–10.2)
CHLORIDE SERPL-SCNC: 93 MMOL/L — LOW (ref 98–107)
CO2 SERPL-SCNC: 26 MMOL/L — SIGNIFICANT CHANGE UP (ref 22–29)
CREAT SERPL-MCNC: 0.79 MG/DL — SIGNIFICANT CHANGE UP (ref 0.5–1.3)
GLUCOSE SERPL-MCNC: 204 MG/DL — HIGH (ref 70–115)
HCT VFR BLD CALC: 35.1 % — LOW (ref 39–50)
HGB BLD-MCNC: 10.5 G/DL — LOW (ref 13–17)
INR BLD: 1.13 RATIO — SIGNIFICANT CHANGE UP (ref 0.88–1.16)
MCHC RBC-ENTMCNC: 25.9 PG — LOW (ref 27–34)
MCHC RBC-ENTMCNC: 29.9 GM/DL — LOW (ref 32–36)
MCV RBC AUTO: 86.5 FL — SIGNIFICANT CHANGE UP (ref 80–100)
PLATELET # BLD AUTO: 260 K/UL — SIGNIFICANT CHANGE UP (ref 150–400)
POTASSIUM SERPL-MCNC: 4.9 MMOL/L — SIGNIFICANT CHANGE UP (ref 3.5–5.3)
POTASSIUM SERPL-SCNC: 4.9 MMOL/L — SIGNIFICANT CHANGE UP (ref 3.5–5.3)
PROT SERPL-MCNC: 9.1 G/DL — HIGH (ref 6.6–8.7)
PROTHROM AB SERPL-ACNC: 13 SEC — HIGH (ref 10–12.9)
RBC # BLD: 4.06 M/UL — LOW (ref 4.2–5.8)
RBC # FLD: 17 % — HIGH (ref 10.3–14.5)
SODIUM SERPL-SCNC: 134 MMOL/L — LOW (ref 135–145)
WBC # BLD: 7.7 K/UL — SIGNIFICANT CHANGE UP (ref 3.8–10.5)
WBC # FLD AUTO: 7.7 K/UL — SIGNIFICANT CHANGE UP (ref 3.8–10.5)

## 2019-10-11 PROCEDURE — 80053 COMPREHEN METABOLIC PANEL: CPT

## 2019-10-11 PROCEDURE — 93005 ELECTROCARDIOGRAM TRACING: CPT

## 2019-10-11 PROCEDURE — 93458 L HRT ARTERY/VENTRICLE ANGIO: CPT | Mod: 26

## 2019-10-11 PROCEDURE — C1894: CPT

## 2019-10-11 PROCEDURE — 36415 COLL VENOUS BLD VENIPUNCTURE: CPT

## 2019-10-11 PROCEDURE — 99152 MOD SED SAME PHYS/QHP 5/>YRS: CPT

## 2019-10-11 PROCEDURE — 93010 ELECTROCARDIOGRAM REPORT: CPT

## 2019-10-11 PROCEDURE — 85730 THROMBOPLASTIN TIME PARTIAL: CPT

## 2019-10-11 PROCEDURE — C1769: CPT

## 2019-10-11 PROCEDURE — 85610 PROTHROMBIN TIME: CPT

## 2019-10-11 PROCEDURE — 93458 L HRT ARTERY/VENTRICLE ANGIO: CPT

## 2019-10-11 PROCEDURE — C1887: CPT

## 2019-10-11 PROCEDURE — 85027 COMPLETE CBC AUTOMATED: CPT

## 2019-10-11 RX ORDER — GABAPENTIN 400 MG/1
800 CAPSULE ORAL ONCE
Refills: 0 | Status: COMPLETED | OUTPATIENT
Start: 2019-10-11 | End: 2019-10-11

## 2019-10-11 RX ORDER — ASPIRIN/CALCIUM CARB/MAGNESIUM 324 MG
81 TABLET ORAL ONCE
Refills: 0 | Status: COMPLETED | OUTPATIENT
Start: 2019-10-11 | End: 2019-10-11

## 2019-10-11 RX ORDER — METFORMIN HYDROCHLORIDE 850 MG/1
1 TABLET ORAL
Qty: 0 | Refills: 0 | DISCHARGE

## 2019-10-11 RX ADMIN — Medication 81 MILLIGRAM(S): at 13:44

## 2019-10-11 RX ADMIN — GABAPENTIN 800 MILLIGRAM(S): 400 CAPSULE ORAL at 13:57

## 2019-10-11 NOTE — H&P PST ADULT - MUSCULOSKELETAL COMMENTS
bilateral leg weakness; ambulates with cane due to chronic lymphedema right knee pain and BLLE swelling

## 2019-10-11 NOTE — H&P PST ADULT - HISTORY OF PRESENT ILLNESS
Narrative: This is a 62 y/o obese, white M, former smoker (1.5PPD x9 years quit 20 years ago), with a PMHx of DM (on insulin), DM (Hgb a1c 8.2), HTN, ASHIA (non-compliant with CPAP; sleeps in recliner daily), SLE, PAD, chronic lymphedema, lymphorrhea, recurrent ulcers/ cellulitis to BLLE, remote hx DVT (tx with coumadin 25 yrs ago), chronic left foot ulcer (specifically plantar aspect of great toe) presents to PST today in anticipation of a left heart cardiac catheterization with Dr. Villalobos. He was referred by vascular surgery for preop risk stratification prior to left common femoral artery surgery with Dr. Craft.  Pt currently denies chest pain, SOB, palps, dizziness, lightheadedness, +lower extremity edema with compression stockings in place.  Pt was seen by Dr. Farrell, Cardiology, as his first visit with a cardiologist and part of his pre op clearance/evaluation.    NST from 10/1/2019 revealed a medium sized moderate defect in the inferior, basal, and mid inferolateral walls that are predominantly reversible, suggestive of infarction with moderate jovita-infarct ischemia. No ischemic changes, no ECG changes, no arrhythmias, no chest pain, LVEF 68%    TTE from 10/1/2019 revealed EF 55-60%, normal global LV systolic function, grade 1 diastolic dysfunction, no significant valvular abnormalities, dilation of aortic root at the sinuses of valsalva measuring 3.9cm    LLE angio from 8/8/2019 (RFA access; no site complications, +mynx) revealed   LEFT LOWER EXTREMITY VESSELS: Left common iliac: Normal. Left internal  iliac: Normal. The vessel was very large. Left external iliac: The vessel  was occluded. Left common femoral: The vessel was partially occluded. The  vessel was supplied by collaterals. Leftsuperficial femoral: Normal. Left  deep femoral: Normal. Left popliteal: Normal. Left anterior tibial:  Normal. Left tibio-peroneal: Normal. Left posterior tibial: Normal. Left  peroneal: The vessel was partially occluded.  RIGHT LOWER EXTREMITY VESSELS: Right common iliac: Normal. Right internal  iliac: Normal. Right external iliac: Normal. Right common femoral: Normal.    ASA: 3  Mallampati: 2  Bleeding Risk:   Creatinine:  GFR: Narrative: This is a 62 y/o obese, white M, former smoker (1.5PPD x9 years quit 20 years ago), with a PMHx of  DM II (on insulin, Hgb a1c 8.2), HTN, ASHIA (non-compliant with CPAP; sleeps in recliner daily), SLE, PAD, chronic lymphedema, lymphorrhea, recurrent ulcers/ cellulitis to BLLE, remote hx DVT (tx with coumadin 25 yrs ago), chronic left foot ulcer (specifically plantar aspect of great toe) presents to PST today in anticipation of a left heart cardiac catheterization with Dr. Villalobos. He was referred by vascular surgery for preop risk stratification prior to left common femoral artery surgery with Dr. Craft.  Pt currently denies chest pain, SOB, palps, dizziness, lightheadedness, +lower extremity edema with compression stockings in place.  Pt was seen by Dr. Farrell, Cardiology, as his first visit with a cardiologist and part of his pre op clearance/evaluation.    NST from 10/1/2019 revealed a medium sized moderate defect in the inferior, basal, and mid inferolateral walls that are predominantly reversible, suggestive of infarction with moderate jovita-infarct ischemia. No ischemic changes, no ECG changes, no arrhythmias, no chest pain, LVEF 68%    TTE from 10/1/2019 revealed EF 55-60%, normal global LV systolic function, grade 1 diastolic dysfunction, no significant valvular abnormalities, dilation of aortic root at the sinuses of valsalva measuring 3.9cm    LLE angio from 8/8/2019 (RFA access; no site complications, +mynx) revealed   LEFT LOWER EXTREMITY VESSELS: Left common iliac: Normal. Left internal  iliac: Normal. The vessel was very large. Left external iliac: The vessel  was occluded. Left common femoral: The vessel was partially occluded. The  vessel was supplied by collaterals. Leftsuperficial femoral: Normal. Left  deep femoral: Normal. Left popliteal: Normal. Left anterior tibial:  Normal. Left tibio-peroneal: Normal. Left posterior tibial: Normal. Left  peroneal: The vessel was partially occluded.  RIGHT LOWER EXTREMITY VESSELS: Right common iliac: Normal. Right internal  iliac: Normal. Right external iliac: Normal. Right common femoral: Normal.    ASA: 3  Mallampati: 2  Bleeding Risk:   Creatinine:  GFR: Narrative: This is a 64 y/o obese, white M, former smoker (1.5PPD x9 years quit 20 years ago), with a PMHx of  DM II (on insulin, Hgb a1c 8.2), HTN, ASHIA (non-compliant with CPAP; sleeps in recliner daily), SLE, PAD, chronic lymphedema, lymphorrhea, recurrent ulcers/ cellulitis to BLLE, remote hx DVT (tx with coumadin 25 yrs ago), chronic left foot ulcer (specifically plantar aspect of great toe) presents to PST today in anticipation of a left heart cardiac catheterization with Dr. Villalobos. He was referred by vascular surgery for preop risk stratification prior to left common femoral artery surgery with Dr. Craft.  Pt currently denies chest pain, SOB, palps, dizziness, lightheadedness, +lower extremity edema with compression stockings in place.  Pt was seen by Dr. Farrell, Cardiology, as his first visit with a cardiologist and part of his pre op clearance/evaluation.    NST from 10/1/2019 revealed a medium sized moderate defect in the inferior, basal, and mid inferolateral walls that are predominantly reversible, suggestive of infarction with moderate jovita-infarct ischemia. No ischemic changes, no ECG changes, no arrhythmias, no chest pain, LVEF 68%    TTE from 10/1/2019 revealed EF 55-60%, normal global LV systolic function, grade 1 diastolic dysfunction, no significant valvular abnormalities, dilation of aortic root at the sinuses of valsalva measuring 3.9cm    LLE angio from 8/8/2019 (RFA access; no site complications, +mynx) revealed   LEFT LOWER EXTREMITY VESSELS: Left common iliac: Normal. Left internal  iliac: Normal. The vessel was very large. Left external iliac: The vessel  was occluded. Left common femoral: The vessel was partially occluded. The  vessel was supplied by collaterals. Leftsuperficial femoral: Normal. Left  deep femoral: Normal. Left popliteal: Normal. Left anterior tibial:  Normal. Left tibio-peroneal: Normal. Left posterior tibial: Normal. Left  peroneal: The vessel was partially occluded.  RIGHT LOWER EXTREMITY VESSELS: Right common iliac: Normal. Right internal  iliac: Normal. Right external iliac: Normal. Right common femoral: Normal.    ASA: 3  Mallampati: 2  Bleeding Risk: 1.3%  Creatinine:0.79  GFR:95

## 2019-10-11 NOTE — H&P PST ADULT - OTHER CARE PROVIDERS
Dr. Villalobos (Interventional Cardiologist), Dr. Farrell (Cardiologist), Dr. Craft (Vascular Surgeon),  Dr. Kelley (Pain Management)

## 2019-10-11 NOTE — DISCHARGE NOTE PROVIDER - NSDCCPCAREPLAN_GEN_ALL_CORE_FT
PRINCIPAL DISCHARGE DIAGNOSIS  Diagnosis: S/P cardiac catheterization  Assessment and Plan of Treatment: Restart metformin in 2 days on 10/14/19 am  Continue all previous medications   Follow up with Cardiologist 1-2 weeks

## 2019-10-11 NOTE — DISCHARGE NOTE NURSING/CASE MANAGEMENT/SOCIAL WORK - PATIENT PORTAL LINK FT
You can access the FollowMyHealth Patient Portal offered by [facility] by registering at the following website: [email or web address]. By joining [facility]’s FollowMyHealth portal, you will also be able to view your health information using other applications (apps) compatible with our system.

## 2019-10-11 NOTE — PROGRESS NOTE ADULT - SUBJECTIVE AND OBJECTIVE BOX
s/p LHC revealing non obstructive CAD done vai RRA tolerated well     Patient feels well.  Denies chest pain, shortness of breath, dizziness or palpitations at this time    Right radial hemoband in place.  Procedure site CDI, no bleeding, no hematoma, able to move all digits with capillary refill < 3 seconds, fingers warm      HPI:  Narrative: This is a 62 y/o obese, white M, former smoker (1.5PPD x9 years quit 20 years ago), with a PMHx of  DM II (on insulin, Hgb a1c 8.2), HTN, ASHIA (non-compliant with CPAP; sleeps in recliner daily), SLE, PAD, chronic lymphedema, lymphorrhea, recurrent ulcers/ cellulitis to BLLE, remote hx DVT (tx with coumadin 25 yrs ago), chronic left foot ulcer (specifically plantar aspect of great toe) presents to PST today in anticipation of a left heart cardiac catheterization with Dr. Villalobos. He was referred by vascular surgery for preop risk stratification prior to left common femoral artery surgery with Dr. Craft.  Pt currently denies chest pain, SOB, palps, dizziness, lightheadedness, +lower extremity edema with compression stockings in place.  Pt was seen by Dr. Farrell, Cardiology, as his first visit with a cardiologist and part of his pre op clearance/evaluation.    NST from 10/1/2019 revealed a medium sized moderate defect in the inferior, basal, and mid inferolateral walls that are predominantly reversible, suggestive of infarction with moderate jovtia-infarct ischemia. No ischemic changes, no ECG changes, no arrhythmias, no chest pain, LVEF 68%    TTE from 10/1/2019 revealed EF 55-60%, normal global LV systolic function, grade 1 diastolic dysfunction, no significant valvular abnormalities, dilation of aortic root at the sinuses of valsalva measuring 3.9cm    LLE angio from 8/8/2019 (RFA access; no site complications, +mynx) revealed   LEFT LOWER EXTREMITY VESSELS: Left common iliac: Normal. Left internal  iliac: Normal. The vessel was very large. Left external iliac: The vessel  was occluded. Left common femoral: The vessel was partially occluded. The  vessel was supplied by collaterals. Leftsuperficial femoral: Normal. Left  deep femoral: Normal. Left popliteal: Normal. Left anterior tibial:  Normal. Left tibio-peroneal: Normal. Left posterior tibial: Normal. Left  peroneal: The vessel was partially occluded.  RIGHT LOWER EXTREMITY VESSELS: Right common iliac: Normal. Right internal  iliac: Normal. Right external iliac: Normal. Right common femoral: Normal.    ASA: 3  Mallampati: 2  Bleeding Risk: 1.3%  Creatinine:0.79  GFR:95 (11 Oct 2019 09:53)    now s/p LHC revealing non obstructive CAD    -continue tele  --vital signs, labs, diet and activity per post procedure orders  - ambulate ad bianca post bedrest  -encourage PO fluids  - remove radial band in 1 hour post procedure if hemostasis maintained   -plan of care discussed with patient, family and MD   -post procedure and d/c instructions reviewed  -follow up with MD in 1-2 weeks  -Discussed therapeutic lifestyle changes to reduce risk factors such as following a cardiac diet, weight loss, maintaining a healthy weight, exercise, smoking cessation, medication compliance, and regular follow-up  with MD to know your numbers (BP, cholesterol, weight, and glucose)

## 2019-10-11 NOTE — DISCHARGE NOTE PROVIDER - HOSPITAL COURSE
s/p LHC revealing non obstructive CAD done vai RRA tolerated well         Patient feels well.  Denies chest pain, shortness of breath, dizziness or palpitations at this time        Right radial hemoband in place.  Procedure site CDI, no bleeding, no hematoma, able to move all digits with capillary refill < 3 seconds, fingers warm            HPI:    Narrative: This is a 64 y/o obese, white M, former smoker (1.5PPD x9 years quit 20 years ago), with a PMHx of  DM II (on insulin, Hgb a1c 8.2), HTN, ASHIA (non-compliant with CPAP; sleeps in recliner daily), SLE, PAD, chronic lymphedema, lymphorrhea, recurrent ulcers/ cellulitis to BLLE, remote hx DVT (tx with coumadin 25 yrs ago), chronic left foot ulcer (specifically plantar aspect of great toe) presents to PST today in anticipation of a left heart cardiac catheterization with Dr. Villalobos. He was referred by vascular surgery for preop risk stratification prior to left common femoral artery surgery with Dr. Craft.    Pt currently denies chest pain, SOB, palps, dizziness, lightheadedness, +lower extremity edema with compression stockings in place.    Pt was seen by Dr. Farrell, Cardiology, as his first visit with a cardiologist and part of his pre op clearance/evaluation.    now s/p C revealing non obstructive CAD        -continue tele    --vital signs, labs, diet and activity per post procedure orders    - ambulate ad bianca post bedrest    -encourage PO fluids    - remove radial band in 1 hour post procedure if hemostasis maintained     -plan of care discussed with patient, family and MD     -post procedure and d/c instructions reviewed    -follow up with MD in 1-2 weeks    -Discussed therapeutic lifestyle changes to reduce risk factors such as following a cardiac diet, weight loss, maintaining a healthy weight, exercise, smoking cessation, medication compliance, and regular follow-up  with MD to know your numbers (BP, cholesterol, weight, and glucose)

## 2019-10-11 NOTE — H&P PST ADULT - NSICDXPASTMEDICALHX_GEN_ALL_CORE_FT
PAST MEDICAL HISTORY:  Ankylosing spondylitis of site in spine     Cellulitis, leg bilateral    Chronic pain disorder     Chronic venous stasis dermatitis     Deep vein thrombosis (DVT) 25 years ago pt had THR was on coumadin for 6 months    Diabetes     Diabetic neuropathy     External iliac artery occlusion     Former smoker     Ischemic ulcer diabetic foot left toe    Lupus     Obesity     Peripheral vascular disease

## 2019-10-11 NOTE — H&P PST ADULT - ATTENDING COMMENTS
62 y/o morbidly obese male , PVD, with abnormal stress test going for high risk vascular surgery  here for coronary angiogram/LHC

## 2019-10-11 NOTE — H&P PST ADULT - NEGATIVE NEUROLOGICAL SYMPTOMS
no transient paralysis/no focal seizures/no weakness/no paresthesias/no generalized seizures/no syncope/no tremors/no headache

## 2019-10-11 NOTE — H&P PST ADULT - ASSESSMENT
62 y/o obese, white M, former smoker with a PMHx of DM, HTN, HLD, severe PAD presents to PST today in anticipation of a left heart cardiac catheterization with Dr. Villalobos as part of his pre operative clearance; vascular surgery.   Of note, he also had an abnormal stress test, this would warrant a further investigation into the coronary vasculature.

## 2019-10-11 NOTE — ASU PATIENT PROFILE, ADULT - PMH
Ankylosing spondylitis of site in spine    Cellulitis, leg  bilateral  Chronic pain disorder    Chronic venous stasis dermatitis    Deep vein thrombosis (DVT)  25 years ago pt had THR was on coumadin for 6 months  Diabetes    Diabetic neuropathy    External iliac artery occlusion    Former smoker    Ischemic ulcer diabetic foot  left toe  Lupus    Obesity    Peripheral vascular disease

## 2019-10-11 NOTE — DISCHARGE NOTE PROVIDER - CARE PROVIDER_API CALL
ManvarSingh, Pallavi B (MD)  Vascular Surgery  [address] [place] [address], [address] [place] [address]  Phone: [phone]  Fax: [phone]  Follow Up Time:

## 2019-10-13 NOTE — PROGRESS NOTE ADULT - PROBLEM SELECTOR PLAN 2
Continue Nystatin.
will continue  with coverage  and Lantus insuline
Continue Nystatin.
pain controlled.
will continue with coverage and  will add insulin to the management, will disuss with patient
0

## 2019-10-15 NOTE — PHYSICAL EXAM
[General Appearance - Well Developed] : well developed [General Appearance - Well Nourished] : well nourished [Normal Appearance] : normal appearance [General Appearance - In No Acute Distress] : no acute distress [No Deformities] : no deformities [Normal Conjunctiva] : the conjunctiva exhibited no abnormalities [Normal Oral Mucosa] : normal oral mucosa [Normal Oropharynx] : normal oropharynx [Heart Sounds] : normal S1 and S2 [Heart Rate And Rhythm] : heart rate and rhythm were normal [Murmurs] : no murmurs present [] : no respiratory distress [Exaggerated Use Of Accessory Muscles For Inspiration] : no accessory muscle use [Respiration, Rhythm And Depth] : normal respiratory rhythm and effort [Auscultation Breath Sounds / Voice Sounds] : lungs were clear to auscultation bilaterally [Bowel Sounds] : normal bowel sounds [Abdomen Soft] : soft [Abdomen Tenderness] : non-tender [Oriented To Time, Place, And Person] : oriented to person, place, and time [Affect] : the affect was normal [Impaired Insight] : insight and judgment were intact [Memory Recent] : recent memory was not impaired [Mood] : the mood was normal [FreeTextEntry1] : B/l extremities are bandaged, unable to assess

## 2019-10-15 NOTE — ADDENDUM
[FreeTextEntry1] : Addendum 10/8/19:\par NST revealed medium sized, moderate defects in inferior and basal/mid inferolateral walls that are predominantly reversible, suggestive of infarction with moderate jovita-infarct ischemia. Results were discussed with patient and Dr. Craft. Patient to be set up for cardiac cath before vascular procedure. \par \par 10/15/2019\par Mr. Vipin MATRINEZ had recent cardiac catheterization revealing nonobstructive CAD. He is stable from cardiology point of view for upcoming L CFA Surgery. \par \par Ulices Memoracion, NP

## 2019-10-15 NOTE — REVIEW OF SYSTEMS
[Joint Pain] : joint pain [Shoulder Pain] : shoulder pain [Negative] : Endocrine [Joint Swelling] : no joint swelling [Joint Stiffness] : no joint stiffness [Limb Weakness (Paresis)] : no limb weakness [Muscle Cramps] : no muscle cramps

## 2019-10-15 NOTE — HISTORY OF PRESENT ILLNESS
[FreeTextEntry1] : 64 y/o morbidly obese M with PMH HTN, DM, ASHIA, SLE, ankylosing spondylitis, PAD, lymphedema, recurrent ulcers/cellulitis, former smoker, presents for initial visit. Patient was referred by vascular surgery for preop risk stratification prior to L CFA surgery. \par \par Patient currently feels well with no complaints. He denies chest pain, dyspnea, palpitations, dizziness/lightheadedness. No history of CAD. States he had a stress test many years ago, which was normal. No subsequent cardiac workup. Patient has limited functional capacity due to leg pain; states he is not able to walk more than 100 feet before stopping and uses a cane to ambulate. He denies exertional chest pain or dyspnea. \par \par Cardiac workup:\par EKG: NSR, no ST-T changes

## 2019-10-17 NOTE — ASU PATIENT PROFILE, ADULT - LEARNING ASSESSMENT (PATIENT) ADDITIONAL COMMENTS
Telephone interview with pt, instructed on pre-op instructions, tips for safer surgery, pain management scale, continue ASA as per Dr Kristin Jaime, do not take any diabetes medication on the morning of surgery, take any medication for heart, blood pressure & breathing with a sip of water on the morning of surgery if normally taken in morning. Pt verbalized understanding of all instructions given.

## 2019-10-18 ENCOUNTER — APPOINTMENT (OUTPATIENT)
Dept: VASCULAR SURGERY | Facility: CLINIC | Age: 64
End: 2019-10-18
Payer: MEDICAID

## 2019-10-18 VITALS
DIASTOLIC BLOOD PRESSURE: 82 MMHG | TEMPERATURE: 97.7 F | SYSTOLIC BLOOD PRESSURE: 136 MMHG | OXYGEN SATURATION: 98 % | HEIGHT: 63 IN | HEART RATE: 86 BPM

## 2019-10-18 PROCEDURE — 29580 STRAPPING UNNA BOOT: CPT | Mod: 50

## 2019-10-18 NOTE — PHYSICAL EXAM
[Normal Breath Sounds] : Normal breath sounds [Normal Rate and Rhythm] : normal rate and rhythm [Normal Heart Sounds] : normal heart sounds [2+] : left 2+ [Ankle Swelling (On Exam)] : present [Ankle Swelling On The Right] : of the right ankle [Ankle Swelling On The Left] : moderate [Varicose Veins Of Lower Extremities] : bilaterally [] : present [Ankle Swelling Bilaterally] : severe [No Rash or Lesion] : No rash or lesion [Alert] : alert [Oriented to Person] : oriented to person [Oriented to Time] : oriented to time [Oriented to Place] : oriented to place [Calm] : calm [de-identified] : WD, WN, NAD. Awake, alert, interactive. Ambulates slowly without difficulty [de-identified] : TAYLOR, PERGEOVANNIL [de-identified] : supple [FreeTextEntry1] : Chronic venous stasis hyperpigmentation and lipodermatosclerosis. \par Chronic venous stasis dermatitis. \par Non-pitting edema BLE, R>L..\par No tenderness, pain, erythema or drainage. \par There is a triangular shaped wound to right lateral, proximal calf. Clear fluid from wound and other areas of leg. No erythema, tenderness or pain. Ulcer to plantar aspect of great toe on LLE, clean, good edges dianne 1 cm. \par No odor.\par Circumference: Right - calf: 57.5 cm, ankle: 32 cm; Left - calf: 49.5 cm, ankle: 32.5 cm. [de-identified] : soft, obese, NT, ND [de-identified] : no cyanosis or deformity. full ROM, MS 5/5\par  [de-identified] : NANNETTE.

## 2019-10-18 NOTE — PROCEDURE
[FreeTextEntry1] : BLE Unna Boot, Coban and Ace for mild compression.\par \par CTA Abd and Pelvis 7/16/19 demonstrates Left external iliac artery occlusion with suboptimal distal arterial opacification.

## 2019-10-18 NOTE — HISTORY OF PRESENT ILLNESS
[FreeTextEntry1] : 62 y/o male with longstanding history of lymphedema, lymphorrhea and recurrent ulcers and cellulitis. He continues to wear an Unna Boot weekly. There has been resolution of cellulitis, weeping and ulcers. He continues to have moderate edema to BLE. He sits for extended time every day while working and is sedentary. He walks slowly without the need for a cane or walker. He has tried to wear compression stockings in the past but they were too difficult to put on and would roll, causing pain in his calves. No fever or chills. He does not report walk or rest pain. He elevates legs while seated. He noticed a wound to the bottom of the big toe on his left foot. He has an appointment to see his Podiatrist in 2 days. Hx includes: HTN, diabetes, lupus, arthritis, sleep apnea, ankylosing spondylitis and DVT in LLE.   [de-identified] : 62 y/o male RTC for followup. He had been using ready wraps and using lymphedema pumps. He states 2 days ago he tripped and hit a corner of a box. There was significant bleeding at the time which subsided. Since then, his RLE has had significant weeping of clear fluid. There is no pain. He continues to have a left toe ulcer treated by Podiatry. He will be having a L CFA surgery with Dr Foster, he had a cardiac Catherization, which he states was negative. No fever or chills.

## 2019-10-18 NOTE — ASSESSMENT
[Arterial/Venous Disease] : arterial/venous disease [FreeTextEntry1] : 64 yo male with longstanding lymphedema. I applied Silvadene to wound followed by BLE Unna Boots, Coban and Ace for mild compression. Will prescribe Augmentin to cover for possible infection o wound, or cellulitis. Continue with conservative treatment.

## 2019-10-23 ENCOUNTER — APPOINTMENT (OUTPATIENT)
Dept: VASCULAR SURGERY | Facility: CLINIC | Age: 64
End: 2019-10-23

## 2019-10-23 ENCOUNTER — TRANSCRIPTION ENCOUNTER (OUTPATIENT)
Age: 64
End: 2019-10-23

## 2019-10-24 ENCOUNTER — INPATIENT (INPATIENT)
Facility: HOSPITAL | Age: 64
LOS: 3 days | Discharge: HOME | DRG: 253 | End: 2019-10-28
Attending: SURGERY | Admitting: SURGERY
Payer: MEDICARE

## 2019-10-24 VITALS
OXYGEN SATURATION: 99 % | TEMPERATURE: 97 F | HEART RATE: 78 BPM | SYSTOLIC BLOOD PRESSURE: 127 MMHG | HEIGHT: 68 IN | DIASTOLIC BLOOD PRESSURE: 64 MMHG | WEIGHT: 294.98 LBS | RESPIRATION RATE: 14 BRPM

## 2019-10-24 DIAGNOSIS — I73.9 PERIPHERAL VASCULAR DISEASE, UNSPECIFIED: ICD-10-CM

## 2019-10-24 DIAGNOSIS — Z96.642 PRESENCE OF LEFT ARTIFICIAL HIP JOINT: Chronic | ICD-10-CM

## 2019-10-24 LAB
BLD GP AB SCN SERPL QL: SIGNIFICANT CHANGE UP
GLUCOSE BLDC GLUCOMTR-MCNC: 206 MG/DL — HIGH (ref 70–99)
GLUCOSE BLDC GLUCOMTR-MCNC: 229 MG/DL — HIGH (ref 70–99)
GLUCOSE BLDC GLUCOMTR-MCNC: 267 MG/DL — HIGH (ref 70–99)

## 2019-10-24 PROCEDURE — 35661 BPG FEMORAL-FEMORAL: CPT

## 2019-10-24 PROCEDURE — 97606 NEG PRS WND THER DME>50 SQCM: CPT

## 2019-10-24 RX ORDER — HYDROCHLOROTHIAZIDE 25 MG
25 TABLET ORAL DAILY
Refills: 0 | Status: DISCONTINUED | OUTPATIENT
Start: 2019-10-24 | End: 2019-10-28

## 2019-10-24 RX ORDER — LISINOPRIL 2.5 MG/1
2.5 TABLET ORAL DAILY
Refills: 0 | Status: DISCONTINUED | OUTPATIENT
Start: 2019-10-24 | End: 2019-10-28

## 2019-10-24 RX ORDER — HEPARIN SODIUM 5000 [USP'U]/ML
5000 INJECTION INTRAVENOUS; SUBCUTANEOUS EVERY 12 HOURS
Refills: 0 | Status: DISCONTINUED | OUTPATIENT
Start: 2019-10-24 | End: 2019-10-28

## 2019-10-24 RX ORDER — METHADONE HYDROCHLORIDE 40 MG/1
10 TABLET ORAL
Refills: 0 | Status: DISCONTINUED | OUTPATIENT
Start: 2019-10-24 | End: 2019-10-28

## 2019-10-24 RX ORDER — ASPIRIN/CALCIUM CARB/MAGNESIUM 324 MG
81 TABLET ORAL DAILY
Refills: 0 | Status: DISCONTINUED | OUTPATIENT
Start: 2019-10-24 | End: 2019-10-28

## 2019-10-24 RX ORDER — GLUCAGON INJECTION, SOLUTION 0.5 MG/.1ML
1 INJECTION, SOLUTION SUBCUTANEOUS ONCE
Refills: 0 | Status: DISCONTINUED | OUTPATIENT
Start: 2019-10-24 | End: 2019-10-28

## 2019-10-24 RX ORDER — CEFAZOLIN SODIUM 1 G
2000 VIAL (EA) INJECTION ONCE
Refills: 0 | Status: DISCONTINUED | OUTPATIENT
Start: 2019-10-24 | End: 2019-10-24

## 2019-10-24 RX ORDER — OXYCODONE AND ACETAMINOPHEN 5; 325 MG/1; MG/1
1 TABLET ORAL EVERY 4 HOURS
Refills: 0 | Status: DISCONTINUED | OUTPATIENT
Start: 2019-10-24 | End: 2019-10-28

## 2019-10-24 RX ORDER — DEXTROSE 50 % IN WATER 50 %
25 SYRINGE (ML) INTRAVENOUS ONCE
Refills: 0 | Status: DISCONTINUED | OUTPATIENT
Start: 2019-10-24 | End: 2019-10-28

## 2019-10-24 RX ORDER — CEFAZOLIN SODIUM 1 G
3000 VIAL (EA) INJECTION ONCE
Refills: 0 | Status: DISCONTINUED | OUTPATIENT
Start: 2019-10-24 | End: 2019-10-24

## 2019-10-24 RX ORDER — HYDROMORPHONE HYDROCHLORIDE 2 MG/ML
0.5 INJECTION INTRAMUSCULAR; INTRAVENOUS; SUBCUTANEOUS
Refills: 0 | Status: DISCONTINUED | OUTPATIENT
Start: 2019-10-24 | End: 2019-10-24

## 2019-10-24 RX ORDER — SODIUM CHLORIDE 9 MG/ML
1000 INJECTION, SOLUTION INTRAVENOUS
Refills: 0 | Status: DISCONTINUED | OUTPATIENT
Start: 2019-10-24 | End: 2019-10-28

## 2019-10-24 RX ORDER — FENTANYL CITRATE 50 UG/ML
50 INJECTION INTRAVENOUS
Refills: 0 | Status: DISCONTINUED | OUTPATIENT
Start: 2019-10-24 | End: 2019-10-24

## 2019-10-24 RX ORDER — INSULIN LISPRO 100/ML
VIAL (ML) SUBCUTANEOUS
Refills: 0 | Status: DISCONTINUED | OUTPATIENT
Start: 2019-10-24 | End: 2019-10-26

## 2019-10-24 RX ORDER — DEXTROSE 50 % IN WATER 50 %
15 SYRINGE (ML) INTRAVENOUS ONCE
Refills: 0 | Status: DISCONTINUED | OUTPATIENT
Start: 2019-10-24 | End: 2019-10-28

## 2019-10-24 RX ORDER — SODIUM CHLORIDE 9 MG/ML
1000 INJECTION, SOLUTION INTRAVENOUS
Refills: 0 | Status: DISCONTINUED | OUTPATIENT
Start: 2019-10-24 | End: 2019-10-24

## 2019-10-24 RX ORDER — HYDROMORPHONE HYDROCHLORIDE 2 MG/ML
0.5 INJECTION INTRAMUSCULAR; INTRAVENOUS; SUBCUTANEOUS
Refills: 0 | Status: COMPLETED | OUTPATIENT
Start: 2019-10-24 | End: 2019-10-24

## 2019-10-24 RX ORDER — INSULIN LISPRO 100/ML
4 VIAL (ML) SUBCUTANEOUS ONCE
Refills: 0 | Status: COMPLETED | OUTPATIENT
Start: 2019-10-24 | End: 2019-10-24

## 2019-10-24 RX ORDER — DEXTROSE 50 % IN WATER 50 %
12.5 SYRINGE (ML) INTRAVENOUS ONCE
Refills: 0 | Status: DISCONTINUED | OUTPATIENT
Start: 2019-10-24 | End: 2019-10-28

## 2019-10-24 RX ORDER — GABAPENTIN 400 MG/1
800 CAPSULE ORAL THREE TIMES A DAY
Refills: 0 | Status: DISCONTINUED | OUTPATIENT
Start: 2019-10-24 | End: 2019-10-28

## 2019-10-24 RX ORDER — ONDANSETRON 8 MG/1
4 TABLET, FILM COATED ORAL ONCE
Refills: 0 | Status: DISCONTINUED | OUTPATIENT
Start: 2019-10-24 | End: 2019-10-24

## 2019-10-24 RX ORDER — SIMVASTATIN 20 MG/1
20 TABLET, FILM COATED ORAL AT BEDTIME
Refills: 0 | Status: DISCONTINUED | OUTPATIENT
Start: 2019-10-24 | End: 2019-10-28

## 2019-10-24 RX ADMIN — OXYCODONE AND ACETAMINOPHEN 1 TABLET(S): 5; 325 TABLET ORAL at 18:19

## 2019-10-24 RX ADMIN — METHADONE HYDROCHLORIDE 10 MILLIGRAM(S): 40 TABLET ORAL at 18:19

## 2019-10-24 RX ADMIN — HYDROMORPHONE HYDROCHLORIDE 0.5 MILLIGRAM(S): 2 INJECTION INTRAMUSCULAR; INTRAVENOUS; SUBCUTANEOUS at 13:45

## 2019-10-24 RX ADMIN — HYDROMORPHONE HYDROCHLORIDE 0.5 MILLIGRAM(S): 2 INJECTION INTRAMUSCULAR; INTRAVENOUS; SUBCUTANEOUS at 14:08

## 2019-10-24 RX ADMIN — HYDROMORPHONE HYDROCHLORIDE 0.5 MILLIGRAM(S): 2 INJECTION INTRAMUSCULAR; INTRAVENOUS; SUBCUTANEOUS at 12:38

## 2019-10-24 RX ADMIN — METHADONE HYDROCHLORIDE 10 MILLIGRAM(S): 40 TABLET ORAL at 23:30

## 2019-10-24 RX ADMIN — HYDROMORPHONE HYDROCHLORIDE 0.5 MILLIGRAM(S): 2 INJECTION INTRAMUSCULAR; INTRAVENOUS; SUBCUTANEOUS at 16:33

## 2019-10-24 RX ADMIN — HYDROMORPHONE HYDROCHLORIDE 0.5 MILLIGRAM(S): 2 INJECTION INTRAMUSCULAR; INTRAVENOUS; SUBCUTANEOUS at 13:19

## 2019-10-24 RX ADMIN — HYDROMORPHONE HYDROCHLORIDE 0.5 MILLIGRAM(S): 2 INJECTION INTRAMUSCULAR; INTRAVENOUS; SUBCUTANEOUS at 16:49

## 2019-10-24 RX ADMIN — HYDROMORPHONE HYDROCHLORIDE 0.5 MILLIGRAM(S): 2 INJECTION INTRAMUSCULAR; INTRAVENOUS; SUBCUTANEOUS at 16:40

## 2019-10-24 RX ADMIN — HYDROMORPHONE HYDROCHLORIDE 0.5 MILLIGRAM(S): 2 INJECTION INTRAMUSCULAR; INTRAVENOUS; SUBCUTANEOUS at 14:07

## 2019-10-24 RX ADMIN — HYDROMORPHONE HYDROCHLORIDE 0.5 MILLIGRAM(S): 2 INJECTION INTRAMUSCULAR; INTRAVENOUS; SUBCUTANEOUS at 17:14

## 2019-10-24 RX ADMIN — HYDROMORPHONE HYDROCHLORIDE 0.5 MILLIGRAM(S): 2 INJECTION INTRAMUSCULAR; INTRAVENOUS; SUBCUTANEOUS at 16:25

## 2019-10-24 RX ADMIN — GABAPENTIN 800 MILLIGRAM(S): 400 CAPSULE ORAL at 23:30

## 2019-10-24 RX ADMIN — Medication 4 UNIT(S): at 17:17

## 2019-10-24 RX ADMIN — SIMVASTATIN 20 MILLIGRAM(S): 20 TABLET, FILM COATED ORAL at 23:30

## 2019-10-24 RX ADMIN — SODIUM CHLORIDE 3 MILLILITER(S): 9 INJECTION INTRAMUSCULAR; INTRAVENOUS; SUBCUTANEOUS at 23:31

## 2019-10-24 RX ADMIN — HYDROMORPHONE HYDROCHLORIDE 0.5 MILLIGRAM(S): 2 INJECTION INTRAMUSCULAR; INTRAVENOUS; SUBCUTANEOUS at 13:42

## 2019-10-24 RX ADMIN — GABAPENTIN 800 MILLIGRAM(S): 400 CAPSULE ORAL at 14:39

## 2019-10-24 RX ADMIN — Medication 6: at 13:39

## 2019-10-24 RX ADMIN — HYDROMORPHONE HYDROCHLORIDE 0.5 MILLIGRAM(S): 2 INJECTION INTRAMUSCULAR; INTRAVENOUS; SUBCUTANEOUS at 12:44

## 2019-10-24 RX ADMIN — HYDROMORPHONE HYDROCHLORIDE 0.5 MILLIGRAM(S): 2 INJECTION INTRAMUSCULAR; INTRAVENOUS; SUBCUTANEOUS at 16:55

## 2019-10-24 RX ADMIN — HYDROMORPHONE HYDROCHLORIDE 0.5 MILLIGRAM(S): 2 INJECTION INTRAMUSCULAR; INTRAVENOUS; SUBCUTANEOUS at 13:14

## 2019-10-24 RX ADMIN — HEPARIN SODIUM 5000 UNIT(S): 5000 INJECTION INTRAVENOUS; SUBCUTANEOUS at 18:18

## 2019-10-24 NOTE — BRIEF OPERATIVE NOTE - OPERATION/FINDINGS
Occluded and atrophic left proximal common femoral artery with reconstitution distally supplied by collateral vessels. [id number] bypass with PTFE graft. Palpable DP/PT pulses in LLE extremity.

## 2019-10-24 NOTE — BRIEF OPERATIVE NOTE - NSICDXBRIEFPREOP_GEN_ALL_CORE_FT
PRE-OP DIAGNOSIS:  Critical lower limb ischemia 24-Oct-2019 12:34:24  Major Mata  Critical lower limb ischemia 24-Oct-2019 12:34:10  Major Mata

## 2019-10-25 LAB
ANION GAP SERPL CALC-SCNC: 11 MMOL/L — SIGNIFICANT CHANGE UP (ref 5–17)
BUN SERPL-MCNC: 12 MG/DL — SIGNIFICANT CHANGE UP (ref 8–20)
CALCIUM SERPL-MCNC: 8.7 MG/DL — SIGNIFICANT CHANGE UP (ref 8.6–10.2)
CHLORIDE SERPL-SCNC: 97 MMOL/L — LOW (ref 98–107)
CO2 SERPL-SCNC: 25 MMOL/L — SIGNIFICANT CHANGE UP (ref 22–29)
CREAT SERPL-MCNC: 0.74 MG/DL — SIGNIFICANT CHANGE UP (ref 0.5–1.3)
GLUCOSE BLDC GLUCOMTR-MCNC: 199 MG/DL — HIGH (ref 70–99)
GLUCOSE BLDC GLUCOMTR-MCNC: 313 MG/DL — HIGH (ref 70–99)
GLUCOSE BLDC GLUCOMTR-MCNC: 433 MG/DL — HIGH (ref 70–99)
GLUCOSE SERPL-MCNC: 258 MG/DL — HIGH (ref 70–115)
HCT VFR BLD CALC: 30.8 % — LOW (ref 39–50)
HGB BLD-MCNC: 9.5 G/DL — LOW (ref 13–17)
MCHC RBC-ENTMCNC: 25.7 PG — LOW (ref 27–34)
MCHC RBC-ENTMCNC: 30.8 GM/DL — LOW (ref 32–36)
MCV RBC AUTO: 83.5 FL — SIGNIFICANT CHANGE UP (ref 80–100)
PLATELET # BLD AUTO: 274 K/UL — SIGNIFICANT CHANGE UP (ref 150–400)
POTASSIUM SERPL-MCNC: 3.7 MMOL/L — SIGNIFICANT CHANGE UP (ref 3.5–5.3)
POTASSIUM SERPL-SCNC: 3.7 MMOL/L — SIGNIFICANT CHANGE UP (ref 3.5–5.3)
RBC # BLD: 3.69 M/UL — LOW (ref 4.2–5.8)
RBC # FLD: 17.6 % — HIGH (ref 10.3–14.5)
SODIUM SERPL-SCNC: 133 MMOL/L — LOW (ref 135–145)
WBC # BLD: 8.81 K/UL — SIGNIFICANT CHANGE UP (ref 3.8–10.5)
WBC # FLD AUTO: 8.81 K/UL — SIGNIFICANT CHANGE UP (ref 3.8–10.5)

## 2019-10-25 RX ORDER — INSULIN GLARGINE 100 [IU]/ML
40 INJECTION, SOLUTION SUBCUTANEOUS
Refills: 0 | Status: DISCONTINUED | OUTPATIENT
Start: 2019-10-25 | End: 2019-10-28

## 2019-10-25 RX ADMIN — Medication 2: at 08:49

## 2019-10-25 RX ADMIN — Medication 81 MILLIGRAM(S): at 12:20

## 2019-10-25 RX ADMIN — METHADONE HYDROCHLORIDE 10 MILLIGRAM(S): 40 TABLET ORAL at 12:20

## 2019-10-25 RX ADMIN — HEPARIN SODIUM 5000 UNIT(S): 5000 INJECTION INTRAVENOUS; SUBCUTANEOUS at 05:05

## 2019-10-25 RX ADMIN — METHADONE HYDROCHLORIDE 10 MILLIGRAM(S): 40 TABLET ORAL at 23:57

## 2019-10-25 RX ADMIN — Medication 8: at 12:04

## 2019-10-25 RX ADMIN — SODIUM CHLORIDE 3 MILLILITER(S): 9 INJECTION INTRAMUSCULAR; INTRAVENOUS; SUBCUTANEOUS at 23:59

## 2019-10-25 RX ADMIN — INSULIN GLARGINE 40 UNIT(S): 100 INJECTION, SOLUTION SUBCUTANEOUS at 23:59

## 2019-10-25 RX ADMIN — SIMVASTATIN 20 MILLIGRAM(S): 20 TABLET, FILM COATED ORAL at 23:51

## 2019-10-25 RX ADMIN — GABAPENTIN 800 MILLIGRAM(S): 400 CAPSULE ORAL at 13:41

## 2019-10-25 RX ADMIN — INSULIN GLARGINE 40 UNIT(S): 100 INJECTION, SOLUTION SUBCUTANEOUS at 12:28

## 2019-10-25 RX ADMIN — OXYCODONE AND ACETAMINOPHEN 1 TABLET(S): 5; 325 TABLET ORAL at 23:57

## 2019-10-25 RX ADMIN — METHADONE HYDROCHLORIDE 10 MILLIGRAM(S): 40 TABLET ORAL at 05:05

## 2019-10-25 RX ADMIN — SODIUM CHLORIDE 3 MILLILITER(S): 9 INJECTION INTRAMUSCULAR; INTRAVENOUS; SUBCUTANEOUS at 13:34

## 2019-10-25 RX ADMIN — Medication 25 MILLIGRAM(S): at 05:05

## 2019-10-25 RX ADMIN — GABAPENTIN 800 MILLIGRAM(S): 400 CAPSULE ORAL at 05:05

## 2019-10-25 RX ADMIN — Medication 12: at 17:30

## 2019-10-25 RX ADMIN — GABAPENTIN 800 MILLIGRAM(S): 400 CAPSULE ORAL at 23:51

## 2019-10-25 RX ADMIN — Medication 12: at 17:29

## 2019-10-25 RX ADMIN — HEPARIN SODIUM 5000 UNIT(S): 5000 INJECTION INTRAVENOUS; SUBCUTANEOUS at 17:27

## 2019-10-25 RX ADMIN — METHADONE HYDROCHLORIDE 10 MILLIGRAM(S): 40 TABLET ORAL at 17:27

## 2019-10-25 RX ADMIN — SODIUM CHLORIDE 3 MILLILITER(S): 9 INJECTION INTRAMUSCULAR; INTRAVENOUS; SUBCUTANEOUS at 05:05

## 2019-10-25 RX ADMIN — LISINOPRIL 2.5 MILLIGRAM(S): 2.5 TABLET ORAL at 05:05

## 2019-10-25 NOTE — PROGRESS NOTE ADULT - SUBJECTIVE AND OBJECTIVE BOX
Patient is a 63y old  Male who presents with a chief complaint of left common femoral endarterectomy ,angioplasty /stent left iliac artery (30 Sep 2019 08:09)    Pt is S/P R-->L fem-fem bypass                POD# 1  Pt c/o low back pain which is chronic and groin pain which is tolerable. Pt denies CP, SOB, N/V. Not passing flatus yet    Vital Signs Last 24 Hrs  T(C): 36.9 (25 Oct 2019 07:10), Max: 37.2 (24 Oct 2019 17:10)  T(F): 98.4 (25 Oct 2019 07:10), Max: 98.9 (24 Oct 2019 17:10)  HR: 85 (25 Oct 2019 07:10) (72 - 85)  BP: 126/70 (25 Oct 2019 07:10) (98/61 - 155/65)  BP(mean): --  RR: 19 (25 Oct 2019 07:10) (16 - 22)  SpO2: 96% (25 Oct 2019 07:10) (92% - 99%)  I&O's Detail    24 Oct 2019 07:01  -  25 Oct 2019 07:00  --------------------------------------------------------  IN:  Total IN: 0 mL    OUT:    Indwelling Catheter - Urethral: 1050 mL    Voided: 550 mL  Total OUT: 1600 mL    Total NET: -1600 mL      MEDICATIONS  (STANDING):  aspirin  chewable 81 milliGRAM(s) Oral daily  dextrose 5%. 1000 milliLiter(s) (50 mL/Hr) IV Continuous <Continuous>  dextrose 50% Injectable 12.5 Gram(s) IV Push once  dextrose 50% Injectable 25 Gram(s) IV Push once  dextrose 50% Injectable 25 Gram(s) IV Push once  gabapentin 800 milliGRAM(s) Oral three times a day  heparin SubCutaneous Injection - Peds 5000 Unit(s) SubCutaneous every 12 hours  hydrochlorothiazide 25 milliGRAM(s) Oral daily  insulin glargine Injectable (LANTUS) 40 Unit(s) SubCutaneous two times a day  insulin lispro (HumaLOG) corrective regimen sliding scale   SubCutaneous three times a day before meals  lisinopril Oral Tab/Cap - Peds 2.5 milliGRAM(s) Oral daily  methadone    Tablet 10 milliGRAM(s) Oral four times a day  simvastatin 20 milliGRAM(s) Oral at bedtime  sodium chloride 0.9% lock flush 3 milliLiter(s) IV Push every 8 hours    MEDICATIONS  (PRN):  dextrose 40% Gel 15 Gram(s) Oral once PRN Blood Glucose LESS THAN 70 milliGRAM(s)/deciliter  glucagon  Injectable 1 milliGRAM(s) IntraMuscular once PRN Glucose LESS THAN 70 milligrams/deciliter  oxycodone    5 mG/acetaminophen 325 mG 1 Tablet(s) Oral every 4 hours PRN Moderate Pain (4 - 6)    PAST MEDICAL & SURGICAL HISTORY:  Deep vein thrombosis (DVT): 25 years ago pt had THR was on coumadin for 6 months  External iliac artery occlusion  Ischemic ulcer diabetic foot: left toe  Peripheral vascular disease  Former smoker  Obesity  Diabetic neuropathy  Chronic pain disorder  Cellulitis, leg: bilateral  Chronic venous stasis dermatitis  Ankylosing spondylitis of site in spine  Lupus  Diabetes  History of hip replacement, total, left: x2 surgeries    Physical Exam:  General: NAD, resting comfortably in bed  Pulmonary: Nonlabored breathing, CTA  Cardiovascular: Normal S1, S2  Abdominal: Obese soft, NT/ND, hypoactive BS  Extremities: BLE WWP, bilat Prevena dressings in place, DP pulses palp bilat      LABS:                        9.5    8.81  )-----------( 274      ( 25 Oct 2019 12:05 )             30.8             CAPILLARY BLOOD GLUCOSE  POCT Blood Glucose.: 199 mg/dL (25 Oct 2019 08:42)  POCT Blood Glucose.: 229 mg/dL (24 Oct 2019 16:57)  POCT Blood Glucose.: 267 mg/dL (24 Oct 2019 13:22)      Radiology and Additional Studies:    Assessment:  Patient is a 62 y/o male AxOx3, Patient c/o chronic left leg pain for the last year , patient reports have a blockage to left leg artery , patient has history chronic pvd .  Patient is having a left common femoral endarterectomy, angioplasty /stent left iliac artery (30 Sep 2019 08:09)  S/P R-->L fem-fem bypass                POD# 1    Plan:  Cont ASA and statin  Pulm toilette; IS, C&DB  OOB/Ambulate/PT  Cont methadone for chronic pain-follows with [facility]  DM management-Lantus restarted; Insulin sliding scale  DC buchanan-monitor U/O  Possible dc home next 24 hours

## 2019-10-25 NOTE — SBIRT NOTE ADULT - NSSBIRTDRGPOSREINDET_GEN_A_CORE
Provided. Pt states he is prescribed narcotics by his pain management physician. Pt states he takes them as prescribed. Pt denied substance misuse.

## 2019-10-26 LAB
GLUCOSE BLDC GLUCOMTR-MCNC: 233 MG/DL — HIGH (ref 70–99)
GLUCOSE BLDC GLUCOMTR-MCNC: 263 MG/DL — HIGH (ref 70–99)
GLUCOSE BLDC GLUCOMTR-MCNC: 287 MG/DL — HIGH (ref 70–99)
GLUCOSE BLDC GLUCOMTR-MCNC: 296 MG/DL — HIGH (ref 70–99)
GLUCOSE BLDC GLUCOMTR-MCNC: 301 MG/DL — HIGH (ref 70–99)

## 2019-10-26 PROCEDURE — 99222 1ST HOSP IP/OBS MODERATE 55: CPT

## 2019-10-26 RX ORDER — INSULIN LISPRO 100/ML
VIAL (ML) SUBCUTANEOUS
Refills: 0 | Status: DISCONTINUED | OUTPATIENT
Start: 2019-10-26 | End: 2019-10-26

## 2019-10-26 RX ORDER — INSULIN LISPRO 100/ML
VIAL (ML) SUBCUTANEOUS
Refills: 0 | Status: DISCONTINUED | OUTPATIENT
Start: 2019-10-26 | End: 2019-10-28

## 2019-10-26 RX ORDER — INSULIN LISPRO 100/ML
8 VIAL (ML) SUBCUTANEOUS
Refills: 0 | Status: DISCONTINUED | OUTPATIENT
Start: 2019-10-26 | End: 2019-10-28

## 2019-10-26 RX ORDER — CLOPIDOGREL BISULFATE 75 MG/1
75 TABLET, FILM COATED ORAL DAILY
Refills: 0 | Status: DISCONTINUED | OUTPATIENT
Start: 2019-10-26 | End: 2019-10-28

## 2019-10-26 RX ORDER — INSULIN HUMAN 100 [IU]/ML
3 INJECTION, SOLUTION SUBCUTANEOUS ONCE
Refills: 0 | Status: COMPLETED | OUTPATIENT
Start: 2019-10-26 | End: 2019-10-26

## 2019-10-26 RX ADMIN — Medication 3: at 21:10

## 2019-10-26 RX ADMIN — GABAPENTIN 800 MILLIGRAM(S): 400 CAPSULE ORAL at 21:07

## 2019-10-26 RX ADMIN — CLOPIDOGREL BISULFATE 75 MILLIGRAM(S): 75 TABLET, FILM COATED ORAL at 12:35

## 2019-10-26 RX ADMIN — METHADONE HYDROCHLORIDE 10 MILLIGRAM(S): 40 TABLET ORAL at 12:36

## 2019-10-26 RX ADMIN — METHADONE HYDROCHLORIDE 10 MILLIGRAM(S): 40 TABLET ORAL at 23:02

## 2019-10-26 RX ADMIN — HEPARIN SODIUM 5000 UNIT(S): 5000 INJECTION INTRAVENOUS; SUBCUTANEOUS at 06:42

## 2019-10-26 RX ADMIN — SODIUM CHLORIDE 3 MILLILITER(S): 9 INJECTION INTRAMUSCULAR; INTRAVENOUS; SUBCUTANEOUS at 21:04

## 2019-10-26 RX ADMIN — OXYCODONE AND ACETAMINOPHEN 1 TABLET(S): 5; 325 TABLET ORAL at 07:38

## 2019-10-26 RX ADMIN — Medication 8 UNIT(S): at 17:11

## 2019-10-26 RX ADMIN — OXYCODONE AND ACETAMINOPHEN 1 TABLET(S): 5; 325 TABLET ORAL at 06:39

## 2019-10-26 RX ADMIN — SODIUM CHLORIDE 3 MILLILITER(S): 9 INJECTION INTRAMUSCULAR; INTRAVENOUS; SUBCUTANEOUS at 14:06

## 2019-10-26 RX ADMIN — LISINOPRIL 2.5 MILLIGRAM(S): 2.5 TABLET ORAL at 06:39

## 2019-10-26 RX ADMIN — Medication 81 MILLIGRAM(S): at 12:35

## 2019-10-26 RX ADMIN — INSULIN GLARGINE 40 UNIT(S): 100 INJECTION, SOLUTION SUBCUTANEOUS at 09:44

## 2019-10-26 RX ADMIN — OXYCODONE AND ACETAMINOPHEN 1 TABLET(S): 5; 325 TABLET ORAL at 21:17

## 2019-10-26 RX ADMIN — Medication 9: at 09:40

## 2019-10-26 RX ADMIN — INSULIN GLARGINE 40 UNIT(S): 100 INJECTION, SOLUTION SUBCUTANEOUS at 21:10

## 2019-10-26 RX ADMIN — OXYCODONE AND ACETAMINOPHEN 1 TABLET(S): 5; 325 TABLET ORAL at 21:47

## 2019-10-26 RX ADMIN — Medication 5: at 16:28

## 2019-10-26 RX ADMIN — INSULIN HUMAN 3 UNIT(S): 100 INJECTION, SOLUTION SUBCUTANEOUS at 14:19

## 2019-10-26 RX ADMIN — SIMVASTATIN 20 MILLIGRAM(S): 20 TABLET, FILM COATED ORAL at 21:07

## 2019-10-26 RX ADMIN — METHADONE HYDROCHLORIDE 10 MILLIGRAM(S): 40 TABLET ORAL at 06:39

## 2019-10-26 RX ADMIN — GABAPENTIN 800 MILLIGRAM(S): 400 CAPSULE ORAL at 06:39

## 2019-10-26 RX ADMIN — HEPARIN SODIUM 5000 UNIT(S): 5000 INJECTION INTRAVENOUS; SUBCUTANEOUS at 17:11

## 2019-10-26 RX ADMIN — Medication 25 MILLIGRAM(S): at 06:39

## 2019-10-26 RX ADMIN — METHADONE HYDROCHLORIDE 10 MILLIGRAM(S): 40 TABLET ORAL at 17:11

## 2019-10-26 RX ADMIN — SODIUM CHLORIDE 3 MILLILITER(S): 9 INJECTION INTRAMUSCULAR; INTRAVENOUS; SUBCUTANEOUS at 07:15

## 2019-10-26 RX ADMIN — OXYCODONE AND ACETAMINOPHEN 1 TABLET(S): 5; 325 TABLET ORAL at 00:30

## 2019-10-26 RX ADMIN — Medication 7: at 12:36

## 2019-10-26 RX ADMIN — GABAPENTIN 800 MILLIGRAM(S): 400 CAPSULE ORAL at 14:54

## 2019-10-26 NOTE — CONSULT NOTE ADULT - SUBJECTIVE AND OBJECTIVE BOX
HPI:  Patient is a 64 y/o male AxOx3, Patient c/o chronic left leg pain for the last year , patient reports have a blockage to left leg artery , patient has history chronic pvd .  Patient is  s/p fem-fembypass   consult called for DM management PAST MEDICAL & SURGICAL HISTORY:  Deep vein thrombosis (DVT): 25 years ago pt had THR was on coumadin for 6 months  External iliac artery occlusion  Ischemic ulcer diabetic foot: left toe  Peripheral vascular disease  Former smoker  Obesity  Diabetic neuropathy  Chronic pain disorder  Cellulitis, leg: bilateral  Chronic venous stasis dermatitis  Ankylosing spondylitis of site in spine  Lupus  Diabetes x 4-5 years  oupt regimen Actos 30 mg Amaryl 4 mg bid Lantus 80 units qhs MFN 1000 mg bid  Pt repotrs BS ae variable at home   with some 300-400 numbers    History of hip replacement, total, left: x2 surgeries      FAMILY HISTORY:  Family history of diabetes mellitus (DM)  Family history of diabetes mellitus (DM) (Sibling, Father, Mother)      SOCIAL HISTORY: former smoker quit 25 years ago  no alcohol     REVIEW OF SYSTEMS:    Constitutional: No fever, no chills, no change in weight.    Eyes: No eye swelling,no  blurry vision, no redness, no loss of vision.    Neck: No neck pain, no change in voice.    Lungs: No shortness of breath, no wheezing, no cough    CV: No chest pain, no palpitations, no pain with walking.    GI: No nausea, no vomiting, no constipation, no diarrhea, no abdominal pain    : No urinary frequency, no blood in urine, no urinary burning, no difficulty voiding.    Musculoskeletal: No muscle pain, no joint pain, no swelling.    Skin: No rash, no infections.    Neurologic: No headaches, no weakness, no burning or pain in feet, no tremor.    Endocrine: No heat intolerance, no cold intolerance, no increased sweating, no shakiness between meals.    Psych: No depression, no anxiety, no trouble concentrating    MEDICATIONS  (STANDING):  aspirin  chewable 81 milliGRAM(s) Oral daily  clopidogrel Tablet 75 milliGRAM(s) Oral daily  dextrose 5%. 1000 milliLiter(s) (50 mL/Hr) IV Continuous <Continuous>  dextrose 50% Injectable 12.5 Gram(s) IV Push once  dextrose 50% Injectable 25 Gram(s) IV Push once  dextrose 50% Injectable 25 Gram(s) IV Push once  gabapentin 800 milliGRAM(s) Oral three times a day  heparin SubCutaneous Injection - Peds 5000 Unit(s) SubCutaneous every 12 hours  hydrochlorothiazide 25 milliGRAM(s) Oral daily  insulin glargine Injectable (LANTUS) 40 Unit(s) SubCutaneous two times a day  insulin lispro (HumaLOG) corrective regimen sliding scale   SubCutaneous Before meals and at bedtime  insulin lispro Injectable (HumaLOG) 8 Unit(s) SubCutaneous three times a day with meals  lisinopril Oral Tab/Cap - Peds 2.5 milliGRAM(s) Oral daily  methadone    Tablet 10 milliGRAM(s) Oral four times a day  simvastatin 20 milliGRAM(s) Oral at bedtime  sodium chloride 0.9% lock flush 3 milliLiter(s) IV Push every 8 hours    MEDICATIONS  (PRN):  dextrose 40% Gel 15 Gram(s) Oral once PRN Blood Glucose LESS THAN 70 milliGRAM(s)/deciliter  glucagon  Injectable 1 milliGRAM(s) IntraMuscular once PRN Glucose LESS THAN 70 milligrams/deciliter  oxycodone    5 mG/acetaminophen 325 mG 1 Tablet(s) Oral every 4 hours PRN Moderate Pain (4 - 6)      Allergies    No Known Allergies    Intolerances          CAPILLARY BLOOD GLUCOSE  CAPILLARY BLOOD GLUCOSE      POCT Blood Glucose.: 233 mg/dL (26 Oct 2019 16:27)  POCT Blood Glucose.: 263 mg/dL (26 Oct 2019 12:34)  POCT Blood Glucose.: 287 mg/dL (26 Oct 2019 10:20)  POCT Blood Glucose.: 301 mg/dL (26 Oct 2019 09:37)        POCT Blood Glucose.: 233 mg/dL (26 Oct 2019 16:27)      PHYSICAL EXAM:    Vital Signs Last 24 Hrs  T(C): 36.8 (26 Oct 2019 23:43), Max: 37 (26 Oct 2019 05:07)  T(F): 98.2 (26 Oct 2019 23:43), Max: 98.6 (26 Oct 2019 05:07)  HR: 81 (26 Oct 2019 23:43) (60 - 88)  BP: 111/69 (26 Oct 2019 23:43) (111/62 - 131/73)  BP(mean): --  RR: 18 (26 Oct 2019 23:43) (18 - 18)  SpO2: 95% (26 Oct 2019 23:43) (94% - 97%)    General appearance: Well developed, well nourished.    Eyes: Pupils equal and reactive to light. EOM full. No exophthalmos.    Neck: Trachea midline. No thyroid enlargement.    Lungs: Normal respiratory excursion. Lungs clear.    CV: Regular cardiac rhythm. No murmur. Carotid and pedal pulses intact.    Abdomen: Soft, non tender, no organomegaly or mass.    Musculoskeletal: No cyanosis, clubbing, or edema. No pedal lesions.    Skin: Warm and moist. No rash. No acanthosis. let foot drain in palce and tubing wrapped around left foot - cannot move it to examine it and take off sock     Neuro: Cranial nerves intact. Normal motor and sensory function. DTR's normal.    Psych: Normal affect, good judgement.            LABS:                        9.5    8.81  )-----------( 274      ( 25 Oct 2019 12:05 )             30.8     10-25    133<L>  |  97<L>  |  12.0  ----------------------------<  258<H>  3.7   |  25.0  |  0.74    Ca    8.7      25 Oct 2019 12:05                CAPILLARY BLOOD GLUCOSE  CAPILLARY BLOOD GLUCOSE      POCT Blood Glucose.: 233 mg/dL (26 Oct 2019 16:27)  POCT Blood Glucose.: 263 mg/dL (26 Oct 2019 12:34)  POCT Blood Glucose.: 287 mg/dL (26 Oct 2019 10:20)  POCT Blood Glucose.: 301 mg/dL (26 Oct 2019 09:37)      RADIOLOGY & ADDITIONAL STUDIES:

## 2019-10-26 NOTE — PHYSICAL THERAPY INITIAL EVALUATION ADULT - DIAGNOSIS, PT EVAL
Impaired functional mobility, including gait, locomotion, and balance secondary to recent lower limb ischemia and recent vascular surgery.

## 2019-10-26 NOTE — CONSULT NOTE ADULT - ASSESSMENT
T2DM   unocntrolle d  Premela insuin added today    can increase to 12 unit tid ac   Keep Lantus 40 bid   may resume MFN 1000 mg bid   and amaryl 4 mg bid     can hold actos

## 2019-10-26 NOTE — PHYSICAL THERAPY INITIAL EVALUATION ADULT - ADDITIONAL COMMENTS
Patient lives in a single-story house with 3 K[address] with railing. He lives with his wife who is available to provide 24/7 assist if necessary. He was independent prior to admission walking with a cane, has no other DME at home.

## 2019-10-26 NOTE — PROGRESS NOTE ADULT - ASSESSMENT
A/P: Patient is a 64 y/o male AxOx3, Patient c/o chronic left leg pain for the last year , patient reports have a blockage to left leg artery , patient has history chronic pvd .  Patient is having a left common femoral endarterectomy, angioplasty /stent left iliac artery (30 Sep 2019 08:09)  S/P R-->L fem-fem bypass                POD# 2    Plan:  Cont ASA and statin  Pulm toilette; IS, C&DB  OOB/Ambulate/PT  Cont methadone for chronic pain-follows with [facility]  DM management-Lantus restarted; Insulin sliding scale increased in amount and frequency  Passed TOV  Plan for dc home Sunday with wound vacs in place  Will follow up with Dr. Foster in Clinic to reassess wound vacs on Friday 11/1

## 2019-10-26 NOTE — PROGRESS NOTE ADULT - SUBJECTIVE AND OBJECTIVE BOX
INTERVAL HPI/OVERNIGHT EVENTS: patient with elevated blood glucose overnight. Patient's SSI increased.    SUBJECTIVE: Patient seen at bedside and found in no acute distress. Patient sitting in chair and doing well. Patient was out of bed ambulating yesterday and is overall feeling well. He passed his trial of void yesterday. Patient states he has mild pain at his bilateral groins but pain is well controlled. He has no complaints at present. Denies chest pain, sob, n/v, fever/chills.      MEDICATIONS  (STANDING):  aspirin  chewable 81 milliGRAM(s) Oral daily  dextrose 5%. 1000 milliLiter(s) (50 mL/Hr) IV Continuous <Continuous>  dextrose 50% Injectable 12.5 Gram(s) IV Push once  dextrose 50% Injectable 25 Gram(s) IV Push once  dextrose 50% Injectable 25 Gram(s) IV Push once  gabapentin 800 milliGRAM(s) Oral three times a day  heparin SubCutaneous Injection - Peds 5000 Unit(s) SubCutaneous every 12 hours  hydrochlorothiazide 25 milliGRAM(s) Oral daily  insulin glargine Injectable (LANTUS) 40 Unit(s) SubCutaneous two times a day  insulin lispro (HumaLOG) corrective regimen sliding scale   SubCutaneous Before meals and at bedtime  lisinopril Oral Tab/Cap - Peds 2.5 milliGRAM(s) Oral daily  methadone    Tablet 10 milliGRAM(s) Oral four times a day  simvastatin 20 milliGRAM(s) Oral at bedtime  sodium chloride 0.9% lock flush 3 milliLiter(s) IV Push every 8 hours    MEDICATIONS  (PRN):  dextrose 40% Gel 15 Gram(s) Oral once PRN Blood Glucose LESS THAN 70 milliGRAM(s)/deciliter  glucagon  Injectable 1 milliGRAM(s) IntraMuscular once PRN Glucose LESS THAN 70 milligrams/deciliter  oxycodone    5 mG/acetaminophen 325 mG 1 Tablet(s) Oral every 4 hours PRN Moderate Pain (4 - 6)      Vital Signs Last 24 Hrs  T(C): 37.6 (25 Oct 2019 23:47), Max: 38.1 (25 Oct 2019 19:14)  T(F): 99.7 (25 Oct 2019 23:47), Max: 100.5 (25 Oct 2019 19:14)  HR: 88 (25 Oct 2019 23:47) (77 - 91)  BP: 126/73 (25 Oct 2019 23:47) (110/67 - 142/76)  BP(mean): --  RR: 18 (25 Oct 2019 23:47) (17 - 19)  SpO2: 92% (25 Oct 2019 23:47) (91% - 96%)    PE  Gen: obese male sitting comfortably in chair. nad  Pulm: no increased wob  Ext: bilateral groin incisions with provena vacs in place and maintaining appropriate suction. No redness or irritation around site of vacs  Vasc: 2+ DP pulses bilaterally, feet warm and well perfused with <3s cap refill  Neuro: grossly neurovascularly intact and moving bilateral LE without motor deficit      I&O's Detail    24 Oct 2019 07:01  -  25 Oct 2019 07:00  --------------------------------------------------------  IN:  Total IN: 0 mL    OUT:    Indwelling Catheter - Urethral: 1050 mL    Voided: 550 mL  Total OUT: 1600 mL    Total NET: -1600 mL      25 Oct 2019 07:01  -  26 Oct 2019 03:45  --------------------------------------------------------  IN:  Total IN: 0 mL    OUT:    Indwelling Catheter - Urethral: 2250 mL    Voided: 350 mL  Total OUT: 2600 mL    Total NET: -2600 mL          LABS:                        9.5    8.81  )-----------( 274      ( 25 Oct 2019 12:05 )             30.8     10-25    133<L>  |  97<L>  |  12.0  ----------------------------<  258<H>  3.7   |  25.0  |  0.74    Ca    8.7      25 Oct 2019 12:05

## 2019-10-26 NOTE — PHYSICAL THERAPY INITIAL EVALUATION ADULT - GENERAL OBSERVATIONS, REHAB EVAL
Patient received seated in chair, NAD, breathing RA, +wound vac x2, +venous compression device. Pt agreeable to Physical Therapy evaluation.

## 2019-10-27 LAB
ANION GAP SERPL CALC-SCNC: 16 MMOL/L — SIGNIFICANT CHANGE UP (ref 5–17)
APPEARANCE UR: ABNORMAL
BACTERIA # UR AUTO: ABNORMAL
BASOPHILS # BLD AUTO: 0.1 K/UL — SIGNIFICANT CHANGE UP (ref 0–0.2)
BASOPHILS NFR BLD AUTO: 0.8 % — SIGNIFICANT CHANGE UP (ref 0–2)
BILIRUB UR-MCNC: NEGATIVE — SIGNIFICANT CHANGE UP
BUN SERPL-MCNC: 28 MG/DL — HIGH (ref 8–20)
CALCIUM SERPL-MCNC: 9.2 MG/DL — SIGNIFICANT CHANGE UP (ref 8.6–10.2)
CHLORIDE SERPL-SCNC: 94 MMOL/L — LOW (ref 98–107)
CO2 SERPL-SCNC: 23 MMOL/L — SIGNIFICANT CHANGE UP (ref 22–29)
COLOR SPEC: YELLOW — SIGNIFICANT CHANGE UP
COMMENT - URINE: SIGNIFICANT CHANGE UP
CREAT SERPL-MCNC: 1.06 MG/DL — SIGNIFICANT CHANGE UP (ref 0.5–1.3)
DIFF PNL FLD: ABNORMAL
EOSINOPHIL # BLD AUTO: 0.24 K/UL — SIGNIFICANT CHANGE UP (ref 0–0.5)
EOSINOPHIL NFR BLD AUTO: 1.9 % — SIGNIFICANT CHANGE UP (ref 0–6)
EPI CELLS # UR: SIGNIFICANT CHANGE UP
GLUCOSE BLDC GLUCOMTR-MCNC: 151 MG/DL — HIGH (ref 70–99)
GLUCOSE BLDC GLUCOMTR-MCNC: 175 MG/DL — HIGH (ref 70–99)
GLUCOSE BLDC GLUCOMTR-MCNC: 188 MG/DL — HIGH (ref 70–99)
GLUCOSE BLDC GLUCOMTR-MCNC: 189 MG/DL — HIGH (ref 70–99)
GLUCOSE BLDC GLUCOMTR-MCNC: 215 MG/DL — HIGH (ref 70–99)
GLUCOSE BLDC GLUCOMTR-MCNC: 316 MG/DL — HIGH (ref 70–99)
GLUCOSE SERPL-MCNC: 221 MG/DL — HIGH (ref 70–115)
GLUCOSE UR QL: NEGATIVE — SIGNIFICANT CHANGE UP
HCT VFR BLD CALC: 34.2 % — LOW (ref 39–50)
HGB BLD-MCNC: 10.5 G/DL — LOW (ref 13–17)
IMM GRANULOCYTES NFR BLD AUTO: 0.8 % — SIGNIFICANT CHANGE UP (ref 0–1.5)
KETONES UR-MCNC: ABNORMAL
LEUKOCYTE ESTERASE UR-ACNC: ABNORMAL
LYMPHOCYTES # BLD AUTO: 17.2 % — SIGNIFICANT CHANGE UP (ref 13–44)
LYMPHOCYTES # BLD AUTO: 2.15 K/UL — SIGNIFICANT CHANGE UP (ref 1–3.3)
MAGNESIUM SERPL-MCNC: 2.1 MG/DL — SIGNIFICANT CHANGE UP (ref 1.6–2.6)
MCHC RBC-ENTMCNC: 25.6 PG — LOW (ref 27–34)
MCHC RBC-ENTMCNC: 30.7 GM/DL — LOW (ref 32–36)
MCV RBC AUTO: 83.4 FL — SIGNIFICANT CHANGE UP (ref 80–100)
MONOCYTES # BLD AUTO: 0.9 K/UL — SIGNIFICANT CHANGE UP (ref 0–0.9)
MONOCYTES NFR BLD AUTO: 7.2 % — SIGNIFICANT CHANGE UP (ref 2–14)
NEUTROPHILS # BLD AUTO: 9.04 K/UL — HIGH (ref 1.8–7.4)
NEUTROPHILS NFR BLD AUTO: 72.1 % — SIGNIFICANT CHANGE UP (ref 43–77)
NITRITE UR-MCNC: POSITIVE
PH UR: 8 — SIGNIFICANT CHANGE UP (ref 5–8)
PHOSPHATE SERPL-MCNC: 3.4 MG/DL — SIGNIFICANT CHANGE UP (ref 2.4–4.7)
PLATELET # BLD AUTO: 320 K/UL — SIGNIFICANT CHANGE UP (ref 150–400)
POTASSIUM SERPL-MCNC: 3.7 MMOL/L — SIGNIFICANT CHANGE UP (ref 3.5–5.3)
POTASSIUM SERPL-SCNC: 3.7 MMOL/L — SIGNIFICANT CHANGE UP (ref 3.5–5.3)
PROT UR-MCNC: 100
RBC # BLD: 4.1 M/UL — LOW (ref 4.2–5.8)
RBC # FLD: 17.6 % — HIGH (ref 10.3–14.5)
RBC CASTS # UR COMP ASSIST: ABNORMAL /HPF (ref 0–4)
SODIUM SERPL-SCNC: 133 MMOL/L — LOW (ref 135–145)
SP GR SPEC: 1.01 — SIGNIFICANT CHANGE UP (ref 1.01–1.02)
TRI-PHOS CRY UR QL COMP ASSIST: ABNORMAL
UROBILINOGEN FLD QL: NEGATIVE — SIGNIFICANT CHANGE UP
WBC # BLD: 12.53 K/UL — HIGH (ref 3.8–10.5)
WBC # FLD AUTO: 12.53 K/UL — HIGH (ref 3.8–10.5)
WBC UR QL: ABNORMAL

## 2019-10-27 RX ORDER — CEPHALEXIN 500 MG
500 CAPSULE ORAL EVERY 8 HOURS
Refills: 0 | Status: DISCONTINUED | OUTPATIENT
Start: 2019-10-27 | End: 2019-10-28

## 2019-10-27 RX ORDER — POTASSIUM CHLORIDE 20 MEQ
20 PACKET (EA) ORAL ONCE
Refills: 0 | Status: COMPLETED | OUTPATIENT
Start: 2019-10-27 | End: 2019-10-27

## 2019-10-27 RX ADMIN — INSULIN GLARGINE 40 UNIT(S): 100 INJECTION, SOLUTION SUBCUTANEOUS at 08:53

## 2019-10-27 RX ADMIN — GABAPENTIN 800 MILLIGRAM(S): 400 CAPSULE ORAL at 21:54

## 2019-10-27 RX ADMIN — Medication 25 MILLIGRAM(S): at 05:55

## 2019-10-27 RX ADMIN — Medication 5: at 12:39

## 2019-10-27 RX ADMIN — HEPARIN SODIUM 5000 UNIT(S): 5000 INJECTION INTRAVENOUS; SUBCUTANEOUS at 17:48

## 2019-10-27 RX ADMIN — SODIUM CHLORIDE 3 MILLILITER(S): 9 INJECTION INTRAMUSCULAR; INTRAVENOUS; SUBCUTANEOUS at 05:54

## 2019-10-27 RX ADMIN — Medication 500 MILLIGRAM(S): at 17:48

## 2019-10-27 RX ADMIN — GABAPENTIN 800 MILLIGRAM(S): 400 CAPSULE ORAL at 05:55

## 2019-10-27 RX ADMIN — GABAPENTIN 800 MILLIGRAM(S): 400 CAPSULE ORAL at 13:27

## 2019-10-27 RX ADMIN — Medication 3: at 08:52

## 2019-10-27 RX ADMIN — Medication 3: at 21:55

## 2019-10-27 RX ADMIN — Medication 8 UNIT(S): at 08:51

## 2019-10-27 RX ADMIN — METHADONE HYDROCHLORIDE 10 MILLIGRAM(S): 40 TABLET ORAL at 17:04

## 2019-10-27 RX ADMIN — Medication 20 MILLIEQUIVALENT(S): at 18:40

## 2019-10-27 RX ADMIN — Medication 81 MILLIGRAM(S): at 11:52

## 2019-10-27 RX ADMIN — SODIUM CHLORIDE 3 MILLILITER(S): 9 INJECTION INTRAMUSCULAR; INTRAVENOUS; SUBCUTANEOUS at 21:58

## 2019-10-27 RX ADMIN — Medication 3: at 17:04

## 2019-10-27 RX ADMIN — METHADONE HYDROCHLORIDE 10 MILLIGRAM(S): 40 TABLET ORAL at 23:17

## 2019-10-27 RX ADMIN — SODIUM CHLORIDE 3 MILLILITER(S): 9 INJECTION INTRAMUSCULAR; INTRAVENOUS; SUBCUTANEOUS at 15:13

## 2019-10-27 RX ADMIN — HEPARIN SODIUM 5000 UNIT(S): 5000 INJECTION INTRAVENOUS; SUBCUTANEOUS at 05:55

## 2019-10-27 RX ADMIN — SIMVASTATIN 20 MILLIGRAM(S): 20 TABLET, FILM COATED ORAL at 21:54

## 2019-10-27 RX ADMIN — LISINOPRIL 2.5 MILLIGRAM(S): 2.5 TABLET ORAL at 05:55

## 2019-10-27 RX ADMIN — Medication 8 UNIT(S): at 12:39

## 2019-10-27 RX ADMIN — METHADONE HYDROCHLORIDE 10 MILLIGRAM(S): 40 TABLET ORAL at 11:52

## 2019-10-27 RX ADMIN — CLOPIDOGREL BISULFATE 75 MILLIGRAM(S): 75 TABLET, FILM COATED ORAL at 11:52

## 2019-10-27 RX ADMIN — METHADONE HYDROCHLORIDE 10 MILLIGRAM(S): 40 TABLET ORAL at 05:55

## 2019-10-27 RX ADMIN — Medication 8 UNIT(S): at 17:03

## 2019-10-27 RX ADMIN — INSULIN GLARGINE 40 UNIT(S): 100 INJECTION, SOLUTION SUBCUTANEOUS at 21:54

## 2019-10-27 NOTE — PROGRESS NOTE ADULT - ASSESSMENT
Patient is a 64 y/o male AxOx3, Patient c/o chronic left leg pain for the last year , patient reports have a blockage to left leg artery , patient has history chronic pvd .  Patient is had a left common femoral endarterectomy, angioplasty /stent left iliac artery (30 Sep 2019 08:09)  S/P R-->L fem-fem bypass  10/24    Plan:  Cont ASA and statin  Pulm toilette; IS, C&DB  OOB/Ambulate/PT  Cont methadone for chronic pain-follows with [facility]  DM management- humalog 8units before meals can increase to 12 if needed, continue Lantus 40 bid, may resume MFN 1000 mg bid   and amaryl 4 mg bid as per Endocrine recommendation  Plan for dc home Sunday with wound vacs in place  Will follow up with Dr. Foster in Clinic to reassess wound vacs on Friday 11/1

## 2019-10-27 NOTE — PROGRESS NOTE ADULT - SUBJECTIVE AND OBJECTIVE BOX
INTERVAL HPI/OVERNIGHT EVENTS:  No acute events overnight. patient evaluated at bedside and found hemodynamically stable and in no acute distress. During 10/26 patient with uncontrolled bood glucose levels during the day, ISS increased. Endocrine consulted and recommended to add 8units humalog before each meals can increase to 12 if needed. Blood glucose levels downtrending. Otherwise patient tolerating diet, no nausea or vomiting, is OOB and to chair which he personally prefers.     STATUS POST:  fem-fem bypass 10/24    SUBJECTIVE:    MEDICATIONS  (STANDING):  aspirin  chewable 81 milliGRAM(s) Oral daily  clopidogrel Tablet 75 milliGRAM(s) Oral daily  dextrose 5%. 1000 milliLiter(s) (50 mL/Hr) IV Continuous <Continuous>  dextrose 50% Injectable 12.5 Gram(s) IV Push once  dextrose 50% Injectable 25 Gram(s) IV Push once  dextrose 50% Injectable 25 Gram(s) IV Push once  gabapentin 800 milliGRAM(s) Oral three times a day  heparin SubCutaneous Injection - Peds 5000 Unit(s) SubCutaneous every 12 hours  hydrochlorothiazide 25 milliGRAM(s) Oral daily  insulin glargine Injectable (LANTUS) 40 Unit(s) SubCutaneous two times a day  insulin lispro (HumaLOG) corrective regimen sliding scale   SubCutaneous Before meals and at bedtime  insulin lispro Injectable (HumaLOG) 8 Unit(s) SubCutaneous three times a day with meals  lisinopril Oral Tab/Cap - Peds 2.5 milliGRAM(s) Oral daily  methadone    Tablet 10 milliGRAM(s) Oral four times a day  simvastatin 20 milliGRAM(s) Oral at bedtime  sodium chloride 0.9% lock flush 3 milliLiter(s) IV Push every 8 hours    MEDICATIONS  (PRN):  dextrose 40% Gel 15 Gram(s) Oral once PRN Blood Glucose LESS THAN 70 milliGRAM(s)/deciliter  glucagon  Injectable 1 milliGRAM(s) IntraMuscular once PRN Glucose LESS THAN 70 milligrams/deciliter  oxycodone    5 mG/acetaminophen 325 mG 1 Tablet(s) Oral every 4 hours PRN Moderate Pain (4 - 6)      Vital Signs Last 24 Hrs  T(C): 36.8 (26 Oct 2019 23:43), Max: 37 (26 Oct 2019 05:07)  T(F): 98.2 (26 Oct 2019 23:43), Max: 98.6 (26 Oct 2019 05:07)  HR: 81 (26 Oct 2019 23:43) (60 - 88)  BP: 111/69 (26 Oct 2019 23:43) (111/62 - 131/73)  BP(mean): --  RR: 18 (26 Oct 2019 23:43) (18 - 18)  SpO2: 95% (26 Oct 2019 23:43) (94% - 97%)    PHYSICAL EXAM:    General:   lying comfortably in bed, no acute distress  Chest: non-labored breathing  Ext: bilateral groin incisions healing well. No erythema or suppuration, no signs of infection  Vasc: 2+ DP pulses bilaterally, feet warm and well perfused with <3s cap refill      I&O's Detail    25 Oct 2019 07:01  -  26 Oct 2019 07:00  --------------------------------------------------------  IN:  Total IN: 0 mL    OUT:    Indwelling Catheter - Urethral: 2250 mL    Voided: 350 mL  Total OUT: 2600 mL    Total NET: -2600 mL      26 Oct 2019 07:01  -  27 Oct 2019 02:41  --------------------------------------------------------  IN:    Oral Fluid: 500 mL  Total IN: 500 mL    OUT:    Voided: 750 mL  Total OUT: 750 mL    Total NET: -250 mL          LABS:                        9.5    8.81  )-----------( 274      ( 25 Oct 2019 12:05 )             30.8     10-25    133<L>  |  97<L>  |  12.0  ----------------------------<  258<H>  3.7   |  25.0  |  0.74    Ca    8.7      25 Oct 2019 12:05            RADIOLOGY & ADDITIONAL STUDIES:    ASSESSMENT AND PLAN:

## 2019-10-28 ENCOUNTER — TRANSCRIPTION ENCOUNTER (OUTPATIENT)
Age: 64
End: 2019-10-28

## 2019-10-28 VITALS
RESPIRATION RATE: 18 BRPM | SYSTOLIC BLOOD PRESSURE: 126 MMHG | HEART RATE: 85 BPM | DIASTOLIC BLOOD PRESSURE: 73 MMHG | TEMPERATURE: 98 F | OXYGEN SATURATION: 94 %

## 2019-10-28 LAB
ANION GAP SERPL CALC-SCNC: 14 MMOL/L — SIGNIFICANT CHANGE UP (ref 5–17)
BASOPHILS # BLD AUTO: 0.31 K/UL — HIGH (ref 0–0.2)
BASOPHILS NFR BLD AUTO: 2.6 % — HIGH (ref 0–2)
BUN SERPL-MCNC: 34 MG/DL — HIGH (ref 8–20)
CALCIUM SERPL-MCNC: 9.1 MG/DL — SIGNIFICANT CHANGE UP (ref 8.6–10.2)
CHLORIDE SERPL-SCNC: 98 MMOL/L — SIGNIFICANT CHANGE UP (ref 98–107)
CO2 SERPL-SCNC: 22 MMOL/L — SIGNIFICANT CHANGE UP (ref 22–29)
CREAT SERPL-MCNC: 0.85 MG/DL — SIGNIFICANT CHANGE UP (ref 0.5–1.3)
EOSINOPHIL # BLD AUTO: 0.11 K/UL — SIGNIFICANT CHANGE UP (ref 0–0.5)
EOSINOPHIL NFR BLD AUTO: 0.9 % — SIGNIFICANT CHANGE UP (ref 0–6)
GIANT PLATELETS BLD QL SMEAR: PRESENT — SIGNIFICANT CHANGE UP
GLUCOSE BLDC GLUCOMTR-MCNC: 164 MG/DL — HIGH (ref 70–99)
GLUCOSE SERPL-MCNC: 157 MG/DL — HIGH (ref 70–115)
HCT VFR BLD CALC: 33.2 % — LOW (ref 39–50)
HGB BLD-MCNC: 10.2 G/DL — LOW (ref 13–17)
LYMPHOCYTES # BLD AUTO: 26.1 % — SIGNIFICANT CHANGE UP (ref 13–44)
LYMPHOCYTES # BLD AUTO: 3.16 K/UL — SIGNIFICANT CHANGE UP (ref 1–3.3)
MAGNESIUM SERPL-MCNC: 2.2 MG/DL — SIGNIFICANT CHANGE UP (ref 1.6–2.6)
MANUAL SMEAR VERIFICATION: SIGNIFICANT CHANGE UP
MCHC RBC-ENTMCNC: 25.7 PG — LOW (ref 27–34)
MCHC RBC-ENTMCNC: 30.7 GM/DL — LOW (ref 32–36)
MCV RBC AUTO: 83.6 FL — SIGNIFICANT CHANGE UP (ref 80–100)
MONOCYTES # BLD AUTO: 0.63 K/UL — SIGNIFICANT CHANGE UP (ref 0–0.9)
MONOCYTES NFR BLD AUTO: 5.2 % — SIGNIFICANT CHANGE UP (ref 2–14)
NEUTROPHILS # BLD AUTO: 7.9 K/UL — HIGH (ref 1.8–7.4)
NEUTROPHILS NFR BLD AUTO: 65.2 % — SIGNIFICANT CHANGE UP (ref 43–77)
OVALOCYTES BLD QL SMEAR: SLIGHT — SIGNIFICANT CHANGE UP
PHOSPHATE SERPL-MCNC: 3.8 MG/DL — SIGNIFICANT CHANGE UP (ref 2.4–4.7)
PLAT MORPH BLD: ABNORMAL
PLATELET # BLD AUTO: 313 K/UL — SIGNIFICANT CHANGE UP (ref 150–400)
PLATELET COUNT - ESTIMATE: NORMAL — SIGNIFICANT CHANGE UP
POLYCHROMASIA BLD QL SMEAR: SIGNIFICANT CHANGE UP
POTASSIUM SERPL-MCNC: 3.4 MMOL/L — LOW (ref 3.5–5.3)
POTASSIUM SERPL-SCNC: 3.4 MMOL/L — LOW (ref 3.5–5.3)
RBC # BLD: 3.97 M/UL — LOW (ref 4.2–5.8)
RBC # FLD: 17.4 % — HIGH (ref 10.3–14.5)
RBC BLD AUTO: ABNORMAL
SODIUM SERPL-SCNC: 134 MMOL/L — LOW (ref 135–145)
WBC # BLD: 12.11 K/UL — HIGH (ref 3.8–10.5)
WBC # FLD AUTO: 12.11 K/UL — HIGH (ref 3.8–10.5)

## 2019-10-28 PROCEDURE — 97110 THERAPEUTIC EXERCISES: CPT

## 2019-10-28 PROCEDURE — 36415 COLL VENOUS BLD VENIPUNCTURE: CPT

## 2019-10-28 PROCEDURE — 82962 GLUCOSE BLOOD TEST: CPT

## 2019-10-28 PROCEDURE — 86850 RBC ANTIBODY SCREEN: CPT

## 2019-10-28 PROCEDURE — C1769: CPT

## 2019-10-28 PROCEDURE — 86923 COMPATIBILITY TEST ELECTRIC: CPT

## 2019-10-28 PROCEDURE — 87186 SC STD MICRODIL/AGAR DIL: CPT

## 2019-10-28 PROCEDURE — 85027 COMPLETE CBC AUTOMATED: CPT

## 2019-10-28 PROCEDURE — 97116 GAIT TRAINING THERAPY: CPT

## 2019-10-28 PROCEDURE — 83735 ASSAY OF MAGNESIUM: CPT

## 2019-10-28 PROCEDURE — C1768: CPT

## 2019-10-28 PROCEDURE — 84100 ASSAY OF PHOSPHORUS: CPT

## 2019-10-28 PROCEDURE — C1894: CPT

## 2019-10-28 PROCEDURE — 86901 BLOOD TYPING SEROLOGIC RH(D): CPT

## 2019-10-28 PROCEDURE — 81001 URINALYSIS AUTO W/SCOPE: CPT

## 2019-10-28 PROCEDURE — 80048 BASIC METABOLIC PNL TOTAL CA: CPT

## 2019-10-28 PROCEDURE — C1889: CPT

## 2019-10-28 PROCEDURE — 86900 BLOOD TYPING SEROLOGIC ABO: CPT

## 2019-10-28 PROCEDURE — 87086 URINE CULTURE/COLONY COUNT: CPT

## 2019-10-28 PROCEDURE — 97163 PT EVAL HIGH COMPLEX 45 MIN: CPT

## 2019-10-28 RX ORDER — CIPROFLOXACIN LACTATE 400MG/40ML
500 VIAL (ML) INTRAVENOUS EVERY 12 HOURS
Refills: 0 | Status: DISCONTINUED | OUTPATIENT
Start: 2019-10-28 | End: 2019-10-28

## 2019-10-28 RX ORDER — MOXIFLOXACIN HYDROCHLORIDE TABLETS, 400 MG 400 MG/1
1 TABLET, FILM COATED ORAL
Qty: 14 | Refills: 0
Start: 2019-10-28 | End: 2019-11-03

## 2019-10-28 RX ORDER — POTASSIUM CHLORIDE 20 MEQ
20 PACKET (EA) ORAL
Refills: 0 | Status: COMPLETED | OUTPATIENT
Start: 2019-10-28 | End: 2019-10-28

## 2019-10-28 RX ORDER — WARFARIN SODIUM 2.5 MG/1
5 TABLET ORAL ONCE
Refills: 0 | Status: DISCONTINUED | OUTPATIENT
Start: 2019-10-28 | End: 2019-10-28

## 2019-10-28 RX ADMIN — Medication 20 MILLIEQUIVALENT(S): at 14:52

## 2019-10-28 RX ADMIN — GABAPENTIN 800 MILLIGRAM(S): 400 CAPSULE ORAL at 14:52

## 2019-10-28 RX ADMIN — Medication 20 MILLIEQUIVALENT(S): at 12:35

## 2019-10-28 RX ADMIN — HEPARIN SODIUM 5000 UNIT(S): 5000 INJECTION INTRAVENOUS; SUBCUTANEOUS at 05:29

## 2019-10-28 RX ADMIN — INSULIN GLARGINE 40 UNIT(S): 100 INJECTION, SOLUTION SUBCUTANEOUS at 08:37

## 2019-10-28 RX ADMIN — LISINOPRIL 2.5 MILLIGRAM(S): 2.5 TABLET ORAL at 05:29

## 2019-10-28 RX ADMIN — Medication 8 UNIT(S): at 12:35

## 2019-10-28 RX ADMIN — SODIUM CHLORIDE 3 MILLILITER(S): 9 INJECTION INTRAMUSCULAR; INTRAVENOUS; SUBCUTANEOUS at 05:30

## 2019-10-28 RX ADMIN — SODIUM CHLORIDE 3 MILLILITER(S): 9 INJECTION INTRAMUSCULAR; INTRAVENOUS; SUBCUTANEOUS at 14:53

## 2019-10-28 RX ADMIN — Medication 25 MILLIGRAM(S): at 05:29

## 2019-10-28 RX ADMIN — Medication 500 MILLIGRAM(S): at 05:29

## 2019-10-28 RX ADMIN — METHADONE HYDROCHLORIDE 10 MILLIGRAM(S): 40 TABLET ORAL at 12:35

## 2019-10-28 RX ADMIN — GABAPENTIN 800 MILLIGRAM(S): 400 CAPSULE ORAL at 05:29

## 2019-10-28 RX ADMIN — Medication 8 UNIT(S): at 08:38

## 2019-10-28 RX ADMIN — Medication 81 MILLIGRAM(S): at 12:35

## 2019-10-28 RX ADMIN — CLOPIDOGREL BISULFATE 75 MILLIGRAM(S): 75 TABLET, FILM COATED ORAL at 12:35

## 2019-10-28 RX ADMIN — METHADONE HYDROCHLORIDE 10 MILLIGRAM(S): 40 TABLET ORAL at 05:29

## 2019-10-28 NOTE — DISCHARGE NOTE PROVIDER - CARE PROVIDER_API CALL
ManvarSingh, Pallavi B (MD)  Vascular Surgery  [address] [facility], [address] [place] [address]  Phone: [phone]  Fax: [phone]  Follow Up Time: 2 weeks

## 2019-10-28 NOTE — PROGRESS NOTE ADULT - SUBJECTIVE AND OBJECTIVE BOX
RANDY MARTINEZ    [id number]    History:      Patient seen this am.  Patient is doing well.  s/p right fem to fem bypass for lle ischemia, on 10/24   The patient's pain has improved.   Patient reports no nausea, vomiting, chest pain, shortness of breath, abdominal pain or fever.   No new complaints. No acute motor or sensory changes are reported.    Vital Signs Last 24 Hrs  T(C): 37 (28 Oct 2019 07:54), Max: 37 (28 Oct 2019 07:54)  T(F): 98.6 (28 Oct 2019 07:54), Max: 98.6 (28 Oct 2019 07:54)  HR: 82 (28 Oct 2019 07:54) (71 - 88)  BP: 113/63 (28 Oct 2019 07:54) (111/70 - 119/69)  BP(mean): --  RR: 18 (28 Oct 2019 07:54) (18 - 18)  SpO2: 95% (28 Oct 2019 07:54) (95% - 96%)  I&O's Summary    27 Oct 2019 07:01  -  28 Oct 2019 07:00  --------------------------------------------------------  IN: 0 mL / OUT: 600 mL / NET: -600 mL                              10.2   12.11 )-----------( 313      ( 28 Oct 2019 05:47 )             33.2     10-28    134<L>  |  98  |  34.0<H>  ----------------------------<  157<H>  3.4<L>   |  22.0  |  0.85    Ca    9.1      28 Oct 2019 05:47  Phos  3.8     10-28  Mg     2.2     10-28        MEDICATIONS  (STANDING):  aspirin  chewable 81 milliGRAM(s) Oral daily  ciprofloxacin     Tablet 500 milliGRAM(s) Oral every 12 hours  clopidogrel Tablet 75 milliGRAM(s) Oral daily  dextrose 5%. 1000 milliLiter(s) (50 mL/Hr) IV Continuous <Continuous>  dextrose 50% Injectable 12.5 Gram(s) IV Push once  dextrose 50% Injectable 25 Gram(s) IV Push once  dextrose 50% Injectable 25 Gram(s) IV Push once  gabapentin 800 milliGRAM(s) Oral three times a day  heparin SubCutaneous Injection - Peds 5000 Unit(s) SubCutaneous every 12 hours  hydrochlorothiazide 25 milliGRAM(s) Oral daily  insulin glargine Injectable (LANTUS) 40 Unit(s) SubCutaneous two times a day  insulin lispro (HumaLOG) corrective regimen sliding scale   SubCutaneous Before meals and at bedtime  insulin lispro Injectable (HumaLOG) 8 Unit(s) SubCutaneous three times a day with meals  lisinopril Oral Tab/Cap - Peds 2.5 milliGRAM(s) Oral daily  methadone    Tablet 10 milliGRAM(s) Oral four times a day  simvastatin 20 milliGRAM(s) Oral at bedtime  sodium chloride 0.9% lock flush 3 milliLiter(s) IV Push every 8 hours    MEDICATIONS  (PRN):  dextrose 40% Gel 15 Gram(s) Oral once PRN Blood Glucose LESS THAN 70 milliGRAM(s)/deciliter  glucagon  Injectable 1 milliGRAM(s) IntraMuscular once PRN Glucose LESS THAN 70 milligrams/deciliter  oxycodone    5 mG/acetaminophen 325 mG 1 Tablet(s) Oral every 4 hours PRN Moderate Pain (4 - 6)      Physical exam:     Large bodily habitus. No acute distress, sitting in chair  non labored breathing.  obese abdomen, non tender  bilateral provena to the groins, not fully sealed   Bilateral le pitting edema  pedal signals present, feet are warm  no calf tenderness  No foot drop. Capillary refill is less than 2 seconds. No cyanosis.    Primary Assessment:  • s/p right to left fem-fem on 10/24 for critical le ischemia  - extremities well perfused post intervention  - patient with no reported rest pain  -  post operative uti     •   Plan:   • antibiotics for uti  - patient needs unna boots to the lower ext  - remove provena prior to discharge and apply conventional dressing  - plan for discharge to home today

## 2019-10-28 NOTE — DISCHARGE NOTE NURSING/CASE MANAGEMENT/SOCIAL WORK - PATIENT PORTAL LINK FT
You can access the FollowMyHealth Patient Portal offered by [facility] by registering at the following website: [email or web address]. By joining [employer]’s FollowMyHealth portal, you will also be able to view your health information using other applications (apps) compatible with our system.

## 2019-10-28 NOTE — DISCHARGE NOTE PROVIDER - NSDCFUADDINST_GEN_ALL_CORE_FT
Patient needs to follow up on Vascular Surgery office this Friday 11/1 to remove Prevena wound vacs.

## 2019-10-28 NOTE — DISCHARGE NOTE PROVIDER - CARE PROVIDERS DIRECT ADDRESSES
[email or web address]

## 2019-10-28 NOTE — DISCHARGE NOTE PROVIDER - NSDCCPCAREPLAN_GEN_ALL_CORE_FT
PRINCIPAL DISCHARGE DIAGNOSIS  Diagnosis: Critical lower limb ischemia  Assessment and Plan of Treatment:

## 2019-10-29 LAB
-  AMIKACIN: SIGNIFICANT CHANGE UP
-  AMPICILLIN/SULBACTAM: SIGNIFICANT CHANGE UP
-  AMPICILLIN: SIGNIFICANT CHANGE UP
-  AZTREONAM: SIGNIFICANT CHANGE UP
-  CEFAZOLIN: SIGNIFICANT CHANGE UP
-  CEFEPIME: SIGNIFICANT CHANGE UP
-  CEFOXITIN: SIGNIFICANT CHANGE UP
-  CEFTRIAXONE: SIGNIFICANT CHANGE UP
-  CIPROFLOXACIN: SIGNIFICANT CHANGE UP
-  ERTAPENEM: SIGNIFICANT CHANGE UP
-  GENTAMICIN: SIGNIFICANT CHANGE UP
-  IMIPENEM: SIGNIFICANT CHANGE UP
-  LEVOFLOXACIN: SIGNIFICANT CHANGE UP
-  MEROPENEM: SIGNIFICANT CHANGE UP
-  NITROFURANTOIN: SIGNIFICANT CHANGE UP
-  PIPERACILLIN/TAZOBACTAM: SIGNIFICANT CHANGE UP
-  TIGECYCLINE: SIGNIFICANT CHANGE UP
-  TOBRAMYCIN: SIGNIFICANT CHANGE UP
-  TRIMETHOPRIM/SULFAMETHOXAZOLE: SIGNIFICANT CHANGE UP
CULTURE RESULTS: SIGNIFICANT CHANGE UP
GLUCOSE BLDC GLUCOMTR-MCNC: 189 MG/DL — HIGH (ref 70–99)
METHOD TYPE: SIGNIFICANT CHANGE UP
ORGANISM # SPEC MICROSCOPIC CNT: SIGNIFICANT CHANGE UP
ORGANISM # SPEC MICROSCOPIC CNT: SIGNIFICANT CHANGE UP
SPECIMEN SOURCE: SIGNIFICANT CHANGE UP

## 2019-11-01 ENCOUNTER — APPOINTMENT (OUTPATIENT)
Dept: VASCULAR SURGERY | Facility: CLINIC | Age: 64
End: 2019-11-01
Payer: MEDICAID

## 2019-11-01 PROCEDURE — 99024 POSTOP FOLLOW-UP VISIT: CPT

## 2019-11-04 ENCOUNTER — APPOINTMENT (OUTPATIENT)
Dept: VASCULAR SURGERY | Facility: CLINIC | Age: 64
End: 2019-11-04
Payer: MEDICAID

## 2019-11-04 VITALS
OXYGEN SATURATION: 96 % | HEART RATE: 83 BPM | SYSTOLIC BLOOD PRESSURE: 131 MMHG | BODY MASS INDEX: 54.93 KG/M2 | HEIGHT: 63 IN | TEMPERATURE: 97.9 F | WEIGHT: 310 LBS | DIASTOLIC BLOOD PRESSURE: 77 MMHG | RESPIRATION RATE: 16 BRPM

## 2019-11-04 PROCEDURE — 29580 STRAPPING UNNA BOOT: CPT | Mod: 50

## 2019-11-07 ENCOUNTER — APPOINTMENT (OUTPATIENT)
Dept: ORTHOPEDIC SURGERY | Facility: CLINIC | Age: 64
End: 2019-11-07
Payer: MEDICAID

## 2019-11-07 VITALS
HEIGHT: 63 IN | WEIGHT: 310 LBS | DIASTOLIC BLOOD PRESSURE: 63 MMHG | HEART RATE: 100 BPM | BODY MASS INDEX: 54.93 KG/M2 | SYSTOLIC BLOOD PRESSURE: 126 MMHG

## 2019-11-07 PROCEDURE — 20610 DRAIN/INJ JOINT/BURSA W/O US: CPT | Mod: RT

## 2019-11-07 PROCEDURE — 73562 X-RAY EXAM OF KNEE 3: CPT | Mod: RT

## 2019-11-07 PROCEDURE — 99203 OFFICE O/P NEW LOW 30 MIN: CPT | Mod: 25

## 2019-11-09 ENCOUNTER — INPATIENT (INPATIENT)
Facility: HOSPITAL | Age: 64
LOS: 3 days | Discharge: HOME | DRG: 689 | End: 2019-11-13
Attending: SURGERY | Admitting: SURGERY
Payer: MEDICARE

## 2019-11-09 VITALS
RESPIRATION RATE: 20 BRPM | DIASTOLIC BLOOD PRESSURE: 74 MMHG | OXYGEN SATURATION: 93 % | SYSTOLIC BLOOD PRESSURE: 130 MMHG | HEART RATE: 100 BPM | TEMPERATURE: 100 F

## 2019-11-09 DIAGNOSIS — L03.119 CELLULITIS OF UNSPECIFIED PART OF LIMB: ICD-10-CM

## 2019-11-09 DIAGNOSIS — Z96.642 PRESENCE OF LEFT ARTIFICIAL HIP JOINT: Chronic | ICD-10-CM

## 2019-11-09 DIAGNOSIS — L03.90 CELLULITIS, UNSPECIFIED: ICD-10-CM

## 2019-11-09 DIAGNOSIS — Z95.828 PRESENCE OF OTHER VASCULAR IMPLANTS AND GRAFTS: Chronic | ICD-10-CM

## 2019-11-09 LAB
ALBUMIN SERPL ELPH-MCNC: 3.4 G/DL — SIGNIFICANT CHANGE UP (ref 3.3–5.2)
ALP SERPL-CCNC: 89 U/L — SIGNIFICANT CHANGE UP (ref 40–120)
ALT FLD-CCNC: 26 U/L — SIGNIFICANT CHANGE UP
ANION GAP SERPL CALC-SCNC: 13 MMOL/L — SIGNIFICANT CHANGE UP (ref 5–17)
APPEARANCE UR: CLEAR — SIGNIFICANT CHANGE UP
APTT BLD: 32.6 SEC — SIGNIFICANT CHANGE UP (ref 27.5–36.3)
AST SERPL-CCNC: 27 U/L — SIGNIFICANT CHANGE UP
BACTERIA # UR AUTO: ABNORMAL
BASOPHILS # BLD AUTO: 0.1 K/UL — SIGNIFICANT CHANGE UP (ref 0–0.2)
BASOPHILS NFR BLD AUTO: 0.5 % — SIGNIFICANT CHANGE UP (ref 0–2)
BILIRUB SERPL-MCNC: 0.3 MG/DL — LOW (ref 0.4–2)
BILIRUB UR-MCNC: NEGATIVE — SIGNIFICANT CHANGE UP
BUN SERPL-MCNC: 14 MG/DL — SIGNIFICANT CHANGE UP (ref 8–20)
CALCIUM SERPL-MCNC: 8.5 MG/DL — LOW (ref 8.6–10.2)
CHLORIDE SERPL-SCNC: 98 MMOL/L — SIGNIFICANT CHANGE UP (ref 98–107)
CO2 SERPL-SCNC: 25 MMOL/L — SIGNIFICANT CHANGE UP (ref 22–29)
COLOR SPEC: YELLOW — SIGNIFICANT CHANGE UP
CREAT SERPL-MCNC: 0.76 MG/DL — SIGNIFICANT CHANGE UP (ref 0.5–1.3)
DIFF PNL FLD: ABNORMAL
EOSINOPHIL # BLD AUTO: 0.02 K/UL — SIGNIFICANT CHANGE UP (ref 0–0.5)
EOSINOPHIL NFR BLD AUTO: 0.1 % — SIGNIFICANT CHANGE UP (ref 0–6)
EPI CELLS # UR: SIGNIFICANT CHANGE UP
GLUCOSE SERPL-MCNC: 156 MG/DL — HIGH (ref 70–115)
GLUCOSE UR QL: 50 MG/DL
HCT VFR BLD CALC: 33.4 % — LOW (ref 39–50)
HGB BLD-MCNC: 9.9 G/DL — LOW (ref 13–17)
IMM GRANULOCYTES NFR BLD AUTO: 0.7 % — SIGNIFICANT CHANGE UP (ref 0–1.5)
INR BLD: 1.21 RATIO — HIGH (ref 0.88–1.16)
KETONES UR-MCNC: ABNORMAL
LACTATE BLDV-MCNC: 3.1 MMOL/L — HIGH (ref 0.5–2)
LEUKOCYTE ESTERASE UR-ACNC: NEGATIVE — SIGNIFICANT CHANGE UP
LYMPHOCYTES # BLD AUTO: 1.06 K/UL — SIGNIFICANT CHANGE UP (ref 1–3.3)
LYMPHOCYTES # BLD AUTO: 5.5 % — LOW (ref 13–44)
MCHC RBC-ENTMCNC: 25.8 PG — LOW (ref 27–34)
MCHC RBC-ENTMCNC: 29.6 GM/DL — LOW (ref 32–36)
MCV RBC AUTO: 87 FL — SIGNIFICANT CHANGE UP (ref 80–100)
MONOCYTES # BLD AUTO: 0.71 K/UL — SIGNIFICANT CHANGE UP (ref 0–0.9)
MONOCYTES NFR BLD AUTO: 3.7 % — SIGNIFICANT CHANGE UP (ref 2–14)
NEUTROPHILS # BLD AUTO: 17.41 K/UL — HIGH (ref 1.8–7.4)
NEUTROPHILS NFR BLD AUTO: 89.5 % — HIGH (ref 43–77)
NITRITE UR-MCNC: NEGATIVE — SIGNIFICANT CHANGE UP
PH UR: 8 — SIGNIFICANT CHANGE UP (ref 5–8)
PLATELET # BLD AUTO: 297 K/UL — SIGNIFICANT CHANGE UP (ref 150–400)
POTASSIUM SERPL-MCNC: 4.8 MMOL/L — SIGNIFICANT CHANGE UP (ref 3.5–5.3)
POTASSIUM SERPL-SCNC: 4.8 MMOL/L — SIGNIFICANT CHANGE UP (ref 3.5–5.3)
PROT SERPL-MCNC: 8.4 G/DL — SIGNIFICANT CHANGE UP (ref 6.6–8.7)
PROT UR-MCNC: 15 MG/DL
PROTHROM AB SERPL-ACNC: 14 SEC — HIGH (ref 10–12.9)
RBC # BLD: 3.84 M/UL — LOW (ref 4.2–5.8)
RBC # FLD: 17.4 % — HIGH (ref 10.3–14.5)
RBC CASTS # UR COMP ASSIST: SIGNIFICANT CHANGE UP /HPF (ref 0–4)
SODIUM SERPL-SCNC: 136 MMOL/L — SIGNIFICANT CHANGE UP (ref 135–145)
SP GR SPEC: 1.01 — SIGNIFICANT CHANGE UP (ref 1.01–1.02)
UROBILINOGEN FLD QL: 1 MG/DL
WBC # BLD: 19.43 K/UL — HIGH (ref 3.8–10.5)
WBC # FLD AUTO: 19.43 K/UL — HIGH (ref 3.8–10.5)
WBC UR QL: SIGNIFICANT CHANGE UP

## 2019-11-09 PROCEDURE — 99291 CRITICAL CARE FIRST HOUR: CPT

## 2019-11-09 PROCEDURE — 71045 X-RAY EXAM CHEST 1 VIEW: CPT | Mod: 26

## 2019-11-09 PROCEDURE — 93010 ELECTROCARDIOGRAM REPORT: CPT

## 2019-11-09 PROCEDURE — 99024 POSTOP FOLLOW-UP VISIT: CPT

## 2019-11-09 PROCEDURE — 74174 CTA ABD&PLVS W/CONTRAST: CPT | Mod: 26

## 2019-11-09 RX ORDER — PIPERACILLIN AND TAZOBACTAM 4; .5 G/20ML; G/20ML
3.38 INJECTION, POWDER, LYOPHILIZED, FOR SOLUTION INTRAVENOUS EVERY 8 HOURS
Refills: 0 | Status: DISCONTINUED | OUTPATIENT
Start: 2019-11-09 | End: 2019-11-10

## 2019-11-09 RX ORDER — PIPERACILLIN AND TAZOBACTAM 4; .5 G/20ML; G/20ML
3.38 INJECTION, POWDER, LYOPHILIZED, FOR SOLUTION INTRAVENOUS ONCE
Refills: 0 | Status: COMPLETED | OUTPATIENT
Start: 2019-11-09 | End: 2019-11-09

## 2019-11-09 RX ORDER — LISINOPRIL 2.5 MG/1
2.5 TABLET ORAL DAILY
Refills: 0 | Status: DISCONTINUED | OUTPATIENT
Start: 2019-11-09 | End: 2019-11-13

## 2019-11-09 RX ORDER — SODIUM CHLORIDE 9 MG/ML
2000 INJECTION INTRAMUSCULAR; INTRAVENOUS; SUBCUTANEOUS ONCE
Refills: 0 | Status: COMPLETED | OUTPATIENT
Start: 2019-11-09 | End: 2019-11-09

## 2019-11-09 RX ORDER — ACETAMINOPHEN 500 MG
975 TABLET ORAL ONCE
Refills: 0 | Status: COMPLETED | OUTPATIENT
Start: 2019-11-09 | End: 2019-11-09

## 2019-11-09 RX ORDER — DEXTROSE 50 % IN WATER 50 %
25 SYRINGE (ML) INTRAVENOUS ONCE
Refills: 0 | Status: DISCONTINUED | OUTPATIENT
Start: 2019-11-09 | End: 2019-11-13

## 2019-11-09 RX ORDER — VANCOMYCIN HCL 1 G
1250 VIAL (EA) INTRAVENOUS EVERY 12 HOURS
Refills: 0 | Status: DISCONTINUED | OUTPATIENT
Start: 2019-11-09 | End: 2019-11-11

## 2019-11-09 RX ORDER — SIMVASTATIN 20 MG/1
20 TABLET, FILM COATED ORAL AT BEDTIME
Refills: 0 | Status: DISCONTINUED | OUTPATIENT
Start: 2019-11-09 | End: 2019-11-13

## 2019-11-09 RX ORDER — IBUPROFEN 200 MG
800 TABLET ORAL ONCE
Refills: 0 | Status: COMPLETED | OUTPATIENT
Start: 2019-11-09 | End: 2019-11-09

## 2019-11-09 RX ORDER — SODIUM CHLORIDE 9 MG/ML
1000 INJECTION, SOLUTION INTRAVENOUS
Refills: 0 | Status: DISCONTINUED | OUTPATIENT
Start: 2019-11-09 | End: 2019-11-13

## 2019-11-09 RX ORDER — GABAPENTIN 400 MG/1
800 CAPSULE ORAL THREE TIMES A DAY
Refills: 0 | Status: DISCONTINUED | OUTPATIENT
Start: 2019-11-09 | End: 2019-11-13

## 2019-11-09 RX ORDER — METHADONE HYDROCHLORIDE 40 MG/1
10 TABLET ORAL
Refills: 0 | Status: DISCONTINUED | OUTPATIENT
Start: 2019-11-09 | End: 2019-11-13

## 2019-11-09 RX ORDER — VANCOMYCIN HCL 1 G
1000 VIAL (EA) INTRAVENOUS ONCE
Refills: 0 | Status: COMPLETED | OUTPATIENT
Start: 2019-11-09 | End: 2019-11-09

## 2019-11-09 RX ORDER — INSULIN LISPRO 100/ML
VIAL (ML) SUBCUTANEOUS
Refills: 0 | Status: DISCONTINUED | OUTPATIENT
Start: 2019-11-09 | End: 2019-11-13

## 2019-11-09 RX ORDER — DEXTROSE 50 % IN WATER 50 %
15 SYRINGE (ML) INTRAVENOUS ONCE
Refills: 0 | Status: DISCONTINUED | OUTPATIENT
Start: 2019-11-09 | End: 2019-11-13

## 2019-11-09 RX ORDER — DEXTROSE 50 % IN WATER 50 %
12.5 SYRINGE (ML) INTRAVENOUS ONCE
Refills: 0 | Status: DISCONTINUED | OUTPATIENT
Start: 2019-11-09 | End: 2019-11-13

## 2019-11-09 RX ORDER — GLUCAGON INJECTION, SOLUTION 0.5 MG/.1ML
1 INJECTION, SOLUTION SUBCUTANEOUS ONCE
Refills: 0 | Status: DISCONTINUED | OUTPATIENT
Start: 2019-11-09 | End: 2019-11-13

## 2019-11-09 RX ORDER — SODIUM CHLORIDE 9 MG/ML
1000 INJECTION, SOLUTION INTRAVENOUS
Refills: 0 | Status: DISCONTINUED | OUTPATIENT
Start: 2019-11-09 | End: 2019-11-11

## 2019-11-09 RX ADMIN — Medication 800 MILLIGRAM(S): at 23:22

## 2019-11-09 RX ADMIN — Medication 800 MILLIGRAM(S): at 21:49

## 2019-11-09 RX ADMIN — SODIUM CHLORIDE 2000 MILLILITER(S): 9 INJECTION INTRAMUSCULAR; INTRAVENOUS; SUBCUTANEOUS at 19:29

## 2019-11-09 RX ADMIN — PIPERACILLIN AND TAZOBACTAM 3.38 GRAM(S): 4; .5 INJECTION, POWDER, LYOPHILIZED, FOR SOLUTION INTRAVENOUS at 20:00

## 2019-11-09 RX ADMIN — SIMVASTATIN 20 MILLIGRAM(S): 20 TABLET, FILM COATED ORAL at 23:22

## 2019-11-09 RX ADMIN — Medication 975 MILLIGRAM(S): at 20:00

## 2019-11-09 RX ADMIN — PIPERACILLIN AND TAZOBACTAM 200 GRAM(S): 4; .5 INJECTION, POWDER, LYOPHILIZED, FOR SOLUTION INTRAVENOUS at 19:29

## 2019-11-09 RX ADMIN — Medication 250 MILLIGRAM(S): at 21:27

## 2019-11-09 RX ADMIN — GABAPENTIN 800 MILLIGRAM(S): 400 CAPSULE ORAL at 23:21

## 2019-11-09 RX ADMIN — SODIUM CHLORIDE 2000 MILLILITER(S): 9 INJECTION INTRAMUSCULAR; INTRAVENOUS; SUBCUTANEOUS at 20:29

## 2019-11-09 NOTE — ED ADULT NURSE NOTE - ED STAT RN HANDOFF DETAILS
report to AGGIE Yoo.  Pt trransported to bed A 12L, placed on cardiac monitor and continuous pulse ox.  Side rails up

## 2019-11-09 NOTE — H&P ADULT - NSHPPHYSICALEXAM_GEN_ALL_CORE
Constitutional: Obese patient resting comfortably in bed in no acute distress   HEENT: EOMI/PERRL b/l  Neck: No JVD, full ROM w/o pain  Respiratory: CTAB with unlabored respirations, no accessory muscle use or conversational dyspnea   Cardiovascular: Regular rate and rhythm with no arrythmias or murmurs  Gastrointestinal: Abdomen soft, non-tender, non-distended with no rebound tenderness or guarding   Rectal: Not indicated   Neurological: GCS 15 (E4V5M6) with no gross sensory, motor or coordination deficits   Psychiatric: Normal mood and affect   Musculoskeletal: No joint pain, swelling or deformity with no limitation of movement   Vascular: Femoral, radial, Constitutional: Obese patient resting comfortably in bed in no acute distress   HEENT: EOMI/PERRL b/l  Neck: No JVD, full ROM w/o pain  Respiratory: CTAB with unlabored respirations, no accessory muscle use or conversational dyspnea   Cardiovascular: Regular rate and rhythm with no arrythmias or murmurs  Gastrointestinal: Abdomen soft, non-tender, non-distended with no rebound tenderness or guarding   Rectal: Not indicated   Neurological: GCS 15 (E4V5M6) with no gross sensory, motor or coordination deficits   Psychiatric: Normal mood and affect   Musculoskeletal: Compression ace bandages loosely wrapped   Vascular: Femoral, radial, DP and PT palpable pulses present   Skin: Erythema present to b/l groins, moderate tenderness to palpation at groin surgical sites (staples present) with no expression of fluid upon palpation

## 2019-11-09 NOTE — ED PROVIDER NOTE - TIMING
MUSCULOSKELETAL AND PELVIC FLOOR EVALUATION:     Referring Physician: Dr. Jessica Sheets  Diagnosis: Abdominal adhesions (K66.0)     Date of Service: 1/10/2018     PATIENT SUMMARY   Kwesi Thao is a 38year old y/o female who presents to therapy today • Extrinsic asthma, unspecified    • Family history of congenital heart defect 8/29/2014    Father of baby with congenital heart defect. Plan fetal echo 22-24 weeks.     • GENITO-URINARY DISEASE     KIDNEY STONES   • Menorrhagia    • Migraines    • Pain in Abdominal Wall Integrity: Patient has significant scar tissue adhesion especially at inverse T from wet to dry dressing with poor  healing.   LROM Summary: Limited FLEX 50%, FLEX, 25% FLEX  Trunk Strength Summary: Transverse Abdominis 2/5, trunk ex 1) Pt will improve pelvic floor/ lower abdominal  muscle recruitment to 3/5 strength to be able to perform ADLs, including without urinary leakage and urgency  (12 visits)  2) Patient will able to perform effective pelvic floor contraction (and \"knack\") unknown Patient/Family/Caregiver was advised of these findings, precautions, and treatment options and has agreed to actively participate in planning and for this course of care.     Thank you for your referral. Please co-sign or sign and return this letter via fax Name_________________________         Date_____________    Time of Day     Type & Amount  of Food & Fluid Intake Amount Voided Ounces,  S /M /L or  Seconds Amount of  Leakage  S /M /L Was  Urge  Present  1 /2 /3 Activity With  Leakage   Midnight        1:0

## 2019-11-09 NOTE — ED ADULT TRIAGE NOTE - CHIEF COMPLAINT QUOTE
Patient BIBA to ED from home with c/o confusion as per EMS via wife  Wife not present as this time, unknown last known well, confusion for at least the past hour as per EMS via wife.  Dr. Funes at bedside, patient brought to critical care for further evaluation.

## 2019-11-09 NOTE — H&P ADULT - HISTORY OF PRESENT ILLNESS
64 year old male presenting with chief complaint of acute confusion. Vascular surgery consulted for concern of bilateral groin surgical site infection as patient is s/p fem fem bypass with PTFE graft Oct 24th for critical limb ischemia. Patient seen and examined at bedside with senior resident. Patient is poor historian hence corroborating information obtained from wife at bedside. States that she noticed that the patient was not acting like himself and was not making sense during her conversations with him prompting her to call 911. Arrived in [facility] ED complaining of lower abdominal pain (last voided in AM) and b/l groin pain on palpation.     PMHx: DM, chronic venous stasis,   PSHx: Fem fem bypass with PTFE graft Oct 24th 2019   Allergies: None   Meds: ASA, lisinopril, gabapentin, simvastatin, HCTZ, metformin, amaryl, actor and basaglar   FamHx: Non contributory   SocHx: Former smoker

## 2019-11-09 NOTE — ED PROCEDURE NOTE - ATTENDING CONTRIBUTION TO CARE
I, Trung Funes, performed the initial face to face bedside interview with this patient regarding history of present illness, review of symptoms and relevant past medical, social and family history.  I completed an independent physical examination.  I was the initial provider who evaluated this patient. I have signed out the follow up of any pending tests (i.e. labs, radiological studies) to the resident.  I have communicated the patient’s plan of care and disposition with the resident.

## 2019-11-09 NOTE — ED PROVIDER NOTE - SKIN, MLM
Skin normal color for race, warm, dry and intact. No evidence of rash., Bilateral inguinal area redness seen

## 2019-11-09 NOTE — H&P ADULT - ASSESSMENT
64 year old male currently febrile with leukocytosis and elevated lactate with erythema noted over prior surgical site incisions on physical exam concerning for cellulitis requiring admission for IV ABx

## 2019-11-09 NOTE — ED PROVIDER NOTE - OBJECTIVE STATEMENT
The patient is a 64 year old male with confusion for one day.  In ED the patient is febrile.  Recent vascular surgery  NIH stroke scale 0

## 2019-11-09 NOTE — ED ADULT NURSE REASSESSMENT NOTE - NS ED NURSE REASSESS COMMENT FT1
Attempted straight cath for urine culture unsuccessful.  Unable to pass catheter around prostate.  Dr. Funes made aware.  Pt refused additional attempts.

## 2019-11-09 NOTE — H&P ADULT - PROBLEM SELECTOR PLAN 1
1. Admit to vascular surgery under Dr. Foster   2. NPO with IVFs ordered   3. Initiate IV ABx (vanc and zosyn) ordered   4. Urgent CTA A/P ordered   5. f/u BCx and UCx collected in the ED   6. Home antihypoglycemia meds held and ISS started instead; home ASA and chemical DVT PPx held temporarily while pending imaging and final plan

## 2019-11-09 NOTE — ED ADULT NURSE NOTE - OBJECTIVE STATEMENT
assumed pt care as critical care RN for code sepsis.  pt is alert and oriented X 3.  S/P Femoral-femoral bypass on October 24th.  Intact staples present to both groins.  Foul smell from both surgical sites present.  Pt finished antibiotics for urinary tract infection 3 days ago. abomden soft, with obvious hernia.  Bilateral legs edematous with ace wrapping bilaterally. chronic ulcer present to bottom of left big toe.

## 2019-11-09 NOTE — PHARMACY COMMUNICATION NOTE - COMMENTS
discussed with nurse and patient patients height is 5 foot 7 inc = 170.18cm and weight is 300lbs =136.36kg.  based on this information the expected trough with 2 grams q12h would have been 24.  Thus modified the dose to 1250mg q12h which would yield expected trough of 14.21 cbarto

## 2019-11-09 NOTE — ED PROVIDER NOTE - CLINICAL SUMMARY MEDICAL DECISION MAKING FREE TEXT BOX
The patient presents with fever and wound surgical site infection and will admit for further evaluation

## 2019-11-09 NOTE — ED ADULT NURSE REASSESSMENT NOTE - NS ED NURSE REASSESS COMMENT FT1
pt handed off to RN NR  in stable condition. Pt oriented to unit, plan of care explained. call bell system explained to pt. no apparent distress noted at this time.

## 2019-11-10 LAB
ANION GAP SERPL CALC-SCNC: 13 MMOL/L — SIGNIFICANT CHANGE UP (ref 5–17)
ANISOCYTOSIS BLD QL: SLIGHT — SIGNIFICANT CHANGE UP
APTT BLD: 91.3 SEC — HIGH (ref 27.5–36.3)
BASOPHILS # BLD AUTO: 0 K/UL — SIGNIFICANT CHANGE UP (ref 0–0.2)
BASOPHILS NFR BLD AUTO: 0 % — SIGNIFICANT CHANGE UP (ref 0–2)
BUN SERPL-MCNC: 13 MG/DL — SIGNIFICANT CHANGE UP (ref 8–20)
CALCIUM SERPL-MCNC: 8.2 MG/DL — LOW (ref 8.6–10.2)
CHLORIDE SERPL-SCNC: 100 MMOL/L — SIGNIFICANT CHANGE UP (ref 98–107)
CO2 SERPL-SCNC: 23 MMOL/L — SIGNIFICANT CHANGE UP (ref 22–29)
CREAT SERPL-MCNC: 0.76 MG/DL — SIGNIFICANT CHANGE UP (ref 0.5–1.3)
ELLIPTOCYTES BLD QL SMEAR: SLIGHT — SIGNIFICANT CHANGE UP
EOSINOPHIL # BLD AUTO: 0 K/UL — SIGNIFICANT CHANGE UP (ref 0–0.5)
EOSINOPHIL NFR BLD AUTO: 0 % — SIGNIFICANT CHANGE UP (ref 0–6)
GIANT PLATELETS BLD QL SMEAR: PRESENT — SIGNIFICANT CHANGE UP
GLUCOSE BLDC GLUCOMTR-MCNC: 101 MG/DL — HIGH (ref 70–99)
GLUCOSE BLDC GLUCOMTR-MCNC: 106 MG/DL — HIGH (ref 70–99)
GLUCOSE BLDC GLUCOMTR-MCNC: 88 MG/DL — SIGNIFICANT CHANGE UP (ref 70–99)
GLUCOSE BLDC GLUCOMTR-MCNC: 90 MG/DL — SIGNIFICANT CHANGE UP (ref 70–99)
GLUCOSE SERPL-MCNC: 90 MG/DL — SIGNIFICANT CHANGE UP (ref 70–115)
HBA1C BLD-MCNC: 8.5 % — HIGH (ref 4–5.6)
HCT VFR BLD CALC: 28.8 % — LOW (ref 39–50)
HCT VFR BLD CALC: 31.4 % — LOW (ref 39–50)
HGB BLD-MCNC: 8.6 G/DL — LOW (ref 13–17)
HGB BLD-MCNC: 9.3 G/DL — LOW (ref 13–17)
HYPOCHROMIA BLD QL: SLIGHT — SIGNIFICANT CHANGE UP
LACTATE BLDV-MCNC: 1.6 MMOL/L — SIGNIFICANT CHANGE UP (ref 0.5–2)
LACTATE SERPL-SCNC: 1.5 MMOL/L — SIGNIFICANT CHANGE UP (ref 0.5–2)
LACTATE SERPL-SCNC: 2.6 MMOL/L — HIGH (ref 0.5–2)
LYMPHOCYTES # BLD AUTO: 0.68 K/UL — LOW (ref 1–3.3)
LYMPHOCYTES # BLD AUTO: 2.6 % — LOW (ref 13–44)
MACROCYTES BLD QL: SLIGHT — SIGNIFICANT CHANGE UP
MAGNESIUM SERPL-MCNC: 2.3 MG/DL — SIGNIFICANT CHANGE UP (ref 1.6–2.6)
MANUAL SMEAR VERIFICATION: SIGNIFICANT CHANGE UP
MCHC RBC-ENTMCNC: 25.7 PG — LOW (ref 27–34)
MCHC RBC-ENTMCNC: 25.7 PG — LOW (ref 27–34)
MCHC RBC-ENTMCNC: 29.6 GM/DL — LOW (ref 32–36)
MCHC RBC-ENTMCNC: 29.9 GM/DL — LOW (ref 32–36)
MCV RBC AUTO: 86.2 FL — SIGNIFICANT CHANGE UP (ref 80–100)
MCV RBC AUTO: 86.7 FL — SIGNIFICANT CHANGE UP (ref 80–100)
MICROCYTES BLD QL: SLIGHT — SIGNIFICANT CHANGE UP
MONOCYTES # BLD AUTO: 0.68 K/UL — SIGNIFICANT CHANGE UP (ref 0–0.9)
MONOCYTES NFR BLD AUTO: 2.6 % — SIGNIFICANT CHANGE UP (ref 2–14)
NEUTROPHILS # BLD AUTO: 24.66 K/UL — HIGH (ref 1.8–7.4)
NEUTROPHILS NFR BLD AUTO: 93.1 % — HIGH (ref 43–77)
NEUTS BAND # BLD: 1.7 % — SIGNIFICANT CHANGE UP (ref 0–8)
PHOSPHATE SERPL-MCNC: 3.4 MG/DL — SIGNIFICANT CHANGE UP (ref 2.4–4.7)
PLAT MORPH BLD: NORMAL — SIGNIFICANT CHANGE UP
PLATELET # BLD AUTO: 266 K/UL — SIGNIFICANT CHANGE UP (ref 150–400)
PLATELET # BLD AUTO: 296 K/UL — SIGNIFICANT CHANGE UP (ref 150–400)
PLATELET COUNT - ESTIMATE: NORMAL — SIGNIFICANT CHANGE UP
POIKILOCYTOSIS BLD QL AUTO: SLIGHT — SIGNIFICANT CHANGE UP
POLYCHROMASIA BLD QL SMEAR: SLIGHT — SIGNIFICANT CHANGE UP
POTASSIUM SERPL-MCNC: 4.2 MMOL/L — SIGNIFICANT CHANGE UP (ref 3.5–5.3)
POTASSIUM SERPL-SCNC: 4.2 MMOL/L — SIGNIFICANT CHANGE UP (ref 3.5–5.3)
RBC # BLD: 3.34 M/UL — LOW (ref 4.2–5.8)
RBC # BLD: 3.62 M/UL — LOW (ref 4.2–5.8)
RBC # FLD: 17.5 % — HIGH (ref 10.3–14.5)
RBC # FLD: 17.5 % — HIGH (ref 10.3–14.5)
RBC BLD AUTO: NORMAL — SIGNIFICANT CHANGE UP
SCHISTOCYTES BLD QL AUTO: SLIGHT — SIGNIFICANT CHANGE UP
SODIUM SERPL-SCNC: 136 MMOL/L — SIGNIFICANT CHANGE UP (ref 135–145)
VANCOMYCIN TROUGH SERPL-MCNC: 5.5 UG/ML — LOW (ref 10–20)
WBC # BLD: 19.59 K/UL — HIGH (ref 3.8–10.5)
WBC # BLD: 26.01 K/UL — HIGH (ref 3.8–10.5)
WBC # FLD AUTO: 19.59 K/UL — HIGH (ref 3.8–10.5)
WBC # FLD AUTO: 26.01 K/UL — HIGH (ref 3.8–10.5)

## 2019-11-10 PROCEDURE — 93970 EXTREMITY STUDY: CPT | Mod: 26

## 2019-11-10 RX ORDER — INFLUENZA VIRUS VACCINE 15; 15; 15; 15 UG/.5ML; UG/.5ML; UG/.5ML; UG/.5ML
0.5 SUSPENSION INTRAMUSCULAR ONCE
Refills: 0 | Status: DISCONTINUED | OUTPATIENT
Start: 2019-11-10 | End: 2019-11-13

## 2019-11-10 RX ORDER — HEPARIN SODIUM 5000 [USP'U]/ML
INJECTION INTRAVENOUS; SUBCUTANEOUS
Qty: 25000 | Refills: 0 | Status: DISCONTINUED | OUTPATIENT
Start: 2019-11-10 | End: 2019-11-12

## 2019-11-10 RX ORDER — MEROPENEM 1 G/30ML
1000 INJECTION INTRAVENOUS ONCE
Refills: 0 | Status: DISCONTINUED | OUTPATIENT
Start: 2019-11-10 | End: 2019-11-10

## 2019-11-10 RX ORDER — HEPARIN SODIUM 5000 [USP'U]/ML
10000 INJECTION INTRAVENOUS; SUBCUTANEOUS ONCE
Refills: 0 | Status: COMPLETED | OUTPATIENT
Start: 2019-11-10 | End: 2019-11-10

## 2019-11-10 RX ORDER — HEPARIN SODIUM 5000 [USP'U]/ML
10000 INJECTION INTRAVENOUS; SUBCUTANEOUS EVERY 6 HOURS
Refills: 0 | Status: DISCONTINUED | OUTPATIENT
Start: 2019-11-10 | End: 2019-11-12

## 2019-11-10 RX ORDER — HEPARIN SODIUM 5000 [USP'U]/ML
5000 INJECTION INTRAVENOUS; SUBCUTANEOUS EVERY 6 HOURS
Refills: 0 | Status: DISCONTINUED | OUTPATIENT
Start: 2019-11-10 | End: 2019-11-12

## 2019-11-10 RX ORDER — MEROPENEM 1 G/30ML
1000 INJECTION INTRAVENOUS EVERY 8 HOURS
Refills: 0 | Status: DISCONTINUED | OUTPATIENT
Start: 2019-11-10 | End: 2019-11-12

## 2019-11-10 RX ORDER — SODIUM CHLORIDE 9 MG/ML
1000 INJECTION, SOLUTION INTRAVENOUS
Refills: 0 | Status: DISCONTINUED | OUTPATIENT
Start: 2019-11-10 | End: 2019-11-13

## 2019-11-10 RX ADMIN — METHADONE HYDROCHLORIDE 10 MILLIGRAM(S): 40 TABLET ORAL at 11:29

## 2019-11-10 RX ADMIN — Medication 125 MILLIGRAM(S): at 10:35

## 2019-11-10 RX ADMIN — MEROPENEM 100 MILLIGRAM(S): 1 INJECTION INTRAVENOUS at 18:01

## 2019-11-10 RX ADMIN — METHADONE HYDROCHLORIDE 10 MILLIGRAM(S): 40 TABLET ORAL at 18:01

## 2019-11-10 RX ADMIN — SODIUM CHLORIDE 1000 MILLILITER(S): 9 INJECTION, SOLUTION INTRAVENOUS at 12:39

## 2019-11-10 RX ADMIN — GABAPENTIN 800 MILLIGRAM(S): 400 CAPSULE ORAL at 05:13

## 2019-11-10 RX ADMIN — MEROPENEM 100 MILLIGRAM(S): 1 INJECTION INTRAVENOUS at 11:28

## 2019-11-10 RX ADMIN — GABAPENTIN 800 MILLIGRAM(S): 400 CAPSULE ORAL at 21:32

## 2019-11-10 RX ADMIN — HEPARIN SODIUM 2400 UNIT(S)/HR: 5000 INJECTION INTRAVENOUS; SUBCUTANEOUS at 09:27

## 2019-11-10 RX ADMIN — GABAPENTIN 800 MILLIGRAM(S): 400 CAPSULE ORAL at 15:22

## 2019-11-10 RX ADMIN — PIPERACILLIN AND TAZOBACTAM 25 GRAM(S): 4; .5 INJECTION, POWDER, LYOPHILIZED, FOR SOLUTION INTRAVENOUS at 05:12

## 2019-11-10 RX ADMIN — SODIUM CHLORIDE 125 MILLILITER(S): 9 INJECTION, SOLUTION INTRAVENOUS at 19:50

## 2019-11-10 RX ADMIN — METHADONE HYDROCHLORIDE 10 MILLIGRAM(S): 40 TABLET ORAL at 05:12

## 2019-11-10 RX ADMIN — Medication 125 MILLIGRAM(S): at 21:30

## 2019-11-10 RX ADMIN — HEPARIN SODIUM 2400 UNIT(S)/HR: 5000 INJECTION INTRAVENOUS; SUBCUTANEOUS at 18:04

## 2019-11-10 RX ADMIN — METHADONE HYDROCHLORIDE 10 MILLIGRAM(S): 40 TABLET ORAL at 23:18

## 2019-11-10 RX ADMIN — HEPARIN SODIUM 10000 UNIT(S): 5000 INJECTION INTRAVENOUS; SUBCUTANEOUS at 09:30

## 2019-11-10 RX ADMIN — SODIUM CHLORIDE 125 MILLILITER(S): 9 INJECTION, SOLUTION INTRAVENOUS at 01:34

## 2019-11-10 RX ADMIN — LISINOPRIL 2.5 MILLIGRAM(S): 2.5 TABLET ORAL at 05:13

## 2019-11-10 RX ADMIN — SIMVASTATIN 20 MILLIGRAM(S): 20 TABLET, FILM COATED ORAL at 21:30

## 2019-11-10 NOTE — PROGRESS NOTE ADULT - SUBJECTIVE AND OBJECTIVE BOX
INTERVAL HPI/OVERNIGHT EVENTS:    Patient seen this morning with overall improvement in condition. He has no new acute issues at this time. Pain is well controlled, but is relatively unchanged since admission. No difficulty breathing. Hemodynamically stable and afebrile at this time.       MEDICATIONS  (STANDING):  dextrose 5%. 1000 milliLiter(s) (50 mL/Hr) IV Continuous <Continuous>  dextrose 50% Injectable 12.5 Gram(s) IV Push once  dextrose 50% Injectable 25 Gram(s) IV Push once  dextrose 50% Injectable 25 Gram(s) IV Push once  gabapentin 800 milliGRAM(s) Oral three times a day  insulin lispro (HumaLOG) corrective regimen sliding scale   SubCutaneous three times a day before meals  lactated ringers. 1000 milliLiter(s) (125 mL/Hr) IV Continuous <Continuous>  lisinopril 2.5 milliGRAM(s) Oral daily  methadone    Tablet 10 milliGRAM(s) Oral four times a day  piperacillin/tazobactam IVPB.. 3.375 Gram(s) IV Intermittent every 8 hours  simvastatin 20 milliGRAM(s) Oral at bedtime  vancomycin  IVPB 1250 milliGRAM(s) IV Intermittent every 12 hours    MEDICATIONS  (PRN):  dextrose 40% Gel 15 Gram(s) Oral once PRN Blood Glucose LESS THAN 70 milliGRAM(s)/deciliter  glucagon  Injectable 1 milliGRAM(s) IntraMuscular once PRN Glucose LESS THAN 70 milligrams/deciliter      Vital Signs Last 24 Hrs  T(C): 37.3 (2019 23:25), Max: 38.8 (2019 18:56)  T(F): 99.1 (2019 23:25), Max: 101.9 (2019 18:56)  HR: 81 (2019 23:25) (81 - 100)  BP: 104/60 (2019 23:25) (104/60 - 163/72)  BP(mean): 97 (2019 21:52) (97 - 97)  RR: 20 (2019 23:25) (20 - 24)  SpO2: 94% (2019 23:25) (92% - 94%)    Physical Exam:  GEN: obese individual, NAD  CV: Non-tachycardic,   Vasc: Palpable pulses b/l DP  Resp: non-labored breathing, clear to auscultate   GI: obese abdomen, nontender  Groin: b/l maura with erythema and appear moist underlaying abdomanl panus. tender to palpation  MSK: b/l compression ace wraps in place      I&O's Detail      LABS:                        9.9    19.43 )-----------( 297      ( 2019 19:16 )             33.4         136  |  98  |  14.0  ----------------------------<  156<H>  4.8   |  25.0  |  0.76    Ca    8.5<L>      2019 19:16    TPro  8.4  /  Alb  3.4  /  TBili  0.3<L>  /  DBili  x   /  AST  27  /  ALT  26  /  AlkPhos  89  -    PT/INR - ( 2019 19:16 )   PT: 14.0 sec;   INR: 1.21 ratio         PTT - ( 2019 19:16 )  PTT:32.6 sec  Urinalysis Basic - ( 2019 21:19 )    Color: Yellow / Appearance: Clear / S.010 / pH: x  Gluc: x / Ketone: Trace  / Bili: Negative / Urobili: 1 mg/dL   Blood: x / Protein: 15 mg/dL / Nitrite: Negative   Leuk Esterase: Negative / RBC: 0-2 /HPF / WBC 0-2   Sq Epi: x / Non Sq Epi: Occasional / Bacteria: Occasional        RADIOLOGY & ADDITIONAL STUDIES:

## 2019-11-10 NOTE — PHARMACOTHERAPY INTERVENTION NOTE - COMMENTS
Pharmacy-Driven Vancomycin  Patient on 1250 mG q12h for surgical infx. Pre-fourth dose trough entered for 11/11/19 @ 08:00

## 2019-11-10 NOTE — PROGRESS NOTE ADULT - ASSESSMENT
64 year old male currently febrile with leukocytosis and elevated lactate with erythema noted over prior surgical site incisions on physical exam concerning for cellulitis requiring admission for IV ABx     Continue home medications  Continue IV hydration, lactate has normalized  NPO for now, may restart diet once cleared by attending later today  repeat AM labs  f/u cultures  May need re-imaging  Continue ABX  Daily Compression wraps  local wound care  Chemical DVT ppx restart today    Will discuss with attending, final plan to follow

## 2019-11-11 ENCOUNTER — APPOINTMENT (OUTPATIENT)
Dept: VASCULAR SURGERY | Facility: CLINIC | Age: 64
End: 2019-11-11

## 2019-11-11 LAB
ANION GAP SERPL CALC-SCNC: 15 MMOL/L — SIGNIFICANT CHANGE UP (ref 5–17)
APTT BLD: 71.9 SEC — HIGH (ref 27.5–36.3)
APTT BLD: 82 SEC — HIGH (ref 27.5–36.3)
BUN SERPL-MCNC: 13 MG/DL — SIGNIFICANT CHANGE UP (ref 8–20)
CALCIUM SERPL-MCNC: 8.3 MG/DL — LOW (ref 8.6–10.2)
CHLORIDE SERPL-SCNC: 100 MMOL/L — SIGNIFICANT CHANGE UP (ref 98–107)
CO2 SERPL-SCNC: 23 MMOL/L — SIGNIFICANT CHANGE UP (ref 22–29)
CREAT SERPL-MCNC: 0.83 MG/DL — SIGNIFICANT CHANGE UP (ref 0.5–1.3)
GLUCOSE BLDC GLUCOMTR-MCNC: 118 MG/DL — HIGH (ref 70–99)
GLUCOSE BLDC GLUCOMTR-MCNC: 128 MG/DL — HIGH (ref 70–99)
GLUCOSE BLDC GLUCOMTR-MCNC: 141 MG/DL — HIGH (ref 70–99)
GLUCOSE BLDC GLUCOMTR-MCNC: 178 MG/DL — HIGH (ref 70–99)
GLUCOSE SERPL-MCNC: 125 MG/DL — HIGH (ref 70–115)
HCT VFR BLD CALC: 29 % — LOW (ref 39–50)
HGB BLD-MCNC: 8.7 G/DL — LOW (ref 13–17)
INR BLD: 1.35 RATIO — HIGH (ref 0.88–1.16)
MCHC RBC-ENTMCNC: 25.5 PG — LOW (ref 27–34)
MCHC RBC-ENTMCNC: 30 GM/DL — LOW (ref 32–36)
MCV RBC AUTO: 85 FL — SIGNIFICANT CHANGE UP (ref 80–100)
PLATELET # BLD AUTO: 289 K/UL — SIGNIFICANT CHANGE UP (ref 150–400)
POTASSIUM SERPL-MCNC: 3.7 MMOL/L — SIGNIFICANT CHANGE UP (ref 3.5–5.3)
POTASSIUM SERPL-SCNC: 3.7 MMOL/L — SIGNIFICANT CHANGE UP (ref 3.5–5.3)
PROTHROM AB SERPL-ACNC: 15.7 SEC — HIGH (ref 10–12.9)
RBC # BLD: 3.41 M/UL — LOW (ref 4.2–5.8)
RBC # FLD: 17.5 % — HIGH (ref 10.3–14.5)
SODIUM SERPL-SCNC: 138 MMOL/L — SIGNIFICANT CHANGE UP (ref 135–145)
VANCOMYCIN TROUGH SERPL-MCNC: 9.3 UG/ML — LOW (ref 10–20)
WBC # BLD: 17.65 K/UL — HIGH (ref 3.8–10.5)
WBC # FLD AUTO: 17.65 K/UL — HIGH (ref 3.8–10.5)

## 2019-11-11 RX ORDER — VANCOMYCIN HCL 1 G
1250 VIAL (EA) INTRAVENOUS EVERY 12 HOURS
Refills: 0 | Status: DISCONTINUED | OUTPATIENT
Start: 2019-11-11 | End: 2019-11-12

## 2019-11-11 RX ORDER — POTASSIUM CHLORIDE 20 MEQ
20 PACKET (EA) ORAL ONCE
Refills: 0 | Status: COMPLETED | OUTPATIENT
Start: 2019-11-11 | End: 2019-11-11

## 2019-11-11 RX ORDER — SODIUM CHLORIDE 9 MG/ML
1000 INJECTION, SOLUTION INTRAVENOUS
Refills: 0 | Status: DISCONTINUED | OUTPATIENT
Start: 2019-11-11 | End: 2019-11-12

## 2019-11-11 RX ADMIN — MEROPENEM 100 MILLIGRAM(S): 1 INJECTION INTRAVENOUS at 14:47

## 2019-11-11 RX ADMIN — SIMVASTATIN 20 MILLIGRAM(S): 20 TABLET, FILM COATED ORAL at 21:38

## 2019-11-11 RX ADMIN — Medication 20 MILLIEQUIVALENT(S): at 14:48

## 2019-11-11 RX ADMIN — GABAPENTIN 800 MILLIGRAM(S): 400 CAPSULE ORAL at 21:38

## 2019-11-11 RX ADMIN — LISINOPRIL 2.5 MILLIGRAM(S): 2.5 TABLET ORAL at 05:54

## 2019-11-11 RX ADMIN — MEROPENEM 100 MILLIGRAM(S): 1 INJECTION INTRAVENOUS at 20:09

## 2019-11-11 RX ADMIN — MEROPENEM 100 MILLIGRAM(S): 1 INJECTION INTRAVENOUS at 03:27

## 2019-11-11 RX ADMIN — METHADONE HYDROCHLORIDE 10 MILLIGRAM(S): 40 TABLET ORAL at 23:11

## 2019-11-11 RX ADMIN — HEPARIN SODIUM 2400 UNIT(S)/HR: 5000 INJECTION INTRAVENOUS; SUBCUTANEOUS at 02:22

## 2019-11-11 RX ADMIN — METHADONE HYDROCHLORIDE 10 MILLIGRAM(S): 40 TABLET ORAL at 12:37

## 2019-11-11 RX ADMIN — METHADONE HYDROCHLORIDE 10 MILLIGRAM(S): 40 TABLET ORAL at 05:54

## 2019-11-11 RX ADMIN — GABAPENTIN 800 MILLIGRAM(S): 400 CAPSULE ORAL at 13:39

## 2019-11-11 RX ADMIN — Medication 166.67 MILLIGRAM(S): at 21:40

## 2019-11-11 RX ADMIN — METHADONE HYDROCHLORIDE 10 MILLIGRAM(S): 40 TABLET ORAL at 17:16

## 2019-11-11 RX ADMIN — HEPARIN SODIUM 2400 UNIT(S)/HR: 5000 INJECTION INTRAVENOUS; SUBCUTANEOUS at 07:30

## 2019-11-11 RX ADMIN — GABAPENTIN 800 MILLIGRAM(S): 400 CAPSULE ORAL at 05:54

## 2019-11-11 RX ADMIN — Medication 125 MILLIGRAM(S): at 09:38

## 2019-11-11 NOTE — PROGRESS NOTE ADULT - ASSESSMENT
64 year old male currently febrile with leukocytosis and elevated lactate with erythema noted over prior surgical site incisions on physical exam concerning for cellulitis requiring admission for IV ABx. Erythema around surgical site likely related to irritation and moisture from under panus, less likely surgical site infection. Newly diagnosed with DVT and PE. continue to monitor respiratory status. continue heparin infusions    Continue home medications  Continue IV hydration, lactate has normalized  Continue ordered diet  repeat AM labs  IV abx in place  f/u cultures  Daily Compression wraps  local wound care  DVT managed with heparin, following nomogram  f/u PT

## 2019-11-11 NOTE — PROGRESS NOTE ADULT - SUBJECTIVE AND OBJECTIVE BOX
INTERVAL HPI/OVERNIGHT EVENTS:    Patient seen today without any acute overnight events. no fevers overnight. Patient tolerating diet. Doing well with anticoagulation. urinating without issues. Has not amublated much since admission. No fevers, chills, cp or sob.      MEDICATIONS  (STANDING):  dextrose 5%. 1000 milliLiter(s) (50 mL/Hr) IV Continuous <Continuous>  dextrose 50% Injectable 12.5 Gram(s) IV Push once  dextrose 50% Injectable 25 Gram(s) IV Push once  dextrose 50% Injectable 25 Gram(s) IV Push once  gabapentin 800 milliGRAM(s) Oral three times a day  heparin  Infusion.  Unit(s)/Hr (24 mL/Hr) IV Continuous <Continuous>  influenza   Vaccine 0.5 milliLiter(s) IntraMuscular once  insulin lispro (HumaLOG) corrective regimen sliding scale   SubCutaneous three times a day before meals  lactated ringers. 1000 milliLiter(s) (125 mL/Hr) IV Continuous <Continuous>  lactated ringers. 1000 milliLiter(s) (1000 mL/Hr) IV Continuous <Continuous>  lisinopril 2.5 milliGRAM(s) Oral daily  meropenem  IVPB 1000 milliGRAM(s) IV Intermittent every 8 hours  methadone    Tablet 10 milliGRAM(s) Oral four times a day  simvastatin 20 milliGRAM(s) Oral at bedtime  vancomycin  IVPB 1250 milliGRAM(s) IV Intermittent every 12 hours    MEDICATIONS  (PRN):  dextrose 40% Gel 15 Gram(s) Oral once PRN Blood Glucose LESS THAN 70 milliGRAM(s)/deciliter  glucagon  Injectable 1 milliGRAM(s) IntraMuscular once PRN Glucose LESS THAN 70 milligrams/deciliter  heparin  Injectable 77189 Unit(s) IV Push every 6 hours PRN For aPTT less than 40  heparin  Injectable 5000 Unit(s) IV Push every 6 hours PRN For aPTT between 40 - 57      Vital Signs Last 24 Hrs  T(C): 37.1 (2019 05:47), Max: 37.2 (10 Nov 2019 19:22)  T(F): 98.7 (2019 05:47), Max: 99 (10 Nov 2019 19:22)  HR: 67 (2019 05:47) (63 - 114)  BP: 115/68 (2019 05:47) (96/57 - 126/57)  BP(mean): --  RR: 18 (2019 05:47) (18 - 19)  SpO2: 94% (2019 05:47) (94% - 99%)    Physical Exam:  Gen: obese individual, laying in bed, nad  HEENT: NCAT, normal conjunctiva, clear oral mucosa  CV: RRR, 2+ radial pulses, palpable dp pulses.   Pulm: no increased WOB, clear to auscultate b/l  GI: obese abdomen, nondistended non tender  Groin: b/l groins with surgical site staples in place and surrounding erythema. Non-tender to palpation. moistness around site from underside of panus. ABD placed this AM  Vasc: palpable b/l distal pulses, b/l lower extremities with erythema, edematous. compression wraps in place. dryed ulceration to right anterior lower extremity.    I&O's Detail    10 Nov 2019 07:01  -  2019 07:00  --------------------------------------------------------  IN:  Total IN: 0 mL    OUT:    Voided: 2050 mL  Total OUT: 2050 mL    Total NET: -2050 mL          LABS:                        8.7    17.65 )-----------( 289      ( 2019 06:31 )             29.0         138  |  100  |  13.0  ----------------------------<  125<H>  3.7   |  23.0  |  0.83    Ca    8.3<L>      2019 06:31  Phos  3.4     11-10  Mg     2.3     11-10    TPro  8.4  /  Alb  3.4  /  TBili  0.3<L>  /  DBili  x   /  AST  27  /  ALT  26  /  AlkPhos  89      PT/INR - ( 2019 06:31 )   PT: 15.7 sec;   INR: 1.35 ratio         PTT - ( 2019 06:31 )  PTT:71.9 sec  Urinalysis Basic - ( 2019 21:19 )    Color: Yellow / Appearance: Clear / S.010 / pH: x  Gluc: x / Ketone: Trace  / Bili: Negative / Urobili: 1 mg/dL   Blood: x / Protein: 15 mg/dL / Nitrite: Negative   Leuk Esterase: Negative / RBC: 0-2 /HPF / WBC 0-2   Sq Epi: x / Non Sq Epi: Occasional / Bacteria: Occasional        RADIOLOGY & ADDITIONAL STUDIES:

## 2019-11-11 NOTE — PHYSICAL THERAPY INITIAL EVALUATION ADULT - PERTINENT HX OF CURRENT PROBLEM, REHAB EVAL
63 y/o male presented to ED due to acute confusion, lower abdominal pain, and b/l groin pain, possible cellulitis over surgical site 63 y/o male presented to ED due to acute confusion, lower abdominal pain, and b/l groin pain, possible cellulitis over surgical site, newly diagnosed with PE and DVT

## 2019-11-11 NOTE — PHYSICAL THERAPY INITIAL EVALUATION ADULT - ADDITIONAL COMMENTS
Pt reports living in 1 story house with 3 steps to enter, no railing but can use door frame to hold onto.  Pt lives with significant other who is around during the day to help him.  Owns SAC and RW but prefers SAC.  Owns raised toilet seat. Pt reports living in 1 story house with 2 steps to enter, no railing but can use door frame to hold onto. 1 platform step within home no rails. Pt lives with significant other who is around during the day to help him.  Owns SAC and RW but prefers SAC.  Owns raised toilet seat.

## 2019-11-11 NOTE — PHYSICAL THERAPY INITIAL EVALUATION ADULT - LEVEL OF INDEPENDENCE: STAIR NEGOTIATION, REHAB EVAL
pt performed 1 step x3 using 2 rails/contact guard 2 steps use of post modified I, pt states can assist him c use of RW up/down stairs prn

## 2019-11-11 NOTE — PHYSICAL THERAPY INITIAL EVALUATION ADULT - WEIGHT-BEARING RESTRICTIONS: GAIT, REHAB EVAL
I will STOP taking the medications listed below when I get home from the hospital:  None
full weight-bearing

## 2019-11-12 ENCOUNTER — TRANSCRIPTION ENCOUNTER (OUTPATIENT)
Age: 64
End: 2019-11-12

## 2019-11-12 DIAGNOSIS — L03.119 CELLULITIS OF UNSPECIFIED PART OF LIMB: ICD-10-CM

## 2019-11-12 LAB
ANION GAP SERPL CALC-SCNC: 13 MMOL/L — SIGNIFICANT CHANGE UP (ref 5–17)
APTT BLD: 57.6 SEC — HIGH (ref 27.5–36.3)
BASOPHILS # BLD AUTO: 0.07 K/UL — SIGNIFICANT CHANGE UP (ref 0–0.2)
BASOPHILS NFR BLD AUTO: 0.6 % — SIGNIFICANT CHANGE UP (ref 0–2)
BUN SERPL-MCNC: 17 MG/DL — SIGNIFICANT CHANGE UP (ref 8–20)
CALCIUM SERPL-MCNC: 8.2 MG/DL — LOW (ref 8.6–10.2)
CHLORIDE SERPL-SCNC: 103 MMOL/L — SIGNIFICANT CHANGE UP (ref 98–107)
CO2 SERPL-SCNC: 23 MMOL/L — SIGNIFICANT CHANGE UP (ref 22–29)
CREAT SERPL-MCNC: 0.74 MG/DL — SIGNIFICANT CHANGE UP (ref 0.5–1.3)
EOSINOPHIL # BLD AUTO: 0.3 K/UL — SIGNIFICANT CHANGE UP (ref 0–0.5)
EOSINOPHIL NFR BLD AUTO: 2.4 % — SIGNIFICANT CHANGE UP (ref 0–6)
GLUCOSE BLDC GLUCOMTR-MCNC: 119 MG/DL — HIGH (ref 70–99)
GLUCOSE BLDC GLUCOMTR-MCNC: 141 MG/DL — HIGH (ref 70–99)
GLUCOSE BLDC GLUCOMTR-MCNC: 152 MG/DL — HIGH (ref 70–99)
GLUCOSE BLDC GLUCOMTR-MCNC: 189 MG/DL — HIGH (ref 70–99)
GLUCOSE SERPL-MCNC: 138 MG/DL — HIGH (ref 70–115)
HCT VFR BLD CALC: 28.7 % — LOW (ref 39–50)
HGB BLD-MCNC: 8.6 G/DL — LOW (ref 13–17)
IMM GRANULOCYTES NFR BLD AUTO: 0.7 % — SIGNIFICANT CHANGE UP (ref 0–1.5)
LYMPHOCYTES # BLD AUTO: 19.4 % — SIGNIFICANT CHANGE UP (ref 13–44)
LYMPHOCYTES # BLD AUTO: 2.46 K/UL — SIGNIFICANT CHANGE UP (ref 1–3.3)
MAGNESIUM SERPL-MCNC: 2.4 MG/DL — SIGNIFICANT CHANGE UP (ref 1.6–2.6)
MCHC RBC-ENTMCNC: 25.8 PG — LOW (ref 27–34)
MCHC RBC-ENTMCNC: 30 GM/DL — LOW (ref 32–36)
MCV RBC AUTO: 86.2 FL — SIGNIFICANT CHANGE UP (ref 80–100)
MONOCYTES # BLD AUTO: 0.65 K/UL — SIGNIFICANT CHANGE UP (ref 0–0.9)
MONOCYTES NFR BLD AUTO: 5.1 % — SIGNIFICANT CHANGE UP (ref 2–14)
NEUTROPHILS # BLD AUTO: 9.08 K/UL — HIGH (ref 1.8–7.4)
NEUTROPHILS NFR BLD AUTO: 71.8 % — SIGNIFICANT CHANGE UP (ref 43–77)
PHOSPHATE SERPL-MCNC: 3.3 MG/DL — SIGNIFICANT CHANGE UP (ref 2.4–4.7)
PLATELET # BLD AUTO: 287 K/UL — SIGNIFICANT CHANGE UP (ref 150–400)
POTASSIUM SERPL-MCNC: 3.7 MMOL/L — SIGNIFICANT CHANGE UP (ref 3.5–5.3)
POTASSIUM SERPL-SCNC: 3.7 MMOL/L — SIGNIFICANT CHANGE UP (ref 3.5–5.3)
RBC # BLD: 3.33 M/UL — LOW (ref 4.2–5.8)
RBC # FLD: 17.5 % — HIGH (ref 10.3–14.5)
SODIUM SERPL-SCNC: 139 MMOL/L — SIGNIFICANT CHANGE UP (ref 135–145)
VANCOMYCIN TROUGH SERPL-MCNC: 10 UG/ML — SIGNIFICANT CHANGE UP (ref 10–20)
WBC # BLD: 12.65 K/UL — HIGH (ref 3.8–10.5)
WBC # FLD AUTO: 12.65 K/UL — HIGH (ref 3.8–10.5)

## 2019-11-12 RX ORDER — POTASSIUM CHLORIDE 20 MEQ
20 PACKET (EA) ORAL
Refills: 0 | Status: COMPLETED | OUTPATIENT
Start: 2019-11-12 | End: 2019-11-12

## 2019-11-12 RX ORDER — GABAPENTIN 400 MG/1
2 CAPSULE ORAL
Qty: 0 | Refills: 0 | DISCHARGE
Start: 2019-11-12

## 2019-11-12 RX ORDER — GABAPENTIN 400 MG/1
1600 CAPSULE ORAL
Qty: 0 | Refills: 0 | DISCHARGE

## 2019-11-12 RX ORDER — APIXABAN 2.5 MG/1
5 TABLET, FILM COATED ORAL EVERY 12 HOURS
Refills: 0 | Status: DISCONTINUED | OUTPATIENT
Start: 2019-11-12 | End: 2019-11-13

## 2019-11-12 RX ORDER — APIXABAN 2.5 MG/1
1 TABLET, FILM COATED ORAL
Qty: 0 | Refills: 0 | DISCHARGE
Start: 2019-11-12

## 2019-11-12 RX ORDER — FLUCONAZOLE 150 MG/1
1 TABLET ORAL
Qty: 5 | Refills: 0
Start: 2019-11-12 | End: 2019-11-16

## 2019-11-12 RX ORDER — FLUCONAZOLE 150 MG/1
100 TABLET ORAL DAILY
Refills: 0 | Status: DISCONTINUED | OUTPATIENT
Start: 2019-11-13 | End: 2019-11-13

## 2019-11-12 RX ORDER — FLUCONAZOLE 150 MG/1
200 TABLET ORAL ONCE
Refills: 0 | Status: COMPLETED | OUTPATIENT
Start: 2019-11-12 | End: 2019-11-12

## 2019-11-12 RX ORDER — APIXABAN 2.5 MG/1
1 TABLET, FILM COATED ORAL
Qty: 60 | Refills: 0
Start: 2019-11-12 | End: 2019-12-11

## 2019-11-12 RX ADMIN — GABAPENTIN 800 MILLIGRAM(S): 400 CAPSULE ORAL at 05:19

## 2019-11-12 RX ADMIN — LISINOPRIL 2.5 MILLIGRAM(S): 2.5 TABLET ORAL at 05:19

## 2019-11-12 RX ADMIN — GABAPENTIN 800 MILLIGRAM(S): 400 CAPSULE ORAL at 22:10

## 2019-11-12 RX ADMIN — METHADONE HYDROCHLORIDE 10 MILLIGRAM(S): 40 TABLET ORAL at 17:31

## 2019-11-12 RX ADMIN — HEPARIN SODIUM 2400 UNIT(S)/HR: 5000 INJECTION INTRAVENOUS; SUBCUTANEOUS at 07:31

## 2019-11-12 RX ADMIN — MEROPENEM 100 MILLIGRAM(S): 1 INJECTION INTRAVENOUS at 03:10

## 2019-11-12 RX ADMIN — GABAPENTIN 800 MILLIGRAM(S): 400 CAPSULE ORAL at 13:31

## 2019-11-12 RX ADMIN — Medication 1 TABLET(S): at 17:31

## 2019-11-12 RX ADMIN — Medication 20 MILLIEQUIVALENT(S): at 11:07

## 2019-11-12 RX ADMIN — SIMVASTATIN 20 MILLIGRAM(S): 20 TABLET, FILM COATED ORAL at 22:10

## 2019-11-12 RX ADMIN — Medication 20 MILLIEQUIVALENT(S): at 12:41

## 2019-11-12 RX ADMIN — METHADONE HYDROCHLORIDE 10 MILLIGRAM(S): 40 TABLET ORAL at 05:19

## 2019-11-12 RX ADMIN — FLUCONAZOLE 200 MILLIGRAM(S): 150 TABLET ORAL at 11:29

## 2019-11-12 RX ADMIN — METHADONE HYDROCHLORIDE 10 MILLIGRAM(S): 40 TABLET ORAL at 12:42

## 2019-11-12 RX ADMIN — Medication 166.67 MILLIGRAM(S): at 11:08

## 2019-11-12 RX ADMIN — APIXABAN 5 MILLIGRAM(S): 2.5 TABLET, FILM COATED ORAL at 17:31

## 2019-11-12 RX ADMIN — Medication 2: at 12:44

## 2019-11-12 NOTE — DISCHARGE NOTE PROVIDER - CARE PROVIDER_API CALL
ManvarSingh, Pallavi B (MD)  Vascular Surgery  [address], [address] [place] [address]  Phone: [phone]  Fax: [phone]  Follow Up Time: 1 week ManvarSingh, Pallavi B (MD)  Vascular Surgery  [address] [address] [place] [address]  Phone: [phone]  Fax: [phone]  Follow Up Time: 1 week    Jonel Merritt)  Urology  [address], [address]  [place] [address]  Phone: [phone]  Fax: [phone]  Follow Up Time: 1 week

## 2019-11-12 NOTE — PROGRESS NOTE ADULT - ASSESSMENT
64 year old male with UTI  Currently afebrile with improved leukocytosis   Newly diagnosed with DVT and PE.   Cont AC with HEparin and transition to oral once insurance auth checked for Eliquis or Xarelto  Continue home medications  Cont IV abx for now. Will transition to PO on dc  Add diflucan for fungal coverage  Daily Compression wraps. Will need unna boots on DC  OOB/Ambulate-f/u PT recommendations  DC home next 24 hours

## 2019-11-12 NOTE — DISCHARGE NOTE PROVIDER - PROVIDER TOKENS
PROVIDER:[TOKEN:[[id number]],FOLLOWUP:[1 week]] PROVIDER:[TOKEN:[[id number]],FOLLOWUP:[1 week]],PROVIDER:[TOKEN:[[id number]],FOLLOWUP:[1 week]]

## 2019-11-12 NOTE — PROGRESS NOTE ADULT - SUBJECTIVE AND OBJECTIVE BOX
INTERVAL HPI/OVERNIGHT EVENTS:    Patient seen today without any acute overnight events. No fevers overnight. Patient tolerating diet. Urinating without issues. Has not amublated much since admission. No fevers, chills, cp or sob.    MEDICATIONS  (STANDING):  dextrose 5%. 1000 milliLiter(s) (50 mL/Hr) IV Continuous <Continuous>  dextrose 50% Injectable 12.5 Gram(s) IV Push once  dextrose 50% Injectable 25 Gram(s) IV Push once  dextrose 50% Injectable 25 Gram(s) IV Push once  gabapentin 800 milliGRAM(s) Oral three times a day  heparin  Infusion.  Unit(s)/Hr (24 mL/Hr) IV Continuous <Continuous>  influenza   Vaccine 0.5 milliLiter(s) IntraMuscular once  insulin lispro (HumaLOG) corrective regimen sliding scale   SubCutaneous three times a day before meals  lactated ringers. 1000 milliLiter(s) (1000 mL/Hr) IV Continuous <Continuous>  lactated ringers. 1000 milliLiter(s) (40 mL/Hr) IV Continuous <Continuous>  lisinopril 2.5 milliGRAM(s) Oral daily  meropenem  IVPB 1000 milliGRAM(s) IV Intermittent every 8 hours  methadone    Tablet 10 milliGRAM(s) Oral four times a day  simvastatin 20 milliGRAM(s) Oral at bedtime  vancomycin  IVPB 1250 milliGRAM(s) IV Intermittent every 12 hours    MEDICATIONS  (PRN):  dextrose 40% Gel 15 Gram(s) Oral once PRN Blood Glucose LESS THAN 70 milliGRAM(s)/deciliter  glucagon  Injectable 1 milliGRAM(s) IntraMuscular once PRN Glucose LESS THAN 70 milligrams/deciliter  heparin  Injectable 29630 Unit(s) IV Push every 6 hours PRN For aPTT less than 40  heparin  Injectable 5000 Unit(s) IV Push every 6 hours PRN For aPTT between 40 - 57    Vital Signs Last 24 Hrs  T(C): 37.1 (2019 05:47), Max: 37.2 (10 Nov 2019 19:22)  T(F): 98.7 (2019 05:47), Max: 99 (10 Nov 2019 19:22)  HR: 67 (2019 05:47) (63 - 114)  BP: 115/68 (2019 05:47) (96/57 - 126/57)  BP(mean): --  RR: 18 (2019 05:47) (18 - 19)  SpO2: 94% (2019 05:47) (94% - 99%)    Physical Exam:  Gen: obese individual, OOB in chair  Groin: b/l groins with surgical site staples in place and surrounding erythema L>R. Surrounding skin macerated. No sig drainage  Vasc: palpable b/l distal pulses, b/l lower extremities edematous. compression wraps in place. dryed ulceration to right anterior lower extremity.      LABS:                        8.6    12.65 )-----------( 287      ( 2019 06:02 )             28.7       139  |  103  |  17.0  ----------------------------<  138<H>  3.7   |  23.0  |  0.74    Ca    8.2<L>      2019 06:02  Phos  3.3       Mg     2.4         PT/INR - ( 2019 06:31 )   PT: 15.7 sec;   INR: 1.35 ratio    PTT - ( 2019 06:02 )  PTT:57.6 sec        Urinalysis Basic - ( 2019 21:19 )    Color: Yellow / Appearance: Clear / S.010 / pH: x  Gluc: x / Ketone: Trace  / Bili: Negative / Urobili: 1 mg/dL   Blood: x / Protein: 15 mg/dL / Nitrite: Negative   Leuk Esterase: Negative / RBC: 0-2 /HPF / WBC 0-2   Sq Epi: x / Non Sq Epi: Occasional / Bacteria: Occasional    RECENT CULTURES:   .Blood XXXX XXXX   No growth at 48 hours    Urine culture >100,000 proteus      RADIOLOGY & ADDITIONAL STUDIES: INTERVAL HPI/OVERNIGHT EVENTS:    Patient seen today without any acute overnight events. No fevers overnight. Patient tolerating diet. Urinating without issues. Has not amublated much since admission. No fevers, chills, cp or sob.    MEDICATIONS  (STANDING):  dextrose 5%. 1000 milliLiter(s) (50 mL/Hr) IV Continuous <Continuous>  dextrose 50% Injectable 12.5 Gram(s) IV Push once  dextrose 50% Injectable 25 Gram(s) IV Push once  dextrose 50% Injectable 25 Gram(s) IV Push once  gabapentin 800 milliGRAM(s) Oral three times a day  heparin  Infusion.  Unit(s)/Hr (24 mL/Hr) IV Continuous <Continuous>  influenza   Vaccine 0.5 milliLiter(s) IntraMuscular once  insulin lispro (HumaLOG) corrective regimen sliding scale   SubCutaneous three times a day before meals  lactated ringers. 1000 milliLiter(s) (1000 mL/Hr) IV Continuous <Continuous>  lactated ringers. 1000 milliLiter(s) (40 mL/Hr) IV Continuous <Continuous>  lisinopril 2.5 milliGRAM(s) Oral daily  meropenem  IVPB 1000 milliGRAM(s) IV Intermittent every 8 hours  methadone    Tablet 10 milliGRAM(s) Oral four times a day  simvastatin 20 milliGRAM(s) Oral at bedtime  vancomycin  IVPB 1250 milliGRAM(s) IV Intermittent every 12 hours    MEDICATIONS  (PRN):  dextrose 40% Gel 15 Gram(s) Oral once PRN Blood Glucose LESS THAN 70 milliGRAM(s)/deciliter  glucagon  Injectable 1 milliGRAM(s) IntraMuscular once PRN Glucose LESS THAN 70 milligrams/deciliter  heparin  Injectable 11739 Unit(s) IV Push every 6 hours PRN For aPTT less than 40  heparin  Injectable 5000 Unit(s) IV Push every 6 hours PRN For aPTT between 40 - 57    Vital Signs Last 24 Hrs  T(C): 37.1 (2019 05:47), Max: 37.2 (10 Nov 2019 19:22)  T(F): 98.7 (2019 05:47), Max: 99 (10 Nov 2019 19:22)  HR: 67 (2019 05:47) (63 - 114)  BP: 115/68 (2019 05:47) (96/57 - 126/57)  BP(mean): --  RR: 18 (2019 05:47) (18 - 19)  SpO2: 94% (2019 05:47) (94% - 99%)    Physical Exam:  Gen: obese individual, OOB in chair  Groin: b/l groins with surgical site staples in place and surrounding erythema L>R. Surrounding skin macerated. No sig drainage  Vasc: palpable b/l distal pulses, b/l lower extremities edematous. compression wraps in place. dryed ulceration to right anterior lower extremity.      LABS:                        8.6    12.65 )-----------( 287      ( 2019 06:02 )             28.7       139  |  103  |  17.0  ----------------------------<  138<H>  3.7   |  23.0  |  0.74    Ca    8.2<L>      2019 06:02  Phos  3.3       Mg     2.4         PT/INR - ( 2019 06:31 )   PT: 15.7 sec;   INR: 1.35 ratio    PTT - ( 2019 06:02 )  PTT:57.6 sec        Urinalysis Basic - ( 2019 21:19 )    Color: Yellow / Appearance: Clear / S.010 / pH: x  Gluc: x / Ketone: Trace  / Bili: Negative / Urobili: 1 mg/dL   Blood: x / Protein: 15 mg/dL / Nitrite: Negative   Leuk Esterase: Negative / RBC: 0-2 /HPF / WBC 0-2   Sq Epi: x / Non Sq Epi: Occasional / Bacteria: Occasional    RECENT CULTURES:   .Blood XXXX XXXX   No growth at 48 hours    Urine culture >100,000 proteus    CAPILLARY BLOOD GLUCOSE  POCT Blood Glucose.: 119 mg/dL (2019 08:41)  POCT Blood Glucose.: 178 mg/dL (2019 21:08)  POCT Blood Glucose.: 141 mg/dL (2019 16:56)  POCT Blood Glucose.: 128 mg/dL (2019 12:16)    RADIOLOGY & ADDITIONAL STUDIES:

## 2019-11-12 NOTE — DISCHARGE NOTE PROVIDER - NSDCFUSCHEDAPPT_GEN_ALL_CORE_FT
RANDY MARTINEZ ; 12/23/2019 ; [facility] RANDY MARTINEZ ; 12/23/2019 ; [facility] RANDY MARTINEZ ; 12/23/2019 ; [facility]

## 2019-11-12 NOTE — DISCHARGE NOTE PROVIDER - CARE PROVIDERS DIRECT ADDRESSES
[email or web address]

## 2019-11-12 NOTE — DISCHARGE NOTE PROVIDER - NSDCCPCAREPLAN_GEN_ALL_CORE_FT
PRINCIPAL DISCHARGE DIAGNOSIS  Diagnosis: Acute UTI  Assessment and Plan of Treatment:       SECONDARY DISCHARGE DIAGNOSES  Diagnosis: H/O aorto-femoral bypass  Assessment and Plan of Treatment: PRINCIPAL DISCHARGE DIAGNOSIS  Diagnosis: Acute UTI  Assessment and Plan of Treatment:       SECONDARY DISCHARGE DIAGNOSES  Diagnosis: DVT, lower extremity  Assessment and Plan of Treatment:     Diagnosis: Pulmonary embolism  Assessment and Plan of Treatment:     Diagnosis: H/O aorto-femoral bypass  Assessment and Plan of Treatment:

## 2019-11-12 NOTE — DISCHARGE NOTE PROVIDER - NSDCMRMEDTOKEN_GEN_ALL_CORE_FT
apixaban 5 mg oral tablet: 1 tab(s) orally every 12 hours  aspirin 81 mg oral tablet, chewable: 1 tab(s) orally once a day  Basaglar KwikPen 100 units/mL subcutaneous solution: 80 unit(s) subcutaneous once a day  fluconazole 100 mg oral tablet: 1 tab(s) orally once a day  gabapentin 400 mg oral capsule: 2 cap(s) orally 3 times a day  glimepiride 4 mg oral tablet: 2 tab(s) orally once a day  hydroCHLOROthiazide 25 mg oral tablet: 1 tab(s) orally once a day  HYDROmorphone 4 mg oral tablet: orally every 8 hours  lisinopril 2.5 mg oral tablet: 1 tab(s) orally once a day  metFORMIN 1000 mg oral tablet: 1 tab(s) orally 2 times a day  RESUME IN 2 days on 10/14/19  methadone 10 mg oral tablet: orally 4 times a day  pioglitazone 30 mg oral tablet: 1 tab(s) orally once a day  Vijay Walker : Apply topically to affected area once a day   simvastatin 20 mg oral tablet: 1 tab(s) orally once a day (at bedtime)  sulfamethoxazole-trimethoprim 800 mg-160 mg oral tablet: 1 tab(s) orally 2 times a day

## 2019-11-12 NOTE — DISCHARGE NOTE PROVIDER - HOSPITAL COURSE
64 year old male presenting with chief complaint of acute confusion. Vascular surgery consulted for concern of bilateral groin surgical site infection as possible cause for confusion. Pt is well known to vascular surgery and is s/p fem fem bypass with PTFE graft Oct 24th for critical limb ischemia. Patient is poor historian hence corroborating information obtained from wife at bedside. States that she noticed that the patient was not acting like himself and was not making sense during her conversations with him prompting her to call 911. Arrived in [facility] ED complaining of lower abdominal pain (last voided in AM) and b/l groin pain on palpation. Pt noted with leukocytosis and elevated lactate with erythema noted over prior surgical site incisions on physical exam concerning for cellulitis requiring admission for IV ABx . Initiated IV vanc and zosyn and pan cultures ordered. The pt was admitted to the vascular surgery service for continued management. An urgent CTA A/P was ordered and revealed no bowel obstruction or inflammation. Moderate stool throughout colon. Status post fem-fem bypass with patent graft. Fluid is seen around the graft in the left groin.. Sterile versus infected fluid cannot be differentiated. Probable filling defect in the left lower lobe segmental pulmonary artery. A heparin gtt was ordered and due to the bilat LE edema a venous duplex was obtained which revealed the proximal right femoral vein is patent, however compression could not be performed secondary to patient discomfort. The mid and distal femoral vein and popliteal vein are patent and compressible     without evidence of thrombus. Deep venous thrombosis is seen within the anterior branch of the right posterior tibial vein. The pt remained hemodynamically stable, however did not have any sig improvement in his leukocytosis and abx changed. That said WBC downtreanded, pt off O2 and mental status improved. The urine cx + proteus and blood cx negative. BL groin excoriations likely sec to pannus and not infected, however given course of antifungal for empiric management of wounds. Pt was seen by PT and stable with RW for dc. Pt switched to Eliquis from Heparin and po abx. He is stable for dc home. 64 year old male presenting with chief complaint of acute confusion. Vascular surgery consulted for concern of bilateral groin surgical site infection as possible cause for confusion. Pt is well known to vascular surgery and is s/p fem fem bypass with PTFE graft Oct 24th for critical limb ischemia. Patient is poor historian hence corroborating information obtained from wife at bedside. States that she noticed that the patient was not acting like himself and was not making sense during her conversations with him prompting her to call 911. Arrived in [facility] ED complaining of lower abdominal pain (last voided in AM) and b/l groin pain on palpation. Pt noted with leukocytosis and elevated lactate with erythema noted over prior surgical site incisions on physical exam concerning for cellulitis requiring admission for IV ABx . Initiated IV vanc and zosyn and pan cultures ordered. The pt was admitted to the vascular surgery service for continued management. An urgent CTA A/P was ordered and revealed no bowel obstruction or inflammation. Moderate stool throughout colon. Status post fem-fem bypass with patent graft. Fluid is seen around the graft in the left groin.. Sterile versus infected fluid cannot be differentiated. Probable filling defect in the left lower lobe segmental pulmonary artery. A heparin gtt was ordered and due to the bilat LE edema a venous duplex was obtained which revealed the proximal right femoral vein is patent, however compression could not be performed secondary to patient discomfort. The mid and distal femoral vein and popliteal vein are patent and compressible     without evidence of thrombus. Deep venous thrombosis is seen within the anterior branch of the right posterior tibial vein. The pt remained hemodynamically stable, however did not have any sig improvement in his leukocytosis and abx changed. That said WBC downtreanded, pt off O2 and mental status improved. The urine cx + proteus and blood cx negative. BL groin excoriations likely sec to pannus and not infected, however given course of antifungal for empiric management of wounds. Pt was seen by PT and stable with RW for dc. Pt switched to Eliquis from Heparin and po abx. He is stable for dc home. Unna boots applied prior to dc. The pt will followup in vascular outpt office.

## 2019-11-13 ENCOUNTER — TRANSCRIPTION ENCOUNTER (OUTPATIENT)
Age: 64
End: 2019-11-13

## 2019-11-13 VITALS
DIASTOLIC BLOOD PRESSURE: 74 MMHG | RESPIRATION RATE: 18 BRPM | TEMPERATURE: 97 F | OXYGEN SATURATION: 95 % | HEART RATE: 76 BPM | SYSTOLIC BLOOD PRESSURE: 128 MMHG

## 2019-11-13 LAB
ANION GAP SERPL CALC-SCNC: 12 MMOL/L — SIGNIFICANT CHANGE UP (ref 5–17)
APTT BLD: 36.1 SEC — SIGNIFICANT CHANGE UP (ref 27.5–36.3)
BUN SERPL-MCNC: 18 MG/DL — SIGNIFICANT CHANGE UP (ref 8–20)
CALCIUM SERPL-MCNC: 9 MG/DL — SIGNIFICANT CHANGE UP (ref 8.6–10.2)
CHLORIDE SERPL-SCNC: 100 MMOL/L — SIGNIFICANT CHANGE UP (ref 98–107)
CO2 SERPL-SCNC: 22 MMOL/L — SIGNIFICANT CHANGE UP (ref 22–29)
CREAT SERPL-MCNC: 0.7 MG/DL — SIGNIFICANT CHANGE UP (ref 0.5–1.3)
GLUCOSE BLDC GLUCOMTR-MCNC: 111 MG/DL — HIGH (ref 70–99)
GLUCOSE BLDC GLUCOMTR-MCNC: 142 MG/DL — HIGH (ref 70–99)
GLUCOSE SERPL-MCNC: 125 MG/DL — HIGH (ref 70–115)
HCT VFR BLD CALC: 29.9 % — LOW (ref 39–50)
HGB BLD-MCNC: 9 G/DL — LOW (ref 13–17)
INR BLD: 1.21 RATIO — HIGH (ref 0.88–1.16)
MCHC RBC-ENTMCNC: 25.6 PG — LOW (ref 27–34)
MCHC RBC-ENTMCNC: 30.1 GM/DL — LOW (ref 32–36)
MCV RBC AUTO: 84.9 FL — SIGNIFICANT CHANGE UP (ref 80–100)
PLATELET # BLD AUTO: 279 K/UL — SIGNIFICANT CHANGE UP (ref 150–400)
POTASSIUM SERPL-MCNC: 4.3 MMOL/L — SIGNIFICANT CHANGE UP (ref 3.5–5.3)
POTASSIUM SERPL-SCNC: 4.3 MMOL/L — SIGNIFICANT CHANGE UP (ref 3.5–5.3)
PROTHROM AB SERPL-ACNC: 14 SEC — HIGH (ref 10–12.9)
RBC # BLD: 3.52 M/UL — LOW (ref 4.2–5.8)
RBC # FLD: 17.3 % — HIGH (ref 10.3–14.5)
SODIUM SERPL-SCNC: 134 MMOL/L — LOW (ref 135–145)
WBC # BLD: 9.44 K/UL — SIGNIFICANT CHANGE UP (ref 3.8–10.5)
WBC # FLD AUTO: 9.44 K/UL — SIGNIFICANT CHANGE UP (ref 3.8–10.5)

## 2019-11-13 PROCEDURE — 84100 ASSAY OF PHOSPHORUS: CPT

## 2019-11-13 PROCEDURE — 71045 X-RAY EXAM CHEST 1 VIEW: CPT

## 2019-11-13 PROCEDURE — 97163 PT EVAL HIGH COMPLEX 45 MIN: CPT

## 2019-11-13 PROCEDURE — 87086 URINE CULTURE/COLONY COUNT: CPT

## 2019-11-13 PROCEDURE — 83036 HEMOGLOBIN GLYCOSYLATED A1C: CPT

## 2019-11-13 PROCEDURE — 83735 ASSAY OF MAGNESIUM: CPT

## 2019-11-13 PROCEDURE — 85610 PROTHROMBIN TIME: CPT

## 2019-11-13 PROCEDURE — 93970 EXTREMITY STUDY: CPT

## 2019-11-13 PROCEDURE — 83605 ASSAY OF LACTIC ACID: CPT

## 2019-11-13 PROCEDURE — 85730 THROMBOPLASTIN TIME PARTIAL: CPT

## 2019-11-13 PROCEDURE — 80053 COMPREHEN METABOLIC PANEL: CPT

## 2019-11-13 PROCEDURE — 99285 EMERGENCY DEPT VISIT HI MDM: CPT | Mod: 25

## 2019-11-13 PROCEDURE — 82962 GLUCOSE BLOOD TEST: CPT

## 2019-11-13 PROCEDURE — 87040 BLOOD CULTURE FOR BACTERIA: CPT

## 2019-11-13 PROCEDURE — 81001 URINALYSIS AUTO W/SCOPE: CPT

## 2019-11-13 PROCEDURE — 80048 BASIC METABOLIC PNL TOTAL CA: CPT

## 2019-11-13 PROCEDURE — 85027 COMPLETE CBC AUTOMATED: CPT

## 2019-11-13 PROCEDURE — 74174 CTA ABD&PLVS W/CONTRAST: CPT

## 2019-11-13 PROCEDURE — 80202 ASSAY OF VANCOMYCIN: CPT

## 2019-11-13 PROCEDURE — 36000 PLACE NEEDLE IN VEIN: CPT | Mod: XU

## 2019-11-13 PROCEDURE — 96375 TX/PRO/DX INJ NEW DRUG ADDON: CPT | Mod: XU

## 2019-11-13 PROCEDURE — 36415 COLL VENOUS BLD VENIPUNCTURE: CPT

## 2019-11-13 PROCEDURE — 93005 ELECTROCARDIOGRAM TRACING: CPT

## 2019-11-13 PROCEDURE — 96365 THER/PROPH/DIAG IV INF INIT: CPT | Mod: XU

## 2019-11-13 RX ORDER — APIXABAN 2.5 MG/1
1 TABLET, FILM COATED ORAL
Qty: 180 | Refills: 3
Start: 2019-11-13 | End: 2020-11-12

## 2019-11-13 RX ADMIN — METHADONE HYDROCHLORIDE 10 MILLIGRAM(S): 40 TABLET ORAL at 01:16

## 2019-11-13 RX ADMIN — METHADONE HYDROCHLORIDE 10 MILLIGRAM(S): 40 TABLET ORAL at 06:04

## 2019-11-13 RX ADMIN — GABAPENTIN 800 MILLIGRAM(S): 400 CAPSULE ORAL at 06:05

## 2019-11-13 RX ADMIN — GABAPENTIN 800 MILLIGRAM(S): 400 CAPSULE ORAL at 12:32

## 2019-11-13 RX ADMIN — METHADONE HYDROCHLORIDE 10 MILLIGRAM(S): 40 TABLET ORAL at 12:32

## 2019-11-13 RX ADMIN — APIXABAN 5 MILLIGRAM(S): 2.5 TABLET, FILM COATED ORAL at 06:04

## 2019-11-13 RX ADMIN — FLUCONAZOLE 100 MILLIGRAM(S): 150 TABLET ORAL at 12:32

## 2019-11-13 RX ADMIN — LISINOPRIL 2.5 MILLIGRAM(S): 2.5 TABLET ORAL at 06:04

## 2019-11-13 RX ADMIN — Medication 1 TABLET(S): at 06:04

## 2019-11-13 RX ADMIN — Medication 975 MILLIGRAM(S): at 01:30

## 2019-11-13 NOTE — PROGRESS NOTE ADULT - SUBJECTIVE AND OBJECTIVE BOX
INTERVAL HPI/OVERNIGHT EVENTS:  Patient seen today without any acute overnight events. No fevers overnight. Patient tolerating diet. Urinating without issues. Amublatation is limited due to R knee pain(chronic). No fevers, chills, cp or sob.    MEDICATIONS  (STANDING):  apixaban 5 milliGRAM(s) Oral every 12 hours  dextrose 5%. 1000 milliLiter(s) (50 mL/Hr) IV Continuous <Continuous>  dextrose 50% Injectable 12.5 Gram(s) IV Push once  dextrose 50% Injectable 25 Gram(s) IV Push once  dextrose 50% Injectable 25 Gram(s) IV Push once  fluconAZOLE   Tablet 100 milliGRAM(s) Oral daily  gabapentin 800 milliGRAM(s) Oral three times a day  influenza   Vaccine 0.5 milliLiter(s) IntraMuscular once  insulin lispro (HumaLOG) corrective regimen sliding scale   SubCutaneous three times a day before meals  lactated ringers. 1000 milliLiter(s) (1000 mL/Hr) IV Continuous <Continuous>  lisinopril 2.5 milliGRAM(s) Oral daily  methadone    Tablet 10 milliGRAM(s) Oral four times a day  simvastatin 20 milliGRAM(s) Oral at bedtime  trimethoprim  160 mG/sulfamethoxazole 800 mG 1 Tablet(s) Oral two times a day    MEDICATIONS  (PRN):  dextrose 40% Gel 15 Gram(s) Oral once PRN Blood Glucose LESS THAN 70 milliGRAM(s)/deciliter  glucagon  Injectable 1 milliGRAM(s) IntraMuscular once PRN Glucose LESS THAN 70 milligrams/deciliter    Vital Signs Last 24 Hrs  T(C): 36.8 (2019 06:02), Max: 36.8 (2019 22:05)  T(F): 98.2 (2019 06:02), Max: 98.2 (2019 22:05)  HR: 61 (2019 06:02) (61 - 76)  BP: 121/72 (2019 06:02) (110/62 - 129/66)  BP(mean): --  RR: 18 (2019 06:02) (18 - 18)  SpO2: 98% (2019 06:02) (95% - 100%)    Physical Exam:  Gen: obese individual, OOB in chair  Groin: b/l groins with surgical site staples in place and surrounding erythema L>R. Surrounding skin macerated. No sig drainage  Vasc: palpable b/l distal pulses, b/l lower extremities edematous. Compression wraps in place. Dry ulceration to right anterior lower extremity.      LABS:                        9.0    9.44  )-----------( 279      ( 2019 07:17 )             29.9   11-13    134<L>  |  100  |  18.0  ----------------------------<  125<H>  4.3   |  22.0  |  0.70    Ca    9.0      2019 07:17  Phos  3.3     -  Mg     2.4     11-    PT/INR - ( 2019 07:17 )   PT: 14.0 sec;   INR: 1.21 ratio    PTT - ( 2019 07:17 )  PTT:36.1 sec    Urinalysis Basic - ( 2019 21:19 )    Color: Yellow / Appearance: Clear / S.010 / pH: x  Gluc: x / Ketone: Trace  / Bili: Negative / Urobili: 1 mg/dL   Blood: x / Protein: 15 mg/dL / Nitrite: Negative   Leuk Esterase: Negative / RBC: 0-2 /HPF / WBC 0-2   Sq Epi: x / Non Sq Epi: Occasional / Bacteria: Occasional    RECENT CULTURES:   .Blood XXXX XXXX   No growth at 48 hours    Urine culture >100,000 proteus    CAPILLARY BLOOD GLUCOSE  POCT Blood Glucose.: 189 mg/dL (2019 21:41)  POCT Blood Glucose.: 141 mg/dL (2019 17:00)  POCT Blood Glucose.: 152 mg/dL (2019 12:17)  POCT Blood Glucose.: 119 mg/dL (2019 08:41)      RADIOLOGY & ADDITIONAL STUDIES:  < from: US Duplex Venous Lower Ext Complete, Bilateral (11.10.19 @ 13:42) >  EXAM:  US DPLX LWR EXT VEINS COMPL BI                          PROCEDURE DATE:  11/10/2019          INTERPRETATION:  CLINICAL INFORMATION: Left lower lobe PE on CT. Pain and   swelling in legs.    COMPARISON: CT abdomen/pelvis 2019 and right lower extremity venous   Doppler 2019    TECHNIQUE: Duplex sonography of the BILATERAL LOWER extremity veins with   color and spectral Doppler, with and without compression.      FINDINGS:    The right common femoral vein was not seen secondary lizandro overlying   groin incision.    The right femoral and popliteal veins are patent and compressible without   evidence of thrombus.    There is nonocclusive deep venous thrombosis in the right tibioperoneal   trunk.    The left common femoral vein isalso not seen secondary to an overlying   incision. The proximal right femoral vein is patent, however compression   could not be performed secondary to patient discomfort. The mid and   distal femoral vein and popliteal vein are patent and compressible   without evidence of thrombus.    Deep venous thrombosis is seen within the anterior branch of the right   posterior tibial vein.      IMPRESSION:     Common femoral veins could not be seen secondary to overlying groin   incisions and limited evaluation of the proximal left femoral vein.    Deep venous thrombosis in the right tibioperoneal trunk and left   posterior tibial vein.      < end of copied text >  < from: CT Angio Abdomen and Pelvis w/ IV Cont (19 @ 23:14) >  EXAM:  CT ANGIO ABD PELV (W)AW IC                          PROCEDURE DATE:  2019          INTERPRETATION:  [facility] RADIOLOGIST PRELIMINARY REPORT    PROCEDURE INFORMATION:   Exam: CT Angiography Abdomen and Pelvis With Contrast   Exam date and time: 2019 11:07 PM   Clinical history: 64 years old, male; Abdominal pain; Generalized     TECHNIQUE:   Imaging protocol: Computed tomographic angiography of the abdomen and   pelvis   with intravenous contrast material.   3D rendering: MIP reconstructed images were created and reviewed.   Contrast material: OMNI 350; Contrast volume: 95 ml; Contrast route: IV;      COMPARISON:   No relevant prior studies available.     FINDINGS:   Lungs: Mild atelectatic changes lung bases.     VASCULATURE:  Aorta: Mild atherosclerotic wall calcification abdominal aorta and iliac   vessels.   Celiac trunk and mesenteric arteries: No occlusion or significant   stenosis.   Renal arteries: No occlusion or significant stenosis.   Right iliac arteries: No occlusion or significant stenosis.   Left iliac arteries: No occlusion or significant stenosis.   Bypass grafts: Fem-fem bypass graft is in place which appears patent.   Fluid is seen around the graft in the left groin     ABDOMEN and PELVIS:   Liver: No mass.   Gallbladder and bile ducts: Unremarkable. No calcified stones. No ductal   dilation.   Pancreas: Unremarkable. No mass. No ductal dilation.   Spleen: Unremarkable. No splenomegaly.   Adrenals: Unremarkable. No mass.   Kidneys and ureters: Scattered bilateral renal cysts measuring up to 3   cm.   Otherwise normal kidneys.   Stomach and bowel: No bowel obstruction. Moderate stool in the colon   which can   be seen with constipation.   Appendix: Normal appendix.   Intraperitoneal space: Unremarkable. No free air. No significant fluid   collection.   Lymph nodes: Unremarkable. No enlarged lymph nodes.     Bladder: Unremarkable. No mass.   Reproductive: Unremarkable.  Bones/joints: Left hip prosthesis in place. Degenerative changes   thoracolumbar   spine.   Soft tissues: Unremarkable.     IMPRESSION:   1. No bowel obstruction or inflammation. Moderate stool throughout colon   2. Status post fem-fem bypass with patent graft. Fluid is seen around the   graft in the left groin.. Sterile versus infected fluid cannot be   differentiated.  3. Probable filling defect in the left lower lobe segmental pulmonary   artery seen on the first few images. Dedicated CT pulmonary angiogram   recommended. Dr Norris and Dr Jaime notified at 837 on 11/10/19.             < end of copied text >

## 2019-11-13 NOTE — PROGRESS NOTE ADULT - SUBJECTIVE AND OBJECTIVE BOX
Vascular Surgery Follow up    Exam:  Extremities: BLE with 2+ edema and chronic venous stasis changes. R ant suárez with 2x2 cm ruptured bullae remnant. Dry    Assessment:64yMaleHPI:  64 year old male presenting with chief complaint of acute confusion. S/P s/p fem-fem bypass Oct 24th; UTI; PE/DVT with h/o chronic venous stasis        Plan:  BLE unna boots  placed. Wrapped with ACE for gentle compression.   Ambulate  Elevate when OOB  DC home

## 2019-11-13 NOTE — PROGRESS NOTE ADULT - REASON FOR ADMISSION
Bilateral groin cellulitis s/p fem-fem bypass Oct 24th

## 2019-11-13 NOTE — DISCHARGE NOTE NURSING/CASE MANAGEMENT/SOCIAL WORK - PATIENT PORTAL LINK FT
You can access the FollowMyHealth Patient Portal offered by [facility] by registering at the following website: [email or web address]. By joining [employer]’s FollowMyHealth portal, you will also be able to view your health information using other applications (apps) compatible with our system.

## 2019-11-13 NOTE — PROGRESS NOTE ADULT - ASSESSMENT
64 year old male with UTI  Currently afebrile with improved leukocytosis   Newly diagnosed with DVT and PE.   Cont AC with Eliquis   Continue home medications  PO abx and diflucan for fungal coverage  Will place unna boots for DC  DC home today

## 2019-11-18 ENCOUNTER — APPOINTMENT (OUTPATIENT)
Dept: VASCULAR SURGERY | Facility: CLINIC | Age: 64
End: 2019-11-18
Payer: MEDICAID

## 2019-11-18 VITALS
HEART RATE: 88 BPM | OXYGEN SATURATION: 92 % | TEMPERATURE: 98 F | RESPIRATION RATE: 16 BRPM | DIASTOLIC BLOOD PRESSURE: 68 MMHG | SYSTOLIC BLOOD PRESSURE: 135 MMHG

## 2019-11-18 PROCEDURE — XXXXX: CPT

## 2019-11-19 ENCOUNTER — APPOINTMENT (OUTPATIENT)
Dept: UROLOGY | Facility: CLINIC | Age: 64
End: 2019-11-19
Payer: MEDICAID

## 2019-11-19 DIAGNOSIS — Z87.440 PERSONAL HISTORY OF URINARY (TRACT) INFECTIONS: ICD-10-CM

## 2019-11-19 DIAGNOSIS — N43.3 HYDROCELE, UNSPECIFIED: ICD-10-CM

## 2019-11-19 PROCEDURE — 99203 OFFICE O/P NEW LOW 30 MIN: CPT

## 2019-11-19 NOTE — PHYSICAL EXAM
[Urethral Meatus] : meatus normal [Penis Abnormality] : normal circumcised penis [Prostate Size ___ (0-4)] : prostate size [unfilled] (scale: 0-4) [Oriented To Time, Place, And Person] : oriented to person, place, and time [FreeTextEntry1] : retracted penis, left hydrocele

## 2019-11-25 ENCOUNTER — APPOINTMENT (OUTPATIENT)
Dept: VASCULAR SURGERY | Facility: CLINIC | Age: 64
End: 2019-11-25
Payer: MEDICAID

## 2019-11-25 VITALS
RESPIRATION RATE: 16 BRPM | TEMPERATURE: 97.6 F | HEIGHT: 63 IN | DIASTOLIC BLOOD PRESSURE: 82 MMHG | OXYGEN SATURATION: 99 % | HEART RATE: 88 BPM | SYSTOLIC BLOOD PRESSURE: 144 MMHG

## 2019-11-25 PROCEDURE — 29580 STRAPPING UNNA BOOT: CPT | Mod: 50,58

## 2019-11-25 PROCEDURE — 97605 NEG PRS WND THER DME<=50SQCM: CPT

## 2019-11-25 PROCEDURE — 99024 POSTOP FOLLOW-UP VISIT: CPT

## 2019-11-26 ENCOUNTER — APPOINTMENT (OUTPATIENT)
Dept: ULTRASOUND IMAGING | Facility: CLINIC | Age: 64
End: 2019-11-26

## 2019-12-01 ENCOUNTER — OUTPATIENT (OUTPATIENT)
Dept: OUTPATIENT SERVICES | Facility: HOSPITAL | Age: 64
LOS: 1 days | End: 2019-12-01
Payer: MEDICARE

## 2019-12-01 DIAGNOSIS — Z96.642 PRESENCE OF LEFT ARTIFICIAL HIP JOINT: Chronic | ICD-10-CM

## 2019-12-01 DIAGNOSIS — Z95.828 PRESENCE OF OTHER VASCULAR IMPLANTS AND GRAFTS: Chronic | ICD-10-CM

## 2019-12-02 ENCOUNTER — APPOINTMENT (OUTPATIENT)
Dept: VASCULAR SURGERY | Facility: CLINIC | Age: 64
End: 2019-12-02
Payer: SELF-PAY

## 2019-12-02 VITALS
SYSTOLIC BLOOD PRESSURE: 144 MMHG | OXYGEN SATURATION: 99 % | DIASTOLIC BLOOD PRESSURE: 80 MMHG | HEART RATE: 99 BPM | TEMPERATURE: 98.4 F | HEIGHT: 63 IN

## 2019-12-02 PROCEDURE — 29580 STRAPPING UNNA BOOT: CPT | Mod: 50,58

## 2019-12-20 ENCOUNTER — APPOINTMENT (OUTPATIENT)
Dept: UROLOGY | Facility: CLINIC | Age: 64
End: 2019-12-20

## 2019-12-23 ENCOUNTER — APPOINTMENT (OUTPATIENT)
Dept: ORTHOPEDIC SURGERY | Facility: CLINIC | Age: 64
End: 2019-12-23

## 2020-02-19 ENCOUNTER — APPOINTMENT (OUTPATIENT)
Dept: VASCULAR SURGERY | Facility: CLINIC | Age: 65
End: 2020-02-19
Payer: MEDICARE

## 2020-02-19 VITALS
SYSTOLIC BLOOD PRESSURE: 158 MMHG | DIASTOLIC BLOOD PRESSURE: 77 MMHG | OXYGEN SATURATION: 96 % | TEMPERATURE: 97.6 F | HEART RATE: 92 BPM | RESPIRATION RATE: 16 BRPM

## 2020-02-19 PROCEDURE — 29580 STRAPPING UNNA BOOT: CPT | Mod: 50

## 2020-02-26 ENCOUNTER — APPOINTMENT (OUTPATIENT)
Dept: VASCULAR SURGERY | Facility: CLINIC | Age: 65
End: 2020-02-26
Payer: MEDICARE

## 2020-02-26 VITALS
HEART RATE: 96 BPM | RESPIRATION RATE: 16 BRPM | HEIGHT: 63 IN | DIASTOLIC BLOOD PRESSURE: 77 MMHG | BODY MASS INDEX: 54.93 KG/M2 | TEMPERATURE: 97.9 F | WEIGHT: 310 LBS | SYSTOLIC BLOOD PRESSURE: 131 MMHG | OXYGEN SATURATION: 95 %

## 2020-02-26 PROCEDURE — 29580 STRAPPING UNNA BOOT: CPT | Mod: 50

## 2020-03-04 ENCOUNTER — APPOINTMENT (OUTPATIENT)
Dept: VASCULAR SURGERY | Facility: CLINIC | Age: 65
End: 2020-03-04
Payer: MEDICARE

## 2020-03-04 VITALS
SYSTOLIC BLOOD PRESSURE: 131 MMHG | TEMPERATURE: 97.9 F | OXYGEN SATURATION: 94 % | DIASTOLIC BLOOD PRESSURE: 79 MMHG | HEART RATE: 87 BPM | HEIGHT: 63 IN

## 2020-03-04 PROCEDURE — 29580 STRAPPING UNNA BOOT: CPT | Mod: 50

## 2020-03-11 ENCOUNTER — APPOINTMENT (OUTPATIENT)
Dept: VASCULAR SURGERY | Facility: CLINIC | Age: 65
End: 2020-03-11
Payer: MEDICARE

## 2020-03-11 VITALS
OXYGEN SATURATION: 92 % | HEART RATE: 97 BPM | DIASTOLIC BLOOD PRESSURE: 75 MMHG | SYSTOLIC BLOOD PRESSURE: 132 MMHG | TEMPERATURE: 98.3 F

## 2020-03-11 PROCEDURE — 29580 STRAPPING UNNA BOOT: CPT | Mod: RT

## 2020-03-18 ENCOUNTER — APPOINTMENT (OUTPATIENT)
Dept: VASCULAR SURGERY | Facility: CLINIC | Age: 65
End: 2020-03-18

## 2020-04-03 NOTE — ED ADULT NURSE NOTE - WOUND TYPE
Physical Therapy E-Visit     Patient verbally declined and initiated e-visit.    SUBJECTIVE:   New or change in symptoms/reports since last e-visit: Pt reports that he is feeling a little better.   Current pain/limitations: no reported.     OBJECTIVE (per patient report):   No reported.    Treatment Recommendations:   URL: [email or web address]   Date: 03/13/2020   Prepared by: Carlos Ordonez   Exercises   · Seated Cervical Retraction and Rotation - 5 reps - 2x daily - 5x weekly   · Seated Scapular Retraction - 5 reps - 2x daily - 5x weekly   · Seated Cervical Sidebending AROM - 5 reps - 2x daily - 5x weekly   · Seated Shoulder Rolls - 5 reps - 2x daily - 5x weekly   HEP ([employer] access code): [email or web address]     ASSESSMENT (Impression of current status):   Pt reports feeling a little better but not much change since last visit.     PLAN:   Will contact patient on 4/7.    Time spent in E-visit with patient: 2 minutes     rt lower leg

## 2020-04-04 ENCOUNTER — INPATIENT (INPATIENT)
Facility: HOSPITAL | Age: 65
LOS: 3 days | Discharge: NURSING, REHAB OR LONG-TERM CARE | DRG: 602 | End: 2020-04-08
Attending: GENERAL ACUTE CARE HOSPITAL | Admitting: INTERNAL MEDICINE
Payer: MEDICARE

## 2020-04-04 VITALS
WEIGHT: 315 LBS | RESPIRATION RATE: 20 BRPM | HEART RATE: 86 BPM | SYSTOLIC BLOOD PRESSURE: 131 MMHG | OXYGEN SATURATION: 97 % | HEIGHT: 68 IN | TEMPERATURE: 99 F | DIASTOLIC BLOOD PRESSURE: 60 MMHG

## 2020-04-04 DIAGNOSIS — Z96.642 PRESENCE OF LEFT ARTIFICIAL HIP JOINT: Chronic | ICD-10-CM

## 2020-04-04 DIAGNOSIS — I50.31 ACUTE DIASTOLIC (CONGESTIVE) HEART FAILURE: ICD-10-CM

## 2020-04-04 DIAGNOSIS — Z95.828 PRESENCE OF OTHER VASCULAR IMPLANTS AND GRAFTS: Chronic | ICD-10-CM

## 2020-04-04 LAB
ALBUMIN SERPL ELPH-MCNC: 3.2 G/DL — LOW (ref 3.3–5.2)
ALP SERPL-CCNC: 63 U/L — SIGNIFICANT CHANGE UP (ref 40–120)
ALT FLD-CCNC: 24 U/L — SIGNIFICANT CHANGE UP
ANION GAP SERPL CALC-SCNC: 15 MMOL/L — SIGNIFICANT CHANGE UP (ref 5–17)
APPEARANCE UR: CLEAR — SIGNIFICANT CHANGE UP
APTT BLD: 37 SEC — HIGH (ref 27.5–36.3)
AST SERPL-CCNC: 41 U/L — HIGH
BASOPHILS # BLD AUTO: 0.06 K/UL — SIGNIFICANT CHANGE UP (ref 0–0.2)
BASOPHILS NFR BLD AUTO: 0.5 % — SIGNIFICANT CHANGE UP (ref 0–2)
BILIRUB SERPL-MCNC: 0.5 MG/DL — SIGNIFICANT CHANGE UP (ref 0.4–2)
BILIRUB UR-MCNC: NEGATIVE — SIGNIFICANT CHANGE UP
BUN SERPL-MCNC: 17 MG/DL — SIGNIFICANT CHANGE UP (ref 8–20)
CALCIUM SERPL-MCNC: 9.1 MG/DL — SIGNIFICANT CHANGE UP (ref 8.6–10.2)
CHLORIDE SERPL-SCNC: 99 MMOL/L — SIGNIFICANT CHANGE UP (ref 98–107)
CO2 SERPL-SCNC: 23 MMOL/L — SIGNIFICANT CHANGE UP (ref 22–29)
COLOR SPEC: YELLOW — SIGNIFICANT CHANGE UP
CREAT SERPL-MCNC: 0.82 MG/DL — SIGNIFICANT CHANGE UP (ref 0.5–1.3)
DIFF PNL FLD: ABNORMAL
EOSINOPHIL # BLD AUTO: 0.09 K/UL — SIGNIFICANT CHANGE UP (ref 0–0.5)
EOSINOPHIL NFR BLD AUTO: 0.8 % — SIGNIFICANT CHANGE UP (ref 0–6)
EPI CELLS # UR: SIGNIFICANT CHANGE UP
GLUCOSE SERPL-MCNC: 124 MG/DL — HIGH (ref 70–99)
GLUCOSE UR QL: NEGATIVE MG/DL — SIGNIFICANT CHANGE UP
HCT VFR BLD CALC: 28.1 % — LOW (ref 39–50)
HGB BLD-MCNC: 8.5 G/DL — LOW (ref 13–17)
IMM GRANULOCYTES NFR BLD AUTO: 0.5 % — SIGNIFICANT CHANGE UP (ref 0–1.5)
INR BLD: 1.67 RATIO — HIGH (ref 0.88–1.16)
KETONES UR-MCNC: NEGATIVE — SIGNIFICANT CHANGE UP
LEUKOCYTE ESTERASE UR-ACNC: NEGATIVE — SIGNIFICANT CHANGE UP
LYMPHOCYTES # BLD AUTO: 1.36 K/UL — SIGNIFICANT CHANGE UP (ref 1–3.3)
LYMPHOCYTES # BLD AUTO: 11.9 % — LOW (ref 13–44)
MCHC RBC-ENTMCNC: 23.7 PG — LOW (ref 27–34)
MCHC RBC-ENTMCNC: 30.2 GM/DL — LOW (ref 32–36)
MCV RBC AUTO: 78.5 FL — LOW (ref 80–100)
MONOCYTES # BLD AUTO: 0.83 K/UL — SIGNIFICANT CHANGE UP (ref 0–0.9)
MONOCYTES NFR BLD AUTO: 7.3 % — SIGNIFICANT CHANGE UP (ref 2–14)
NEUTROPHILS # BLD AUTO: 9.02 K/UL — HIGH (ref 1.8–7.4)
NEUTROPHILS NFR BLD AUTO: 79 % — HIGH (ref 43–77)
NITRITE UR-MCNC: NEGATIVE — SIGNIFICANT CHANGE UP
NT-PROBNP SERPL-SCNC: 298 PG/ML — SIGNIFICANT CHANGE UP (ref 0–300)
PH UR: 6 — SIGNIFICANT CHANGE UP (ref 5–8)
PLATELET # BLD AUTO: 272 K/UL — SIGNIFICANT CHANGE UP (ref 150–400)
POTASSIUM SERPL-MCNC: 3.6 MMOL/L — SIGNIFICANT CHANGE UP (ref 3.5–5.3)
POTASSIUM SERPL-SCNC: 3.6 MMOL/L — SIGNIFICANT CHANGE UP (ref 3.5–5.3)
PROT SERPL-MCNC: 8.1 G/DL — SIGNIFICANT CHANGE UP (ref 6.6–8.7)
PROT UR-MCNC: 30 MG/DL
PROTHROM AB SERPL-ACNC: 19.2 SEC — HIGH (ref 10–12.9)
RBC # BLD: 3.58 M/UL — LOW (ref 4.2–5.8)
RBC # FLD: 19 % — HIGH (ref 10.3–14.5)
RBC CASTS # UR COMP ASSIST: SIGNIFICANT CHANGE UP /HPF (ref 0–4)
SODIUM SERPL-SCNC: 137 MMOL/L — SIGNIFICANT CHANGE UP (ref 135–145)
SP GR SPEC: 1.01 — SIGNIFICANT CHANGE UP (ref 1.01–1.02)
UROBILINOGEN FLD QL: NEGATIVE MG/DL — SIGNIFICANT CHANGE UP
WBC # BLD: 11.42 K/UL — HIGH (ref 3.8–10.5)
WBC # FLD AUTO: 11.42 K/UL — HIGH (ref 3.8–10.5)
WBC UR QL: NEGATIVE — SIGNIFICANT CHANGE UP

## 2020-04-04 PROCEDURE — 93010 ELECTROCARDIOGRAM REPORT: CPT

## 2020-04-04 PROCEDURE — 71045 X-RAY EXAM CHEST 1 VIEW: CPT | Mod: 26

## 2020-04-04 PROCEDURE — 99223 1ST HOSP IP/OBS HIGH 75: CPT | Mod: AI

## 2020-04-04 PROCEDURE — 99285 EMERGENCY DEPT VISIT HI MDM: CPT

## 2020-04-04 PROCEDURE — 76870 US EXAM SCROTUM: CPT | Mod: 26

## 2020-04-04 RX ORDER — FUROSEMIDE 40 MG
80 TABLET ORAL ONCE
Refills: 0 | Status: COMPLETED | OUTPATIENT
Start: 2020-04-04 | End: 2020-04-04

## 2020-04-04 RX ADMIN — Medication 80 MILLIGRAM(S): at 18:42

## 2020-04-04 NOTE — H&P ADULT - NSHPPHYSICALEXAM_GEN_ALL_CORE
PHYSICAL EXAM:  General: in no acute distress; obese  Head: Normocephalic; atraumatic  Neck: Supple; non tender; no masses; thick  Respiratory: No wheezes, rales or rhonchi  Cardiovascular: Regular rate and rhythm. S1 and S2 Normal; No murmurs, gallops or rubs  Gastrointestinal: Soft non-tender non-distended; Normal bowel sounds  Extremities: Normal range of motion, No clubbing; bilateral swelling from the lower ext to the lower abdomen including the scrotum  Vascular: Peripheral pulses palpable 2+ bilaterally  Neurological: Alert and oriented x4  Skin: Warm and dry. No acute rash  Psychiatric: Cooperative and appropriate

## 2020-04-04 NOTE — ED PROVIDER NOTE - NEURO NEGATIVE STATEMENT, MLM
no loss of consciousness, baseline gait difficulties, no headache, no sensory deficits, and no weakness.

## 2020-04-04 NOTE — H&P ADULT - NSHPREVIEWOFSYSTEMS_GEN_ALL_CORE
REVIEW OF SYSTEMS  General: denies weakness, malaise  Skin/Breast: no new rash  Ophthalmologic: no change in vision  ENMT: no dysphagia, throat pain or change in hearing  Respiratory and Thorax: no difficulty breathing or chest pain  Cardiovascular: no palpitations or PND, orthopnea  Gastrointestinal: no abdominal pain, normal bowel movement, no dark stools  Genitourinary: no difficulty urinating, no burning urination  Musculoskeletal: bilateral leg swelling and pain  Neurological: no weakness, numbness, change in gait  Psychiatric: no depression, anxiety  Hematology/Lymphatics: denies easy bruising or bleeding  Endocrine: no polyuria, polydipsia

## 2020-04-04 NOTE — ED ADULT NURSE REASSESSMENT NOTE - NSIMPLEMENTINTERV_GEN_ALL_ED
Implemented All Fall Risk Interventions:  [place] to call system. Call bell, personal items and telephone within reach. Instruct patient to call for assistance. Room bathroom lighting operational. Non-slip footwear when patient is off stretcher. Physically safe environment: no spills, clutter or unnecessary equipment. Stretcher in lowest position, wheels locked, appropriate side rails in place. Provide visual cue, wrist band, yellow gown, etc. Monitor gait and stability. Monitor for mental status changes and reorient to person, place, and time. Review medications for side effects contributing to fall risk. Reinforce activity limits and safety measures with patient and family.

## 2020-04-04 NOTE — H&P ADULT - HISTORY OF PRESENT ILLNESS
63 yo male with fem-fem bypass with PTFE graft in Oct 24th for critical limb ischemia, site infection that was treated in November of 2019 after presenting with AMS, DVT that was found on same admission treated with eliquis. Patient is coming to the hospital with bilateral leg swelling. He was told by his vascular surgeon office that they were no longer having patient's come to office for leg wraps. Patient states since then swelling has gotten worse. Patient without complaints of SOB or orthopnea, denies chest pain. Endorses bilateral leg pain but also with neuropathic pain.

## 2020-04-04 NOTE — ED ADULT NURSE REASSESSMENT NOTE - REASSESS COMMUNICATION
assumed care of pt from AGGIE Rhoades, pt resting eyes closed, awakes to voice. 20g LH present. bilat LE cellulitis noted along with cellulitis and swelling to scrotum. assumed care of pt from AGGIE Rhoades, pt resting eyes closed, awakes to voice. 20g LH present. bilat LE cellulitis noted along with cellulitis and swelling to scrotum, 16f buchanan present draining clear yellow urine.

## 2020-04-04 NOTE — ED PROVIDER NOTE - CARE PLAN
Principal Discharge DX:	Acute diastolic congestive heart failure  Secondary Diagnosis:	Cellulitis of leg, except foot  Secondary Diagnosis:	Cellulitis of scrotum

## 2020-04-04 NOTE — ED PROVIDER NOTE - OBJECTIVE STATEMENT
65 y/o M with h/o femoral bypass on blood thinners, obesity p/w few days of swelling of testicles and was unable to f/u with urology due to COVID cancellation of appointments. Pt today had hematuria and he states that swelling is so much that his penis is barely perceptible and is just a hole there. No fall or trauma. Pt has limited ambulation at baseline due to body habitus, chronic pain

## 2020-04-04 NOTE — ED PROVIDER NOTE - PROGRESS NOTE DETAILS
Greta WARNER- PT WAS UNDRESSED AND B/L LOWER LEG SWELLIGN WITH CELLULITIS UPTO THE GROIN AND SCROTUM NOTED, CHAPERONE Markos Betancur RN - b/l scrotum enlarged, edematous, no crepitus, skin deep red non tender, penis embedded in swelling, midl abrasion on rt scrotum no sign of fourniers gangrene, will start on lasix for fluid overload, place buchanan for urine output and start on clinda and admit for chf and cellulitis of legs and scrotum

## 2020-04-04 NOTE — H&P ADULT - NSHPLABSRESULTS_GEN_ALL_CORE
Comprehensive Metabolic Panel (04.04.20 @ 15:35)    Sodium, Serum: 137 mmol/L    Potassium, Serum: 3.6: Mild hemolysis.  Results may be falsely elevated. mmol/L    Chloride, Serum: 99 mmol/L    Carbon Dioxide, Serum: 23.0 mmol/L    Anion Gap, Serum: 15 mmol/L    Blood Urea Nitrogen, Serum: 17.0 mg/dL    Creatinine, Serum: 0.82 mg/dL    Glucose, Serum: 124: Reference Range for Glucose has been amended as of 1/21/2020 mg/dL    Calcium, Total Serum: 9.1 mg/dL    Protein Total, Serum: 8.1 g/dL    Albumin, Serum: 3.2 g/dL    Bilirubin Total, Serum: 0.5 mg/dL    Alkaline Phosphatase, Serum: 63 U/L    Aspartate Aminotransferase (AST/SGOT): 41 U/L    Alanine Aminotransferase (ALT/SGPT): 24 U/L    eGFR if Non : 93    Complete Blood Count + Automated Diff (04.04.20 @ 15:35)    WBC Count: 11.42 K/uL    RBC Count: 3.58 M/uL    Hemoglobin: 8.5 g/dL    Hematocrit: 28.1 %    Mean Cell Volume: 78.5 fl    Mean Cell Hemoglobin: 23.7 pg    Mean Cell Hemoglobin Conc: 30.2 gm/dL    Red Cell Distrib Width: 19.0 %    Platelet Count - Automated: 272 K/uL    Auto Neutrophil #: 9.02 K/uL    Auto Lymphocyte #: 1.36 K/uL    Auto Monocyte #: 0.83 K/uL    Auto Eosinophil #: 0.09 K/uL    Auto Basophil #: 0.06 K/uL    Auto Neutrophil %: 79.0: Differential percentages must be correlated with absolute numbers for  clinical significance. %    Auto Lymphocyte %: 11.9 %    Auto Monocyte %: 7.3 %    Auto Eosinophil %: 0.8 %    Auto Basophil %: 0.5 %    Auto Immature Granulocyte %: 0.5 %

## 2020-04-04 NOTE — H&P ADULT - ASSESSMENT
63 yo male with fem-fem bypass with PTFE graft in Oct 24th for critical limb ischemia, site infection that was treated in November of 2019 after presenting with AMS, DVT that was found on same admission treated with eliquis. Patient is coming to the hospital with bilateral leg swelling. He was told by his vascular surgeon office that they were no longer having patient's come to office for leg wraps. Patient states since then swelling has gotten worse. Patient without complaints of SOB or orthopnea, denies chest pain. Endorses bilateral leg pain but also with neuropathic pain.    #LE swelling - may be acute on chronic diastolic heart failure  - r/o DVT  - lasix IV 40mg BID  - hold on echo at this time - recently showed preserved EF  - BNP within normal limits however obese  - troponins negative  - can be admitted to any bed  - on IV antibiotics to cover for infection as paitent also with leukocytosis    #leukocytosis and bilateral redness of LE and scrotum  - discontinued clindamycin  - started vancomycin  - repeat CBC  - follow up cultures    #DM  - diabetic/dash diet  - ISS  - FS TIDAC    #HTN  - monitor BP    #PAD  - continue eliquis  - monitor for signs of limb ischemia    #DVT  - on eliquis as above

## 2020-04-05 LAB
ANION GAP SERPL CALC-SCNC: 14 MMOL/L — SIGNIFICANT CHANGE UP (ref 5–17)
ANION GAP SERPL CALC-SCNC: 15 MMOL/L — SIGNIFICANT CHANGE UP (ref 5–17)
BUN SERPL-MCNC: 14 MG/DL — SIGNIFICANT CHANGE UP (ref 8–20)
BUN SERPL-MCNC: 15 MG/DL — SIGNIFICANT CHANGE UP (ref 8–20)
CALCIUM SERPL-MCNC: 8.6 MG/DL — SIGNIFICANT CHANGE UP (ref 8.6–10.2)
CALCIUM SERPL-MCNC: 8.8 MG/DL — SIGNIFICANT CHANGE UP (ref 8.6–10.2)
CHLORIDE SERPL-SCNC: 99 MMOL/L — SIGNIFICANT CHANGE UP (ref 98–107)
CHLORIDE SERPL-SCNC: 99 MMOL/L — SIGNIFICANT CHANGE UP (ref 98–107)
CO2 SERPL-SCNC: 25 MMOL/L — SIGNIFICANT CHANGE UP (ref 22–29)
CO2 SERPL-SCNC: 27 MMOL/L — SIGNIFICANT CHANGE UP (ref 22–29)
CREAT SERPL-MCNC: 0.74 MG/DL — SIGNIFICANT CHANGE UP (ref 0.5–1.3)
CREAT SERPL-MCNC: 0.79 MG/DL — SIGNIFICANT CHANGE UP (ref 0.5–1.3)
GLUCOSE BLDC GLUCOMTR-MCNC: 185 MG/DL — HIGH (ref 70–99)
GLUCOSE BLDC GLUCOMTR-MCNC: 215 MG/DL — HIGH (ref 70–99)
GLUCOSE SERPL-MCNC: 105 MG/DL — HIGH (ref 70–99)
GLUCOSE SERPL-MCNC: 166 MG/DL — HIGH (ref 70–99)
HCT VFR BLD CALC: 28.1 % — LOW (ref 39–50)
HGB BLD-MCNC: 8.4 G/DL — LOW (ref 13–17)
MCHC RBC-ENTMCNC: 23.5 PG — LOW (ref 27–34)
MCHC RBC-ENTMCNC: 29.9 GM/DL — LOW (ref 32–36)
MCV RBC AUTO: 78.5 FL — LOW (ref 80–100)
PLATELET # BLD AUTO: 274 K/UL — SIGNIFICANT CHANGE UP (ref 150–400)
POTASSIUM SERPL-MCNC: 2.8 MMOL/L — CRITICAL LOW (ref 3.5–5.3)
POTASSIUM SERPL-MCNC: 3.3 MMOL/L — LOW (ref 3.5–5.3)
POTASSIUM SERPL-SCNC: 2.8 MMOL/L — CRITICAL LOW (ref 3.5–5.3)
POTASSIUM SERPL-SCNC: 3.3 MMOL/L — LOW (ref 3.5–5.3)
RBC # BLD: 3.58 M/UL — LOW (ref 4.2–5.8)
RBC # FLD: 18.8 % — HIGH (ref 10.3–14.5)
SODIUM SERPL-SCNC: 138 MMOL/L — SIGNIFICANT CHANGE UP (ref 135–145)
SODIUM SERPL-SCNC: 141 MMOL/L — SIGNIFICANT CHANGE UP (ref 135–145)
WBC # BLD: 9.48 K/UL — SIGNIFICANT CHANGE UP (ref 3.8–10.5)
WBC # FLD AUTO: 9.48 K/UL — SIGNIFICANT CHANGE UP (ref 3.8–10.5)

## 2020-04-05 PROCEDURE — 99233 SBSQ HOSP IP/OBS HIGH 50: CPT

## 2020-04-05 PROCEDURE — 99222 1ST HOSP IP/OBS MODERATE 55: CPT

## 2020-04-05 PROCEDURE — 93970 EXTREMITY STUDY: CPT | Mod: 26

## 2020-04-05 RX ORDER — ASPIRIN/CALCIUM CARB/MAGNESIUM 324 MG
81 TABLET ORAL DAILY
Refills: 0 | Status: DISCONTINUED | OUTPATIENT
Start: 2020-04-05 | End: 2020-04-08

## 2020-04-05 RX ORDER — DEXTROSE 50 % IN WATER 50 %
12.5 SYRINGE (ML) INTRAVENOUS ONCE
Refills: 0 | Status: DISCONTINUED | OUTPATIENT
Start: 2020-04-05 | End: 2020-04-08

## 2020-04-05 RX ORDER — SODIUM CHLORIDE 9 MG/ML
1000 INJECTION, SOLUTION INTRAVENOUS
Refills: 0 | Status: DISCONTINUED | OUTPATIENT
Start: 2020-04-05 | End: 2020-04-08

## 2020-04-05 RX ORDER — POTASSIUM CHLORIDE 20 MEQ
40 PACKET (EA) ORAL
Refills: 0 | Status: COMPLETED | OUTPATIENT
Start: 2020-04-05 | End: 2020-04-05

## 2020-04-05 RX ORDER — DEXTROSE 50 % IN WATER 50 %
25 SYRINGE (ML) INTRAVENOUS ONCE
Refills: 0 | Status: DISCONTINUED | OUTPATIENT
Start: 2020-04-05 | End: 2020-04-08

## 2020-04-05 RX ORDER — DEXTROSE 50 % IN WATER 50 %
15 SYRINGE (ML) INTRAVENOUS ONCE
Refills: 0 | Status: DISCONTINUED | OUTPATIENT
Start: 2020-04-05 | End: 2020-04-08

## 2020-04-05 RX ORDER — VANCOMYCIN HCL 1 G
VIAL (EA) INTRAVENOUS
Refills: 0 | Status: DISCONTINUED | OUTPATIENT
Start: 2020-04-05 | End: 2020-04-05

## 2020-04-05 RX ORDER — POTASSIUM CHLORIDE 20 MEQ
10 PACKET (EA) ORAL
Refills: 0 | Status: COMPLETED | OUTPATIENT
Start: 2020-04-05 | End: 2020-04-05

## 2020-04-05 RX ORDER — INSULIN LISPRO 100/ML
VIAL (ML) SUBCUTANEOUS
Refills: 0 | Status: DISCONTINUED | OUTPATIENT
Start: 2020-04-05 | End: 2020-04-08

## 2020-04-05 RX ORDER — VANCOMYCIN HCL 1 G
1250 VIAL (EA) INTRAVENOUS
Refills: 0 | Status: DISCONTINUED | OUTPATIENT
Start: 2020-04-05 | End: 2020-04-08

## 2020-04-05 RX ORDER — SIMVASTATIN 20 MG/1
20 TABLET, FILM COATED ORAL AT BEDTIME
Refills: 0 | Status: DISCONTINUED | OUTPATIENT
Start: 2020-04-05 | End: 2020-04-08

## 2020-04-05 RX ORDER — INSULIN LISPRO 100/ML
VIAL (ML) SUBCUTANEOUS AT BEDTIME
Refills: 0 | Status: DISCONTINUED | OUTPATIENT
Start: 2020-04-05 | End: 2020-04-08

## 2020-04-05 RX ORDER — GLUCAGON INJECTION, SOLUTION 0.5 MG/.1ML
1 INJECTION, SOLUTION SUBCUTANEOUS ONCE
Refills: 0 | Status: DISCONTINUED | OUTPATIENT
Start: 2020-04-05 | End: 2020-04-08

## 2020-04-05 RX ORDER — APIXABAN 2.5 MG/1
5 TABLET, FILM COATED ORAL EVERY 12 HOURS
Refills: 0 | Status: DISCONTINUED | OUTPATIENT
Start: 2020-04-05 | End: 2020-04-08

## 2020-04-05 RX ORDER — FUROSEMIDE 40 MG
40 TABLET ORAL EVERY 12 HOURS
Refills: 0 | Status: DISCONTINUED | OUTPATIENT
Start: 2020-04-05 | End: 2020-04-07

## 2020-04-05 RX ORDER — POTASSIUM CHLORIDE 20 MEQ
40 PACKET (EA) ORAL
Refills: 0 | Status: DISCONTINUED | OUTPATIENT
Start: 2020-04-05 | End: 2020-04-05

## 2020-04-05 RX ADMIN — SIMVASTATIN 20 MILLIGRAM(S): 20 TABLET, FILM COATED ORAL at 21:47

## 2020-04-05 RX ADMIN — APIXABAN 5 MILLIGRAM(S): 2.5 TABLET, FILM COATED ORAL at 21:46

## 2020-04-05 RX ADMIN — Medication 81 MILLIGRAM(S): at 19:10

## 2020-04-05 RX ADMIN — Medication 100 MILLIEQUIVALENT(S): at 10:46

## 2020-04-05 RX ADMIN — Medication 166.67 MILLIGRAM(S): at 21:47

## 2020-04-05 RX ADMIN — Medication 40 MILLIEQUIVALENT(S): at 16:44

## 2020-04-05 RX ADMIN — Medication 2: at 19:09

## 2020-04-05 RX ADMIN — Medication 40 MILLIEQUIVALENT(S): at 13:55

## 2020-04-05 RX ADMIN — Medication 4: at 13:43

## 2020-04-05 RX ADMIN — Medication 166.67 MILLIGRAM(S): at 13:34

## 2020-04-05 RX ADMIN — Medication 40 MILLIEQUIVALENT(S): at 10:45

## 2020-04-05 RX ADMIN — Medication 40 MILLIGRAM(S): at 19:10

## 2020-04-05 NOTE — CONSULT NOTE ADULT - ASSESSMENT
A/P: 63 y/o obese M former smoker with a PMHx of PAD s/p fem-fem bypass with PTFE graft 10/24/20 for critical limb ischemia c/b site infection and right DVT (on Eliquis), non-obstructive mild CAD ([facility] 10/19), DMII (on insulin), HTN, ASHIA (non-compliant with CPAP, sleeps in a recliner), ankylosing spondylitis, chronic lymphedema, lymphorreha, recurrent ulcers/cellulitis, chronic foot ulcer who presented to the ED with worsening leg and scrotal swelling. Patient states that he has been unable to go the vascular surgeon office for his unna boots for the last 2 weeks due to the Covid-19 pandemic. Patient states that the bilateral leg swelling has continued to worsen, and then he ban noting that his testicles became very swollen. Patient states he sleeps in a recliner due to leg pain, but denies any orthopnea or SOB. Patient denies any fevers, chills, cough, chest pain, SOB, orthopnea, PND, syncope, near syncope, abdominal pain, N/V/D, headache, or dizziness.   Troponin neg x 1      1. Leg Edema  - Chronic lymphedema, not getting Unna boots placed  -   - Echo 10/2019 with normal EF, grade I DD, and no significant valvular abnormalities  - No other s/s c/w CHF  - Can add aldactone for symptom management and as it is potassium sparing.   - No further workup at this time.     2. Non-obstructive CAD  - Mild CAD on [facility] 10/2019  - Continue aspirin and statin.   - Restart lisinopril when BP allows  - No s/s of ACs    3. DVT  - Resolved on LE venous dopplers  - Eliquis per primary team.     Preliminary evaluation, please await official recommendations by Dr. Corley A/P: 63 y/o obese M former smoker with a PMHx of PAD s/p fem-fem bypass with PTFE graft 10/24/20 for critical limb ischemia c/b site infection and right DVT (on Eliquis), non-obstructive mild CAD ([facility] 10/19), DMII (on insulin), HTN, ASHIA (non-compliant with CPAP, sleeps in a recliner), ankylosing spondylitis, chronic lymphedema, lymphorreha, recurrent ulcers/cellulitis, chronic foot ulcer who presented to the ED with worsening leg and scrotal swelling. Patient states that he has been unable to go the vascular surgeon office for his unna boots for the last 2 weeks due to the Covid-19 pandemic. Patient states that the bilateral leg swelling has continued to worsen, and then he ban noting that his testicles became very swollen. Patient states he sleeps in a recliner due to leg pain, but denies any orthopnea or SOB. Patient denies any fevers, chills, cough, chest pain, SOB, orthopnea, PND, syncope, near syncope, abdominal pain, N/V/D, headache, or dizziness.   Troponin neg x 1      1. Leg Edema  - Chronic lymphedema, not getting Unna boots placed  -   - Echo 10/2019 with normal EF, grade I DD, and no significant valvular abnormalities  - No other s/s c/w CHF  - Can add aldactone for symptom management and as it is potassium sparing.   - No further workup at this time.     2. Non-obstructive CAD  - Mild CAD on [facility] 10/2019  - Continue aspirin and statin.   - Restart lisinopril when BP allows  - No s/s of ACs    3. DVT  - Resolved on LE venous dopplers  - Eliquis per primary team.

## 2020-04-05 NOTE — CONSULT NOTE ADULT - SUBJECTIVE AND OBJECTIVE BOX
[facility]                                                       [facility]                                                               Office:  [address] [address]                                                              Telephone: [phone]. Fax:[phone]                                                                        CARDIOLOGY CONSULTATION NOTE                                                                                             Consult requested by:    Reason for Consultation:   History obtained by: Patient and medical record   obtained: No    Chief complaint:    Patient is a 64y old  Male who presents with a chief complaint of       HPI: 63 y/o obese M former smoker with a PMHx of PAD s/p fem-fem bypass with PTFE graft 10/24/20 for critical limb ischemia c/b site infection and right DVT (on Eliquis), non-obstructive mild CAD ([facility] 10/19), DMII (on insulin), HTN, ASHIA (non-compliant with CPAP, sleeps in a recliner), ankylosing spondylitis, chronic lymphedema, lymphorreha, recurrent ulcers/cellulitis, chronic foot ulcer who presented to the ED with worsening leg and scrotal swelling. Patient states that he has been unable to go the vascular surgeon office for his unna boots for the last 2 weeks due to the Covid- pandemic. Patient states that the bilateral leg swelling has continued to worsen, and then he ban noting that his testicles became very swollen. Patient states he sleeps in a recliner due to leg pain, but denies any orthopnea or SOB. Patient denies any fevers, chills, cough, chest pain, SOB, orthopnea, PND, syncope, near syncope, abdominal pain, N/V/D, headache, or dizziness.     REVIEW OF SYMPTOMS:     CONSTITUTIONAL: No fever, weight loss, or fatigue  ENMT:  No difficulty hearing, tinnitus, vertigo; No sinus or throat pain  NECK: No pain or stiffness  CARDIOVASCULAR: AS PER HPI  RESPIRATORY: Dyspnea on exertion, Shortness of breath, cough, wheezing  : No dysuria, no hematuria   GI: No dark color stool, no melena, no diarrhea, no constipation, no abdominal pain   NEURO: No headache, no dizziness, no slurred speech   MUSCULOSKELETAL: AS PER HPI  PSYCH: No agitation, no anxiety.    ALL OTHER REVIEW OF SYSTEMS ARE NEGATIVE.      PREVIOUS DIAGNOSTIC TESTING  ECHO FINDINGS:  < from: TTE Echo Complete w/Doppler (19 @ 16:01) >  PHYSICIAN INTERPRETATION:  Left Ventricle:  Global LV systolic function was normal. Left ventricular ejection   fraction, by visual estimation, is 60 to 65%. Spectral Doppler shows   pseudonormal pattern of left ventricular myocardial filling (Grade II   diastolic dysfunction). Elevated mean left atrial pressure.  Right Ventricle: Normal right ventricular size and function. TV S' 0.2   m/s.  Left Atrium: Mildly enlarged left atrium.  Right Atrium: Normal right atrial size.  Pericardium: Trivial pericardial effusion is present. The pericardial   effusion is anterior to the right ventricle.  Mitral Valve: The mitral valve is normal in structure. No evidence of   mitral valve regurgitation is seen.  Tricuspid Valve: The tricuspid valve is normal. No tricuspid   regurgitation is visualized.  Aortic Valve: The aortic valve istrileaflet. No evidence of aortic valve   regurgitation is seen.  Pulmonic Valve: No indication of pulmonic valve regurgitation.  Aorta: The aortic root is normal in size and structure.  Pulmonary Artery: The pulmonary artery is mildly dilated.  Venous: The inferior vena cava is normal.       Summary:   1. Mildly enlarged left atrium.   2. There is mild concentric left ventricular hypertrophy.   3. Normal wall motion. Left ventricular ejection fraction, by visual   estimation, is 60 to 65%. GradeII diastolic dysfunction.   4. Elevated mean left atrial pressure.   5. Normal right atrial size.   6. Normal right ventricular size and function.   7. No significant valvular abnormality.   8. Trivial pericardial effusion.    [id number] HOMERO Rocha, Electronically signed on 2019 at   5:16:02 PM    < end of copied text >        Echo in the office 10/19   EF 55-60%, Grade I DD, no significant valvular abnormalities, dilatation of sinuses of valsalave 3.9 cm.     CATHETERIZATION FINDINGS:   < from: Cardiac Cath Lab - Adult (10.11.19 @ 11:46) >  CORONARY VESSELS: The coronary circulation is right dominant.  LM:   --  LM: The vessel was large sized. Angiography showed mild  atherosclerosis with no flow limiting lesions.  LAD:   --  LAD: The vessel was large sized.  CX:   --  Circumflex: The vessel was large sized. Angiography showed mild  atherosclerosis with no flow limiting lesions.  RCA:   --  RCA: The vessel was large sized (dominant). Angiography showed  mild atherosclerosis with no flow limiting lesions.  COMPLICATIONS: No complications occurred during the cath lab visit.  DIAGNOSTIC IMPRESSIONS: Mild CAD  Normal LV systolic function  DIAGNOSTIC RECOMMENDATIONS: continue secondary prevention for CAD  ASA 81 mg , statins to lower LDL < 70 mg/dl  Prepared and signed by  Simón Villalobos MD  Signed 10/14/2019 17:25:09    < end of copied text >    ALLERGIES: Allergies    No Known Allergies    Intolerances    PAST MEDICAL HISTORY  Deep vein thrombosis (DVT)  External iliac artery occlusion  Ischemic ulcer diabetic foot  Peripheral vascular disease  Former smoker  Obesity  Diabetic neuropathy  Chronic pain disorder  Cellulitis, leg  Chronic venous stasis dermatitis  Ankylosing spondylitis of site in spine  Lupus  Diabetes      PAST SURGICAL HISTORY  H/O aorto-femoral bypass  History of hip replacement, total, left      FAMILY HISTORY:  Family history of diabetes mellitus (DM)  Family history of diabetes mellitus (DM) (Sibling)      SOCIAL HISTORY:  Lives at home with his wife.   CIGARETTES:   Former smoker, smoked 1.5 PPD x 20 years  ALCOHOL: Denies  DRUGS: Denies    CURRENT MEDICATIONS:  furosemide   Injectable 40 milliGRAM(s) IV Push every 12 hours     apixaban  dextrose 5%.  dextrose 50% Injectable  dextrose 50% Injectable  dextrose 50% Injectable  insulin lispro (HumaLOG) corrective regimen sliding scale  insulin lispro (HumaLOG) corrective regimen sliding scale  potassium chloride   Powder  vancomycin  IVPB    HOME MEDICATIONS:  Home Medications:  aspirin 81 mg oral tablet, chewable: 1 tab(s) orally once a day (24 Oct 2019 06:34)  Basaglar KwikPen 100 units/mL subcutaneous solution: 80 unit(s) subcutaneous once a day (24 Oct 2019 06:34)  gabapentin 400 mg oral capsule: 2 cap(s) orally 3 times a day (2019 12:08)  glimepiride 4 mg oral tablet: 2 tab(s) orally once a day (24 Oct 2019 06:34)  hydroCHLOROthiazide 25 mg oral tablet: 1 tab(s) orally once a day (24 Oct 2019 06:34)  HYDROmorphone 4 mg oral tablet: orally every 8 hours (24 Oct 2019 06:34)  lisinopril 2.5 mg oral tablet: 1 tab(s) orally once a day (24 Oct 2019 06:34)  metFORMIN 1000 mg oral tablet: 1 tab(s) orally 2 times a day  RESUME IN 2 days on 10/14/19 (24 Oct 2019 06:34)  methadone 10 mg oral tablet: orally 4 times a day (24 Oct 2019 06:34)  pioglitazone 30 mg oral tablet: 1 tab(s) orally once a day (24 Oct 2019 06:34)  simvastatin 20 mg oral tablet: 1 tab(s) orally once a day (at bedtime) (24 Oct 2019 06:34)      Vital Signs Last 24 Hrs  T(C): 36.7 (2020 08:04), Max: 37.2 (2020 03:00)  T(F): 98.1 (2020 08:04), Max: 99 (2020 03:00)  HR: 77 (2020 08:04) (77 - 87)  BP: 118/60 (2020 08:04) (116/56 - 130/63)  RR: 18 (2020 08:04) (18 - 20)  SpO2: 96% (2020 08:04) (95% - 98%)      PHYSICAL EXAM:  Constitutional: Comfortable . No acute distress.   HEENT: Atraumatic and normocephalic , neck is supple . no JVD. No carotid bruit. PEERL   CNS: A&Ox3. No focal deficits. EOMI. Cranial nerves II-IX are intact.   Lymph Nodes: Cervical : Not palpable.  Respiratory: CTAB  Cardiovascular: S1S2 RRR. No murmur/rubs or gallop.  Gastrointestinal: Soft non-tender and non distended . +Bowel sounds. negative Lenz's sign.  Extremities: 2++ bilateral LE edema from abdomen down, + scrotal edema   Psychiatric: Calm . no agitation.  Skin: No skin rash/ulcers visualized to face, hands or feet.    Intake and output:    @ 07:01  -  -05 @ 07:00  --------------------------------------------------------  IN: 0 mL / OUT: 1700 mL / NET: -1700 mL        LABS:                        8.4    9.48  )-----------( 274      ( 2020 07:07 )             28.1     04-    138  |  99  |  15.0  ----------------------------<  166<H>  3.3<L>   |  25.0  |  0.74    Ca    8.8      2020 13:53    TPro  8.1  /  Alb  3.2<L>  /  TBili  0.5  /  DBili  x   /  AST  41<H>  /  ALT  24  /  AlkPhos  63  -04    CARDIAC MARKERS ( 2020 16:26 )  x     / x     / x     / x     / 8.7 ng/mL  CARDIAC MARKERS ( 2020 15:35 )  x     / 0.01 ng/mL / 730 U/L / x     / x        ;p-BNP=Serum Pro-Brain Natriuretic Peptide: 298 pg/mL ( @ 21:31)    PT/INR - ( 2020 16:55 )   PT: 19.2 sec;   INR: 1.67 ratio         PTT - ( 2020 16:55 )  PTT:37.0 sec  Urinalysis Basic - ( 2020 17:49 )    Color: Yellow / Appearance: Clear / S.015 / pH: x  Gluc: x / Ketone: Negative  / Bili: Negative / Urobili: Negative mg/dL   Blood: x / Protein: 30 mg/dL / Nitrite: Negative   Leuk Esterase: Negative / RBC: 0-2 /HPF / WBC Negative   Sq Epi: x / Non Sq Epi: Occasional / Bacteria: x      INTERPRETATION OF TELEMETRY: Not on telemetry  ECG: Reviewed by me. NSR, NSST/T wave abnormalities     RADIOLOGY & ADDITIONAL STUDIES:    X-ray:  reviewed by me.   < from: Xray Chest 1 View-PORTABLE IMMEDIATE (20 @ 17:29) >     EXAM:  XR CHEST PORTABLE IMMED 1V                          PROCEDURE DATE:  2020          INTERPRETATION:  XR CHEST IMMEDIATE    Clinical History: Shortness of breath.      Comparison: None..    Findings:  The heart and mediastinal contours are exaggerated by the AP lordotic technique.. No segmental infiltrates or effusions are observed.  No pneumothorax.     Impression:  No Acute Cardiopulmonary Disease.      < end of copied text >

## 2020-04-05 NOTE — PROGRESS NOTE ADULT - SUBJECTIVE AND OBJECTIVE BOX
CC:   HPI:  63 yo male with fem-fem bypass with PTFE graft in Oct 24th for critical limb ischemia, site infection that was treated in 2019 after presenting with AMS, DVT that was found on same admission treated with eliquis. Patient is coming to the hospital with bilateral leg swelling. He was told by his vascular surgeon office that they were no longer having patient's come to office for leg wraps. Patient states since then swelling has gotten worse. Patient without complaints of SOB or orthopnea, denies chest pain. Endorses bilateral leg pain but also with neuropathic pain. (2020 17:54)    INTERVAL HPI/OVERNIGHT EVENTS:    No acute events overnight  patient feels well  no oxygen requirement  legs do look to be improved  no fever  cardiology to see patient    Vital Signs Last 24 Hrs  T(C): 36.9 (2020 21:40), Max: 36.9 (2020 21:40)  T(F): 98.5 (2020 21:40), Max: 98.5 (2020 21:40)  HR: 69 (2020 21:40) (69 - 82)  BP: 124/66 (2020 21:40) (123/60 - 124/66)  BP(mean): --  RR: --  SpO2: --    PHYSICAL EXAM:  General: in no acute distress; morbidly obese  Head: Normocephalic; atraumatic  Neck: Supple; non tender; no masses; thick  Respiratory: No wheezes, rales or rhonchi  Cardiovascular: Regular rate and rhythm. S1 and S2 Normal; No murmurs, gallops or rubs  Gastrointestinal: Soft non-tender non-distended; Normal bowel sounds  Extremities: Normal range of motion, No clubbing; bilateral swelling from the lower ext to the lower abdomen including the scrotum; 1st left toe ulcer under 2x3 and kerlix wrap; right heel ulcer under pressure wrapping (was not taken down)  Vascular: Peripheral pulses palpable 2+ bilaterally  Neurological: Alert and oriented x4  Skin: Warm and dry. No acute rash  Psychiatric: Cooperative and appropriate    I&O's Detail    2020 07:01  -  2020 07:00  --------------------------------------------------------  IN:  Total IN: 0 mL    OUT:    Indwelling Catheter - Urethral: 1500 mL  Total OUT: 1500 mL    Total NET: -1500 mL    CARDIAC MARKERS ( 2020 16:26 )  x     / x     / x     / x     / 8.7 ng/mL  CARDIAC MARKERS ( 2020 15:35 )  x     / 0.01 ng/mL / 730 U/L / x     / x                            8.4    9.48  )-----------( 274      ( 2020 07:07 )             28.1     CAPILLARY BLOOD GLUCOS  POCT Blood Glucose.: 168 mg/dL (2020 08:03)  POCT Blood Glucose.: 185 mg/dL (2020 19:07)  POCT Blood Glucose.: 215 mg/dL (2020 13:37)    Basic Metabolic Panel in AM (20 @ 07:07)    Sodium, Serum: 141 mmol/L    Potassium, Serum: 2.8: TYPE:(C=Critical, N=Notification, A=Abnormal) C  TESTS: POTASSIUM  DATE/TIME CALLED: 20 08:44  CALLED TO: WILLIAMS UPTON  READ BACK (2 Patient Identifiers)(Y/N): Y  READ BACK VALUES (Y/N): Y  CALLED BY: PS mmol/L    Chloride, Serum: 99 mmol/L    Carbon Dioxide, Serum: 27.0 mmol/L    Anion Gap, Serum: 15 mmol/L    Blood Urea Nitrogen, Serum: 14.0 mg/dL    Creatinine, Serum: 0.79 mg/dL    Glucose, Serum: 105: Reference Range for Glucose has been amended as of 2020 mg/dL    Calcium, Total Serum: 8.6 mg/dL    eGFR if Non : 94      LIVER FUNCTIONS - ( 2020 15:35 )  Alb: 3.2 g/dL / Pro: 8.1 g/dL / ALK PHOS: 63 U/L / ALT: 24 U/L / AST: 41 U/L / GGT: x           Urinalysis Basic - ( 2020 17:49 )    Color: Yellow / Appearance: Clear / S.015 / pH: x  Gluc: x / Ketone: Negative  / Bili: Negative / Urobili: Negative mg/dL   Blood: x / Protein: 30 mg/dL / Nitrite: Negative   Leuk Esterase: Negative / RBC: 0-2 /HPF / WBC Negative   Sq Epi: x / Non Sq Epi: Occasional / Bacteria: x    Hemoglobin A1C, Whole Blood: 7.6 % (20 @ 06:21)    MEDICATIONS  (STANDING):  apixaban 5 milliGRAM(s) Oral every 12 hours  aspirin enteric coated 81 milliGRAM(s) Oral daily  dextrose 5%. 1000 milliLiter(s) (50 mL/Hr) IV Continuous <Continuous>  dextrose 50% Injectable 12.5 Gram(s) IV Push once  dextrose 50% Injectable 25 Gram(s) IV Push once  dextrose 50% Injectable 25 Gram(s) IV Push once  furosemide   Injectable 40 milliGRAM(s) IV Push every 12 hours  insulin lispro (HumaLOG) corrective regimen sliding scale   SubCutaneous three times a day before meals  insulin lispro (HumaLOG) corrective regimen sliding scale   SubCutaneous at bedtime  simvastatin 20 milliGRAM(s) Oral at bedtime  vancomycin  IVPB 1250 milliGRAM(s) IV Intermittent <User Schedule>    MEDICATIONS  (PRN):  dextrose 40% Gel 15 Gram(s) Oral once PRN Blood Glucose LESS THAN 70 milliGRAM(s)/deciliter  glucagon  Injectable 1 milliGRAM(s) IntraMuscular once PRN Glucose LESS THAN 70 milligrams/deciliter      RADIOLOGY & ADDITIONAL TESTS:

## 2020-04-05 NOTE — CONSULT NOTE ADULT - ATTENDING COMMENTS
Patient seen and examined by me.  I have discussed my recommendation with the PA which are outlined above.  Discussed with Dr Noe Tomlin  Will sign off.

## 2020-04-06 LAB
ANION GAP SERPL CALC-SCNC: 14 MMOL/L — SIGNIFICANT CHANGE UP (ref 5–17)
BUN SERPL-MCNC: 17 MG/DL — SIGNIFICANT CHANGE UP (ref 8–20)
CALCIUM SERPL-MCNC: 8.7 MG/DL — SIGNIFICANT CHANGE UP (ref 8.6–10.2)
CHLORIDE SERPL-SCNC: 100 MMOL/L — SIGNIFICANT CHANGE UP (ref 98–107)
CO2 SERPL-SCNC: 27 MMOL/L — SIGNIFICANT CHANGE UP (ref 22–29)
CREAT SERPL-MCNC: 0.79 MG/DL — SIGNIFICANT CHANGE UP (ref 0.5–1.3)
CULTURE RESULTS: NO GROWTH — SIGNIFICANT CHANGE UP
GLUCOSE BLDC GLUCOMTR-MCNC: 146 MG/DL — HIGH (ref 70–99)
GLUCOSE BLDC GLUCOMTR-MCNC: 157 MG/DL — HIGH (ref 70–99)
GLUCOSE BLDC GLUCOMTR-MCNC: 168 MG/DL — HIGH (ref 70–99)
GLUCOSE BLDC GLUCOMTR-MCNC: 196 MG/DL — HIGH (ref 70–99)
GLUCOSE SERPL-MCNC: 135 MG/DL — HIGH (ref 70–99)
HBA1C BLD-MCNC: 7.6 % — HIGH (ref 4–5.6)
POTASSIUM SERPL-MCNC: 3.3 MMOL/L — LOW (ref 3.5–5.3)
POTASSIUM SERPL-SCNC: 3.3 MMOL/L — LOW (ref 3.5–5.3)
SARS-COV-2 RNA SPEC QL NAA+PROBE: SIGNIFICANT CHANGE UP
SODIUM SERPL-SCNC: 141 MMOL/L — SIGNIFICANT CHANGE UP (ref 135–145)
SPECIMEN SOURCE: SIGNIFICANT CHANGE UP

## 2020-04-06 PROCEDURE — 99233 SBSQ HOSP IP/OBS HIGH 50: CPT

## 2020-04-06 PROCEDURE — 99223 1ST HOSP IP/OBS HIGH 75: CPT

## 2020-04-06 RX ORDER — POTASSIUM CHLORIDE 20 MEQ
40 PACKET (EA) ORAL EVERY 4 HOURS
Refills: 0 | Status: COMPLETED | OUTPATIENT
Start: 2020-04-06 | End: 2020-04-06

## 2020-04-06 RX ORDER — LISINOPRIL 2.5 MG/1
2.5 TABLET ORAL DAILY
Refills: 0 | Status: DISCONTINUED | OUTPATIENT
Start: 2020-04-06 | End: 2020-04-08

## 2020-04-06 RX ADMIN — Medication 40 MILLIEQUIVALENT(S): at 00:48

## 2020-04-06 RX ADMIN — Medication 2: at 13:04

## 2020-04-06 RX ADMIN — Medication 40 MILLIGRAM(S): at 05:31

## 2020-04-06 RX ADMIN — Medication 40 MILLIGRAM(S): at 18:00

## 2020-04-06 RX ADMIN — SIMVASTATIN 20 MILLIGRAM(S): 20 TABLET, FILM COATED ORAL at 23:47

## 2020-04-06 RX ADMIN — Medication 81 MILLIGRAM(S): at 13:05

## 2020-04-06 RX ADMIN — Medication 40 MILLIEQUIVALENT(S): at 13:07

## 2020-04-06 RX ADMIN — Medication 40 MILLIEQUIVALENT(S): at 18:00

## 2020-04-06 RX ADMIN — Medication 166.67 MILLIGRAM(S): at 11:05

## 2020-04-06 RX ADMIN — APIXABAN 5 MILLIGRAM(S): 2.5 TABLET, FILM COATED ORAL at 18:00

## 2020-04-06 RX ADMIN — Medication 2: at 09:01

## 2020-04-06 RX ADMIN — APIXABAN 5 MILLIGRAM(S): 2.5 TABLET, FILM COATED ORAL at 05:31

## 2020-04-06 NOTE — CONSULT NOTE ADULT - SUBJECTIVE AND OBJECTIVE BOX
INFECTIOUS DISEASES AND INTERNAL MEDICINE at [place]  =======================================================  Raoul Johns MD  Diplomates American Board of Internal Medicine and Infectious Diseases  Telephone [phone]  Fax            [phone]  =======================================================    RANDY [id number]      HPI:  63 yo male with fem-fem bypass with PTFE graft in Oct 24th for critical limb ischemia, site infection that was treated in 2019 after presenting with AMS, DVT that was found on same admission treated with eliquis. Patient is coming to the hospital with bilateral leg swelling. He was told by his vascular surgeon office that they were no longer having patient's come to office for leg wraps. Patient states since then swelling has gotten worse. Patient without complaints of SOB or orthopnea, denies chest pain. Endorses bilateral leg pain but also with neuropathic pain.   CONCERN OF R LOWER EXT CELLULITIS AND SCROTAL Cellulitis  ASKED TO EVALUATE FROM ID STANDPOINT       PAST MEDICAL & SURGICAL HISTORY:  Deep vein thrombosis (DVT): 25 years ago pt had THR was on coumadin for 6 months  External iliac artery occlusion  Ischemic ulcer diabetic foot: left toe  Peripheral vascular disease  Former smoker  Obesity  Diabetic neuropathy  Chronic pain disorder  Cellulitis, leg: bilateral  Chronic venous stasis dermatitis  Ankylosing spondylitis of site in spine  Lupus  Diabetes  H/O aorto-femoral bypass  History of hip replacement, total, left: x2 surgeries      ANTIBIOTICS  vancomycin  IVPB 1250 milliGRAM(s) IV Intermittent <User Schedule>      Allergies    No Known Allergies    Intolerances        SOCIAL HISTORY:     FAMILY HX   FAMILY HISTORY:  Family history of diabetes mellitus (DM)  Family history of diabetes mellitus (DM) (Sibling)      Vital Signs Last 24 Hrs  T(C): 36.8 (2020 10:04), Max: 36.9 (2020 21:40)  T(F): 98.3 (2020 10:04), Max: 98.5 (2020 21:40)  HR: 76 (2020 10:04) (69 - 82)  BP: 143/76 (2020 10:04) (123/60 - 143/76)  BP(mean): --  RR: --  SpO2: --  Drug Dosing Weight  Height (cm): 172.72 (2020 14:43)  Weight (kg): 145.1 (2020 14:43)  BMI (kg/m2): 48.6 (2020 14:43)  BSA (m2): 2.5 (2020 14:43)      REVIEW OF SYSTEMS:    CONSTITUTIONAL:  As per HPI.    HEENT:  Eyes:  No diplopia or blurred vision. ENT:  No earache, sore throat or runny nose.    CARDIOVASCULAR:  No pressure, squeezing, strangling, tightness, heaviness or aching about the chest, neck, axilla or epigastrium.    RESPIRATORY:  No cough, shortness of breath, PND or orthopnea.    GASTROINTESTINAL:  No nausea, vomiting or diarrhea.    GENITOURINARY: AS PER HPI     MUSCULOSKELETAL:  As per HPI.    SKIN:  AS PER HPI     NEUROLOGIC:  No paresthesias, fasciculations, seizures or weakness.                  PHYSICAL EXAMINATION:    GENERAL: The patient is a well-developed, well-nourished _____in no apparent distress. ___ is alert and oriented x3.    VITAL SIGNS: T(C): 36.8 (20 @ 10:04), Max: 36.9 (20 @ 21:40)  HR: 76 (20 @ 10:04) (69 - 82)  BP: 143/76 (20 @ 10:04) (123/60 - 143/76)  RR: --  SpO2: --  Wt(kg): --    HEENT: Head is normocephalic and atraumatic.  ANICTERIC  NECK: Supple. No carotid bruits.  No lymphadenopathy or thyromegaly.    LUNGS: COARSE BREATH SOUNDS    HEART: Regular rate and rhythm without murmur.    ABDOMEN: Soft, nontender, and nondistended.  Positive bowel sounds.  No hepatosplenomegaly was noted. NO REBOUND NO GUARDING   SCORTAL EDEMA AND ERYTHEMA NON TENDER NO DRAINAGE  NO CREPITUS    EXTREMITIES:  EDEMA   AND MID ERYTHEMA OF BOTH LEGS RIGHT GREATER THAN LEFT NON TENDER  NO DRAINAGE  NO CREPITUS     NEUROLOGIC: NON FOCAL      SKIN: No ulceration or induration present. NO RASH        BLOOD CULTURES       URINE CX          LABS:                        8.4    9.48  )-----------( 274      ( 2020 07:07 )             28.1     04-06    141  |  100  |  17.0  ----------------------------<  135<H>  3.3<L>   |  27.0  |  0.79    Ca    8.7      2020 06:14    TPro  8.1  /  Alb  3.2<L>  /  TBili  0.5  /  DBili  x   /  AST  41<H>  /  ALT  24  /  AlkPhos  63  04-04    PT/INR - ( 2020 16:55 )   PT: 19.2 sec;   INR: 1.67 ratio         PTT - ( 2020 16:55 )  PTT:37.0 sec  Urinalysis Basic - ( 2020 17:49 )    Color: Yellow / Appearance: Clear / S.015 / pH: x  Gluc: x / Ketone: Negative  / Bili: Negative / Urobili: Negative mg/dL   Blood: x / Protein: 30 mg/dL / Nitrite: Negative   Leuk Esterase: Negative / RBC: 0-2 /HPF / WBC Negative   Sq Epi: x / Non Sq Epi: Occasional / Bacteria: x        RADIOLOGY & ADDITIONAL STUDIES:      ASSESSMENT/PLAN    63 yo male with fem-fem bypass with PTFE graft in Oct 24th for critical limb ischemia, site infection that was treated in 2019 after presenting with AMS, DVT that was found on same admission treated with eliquis. Patient is coming to the hospital with bilateral leg swelling. He was told by his vascular surgeon office that they were no longer having patient's come to office for leg wraps. Patient states since then swelling has gotten worse. Patient without complaints of SOB or orthopnea, denies chest pain. Endorses bilateral leg pain but also with neuropathic pain.   CONCERN OF POSSBLE   LOWER EXT CELLULITIS AND SCROTAL Cellulitis  SKIN CHANGES APPEAR TO BE MORE CHRONIC IN NATURE   NO WARMTH NON TENDER   PLACED INITIALLY ON  IV VANCOMYCIN  OK TO CHANGE TO ORAL AGENT ZYVOX WILL COVER BOTH MRSA AND MSSA   NO OBVIOUS MRSA POSITIVE  CX   BUT COULD CONSIDER KEFLEX 500QID  FOR ONE WEEK IF UNABLE TO OBTAN ZYVOX    WILL FOLLOW UP SPOKE TO DR LANCE JONES MD

## 2020-04-06 NOTE — PHYSICAL THERAPY INITIAL EVALUATION ADULT - ADDITIONAL COMMENTS
Pt. states he lives in a private home with 5 steps to enter with handrail.   Ambulates only within his home from room to room using a straight cane.

## 2020-04-06 NOTE — PROGRESS NOTE ADULT - SUBJECTIVE AND OBJECTIVE BOX
CC:   HPI:  65 yo male with fem-fem bypass with PTFE graft in Oct 24th for critical limb ischemia, site infection that was treated in 2019 after presenting with AMS, DVT that was found on same admission treated with eliquis. Patient is coming to the hospital with bilateral leg swelling. He was told by his vascular surgeon office that they were no longer having patient's come to office for leg wraps. Patient states since then swelling has gotten worse. Patient without complaints of SOB or orthopnea, denies chest pain. Endorses bilateral leg pain but also with neuropathic pain. (2020 17:54)    INTERVAL HPI/OVERNIGHT EVENTS:    No acute events overnight  patient feels well  no oxygen requirement  legs do look to be improved  no fever  cardiology to see patient    Vital Signs Last 24 Hrs  T(C): 36.9 (2020 21:40), Max: 36.9 (2020 21:40)  T(F): 98.5 (2020 21:40), Max: 98.5 (2020 21:40)  HR: 69 (2020 21:40) (69 - 82)  BP: 124/66 (2020 21:40) (123/60 - 124/66)  BP(mean): --  RR: --  SpO2: --    PHYSICAL EXAM:  General: in no acute distress; morbidly obese  Head: Normocephalic; atraumatic  Neck: Supple; non tender; no masses; thick  Respiratory: No wheezes, rales or rhonchi  Cardiovascular: Regular rate and rhythm. S1 and S2 Normal; No murmurs, gallops or rubs  Gastrointestinal: Soft non-tender non-distended; Normal bowel sounds  Extremities: Normal range of motion, No clubbing; bilateral swelling from the lower ext to the lower abdomen including the scrotum; 1st left toe ulcer under 2x3 and kerlix wrap; right heel ulcer under pressure wrapping (was not taken down)  Vascular: Peripheral pulses palpable 2+ bilaterally  Neurological: Alert and oriented x4  Skin: Warm and dry. No acute rash  Psychiatric: Cooperative and appropriate    I&O's Detail    2020 07:01  -  2020 07:00  --------------------------------------------------------  IN:  Total IN: 0 mL    OUT:    Indwelling Catheter - Urethral: 1500 mL  Total OUT: 1500 mL    Total NET: -1500 mL    CARDIAC MARKERS ( 2020 16:26 )  x     / x     / x     / x     / 8.7 ng/mL  CARDIAC MARKERS ( 2020 15:35 )  x     / 0.01 ng/mL / 730 U/L / x     / x                            8.4    9.48  )-----------( 274      ( 2020 07:07 )             28.1     CAPILLARY BLOOD GLUCOS  POCT Blood Glucose.: 168 mg/dL (2020 08:03)  POCT Blood Glucose.: 185 mg/dL (2020 19:07)  POCT Blood Glucose.: 215 mg/dL (2020 13:37)    Basic Metabolic Panel in AM (20 @ 07:07)    Sodium, Serum: 141 mmol/L    Potassium, Serum: 2.8: TYPE:(C=Critical, N=Notification, A=Abnormal) C  TESTS: POTASSIUM  DATE/TIME CALLED: 20 08:44  CALLED TO: WILLIAMS UPTON  READ BACK (2 Patient Identifiers)(Y/N): Y  READ BACK VALUES (Y/N): Y  CALLED BY: PS mmol/L    Chloride, Serum: 99 mmol/L    Carbon Dioxide, Serum: 27.0 mmol/L    Anion Gap, Serum: 15 mmol/L    Blood Urea Nitrogen, Serum: 14.0 mg/dL    Creatinine, Serum: 0.79 mg/dL    Glucose, Serum: 105: Reference Range for Glucose has been amended as of 2020 mg/dL    Calcium, Total Serum: 8.6 mg/dL    eGFR if Non : 94      LIVER FUNCTIONS - ( 2020 15:35 )  Alb: 3.2 g/dL / Pro: 8.1 g/dL / ALK PHOS: 63 U/L / ALT: 24 U/L / AST: 41 U/L / GGT: x           Urinalysis Basic - ( 2020 17:49 )    Color: Yellow / Appearance: Clear / S.015 / pH: x  Gluc: x / Ketone: Negative  / Bili: Negative / Urobili: Negative mg/dL   Blood: x / Protein: 30 mg/dL / Nitrite: Negative   Leuk Esterase: Negative / RBC: 0-2 /HPF / WBC Negative   Sq Epi: x / Non Sq Epi: Occasional / Bacteria: x    Hemoglobin A1C, Whole Blood: 7.6 % (20 @ 06:21)    MEDICATIONS  (STANDING):  apixaban 5 milliGRAM(s) Oral every 12 hours  aspirin enteric coated 81 milliGRAM(s) Oral daily  dextrose 5%. 1000 milliLiter(s) (50 mL/Hr) IV Continuous <Continuous>  dextrose 50% Injectable 12.5 Gram(s) IV Push once  dextrose 50% Injectable 25 Gram(s) IV Push once  dextrose 50% Injectable 25 Gram(s) IV Push once  furosemide   Injectable 40 milliGRAM(s) IV Push every 12 hours  insulin lispro (HumaLOG) corrective regimen sliding scale   SubCutaneous three times a day before meals  insulin lispro (HumaLOG) corrective regimen sliding scale   SubCutaneous at bedtime  simvastatin 20 milliGRAM(s) Oral at bedtime  vancomycin  IVPB 1250 milliGRAM(s) IV Intermittent <User Schedule>    MEDICATIONS  (PRN):  dextrose 40% Gel 15 Gram(s) Oral once PRN Blood Glucose LESS THAN 70 milliGRAM(s)/deciliter  glucagon  Injectable 1 milliGRAM(s) IntraMuscular once PRN Glucose LESS THAN 70 milligrams/deciliter      RADIOLOGY & ADDITIONAL TESTS:

## 2020-04-06 NOTE — PROGRESS NOTE ADULT - SUBJECTIVE AND OBJECTIVE BOX
CHIEF COMPLAINT/INTERVAL HISTORY:    Patient is a 64y old  Male who presents with a chief complaint of LE swelling, cellulitis (05 Apr 2020 08:32)      HPI:  65 yo male with fem-fem bypass with PTFE graft in Oct 24th for critical limb ischemia, site infection that was treated in November of 2019 after presenting with AMS, DVT that was found on same admission treated with eliquis. Patient is coming to the hospital with bilateral leg swelling. He was told by his vascular surgeon office that they were no longer having patient's come to office for leg wraps. Patient states since then swelling has gotten worse. Patient without complaints of SOB or orthopnea, denies chest pain. Endorses bilateral leg pain but also with neuropathic pain. (04 Apr 2020 17:54)      SUBJECTIVE & OBJECTIVE: Pt seen and examined at bedside.     ICU Vital Signs Last 24 Hrs  T(C): 37 (06 Apr 2020 16:41), Max: 37 (06 Apr 2020 16:41)  T(F): 98.6 (06 Apr 2020 16:41), Max: 98.6 (06 Apr 2020 16:41)  HR: 74 (06 Apr 2020 16:41) (74 - 76)  BP: 158/79 (06 Apr 2020 16:41) (143/76 - 158/79)  BP(mean): --  ABP: --  ABP(mean): --  RR: --  SpO2: 97% (06 Apr 2020 16:41) (97% - 97%)        MEDICATIONS  (STANDING):  apixaban 5 milliGRAM(s) Oral every 12 hours  aspirin enteric coated 81 milliGRAM(s) Oral daily  dextrose 5%. 1000 milliLiter(s) (50 mL/Hr) IV Continuous <Continuous>  dextrose 50% Injectable 12.5 Gram(s) IV Push once  dextrose 50% Injectable 25 Gram(s) IV Push once  dextrose 50% Injectable 25 Gram(s) IV Push once  furosemide   Injectable 40 milliGRAM(s) IV Push every 12 hours  insulin lispro (HumaLOG) corrective regimen sliding scale   SubCutaneous three times a day before meals  insulin lispro (HumaLOG) corrective regimen sliding scale   SubCutaneous at bedtime  lisinopril 2.5 milliGRAM(s) Oral daily  simvastatin 20 milliGRAM(s) Oral at bedtime  vancomycin  IVPB 1250 milliGRAM(s) IV Intermittent <User Schedule>    MEDICATIONS  (PRN):  dextrose 40% Gel 15 Gram(s) Oral once PRN Blood Glucose LESS THAN 70 milliGRAM(s)/deciliter  glucagon  Injectable 1 milliGRAM(s) IntraMuscular once PRN Glucose LESS THAN 70 milligrams/deciliter      LABS:                        8.4    9.48  )-----------( 274      ( 05 Apr 2020 07:07 )             28.1     04-06    141  |  100  |  17.0  ----------------------------<  135<H>  3.3<L>   |  27.0  |  0.79    Ca    8.7      06 Apr 2020 06:14            CAPILLARY BLOOD GLUCOSE      POCT Blood Glucose.: 157 mg/dL (06 Apr 2020 20:55)  POCT Blood Glucose.: 146 mg/dL (06 Apr 2020 17:06)  POCT Blood Glucose.: 196 mg/dL (06 Apr 2020 12:34)  POCT Blood Glucose.: 168 mg/dL (06 Apr 2020 08:03)      RECENT CULTURES:      RADIOLOGY & ADDITIONAL TESTS:      PHYSICAL EXAM:    GENERAL: NAD, well-groomed, well-developed  HEAD:  Atraumatic, Normocephalic  EYES: EOMI, PERRLA, conjunctiva and sclera clear  ENMT: Moist mucous membranes  NECK: Supple, No JVD  NERVOUS SYSTEM:  Alert & Oriented X3, Motor Strength 5/5 B/L upper and lower extremities; DTRs 2+ intact and symmetric  CHEST/LUNG: Clear to auscultation bilaterally; No rales, rhonchi, wheezing, or rubs  HEART: Regular rate and rhythm; No murmurs, rubs, or gallops  ABDOMEN: Soft, Nontender, Nondistended; Bowel sounds present  EXTREMITIES:  2+ Peripheral Pulses, No clubbing, cyanosis, or edema        DVT/GI ppx  Discussed with pt @ bedside CHIEF COMPLAINT/INTERVAL HISTORY:    Patient is a 64y old  Male who presents with a chief complaint of LE swelling, cellulitis (05 Apr 2020 08:32)    SUBJECTIVE & OBJECTIVE: Pt seen and examined at bedside. No overnight events. Seen by ID; recommending 1 more day of IV abx then switch to PO. Remove buchanan today for voiding trail. Scrotal support. PT evaluation.    ROS: No chest pain, palpitations, SOB, light headedness, dizziness, headache, nausea/vomiting, fevers/chills, abdominal pain.    ICU Vital Signs Last 24 Hrs  T(C): 37 (06 Apr 2020 16:41), Max: 37 (06 Apr 2020 16:41)  T(F): 98.6 (06 Apr 2020 16:41), Max: 98.6 (06 Apr 2020 16:41)  HR: 74 (06 Apr 2020 16:41) (74 - 76)  BP: 158/79 (06 Apr 2020 16:41) (143/76 - 158/79)  SpO2: 97% (06 Apr 2020 16:41) (97% - 97%)    MEDICATIONS  (STANDING):  apixaban 5 milliGRAM(s) Oral every 12 hours  aspirin enteric coated 81 milliGRAM(s) Oral daily  dextrose 5%. 1000 milliLiter(s) (50 mL/Hr) IV Continuous <Continuous>  dextrose 50% Injectable 12.5 Gram(s) IV Push once  dextrose 50% Injectable 25 Gram(s) IV Push once  dextrose 50% Injectable 25 Gram(s) IV Push once  furosemide   Injectable 40 milliGRAM(s) IV Push every 12 hours  insulin lispro (HumaLOG) corrective regimen sliding scale   SubCutaneous three times a day before meals  insulin lispro (HumaLOG) corrective regimen sliding scale   SubCutaneous at bedtime  lisinopril 2.5 milliGRAM(s) Oral daily  simvastatin 20 milliGRAM(s) Oral at bedtime  vancomycin  IVPB 1250 milliGRAM(s) IV Intermittent <User Schedule>    MEDICATIONS  (PRN):  dextrose 40% Gel 15 Gram(s) Oral once PRN Blood Glucose LESS THAN 70 milliGRAM(s)/deciliter  glucagon  Injectable 1 milliGRAM(s) IntraMuscular once PRN Glucose LESS THAN 70 milligrams/deciliter      LABS:                        8.4    9.48  )-----------( 274      ( 05 Apr 2020 07:07 )             28.1     04-06    141  |  100  |  17.0  ----------------------------<  135<H>  3.3<L>   |  27.0  |  0.79    Ca    8.7      06 Apr 2020 06:14      CAPILLARY BLOOD GLUCOSE      POCT Blood Glucose.: 157 mg/dL (06 Apr 2020 20:55)  POCT Blood Glucose.: 146 mg/dL (06 Apr 2020 17:06)  POCT Blood Glucose.: 196 mg/dL (06 Apr 2020 12:34)  POCT Blood Glucose.: 168 mg/dL (06 Apr 2020 08:03)      RECENT CULTURES:      RADIOLOGY & ADDITIONAL TESTS:      PHYSICAL EXAM:    GENERAL: elderly male, morbidly obese, NAD  HEAD:  Atraumatic, Normocephalic  EYES: EOMI, PERRLA, conjunctiva and sclera clear  ENMT: Moist mucous membranes  NECK: Supple   NERVOUS SYSTEM:  Alert & Oriented X3   CHEST/LUNG: coarse breath sounds  HEART: Regular rate and rhythm; + S1/S2  ABDOMEN: Soft, Nontender, obese; Bowel sounds present  : scrotal edema and erythema  EXTREMITIES:  ++ LE edema, mild erythema

## 2020-04-07 ENCOUNTER — TRANSCRIPTION ENCOUNTER (OUTPATIENT)
Age: 65
End: 2020-04-07

## 2020-04-07 LAB
ANION GAP SERPL CALC-SCNC: 17 MMOL/L — SIGNIFICANT CHANGE UP (ref 5–17)
BASOPHILS # BLD AUTO: 0.06 K/UL — SIGNIFICANT CHANGE UP (ref 0–0.2)
BASOPHILS NFR BLD AUTO: 0.6 % — SIGNIFICANT CHANGE UP (ref 0–2)
BUN SERPL-MCNC: 17 MG/DL — SIGNIFICANT CHANGE UP (ref 8–20)
CALCIUM SERPL-MCNC: 9.1 MG/DL — SIGNIFICANT CHANGE UP (ref 8.6–10.2)
CHLORIDE SERPL-SCNC: 97 MMOL/L — LOW (ref 98–107)
CO2 SERPL-SCNC: 26 MMOL/L — SIGNIFICANT CHANGE UP (ref 22–29)
CREAT SERPL-MCNC: 0.79 MG/DL — SIGNIFICANT CHANGE UP (ref 0.5–1.3)
EOSINOPHIL # BLD AUTO: 0.17 K/UL — SIGNIFICANT CHANGE UP (ref 0–0.5)
EOSINOPHIL NFR BLD AUTO: 1.6 % — SIGNIFICANT CHANGE UP (ref 0–6)
FERRITIN SERPL-MCNC: 110 NG/ML — SIGNIFICANT CHANGE UP (ref 30–400)
GLUCOSE BLDC GLUCOMTR-MCNC: 120 MG/DL — HIGH (ref 70–99)
GLUCOSE BLDC GLUCOMTR-MCNC: 146 MG/DL — HIGH (ref 70–99)
GLUCOSE BLDC GLUCOMTR-MCNC: 148 MG/DL — HIGH (ref 70–99)
GLUCOSE BLDC GLUCOMTR-MCNC: 173 MG/DL — HIGH (ref 70–99)
GLUCOSE SERPL-MCNC: 158 MG/DL — HIGH (ref 70–99)
HCT VFR BLD CALC: 31.9 % — LOW (ref 39–50)
HGB BLD-MCNC: 9.6 G/DL — LOW (ref 13–17)
IMM GRANULOCYTES NFR BLD AUTO: 0.8 % — SIGNIFICANT CHANGE UP (ref 0–1.5)
IRON SATN MFR SERPL: 12 % — LOW (ref 16–55)
IRON SATN MFR SERPL: 36 UG/DL — LOW (ref 59–158)
LYMPHOCYTES # BLD AUTO: 1.92 K/UL — SIGNIFICANT CHANGE UP (ref 1–3.3)
LYMPHOCYTES # BLD AUTO: 18 % — SIGNIFICANT CHANGE UP (ref 13–44)
MAGNESIUM SERPL-MCNC: 1.8 MG/DL — SIGNIFICANT CHANGE UP (ref 1.6–2.6)
MCHC RBC-ENTMCNC: 23.5 PG — LOW (ref 27–34)
MCHC RBC-ENTMCNC: 30.1 GM/DL — LOW (ref 32–36)
MCV RBC AUTO: 78 FL — LOW (ref 80–100)
MONOCYTES # BLD AUTO: 0.74 K/UL — SIGNIFICANT CHANGE UP (ref 0–0.9)
MONOCYTES NFR BLD AUTO: 6.9 % — SIGNIFICANT CHANGE UP (ref 2–14)
NEUTROPHILS # BLD AUTO: 7.68 K/UL — HIGH (ref 1.8–7.4)
NEUTROPHILS NFR BLD AUTO: 72.1 % — SIGNIFICANT CHANGE UP (ref 43–77)
PHOSPHATE SERPL-MCNC: 3.5 MG/DL — SIGNIFICANT CHANGE UP (ref 2.4–4.7)
PLATELET # BLD AUTO: 354 K/UL — SIGNIFICANT CHANGE UP (ref 150–400)
POTASSIUM SERPL-MCNC: 3.8 MMOL/L — SIGNIFICANT CHANGE UP (ref 3.5–5.3)
POTASSIUM SERPL-SCNC: 3.8 MMOL/L — SIGNIFICANT CHANGE UP (ref 3.5–5.3)
RBC # BLD: 4.09 M/UL — LOW (ref 4.2–5.8)
RBC # FLD: 19.4 % — HIGH (ref 10.3–14.5)
SODIUM SERPL-SCNC: 140 MMOL/L — SIGNIFICANT CHANGE UP (ref 135–145)
TIBC SERPL-MCNC: 309 UG/DL — SIGNIFICANT CHANGE UP (ref 220–430)
TRANSFERRIN SERPL-MCNC: 216 MG/DL — SIGNIFICANT CHANGE UP (ref 180–329)
VANCOMYCIN TROUGH SERPL-MCNC: 12.1 UG/ML — SIGNIFICANT CHANGE UP (ref 10–20)
WBC # BLD: 10.65 K/UL — HIGH (ref 3.8–10.5)
WBC # FLD AUTO: 10.65 K/UL — HIGH (ref 3.8–10.5)

## 2020-04-07 PROCEDURE — 99232 SBSQ HOSP IP/OBS MODERATE 35: CPT

## 2020-04-07 RX ORDER — FUROSEMIDE 40 MG
40 TABLET ORAL DAILY
Refills: 0 | Status: DISCONTINUED | OUTPATIENT
Start: 2020-04-07 | End: 2020-04-08

## 2020-04-07 RX ORDER — CEPHALEXIN 500 MG
1 CAPSULE ORAL
Qty: 28 | Refills: 0
Start: 2020-04-07 | End: 2020-04-13

## 2020-04-07 RX ORDER — GABAPENTIN 400 MG/1
800 CAPSULE ORAL THREE TIMES A DAY
Refills: 0 | Status: DISCONTINUED | OUTPATIENT
Start: 2020-04-07 | End: 2020-04-08

## 2020-04-07 RX ORDER — TRAZODONE HCL 50 MG
50 TABLET ORAL AT BEDTIME
Refills: 0 | Status: DISCONTINUED | OUTPATIENT
Start: 2020-04-07 | End: 2020-04-08

## 2020-04-07 RX ORDER — TRAZODONE HCL 50 MG
1 TABLET ORAL
Qty: 0 | Refills: 0 | DISCHARGE
Start: 2020-04-07

## 2020-04-07 RX ORDER — SERTRALINE 25 MG/1
100 TABLET, FILM COATED ORAL DAILY
Refills: 0 | Status: DISCONTINUED | OUTPATIENT
Start: 2020-04-07 | End: 2020-04-08

## 2020-04-07 RX ORDER — SERTRALINE 25 MG/1
1 TABLET, FILM COATED ORAL
Qty: 0 | Refills: 0 | DISCHARGE
Start: 2020-04-07

## 2020-04-07 RX ORDER — FUROSEMIDE 40 MG
1 TABLET ORAL
Qty: 0 | Refills: 0 | DISCHARGE
Start: 2020-04-07

## 2020-04-07 RX ORDER — HYDROMORPHONE HYDROCHLORIDE 2 MG/ML
4 INJECTION INTRAMUSCULAR; INTRAVENOUS; SUBCUTANEOUS ONCE
Refills: 0 | Status: DISCONTINUED | OUTPATIENT
Start: 2020-04-07 | End: 2020-04-07

## 2020-04-07 RX ORDER — GABAPENTIN 400 MG/1
1600 CAPSULE ORAL THREE TIMES A DAY
Refills: 0 | Status: DISCONTINUED | OUTPATIENT
Start: 2020-04-07 | End: 2020-04-07

## 2020-04-07 RX ORDER — INSULIN GLARGINE 100 [IU]/ML
80 INJECTION, SOLUTION SUBCUTANEOUS
Qty: 0 | Refills: 0 | DISCHARGE

## 2020-04-07 RX ORDER — METHADONE HYDROCHLORIDE 40 MG/1
10 TABLET ORAL EVERY 8 HOURS
Refills: 0 | Status: DISCONTINUED | OUTPATIENT
Start: 2020-04-07 | End: 2020-04-08

## 2020-04-07 RX ADMIN — Medication 81 MILLIGRAM(S): at 16:54

## 2020-04-07 RX ADMIN — Medication 166.67 MILLIGRAM(S): at 12:00

## 2020-04-07 RX ADMIN — Medication 40 MILLIGRAM(S): at 04:53

## 2020-04-07 RX ADMIN — APIXABAN 5 MILLIGRAM(S): 2.5 TABLET, FILM COATED ORAL at 04:52

## 2020-04-07 RX ADMIN — Medication 2: at 12:00

## 2020-04-07 RX ADMIN — Medication 166.67 MILLIGRAM(S): at 01:49

## 2020-04-07 RX ADMIN — APIXABAN 5 MILLIGRAM(S): 2.5 TABLET, FILM COATED ORAL at 17:27

## 2020-04-07 RX ADMIN — HYDROMORPHONE HYDROCHLORIDE 4 MILLIGRAM(S): 2 INJECTION INTRAMUSCULAR; INTRAVENOUS; SUBCUTANEOUS at 01:49

## 2020-04-07 RX ADMIN — LISINOPRIL 2.5 MILLIGRAM(S): 2.5 TABLET ORAL at 04:53

## 2020-04-07 RX ADMIN — Medication 40 MILLIGRAM(S): at 12:00

## 2020-04-07 RX ADMIN — SERTRALINE 100 MILLIGRAM(S): 25 TABLET, FILM COATED ORAL at 12:00

## 2020-04-07 NOTE — DISCHARGE NOTE NURSING/CASE MANAGEMENT/SOCIAL WORK - PATIENT PORTAL LINK FT
You can access the FollowMyHealth Patient Portal offered by [facility] by registering at the following website: [email or web address]. By joining [facility]’s FollowMyHealth portal, you will also be able to view your health information using other applications (apps) compatible with our system.

## 2020-04-07 NOTE — DISCHARGE NOTE PROVIDER - NSDCCPCAREPLAN_GEN_ALL_CORE_FT
PRINCIPAL DISCHARGE DIAGNOSIS  Diagnosis: Acute diastolic congestive heart failure  Assessment and Plan of Treatment: please take lasix as prescribed  adhere to low sodium diet and fluid restriction  follow up with cardiology as outpatient      SECONDARY DISCHARGE DIAGNOSES  Diagnosis: Diabetes  Assessment and Plan of Treatment: hold lantus 80 units at bedtime since fingerstickss have been acceptable  continue oral medications but monitor accuchecks q 6hrs hours    Diagnosis: Cellulitis of scrotum  Assessment and Plan of Treatment: please take 7 days of keflex as recommended by ID  scrotal elevation    Diagnosis: Cellulitis of leg, except foot  Assessment and Plan of Treatment: please take 7 days of keflex as recommended by ID

## 2020-04-07 NOTE — PROGRESS NOTE ADULT - SUBJECTIVE AND OBJECTIVE BOX
CHIEF COMPLAINT/INTERVAL HISTORY:    Patient is a 64y old  Male who presents with a chief complaint of cellulitis (07 Apr 2020 14:44)    SUBJECTIVE & OBJECTIVE: Pt seen and examined at bedside. No overnight events. Patient reports feeling a little better but requesting to be discharged to [facility]. Discharge completed.    ROS: No chest pain, palpitations, SOB, light headedness, dizziness, headache, nausea/vomiting, fevers/chills, abdominal pain, dysuria.    ICU Vital Signs Last 24 Hrs  T(C): 36.8 (07 Apr 2020 23:25), Max: 36.8 (07 Apr 2020 23:25)  T(F): 98.3 (07 Apr 2020 23:25), Max: 98.3 (07 Apr 2020 23:25)  HR: 76 (08 Apr 2020 05:15) (73 - 76)  BP: 126/78 (08 Apr 2020 05:15) (126/78 - 135/62)  RR: 18 (07 Apr 2020 23:25) (18 - 18)  SpO2: 97% (07 Apr 2020 23:25) (97% - 97%)      MEDICATIONS  (STANDING):  apixaban 5 milliGRAM(s) Oral every 12 hours  aspirin enteric coated 81 milliGRAM(s) Oral daily  dextrose 5%. 1000 milliLiter(s) (50 mL/Hr) IV Continuous <Continuous>  dextrose 50% Injectable 12.5 Gram(s) IV Push once  dextrose 50% Injectable 25 Gram(s) IV Push once  dextrose 50% Injectable 25 Gram(s) IV Push once  furosemide    Tablet 40 milliGRAM(s) Oral daily  gabapentin 800 milliGRAM(s) Oral three times a day  insulin lispro (HumaLOG) corrective regimen sliding scale   SubCutaneous three times a day before meals  insulin lispro (HumaLOG) corrective regimen sliding scale   SubCutaneous at bedtime  lisinopril 2.5 milliGRAM(s) Oral daily  methadone    Tablet 10 milliGRAM(s) Oral every 8 hours  sertraline 100 milliGRAM(s) Oral daily  simvastatin 20 milliGRAM(s) Oral at bedtime  traZODone 50 milliGRAM(s) Oral at bedtime  vancomycin  IVPB 1250 milliGRAM(s) IV Intermittent <User Schedule>    MEDICATIONS  (PRN):  dextrose 40% Gel 15 Gram(s) Oral once PRN Blood Glucose LESS THAN 70 milliGRAM(s)/deciliter  glucagon  Injectable 1 milliGRAM(s) IntraMuscular once PRN Glucose LESS THAN 70 milligrams/deciliter      LABS:                        9.6    10.65 )-----------( 354      ( 07 Apr 2020 08:08 )             31.9     04-07    140  |  97<L>  |  17.0  ----------------------------<  158<H>  3.8   |  26.0  |  0.79    Ca    9.1      07 Apr 2020 08:08  Phos  3.5     04-07  Mg     1.8     04-07      CAPILLARY BLOOD GLUCOSE      POCT Blood Glucose.: 133 mg/dL (08 Apr 2020 08:10)  POCT Blood Glucose.: 120 mg/dL (07 Apr 2020 23:38)  POCT Blood Glucose.: 146 mg/dL (07 Apr 2020 17:22)  POCT Blood Glucose.: 173 mg/dL (07 Apr 2020 11:27)  POCT Blood Glucose.: 148 mg/dL (07 Apr 2020 08:40)      PHYSICAL EXAM:    GENERAL: elderly male, morbidly obese, NAD  HEAD:  Atraumatic, Normocephalic  EYES: EOMI, PERRLA, conjunctiva and sclera clear  ENMT: Moist mucous membranes  NECK: Supple   NERVOUS SYSTEM:  Alert & Oriented X3   CHEST/LUNG: coarse breath sounds  HEART: Regular rate and rhythm; + S1/S2  ABDOMEN: Soft, Nontender, obese; Bowel sounds present  : scrotal edema and erythema  EXTREMITIES:  ++ LE edema, mild erythema

## 2020-04-07 NOTE — DISCHARGE NOTE PROVIDER - CARE PROVIDER_API CALL
Uziel Walker)  Family Medicine  [address] [place] [address]  Phone: [phone]  Fax: [phone]  Follow Up Time:

## 2020-04-07 NOTE — DISCHARGE NOTE PROVIDER - HOSPITAL COURSE
65 yo male with fem-fem bypass with PTFE graft in Oct 24th for critical limb ischemia, site infection that was treated in November of 2019 after presenting with AMS, DVT that was found on same admission treated with eliquis. Patient is coming to the hospital with bilateral leg swelling. He was told by his vascular surgeon office that they were no longer having patient's come to office for leg wraps. Patient states since then swelling has gotten worse. Patient without complaints of SOB or orthopnea, denies chest pain. Endorses bilateral leg pain but also with neuropathic pain.        #LE swelling appears chronic; likely due to lymphedema     - possibly superimposed mild cellulitis    - BNP within normal limits however obese    - history of DVT on eliquis; US negative for acute DVT      - continue lasix IV, switch to PO today    - hold on echo at this time - recently showed preserved EF in February of this year    - troponins negative    - on IV antibiotics to cover for infection ; switch to PO        #leukocytosis and bilateral redness of LE and scrotum    - blood cultures - no growth to date    - continue vancomycin    - can switch to zyvox or keflex today    - ID consult appreciated        #left and right lower ext wounds ((heel ulcer on the right, 1st toe ulcer on the left)    - follows with podiatry as outpatient    - can follow up as outpatient on discharge    - he is supposed to have surgical shoes but states he lost them        #decreased ability to walk    - patient states it's difficult for him to walk due to pain from neuropathy, heavy legs    - does not follow weight bearing instructions in terms of wearing the surgical shoes    - PT - [facility]        #DM    - diabetic diet    - ISS; accuchecks acceptable    - hold glimperide, metformin and actos, and lantus         #HTN    - monitor BP    - continue current medications; lisinopril and lasix    - low sodium diet        #PAD    - continue aspirin and statin    - no signs of limb ischemia        #ankylosing spondylitis    -on chronic methadone; resume home dose of methadone    -dilaudid prn        Medically stable for discharge to rehab. Time spent on discharge 45 minutes.

## 2020-04-07 NOTE — DISCHARGE NOTE PROVIDER - NSDCMRMEDTOKEN_GEN_ALL_CORE_FT
apixaban 5 mg oral tablet: 1 tab(s) orally every 12 hours  aspirin 81 mg oral tablet, chewable: 1 tab(s) orally once a day  furosemide 40 mg oral tablet: 1 tab(s) orally once a day  gabapentin 400 mg oral capsule: 2 cap(s) orally 3 times a day  glimepiride 4 mg oral tablet: 2 tab(s) orally once a day  HYDROmorphone 4 mg oral tablet: orally every 8 hours  lisinopril 2.5 mg oral tablet: 1 tab(s) orally once a day  metFORMIN 1000 mg oral tablet: 1 tab(s) orally 2 times a day  RESUME IN 2 days on 10/14/19  methadone 10 mg oral tablet: orally 4 times a day  pioglitazone 30 mg oral tablet: 1 tab(s) orally once a day  sertraline 100 mg oral tablet: 1 tab(s) orally once a day  simvastatin 20 mg oral tablet: 1 tab(s) orally once a day (at bedtime)  traZODone 50 mg oral tablet: 1 tab(s) orally once a day (at bedtime)

## 2020-04-08 VITALS
HEART RATE: 95 BPM | DIASTOLIC BLOOD PRESSURE: 61 MMHG | TEMPERATURE: 99 F | OXYGEN SATURATION: 95 % | SYSTOLIC BLOOD PRESSURE: 116 MMHG

## 2020-04-08 LAB
GLUCOSE BLDC GLUCOMTR-MCNC: 133 MG/DL — HIGH (ref 70–99)
GLUCOSE BLDC GLUCOMTR-MCNC: 241 MG/DL — HIGH (ref 70–99)

## 2020-04-08 PROCEDURE — 87040 BLOOD CULTURE FOR BACTERIA: CPT

## 2020-04-08 PROCEDURE — 71045 X-RAY EXAM CHEST 1 VIEW: CPT

## 2020-04-08 PROCEDURE — 99232 SBSQ HOSP IP/OBS MODERATE 35: CPT

## 2020-04-08 PROCEDURE — 97110 THERAPEUTIC EXERCISES: CPT

## 2020-04-08 PROCEDURE — 51702 INSERT TEMP BLADDER CATH: CPT

## 2020-04-08 PROCEDURE — 93005 ELECTROCARDIOGRAM TRACING: CPT

## 2020-04-08 PROCEDURE — 80048 BASIC METABOLIC PNL TOTAL CA: CPT

## 2020-04-08 PROCEDURE — 99285 EMERGENCY DEPT VISIT HI MDM: CPT | Mod: 25

## 2020-04-08 PROCEDURE — 81001 URINALYSIS AUTO W/SCOPE: CPT

## 2020-04-08 PROCEDURE — 93970 EXTREMITY STUDY: CPT

## 2020-04-08 PROCEDURE — 97163 PT EVAL HIGH COMPLEX 45 MIN: CPT

## 2020-04-08 PROCEDURE — 83735 ASSAY OF MAGNESIUM: CPT

## 2020-04-08 PROCEDURE — 87635 SARS-COV-2 COVID-19 AMP PRB: CPT

## 2020-04-08 PROCEDURE — 36415 COLL VENOUS BLD VENIPUNCTURE: CPT

## 2020-04-08 PROCEDURE — 84484 ASSAY OF TROPONIN QUANT: CPT

## 2020-04-08 PROCEDURE — 82550 ASSAY OF CK (CPK): CPT

## 2020-04-08 PROCEDURE — 99239 HOSP IP/OBS DSCHRG MGMT >30: CPT

## 2020-04-08 PROCEDURE — 85027 COMPLETE CBC AUTOMATED: CPT

## 2020-04-08 PROCEDURE — 82962 GLUCOSE BLOOD TEST: CPT

## 2020-04-08 PROCEDURE — 84466 ASSAY OF TRANSFERRIN: CPT

## 2020-04-08 PROCEDURE — 83880 ASSAY OF NATRIURETIC PEPTIDE: CPT

## 2020-04-08 PROCEDURE — 82728 ASSAY OF FERRITIN: CPT

## 2020-04-08 PROCEDURE — 82553 CREATINE MB FRACTION: CPT

## 2020-04-08 PROCEDURE — 76870 US EXAM SCROTUM: CPT

## 2020-04-08 PROCEDURE — 80053 COMPREHEN METABOLIC PANEL: CPT

## 2020-04-08 PROCEDURE — 83540 ASSAY OF IRON: CPT

## 2020-04-08 PROCEDURE — 97116 GAIT TRAINING THERAPY: CPT

## 2020-04-08 PROCEDURE — 87086 URINE CULTURE/COLONY COUNT: CPT

## 2020-04-08 PROCEDURE — 85610 PROTHROMBIN TIME: CPT

## 2020-04-08 PROCEDURE — 83550 IRON BINDING TEST: CPT

## 2020-04-08 PROCEDURE — 84100 ASSAY OF PHOSPHORUS: CPT

## 2020-04-08 PROCEDURE — 85730 THROMBOPLASTIN TIME PARTIAL: CPT

## 2020-04-08 PROCEDURE — 83036 HEMOGLOBIN GLYCOSYLATED A1C: CPT

## 2020-04-08 PROCEDURE — 80202 ASSAY OF VANCOMYCIN: CPT

## 2020-04-08 RX ADMIN — GABAPENTIN 800 MILLIGRAM(S): 400 CAPSULE ORAL at 00:03

## 2020-04-08 RX ADMIN — SIMVASTATIN 20 MILLIGRAM(S): 20 TABLET, FILM COATED ORAL at 00:04

## 2020-04-08 RX ADMIN — Medication 4: at 11:02

## 2020-04-08 RX ADMIN — Medication 40 MILLIGRAM(S): at 05:21

## 2020-04-08 RX ADMIN — APIXABAN 5 MILLIGRAM(S): 2.5 TABLET, FILM COATED ORAL at 05:21

## 2020-04-08 RX ADMIN — Medication 81 MILLIGRAM(S): at 11:03

## 2020-04-08 RX ADMIN — METHADONE HYDROCHLORIDE 10 MILLIGRAM(S): 40 TABLET ORAL at 13:30

## 2020-04-08 RX ADMIN — SERTRALINE 100 MILLIGRAM(S): 25 TABLET, FILM COATED ORAL at 11:03

## 2020-04-08 RX ADMIN — GABAPENTIN 800 MILLIGRAM(S): 400 CAPSULE ORAL at 05:22

## 2020-04-08 RX ADMIN — METHADONE HYDROCHLORIDE 10 MILLIGRAM(S): 40 TABLET ORAL at 05:21

## 2020-04-08 RX ADMIN — Medication 50 MILLIGRAM(S): at 00:03

## 2020-04-08 RX ADMIN — GABAPENTIN 800 MILLIGRAM(S): 400 CAPSULE ORAL at 13:30

## 2020-04-08 RX ADMIN — LISINOPRIL 2.5 MILLIGRAM(S): 2.5 TABLET ORAL at 05:22

## 2020-04-08 NOTE — PROGRESS NOTE ADULT - ASSESSMENT
63 yo male with fem-fem bypass with PTFE graft in Oct 24th for critical limb ischemia, site infection that was treated in 2019 after presenting with AMS, DVT that was found on same admission treated with eliquis. Patient is coming to the hospital with bilateral leg swelling. He was told by his vascular surgeon office that they were no longer having patient's come to office for leg wraps. Patient states since then swelling has gotten worse. Patient without complaints of SOB or orthopnea, denies chest pain. Endorses bilateral leg pain but also with neuropathic pain.    #LE swelling - may be acute on chronic diastolic heart failure  - BNP within normal limits however obese  - r/o DVT with US - negative  - lasix IV 40mg BID - monitor potassium  - hold on echo at this time - recently showed preserved EF in February of this year  - troponins negative  - on IV antibiotics to cover for infection as paitent also with leukocytosis (vancomycin)    #leukocytosis and bilateral redness of LE and scrotum  - discontinued clindamycin  - started vancomycin  - repeat CBC  - follow up cultures  - ID consult    #left and right lower ext wounds ((heel ulcer on the right, 1st toe ulcer on the left)  - follows with podiatry as outpatient  - he can't recall the name but states they are off [facility] and [facility]  - can follow up as outpatient on discharge  - he is supposed to have surgical shoes but states he lost them    #decreased ability to walk  - patient states it's difficult for him to walk due to pain from neuropathy, heavy legs  - does not follow weight bearing instructions in terms of wearing the surgical shoes  - he reports he has no weight weight bearing restrictions aside from supposed to be wearing surgical shoes  - will have PT see him1    #DM  - diabetic/dash diet  - ISS  - FS TIDAC    #HTN  - monitor BP    #PAD  - continue eliquis  - monitor for signs of limb ischemia    #ankylosing spondylitis  - patient unsure of medications  - will need to perform medical reconcilliation  - pharmacy he states is [facility]    #DVT  - on eliquis as above    DISPO; once on oral diuretic can discharge home, possibly with IV antibiotics if ID agrees. Ordered COVID to see if patient would be a candidate for either [facility] or transfer to ship  CC:   HPI:  63 yo male with fem-fem bypass with PTFE graft in Oct 24th for critical limb ischemia, site infection that was treated in 2019 after presenting with AMS, DVT that was found on same admission treated with eliquis. Patient is coming to the hospital with bilateral leg swelling. He was told by his vascular surgeon office that they were no longer having patient's come to office for leg wraps. Patient states since then swelling has gotten worse. Patient without complaints of SOB or orthopnea, denies chest pain. Endorses bilateral leg pain but also with neuropathic pain. (2020 17:54)    INTERVAL HPI/OVERNIGHT EVENTS:    No acute events overnight  patient feels well  no oxygen requirement  legs do look to be improved  no fever  cardiology to see patient    Vital Signs Last 24 Hrs  T(C): 36.9 (2020 21:40), Max: 36.9 (2020 21:40)  T(F): 98.5 (2020 21:40), Max: 98.5 (2020 21:40)  HR: 69 (2020 21:40) (69 - 82)  BP: 124/66 (2020 21:40) (123/60 - 124/66)  BP(mean): --  RR: --  SpO2: --    PHYSICAL EXAM:  General: in no acute distress; morbidly obese  Head: Normocephalic; atraumatic  Neck: Supple; non tender; no masses; thick  Respiratory: No wheezes, rales or rhonchi  Cardiovascular: Regular rate and rhythm. S1 and S2 Normal; No murmurs, gallops or rubs  Gastrointestinal: Soft non-tender non-distended; Normal bowel sounds  Extremities: Normal range of motion, No clubbing; bilateral swelling from the lower ext to the lower abdomen including the scrotum; 1st left toe ulcer under 2x3 and kerlix wrap; right heel ulcer under pressure wrapping (was not taken down)  Vascular: Peripheral pulses palpable 2+ bilaterally  Neurological: Alert and oriented x4  Skin: Warm and dry. No acute rash  Psychiatric: Cooperative and appropriate    I&O's Detail    2020 07:01  -  2020 07:00  --------------------------------------------------------  IN:  Total IN: 0 mL    OUT:    Indwelling Catheter - Urethral: 1500 mL  Total OUT: 1500 mL    Total NET: -1500 mL    CARDIAC MARKERS ( 2020 16:26 )  x     / x     / x     / x     / 8.7 ng/mL  CARDIAC MARKERS ( 2020 15:35 )  x     / 0.01 ng/mL / 730 U/L / x     / x                            8.4    9.48  )-----------( 274      ( 2020 07:07 )             28.1     CAPILLARY BLOOD GLUCOS  POCT Blood Glucose.: 168 mg/dL (2020 08:03)  POCT Blood Glucose.: 185 mg/dL (2020 19:07)  POCT Blood Glucose.: 215 mg/dL (2020 13:37)    Basic Metabolic Panel in AM (20 @ 07:07)    Sodium, Serum: 141 mmol/L    Potassium, Serum: 2.8: TYPE:(C=Critical, N=Notification, A=Abnormal) C  TESTS: POTASSIUM  DATE/TIME CALLED: 20 08:44  CALLED TO: WILLIAMS UPTON  READ BACK (2 Patient Identifiers)(Y/N): Y  READ BACK VALUES (Y/N): Y  CALLED BY: PS mmol/L    Chloride, Serum: 99 mmol/L    Carbon Dioxide, Serum: 27.0 mmol/L    Anion Gap, Serum: 15 mmol/L    Blood Urea Nitrogen, Serum: 14.0 mg/dL    Creatinine, Serum: 0.79 mg/dL    Glucose, Serum: 105: Reference Range for Glucose has been amended as of 2020 mg/dL    Calcium, Total Serum: 8.6 mg/dL    eGFR if Non : 94      LIVER FUNCTIONS - ( 2020 15:35 )  Alb: 3.2 g/dL / Pro: 8.1 g/dL / ALK PHOS: 63 U/L / ALT: 24 U/L / AST: 41 U/L / GGT: x           Urinalysis Basic - ( 2020 17:49 )    Color: Yellow / Appearance: Clear / S.015 / pH: x  Gluc: x / Ketone: Negative  / Bili: Negative / Urobili: Negative mg/dL   Blood: x / Protein: 30 mg/dL / Nitrite: Negative   Leuk Esterase: Negative / RBC: 0-2 /HPF / WBC Negative   Sq Epi: x / Non Sq Epi: Occasional / Bacteria: x    Hemoglobin A1C, Whole Blood: 7.6 % (20 @ 06:21)    MEDICATIONS  (STANDING):  apixaban 5 milliGRAM(s) Oral every 12 hours  aspirin enteric coated 81 milliGRAM(s) Oral daily  dextrose 5%. 1000 milliLiter(s) (50 mL/Hr) IV Continuous <Continuous>  dextrose 50% Injectable 12.5 Gram(s) IV Push once  dextrose 50% Injectable 25 Gram(s) IV Push once  dextrose 50% Injectable 25 Gram(s) IV Push once  furosemide   Injectable 40 milliGRAM(s) IV Push every 12 hours  insulin lispro (HumaLOG) corrective regimen sliding scale   SubCutaneous three times a day before meals  insulin lispro (HumaLOG) corrective regimen sliding scale   SubCutaneous at bedtime  simvastatin 20 milliGRAM(s) Oral at bedtime  vancomycin  IVPB 1250 milliGRAM(s) IV Intermittent <User Schedule>    MEDICATIONS  (PRN):  dextrose 40% Gel 15 Gram(s) Oral once PRN Blood Glucose LESS THAN 70 milliGRAM(s)/deciliter  glucagon  Injectable 1 milliGRAM(s) IntraMuscular once PRN Glucose LESS THAN 70 milligrams/deciliter      RADIOLOGY & ADDITIONAL TESTS:
63 yo male with fem-fem bypass with PTFE graft in Oct 24th for critical limb ischemia, site infection that was treated in November of 2019 after presenting with AMS, DVT that was found on same admission treated with eliquis. Patient is coming to the hospital with bilateral leg swelling. He was told by his vascular surgeon office that they were no longer having patient's come to office for leg wraps. Patient states since then swelling has gotten worse. Patient without complaints of SOB or orthopnea, denies chest pain. Endorses bilateral leg pain but also with neuropathic pain.    #LE swelling - may be acute on chronic diastolic heart failure  - BNP within normal limits however obese  - r/o DVT with US - negative  - lasix IV 40mg BID - monitor potassium  - hold on echo at this time - recently showed preserved EF in February of this year  - troponins negative  - on IV antibiotics to cover for infection as paitent also with leukocytosis (vancomycin)    #leukocytosis and bilateral redness of LE and scrotum  - discontinued clindamycin  - started vancomycin  - repeat CBC  - follow up cultures  - ID consult    #left and right lower ext wounds ((heel ulcer on the right, 1st toe ulcer on the left)  - follows with podiatry as outpatient  - he can't recall the name but states they are off [facility]  - can follow up as outpatient on discharge  - he is supposed to have surgical shoes but states he lost them    #decreased ability to walk  - patient states it's difficult for him to walk due to pain from neuropathy, heavy legs  - does not follow weight bearing instructions in terms of wearing the surgical shoes  - he reports he has no weight weight bearing restrictions aside from supposed to be wearing surgical shoes  - will have PT see him1    #DM  - diabetic/dash diet  - ISS  - FS TIDAC    #HTN  - monitor BP    #PAD  - continue eliquis  - monitor for signs of limb ischemia    #ankylosing spondylitis  - patient unsure of medications  - will need to perform medical reconcilliation  - pharmacy he states is [facility]    #DVT  - on eliquis as above    DISPO; once on oral diuretic can discharge home, possibly with IV antibiotics if ID agrees. Ordered COVID to see if patient would be a candidate for either [facility] or transfer to ship
63 yo male with fem-fem bypass with PTFE graft in Oct 24th for critical limb ischemia, site infection that was treated in November of 2019 after presenting with AMS, DVT that was found on same admission treated with eliquis. Patient is coming to the hospital with bilateral leg swelling. He was told by his vascular surgeon office that they were no longer having patient's come to office for leg wraps. Patient states since then swelling has gotten worse. Patient without complaints of SOB or orthopnea, denies chest pain. Endorses bilateral leg pain but also with neuropathic pain.    #LE swelling appears chronic; likely due to lymphedema   - possibly superimposed mild cellulitis  - BNP within normal limits however obese  - history of DVT on eliquis; US negative for acute DVT    - continue lasix IV, switch to PO today  - hold on echo at this time - recently showed preserved EF in February of this year  - troponins negative  - on IV antibiotics to cover for infection ; switch to PO    #leukocytosis and bilateral redness of LE and scrotum  - blood cultures - no growth to date  - continue vancomycin  - can switch to zyvox or keflex today  - ID consult appreciated    #left and right lower ext wounds ((heel ulcer on the right, 1st toe ulcer on the left)  - follows with podiatry as outpatient  - can follow up as outpatient on discharge  - he is supposed to have surgical shoes but states he lost them    #decreased ability to walk  - patient states it's difficult for him to walk due to pain from neuropathy, heavy legs  - does not follow weight bearing instructions in terms of wearing the surgical shoes  - PT - [facility]    #DM  - diabetic diet  - ISS; accuchecks acceptable  - hold glimperide, metformin and actos, and lantus     #HTN  - monitor BP  - continue current medications; lisinopril and lasix  - low sodium diet    #PAD  - continue aspirin and statin  - no signs of limb ischemia    #ankylosing spondylitis  -on chronic methadone; resume home dose of methadone  -dilaudid prn    DISPO: Discharge to rehab today.
63 yo male with fem-fem bypass with PTFE graft in Oct 24th for critical limb ischemia, site infection that was treated in November of 2019 after presenting with AMS, DVT that was found on same admission treated with eliquis. Patient is coming to the hospital with bilateral leg swelling. He was told by his vascular surgeon office that they were no longer having patient's come to office for leg wraps. Patient states since then swelling has gotten worse. Patient without complaints of SOB or orthopnea, denies chest pain. Endorses bilateral leg pain but also with neuropathic pain.   CONCERN OF POSSBLE   LOWER EXT CELLULITIS AND SCROTAL Cellulitis  SKIN CHANGES APPEAR TO BE MORE CHRONIC IN NATURE   NO WARMTH NON TENDER   PLACED INITIALLY ON  IV VANCOMYCIN  OK TO CHANGE TO ORAL AGENT ZYVOX WILL COVER BOTH MRSA AND MSSA   NO OBVIOUS MRSA POSITIVE  CX   BUT COULD CONSIDER KEFLEX 500QID  FOR ONE WEEK IF UNABLE TO OBTAN ZYVOX    FOR D/C TO [facility]   WILL FOLLOW UP AS NEEDED
63 yo male with fem-fem bypass with PTFE graft in Oct 24th for critical limb ischemia, site infection that was treated in November of 2019 after presenting with AMS, DVT that was found on same admission treated with eliquis. Patient is coming to the hospital with bilateral leg swelling. He was told by his vascular surgeon office that they were no longer having patient's come to office for leg wraps. Patient states since then swelling has gotten worse. Patient without complaints of SOB or orthopnea, denies chest pain. Endorses bilateral leg pain but also with neuropathic pain.    #LE swelling appears chronic; likely due to lymphedema   - possibly superimposed mild cellulitis  - f/u blood cultures  - BNP within normal limits however obese  - history of DVT on eliquis; US negative for acute DVT    - continue lasix IV, switch to PO in AM  - hold on echo at this time - recently showed preserved EF in February of this year  - troponins negative  - on IV antibiotics to cover for infection     #leukocytosis and bilateral redness of LE and scrotum  - f/u blood cultures  - continue vancomycin  - can switch to zyvox or keflex on d/c per ID  - ID consult appreciated    #left and right lower ext wounds ((heel ulcer on the right, 1st toe ulcer on the left)  - follows with podiatry as outpatient  - can follow up as outpatient on discharge  - he is supposed to have surgical shoes but states he lost them    #decreased ability to walk  - patient states it's difficult for him to walk due to pain from neuropathy, heavy legs  - does not follow weight bearing instructions in terms of wearing the surgical shoes  - PT recommending daily PT; will re-evaluate patient tomorrow    #DM  - diabetic diet  - ISS    #HTN  - monitor BP  - continue current medications; lisinopril and lasix  - low sodium diet    #PAD  - continue aspirin and statin  - no signs of limb ischemia    #ankylosing spondylitis  - patient unsure of medications    DISPO; PT re-evaluation tomorrow. Switch to PO lasix in 24 hours.

## 2020-04-10 LAB
CULTURE RESULTS: SIGNIFICANT CHANGE UP
SPECIMEN SOURCE: SIGNIFICANT CHANGE UP

## 2020-04-13 NOTE — H&P ADULT - PROBLEM SELECTOR PLAN 4
Refill Request for: B  Complex vitamins     Future office Visit:6/24/20     Last office visit:3/11/20   Last refill:3/28/19   Last Labs:3/4/20       Please advise     
will keep on humalog scale.

## 2020-04-15 NOTE — ED PROVIDER NOTE - NS_BEDUNITTYPES_ED_ALL_ED
Problem: Falls - Risk of:  Goal: Will remain free from falls  Description: Will remain free from falls  Outcome: Ongoing     Problem: Gas Exchange - Impaired:  Goal: Levels of oxygenation will improve  Description: Levels of oxygenation will improve  Outcome: Ongoing ANY BED

## 2020-04-20 ENCOUNTER — APPOINTMENT (OUTPATIENT)
Dept: ORTHOPEDIC SURGERY | Facility: CLINIC | Age: 65
End: 2020-04-20

## 2020-05-01 PROCEDURE — G9001: CPT

## 2020-05-01 PROCEDURE — G9005: CPT

## 2020-06-01 ENCOUNTER — OUTPATIENT (OUTPATIENT)
Dept: OUTPATIENT SERVICES | Facility: HOSPITAL | Age: 65
LOS: 1 days | End: 2020-06-01
Payer: MEDICARE

## 2020-06-01 DIAGNOSIS — Z96.642 PRESENCE OF LEFT ARTIFICIAL HIP JOINT: Chronic | ICD-10-CM

## 2020-06-01 DIAGNOSIS — Z95.828 PRESENCE OF OTHER VASCULAR IMPLANTS AND GRAFTS: Chronic | ICD-10-CM

## 2020-06-03 ENCOUNTER — APPOINTMENT (OUTPATIENT)
Dept: VASCULAR SURGERY | Facility: CLINIC | Age: 65
End: 2020-06-03
Payer: MEDICARE

## 2020-06-03 VITALS
HEART RATE: 92 BPM | SYSTOLIC BLOOD PRESSURE: 116 MMHG | TEMPERATURE: 97.6 F | DIASTOLIC BLOOD PRESSURE: 67 MMHG | OXYGEN SATURATION: 100 %

## 2020-06-03 PROCEDURE — 29580 STRAPPING UNNA BOOT: CPT | Mod: 50

## 2020-06-10 ENCOUNTER — APPOINTMENT (OUTPATIENT)
Dept: VASCULAR SURGERY | Facility: CLINIC | Age: 65
End: 2020-06-10
Payer: MEDICARE

## 2020-06-10 VITALS
DIASTOLIC BLOOD PRESSURE: 67 MMHG | HEART RATE: 97 BPM | OXYGEN SATURATION: 94 % | SYSTOLIC BLOOD PRESSURE: 110 MMHG | TEMPERATURE: 98.5 F

## 2020-06-10 PROCEDURE — 29580 STRAPPING UNNA BOOT: CPT | Mod: 50

## 2020-06-15 ENCOUNTER — APPOINTMENT (OUTPATIENT)
Dept: VASCULAR SURGERY | Facility: CLINIC | Age: 65
End: 2020-06-15
Payer: MEDICARE

## 2020-06-15 ENCOUNTER — OUTPATIENT (OUTPATIENT)
Dept: OUTPATIENT SERVICES | Facility: HOSPITAL | Age: 65
LOS: 1 days | Discharge: HOME | End: 2020-06-15

## 2020-06-15 VITALS
DIASTOLIC BLOOD PRESSURE: 72 MMHG | TEMPERATURE: 98.4 F | OXYGEN SATURATION: 98 % | HEIGHT: 63 IN | BODY MASS INDEX: 49.79 KG/M2 | SYSTOLIC BLOOD PRESSURE: 130 MMHG | WEIGHT: 281 LBS | HEART RATE: 80 BPM

## 2020-06-15 DIAGNOSIS — Z95.828 PRESENCE OF OTHER VASCULAR IMPLANTS AND GRAFTS: Chronic | ICD-10-CM

## 2020-06-15 DIAGNOSIS — D64.9 ANEMIA, UNSPECIFIED: ICD-10-CM

## 2020-06-15 DIAGNOSIS — Z96.642 PRESENCE OF LEFT ARTIFICIAL HIP JOINT: Chronic | ICD-10-CM

## 2020-06-15 PROCEDURE — 29580 STRAPPING UNNA BOOT: CPT | Mod: RT

## 2020-06-16 DIAGNOSIS — Z71.89 OTHER SPECIFIED COUNSELING: ICD-10-CM

## 2020-06-17 ENCOUNTER — APPOINTMENT (OUTPATIENT)
Dept: HEMATOLOGY ONCOLOGY | Facility: CLINIC | Age: 65
End: 2020-06-17
Payer: MEDICARE

## 2020-06-17 PROCEDURE — 99202 OFFICE O/P NEW SF 15 MIN: CPT | Mod: 95

## 2020-06-18 ENCOUNTER — APPOINTMENT (OUTPATIENT)
Dept: GASTROENTEROLOGY | Facility: CLINIC | Age: 65
End: 2020-06-18
Payer: MEDICARE

## 2020-06-18 VITALS
RESPIRATION RATE: 14 BRPM | SYSTOLIC BLOOD PRESSURE: 138 MMHG | WEIGHT: 275 LBS | BODY MASS INDEX: 48.71 KG/M2 | OXYGEN SATURATION: 96 % | HEART RATE: 74 BPM | TEMPERATURE: 95.4 F | DIASTOLIC BLOOD PRESSURE: 86 MMHG

## 2020-06-18 VITALS
WEIGHT: 275 LBS | DIASTOLIC BLOOD PRESSURE: 86 MMHG | TEMPERATURE: 95.4 F | RESPIRATION RATE: 14 BRPM | SYSTOLIC BLOOD PRESSURE: 138 MMHG | BODY MASS INDEX: 48.73 KG/M2 | HEIGHT: 63 IN | HEART RATE: 74 BPM

## 2020-06-18 DIAGNOSIS — D50.8 OTHER IRON DEFICIENCY ANEMIAS: ICD-10-CM

## 2020-06-18 LAB
DEPRECATED KAPPA LC FREE/LAMBDA SER: 1.59 RATIO
FERRITIN SERPL-MCNC: 103 NG/ML
IGA SER QL IEP: 333 MG/DL
IGG SER QL IEP: 2320 MG/DL
IGM SER QL IEP: 263 MG/DL
IRON SATN MFR SERPL: 10 %
IRON SERPL-MCNC: 29 UG/DL
KAPPA LC CSF-MCNC: 6.25 MG/DL
KAPPA LC SERPL-MCNC: 9.95 MG/DL
RBC # BLD: 3.48 M/UL
RETICS # AUTO: 1.7 %
RETICS AGGREG/RBC NFR: 59.5 K/UL
TIBC SERPL-MCNC: 295 UG/DL
UIBC SERPL-MCNC: 267 UG/DL

## 2020-06-18 PROCEDURE — 99204 OFFICE O/P NEW MOD 45 MIN: CPT

## 2020-06-18 NOTE — REASON FOR VISIT
[Initial Evaluation] : an initial evaluation [FreeTextEntry1] : for new onset iron deficiency anemia

## 2020-06-18 NOTE — PHYSICAL EXAM
[General Appearance - Alert] : alert [General Appearance - Well Nourished] : well nourished [General Appearance - In No Acute Distress] : in no acute distress [General Appearance - Well Developed] : well developed [General Appearance - Well-Appearing] : healthy appearing [Sclera] : the sclera and conjunctiva were normal [Extraocular Movements] : extraocular movements were intact [PERRL With Normal Accommodation] : pupils were equal in size, round, and reactive to light [Outer Ear] : the ears and nose were normal in appearance [Examination Of The Oral Cavity] : the lips and gums were normal [Oropharynx] : the oropharynx was normal [Neck Appearance] : the appearance of the neck was normal [Neck Cervical Mass (___cm)] : no neck mass was observed [Thyroid Diffuse Enlargement] : the thyroid was not enlarged [Jugular Venous Distention Increased] : there was no jugular-venous distention [Thyroid Nodule] : there were no palpable thyroid nodules [Auscultation Breath Sounds / Voice Sounds] : lungs were clear to auscultation bilaterally [Heart Rate And Rhythm] : heart rate was normal and rhythm regular [Heart Sounds] : normal S1 and S2 [Heart Sounds Gallop] : no gallops [Murmurs] : no murmurs [Heart Sounds Pericardial Friction Rub] : no pericardial rub [Abdomen Tenderness] : non-tender [Bowel Sounds] : normal bowel sounds [Abdomen Soft] : soft [] : no hepato-splenomegaly [Abdomen Mass (___ Cm)] : no abdominal mass palpated [Patient Refused] : rectal exam was refused by the patient [No CVA Tenderness] : no ~M costovertebral angle tenderness [Skin Color & Pigmentation] : normal skin color and pigmentation [Skin Turgor] : normal skin turgor [Oriented To Time, Place, And Person] : oriented to person, place, and time [FreeTextEntry1] : patient with significant bilateral lower extremity swelling

## 2020-06-18 NOTE — ASSESSMENT
[FreeTextEntry1] : Serum iron studies are indeterminate for iron deficiency. The transferrin saturation is 10%, but the serum ferritin is 103.\par Colonoscopy to but this may be a multifactorial anemia With iron deficiency, and the anemia of chronic disease secondary to nonhealing ulcers and underlying ankylosing spondylitis.\par \par Dr. Villeda of GI is seeing this patient for GI and colonoscopy is planned.\par A trial of iron following completion of colonoscopy is reasonable.\par \par 5 minutes were spent reviewing history,discussing current clinical status, and planning future care.

## 2020-06-18 NOTE — HISTORY OF PRESENT ILLNESS
[de-identified] : This 64-year-old male was referred for anemia, with a hemoglobin of 8.5 g.\par MCV 77, white count and platelet count both normal.\par There is no history of bleeding.\par The serum iron is 29, TIBC, 295, serum ferritin 103.\par Immunoglobulin panel, reveals polyclonal hypergammaglobulinemia.\par \par This patient has an active ulcer in the right footand healed ulcer in the left toe, secondary to underlying diabetes mellitus.\par He also carries a diagnosis of ankylosing spondylitis.\par \par He has recently noted increasing fatigue.

## 2020-06-18 NOTE — HISTORY OF PRESENT ILLNESS
[None] : had no significant interval events [Heartburn] : denies heartburn [Nausea] : denies nausea [Vomiting] : denies vomiting [Diarrhea] : denies diarrhea [Yellow Skin Or Eyes (Jaundice)] : denies jaundice [Constipation] : denies constipation [Abdominal Pain] : denies abdominal pain [Abdominal Swelling] : denies abdominal swelling [Rectal Pain] : denies rectal pain [Wt Gain ___ Lbs] : no recent weight gain [Wt Loss ___ Lbs] : no recent weight loss [Hiatus Hernia] : no hiatus hernia [GERD] : no gastroesophageal reflux disease [Peptic Ulcer Disease] : no peptic ulcer disease [Pancreatitis] : no pancreatitis [Cholelithiasis] : no cholelithiasis [Kidney Stone] : no kidney stone [Inflammatory Bowel Disease] : no inflammatory bowel disease [Diverticulitis] : no diverticulitis [Irritable Bowel Syndrome] : no irritable bowel syndrome [Alcohol Abuse] : no alcohol abuse [Abdominal Surgery] : no abdominal surgery [Malignancy] : no malignancy [Appendectomy] : no appendectomy [Cholecystectomy] : no cholecystectomy [de-identified] : this is the patient's first visit here [de-identified] : Patient presents for initial evaluation for new-onset iron deficiency anemia with a hemoglobin of roughly 8.2 g noted on blood work done in June of this year. He has no prior history of anemia and denies gross hematochezia or melena or abdominal pain or unexplained weight loss or anorexia or nausea or vomiting or hematemesis or swallowing difficulties or reflux-related symptoms. He has had no prior upper endoscopies or colonoscopies and has required no iron or blood transfusions.

## 2020-06-18 NOTE — REVIEW OF SYSTEMS
[Fever] : no fever [Fatigue] : fatigue [Night Sweats] : no night sweats [Negative] : Gastrointestinal [SOB on Exertion] : shortness of breath during exertion [de-identified] : Lower extremity ulcers on the left toe, and right foot [FreeTextEntry9] : Ankylosing spondylitis

## 2020-06-18 NOTE — ASSESSMENT
[FreeTextEntry1] : Impression: New-onset iron deficiency anemia rule out colonic neoplasm. Patient has had no previous colonoscopies.\par \par Recommendations: Colonoscopy was advised for further evaluation of the above which will be done at [facility] because of his obesity and elevated BMI and multiple medical comorbidities. There is versus benefits of colonoscopy and intravenous sedation, and alternative testing such as virtual colonoscopy, were individually explained to the patient today who appeared to understand all of the above and was agreeable to proceeding with colonoscopy. Preprocedure cardiac clearance was requested as well as presurgical testing. His ASA classification is 3. He will be prepped with MiraLax and Dulcolax tablets with colonoscopy and pending cardiac clearance will be medically optimized for colonoscopy. He appeared to understand all of the above instructions, information, and management plan.

## 2020-06-24 ENCOUNTER — APPOINTMENT (OUTPATIENT)
Dept: VASCULAR SURGERY | Facility: CLINIC | Age: 65
End: 2020-06-24
Payer: MEDICARE

## 2020-06-24 ENCOUNTER — APPOINTMENT (OUTPATIENT)
Dept: CARDIOLOGY | Facility: CLINIC | Age: 65
End: 2020-06-24
Payer: MEDICARE

## 2020-06-24 VITALS
SYSTOLIC BLOOD PRESSURE: 121 MMHG | OXYGEN SATURATION: 96 % | TEMPERATURE: 98.1 F | DIASTOLIC BLOOD PRESSURE: 70 MMHG | HEART RATE: 82 BPM

## 2020-06-24 VITALS
WEIGHT: 274 LBS | OXYGEN SATURATION: 97 % | TEMPERATURE: 98.6 F | HEART RATE: 82 BPM | BODY MASS INDEX: 48.55 KG/M2 | SYSTOLIC BLOOD PRESSURE: 129 MMHG | HEIGHT: 63 IN | DIASTOLIC BLOOD PRESSURE: 69 MMHG

## 2020-06-24 PROCEDURE — 99214 OFFICE O/P EST MOD 30 MIN: CPT | Mod: 25

## 2020-06-24 PROCEDURE — 29580 STRAPPING UNNA BOOT: CPT | Mod: RT

## 2020-06-24 PROCEDURE — 93000 ELECTROCARDIOGRAM COMPLETE: CPT | Mod: NC

## 2020-06-26 DIAGNOSIS — Z71.89 OTHER SPECIFIED COUNSELING: ICD-10-CM

## 2020-07-01 ENCOUNTER — APPOINTMENT (OUTPATIENT)
Dept: VASCULAR SURGERY | Facility: CLINIC | Age: 65
End: 2020-07-01
Payer: MEDICARE

## 2020-07-01 VITALS
SYSTOLIC BLOOD PRESSURE: 98 MMHG | OXYGEN SATURATION: 97 % | HEART RATE: 83 BPM | BODY MASS INDEX: 50.85 KG/M2 | RESPIRATION RATE: 16 BRPM | DIASTOLIC BLOOD PRESSURE: 61 MMHG | HEIGHT: 63 IN | TEMPERATURE: 97.8 F | WEIGHT: 287 LBS

## 2020-07-01 PROCEDURE — 29580 STRAPPING UNNA BOOT: CPT | Mod: RT

## 2020-07-10 ENCOUNTER — NON-APPOINTMENT (OUTPATIENT)
Age: 65
End: 2020-07-10

## 2020-07-10 ENCOUNTER — OUTPATIENT (OUTPATIENT)
Dept: OUTPATIENT SERVICES | Facility: HOSPITAL | Age: 65
LOS: 1 days | End: 2020-07-10
Payer: MEDICARE

## 2020-07-10 ENCOUNTER — EMERGENCY (EMERGENCY)
Facility: HOSPITAL | Age: 65
LOS: 1 days | Discharge: HOME | End: 2020-07-10
Attending: EMERGENCY MEDICINE
Payer: MEDICARE

## 2020-07-10 VITALS
HEART RATE: 75 BPM | RESPIRATION RATE: 23 BRPM | DIASTOLIC BLOOD PRESSURE: 58 MMHG | TEMPERATURE: 98 F | SYSTOLIC BLOOD PRESSURE: 133 MMHG | OXYGEN SATURATION: 98 %

## 2020-07-10 VITALS
SYSTOLIC BLOOD PRESSURE: 142 MMHG | RESPIRATION RATE: 20 BRPM | HEIGHT: 68 IN | DIASTOLIC BLOOD PRESSURE: 70 MMHG | TEMPERATURE: 98 F | HEART RATE: 80 BPM | WEIGHT: 279.99 LBS

## 2020-07-10 DIAGNOSIS — Z95.828 PRESENCE OF OTHER VASCULAR IMPLANTS AND GRAFTS: Chronic | ICD-10-CM

## 2020-07-10 DIAGNOSIS — Z29.9 ENCOUNTER FOR PROPHYLACTIC MEASURES, UNSPECIFIED: ICD-10-CM

## 2020-07-10 DIAGNOSIS — D64.9 ANEMIA, UNSPECIFIED: ICD-10-CM

## 2020-07-10 DIAGNOSIS — I82.409 ACUTE EMBOLISM AND THROMBOSIS OF UNSPECIFIED DEEP VEINS OF UNSPECIFIED LOWER EXTREMITY: ICD-10-CM

## 2020-07-10 DIAGNOSIS — Z96.642 PRESENCE OF LEFT ARTIFICIAL HIP JOINT: Chronic | ICD-10-CM

## 2020-07-10 DIAGNOSIS — Z01.818 ENCOUNTER FOR OTHER PREPROCEDURAL EXAMINATION: ICD-10-CM

## 2020-07-10 DIAGNOSIS — H26.9 UNSPECIFIED CATARACT: Chronic | ICD-10-CM

## 2020-07-10 DIAGNOSIS — I10 ESSENTIAL (PRIMARY) HYPERTENSION: ICD-10-CM

## 2020-07-10 LAB
ALBUMIN SERPL ELPH-MCNC: 3.4 G/DL — SIGNIFICANT CHANGE UP (ref 3.3–5.2)
ALP SERPL-CCNC: 77 U/L — SIGNIFICANT CHANGE UP (ref 40–120)
ALT FLD-CCNC: 15 U/L — SIGNIFICANT CHANGE UP
ANION GAP SERPL CALC-SCNC: 12 MMOL/L — SIGNIFICANT CHANGE UP (ref 5–17)
APTT BLD: 34.5 SEC — SIGNIFICANT CHANGE UP (ref 27.5–35.5)
AST SERPL-CCNC: 15 U/L — SIGNIFICANT CHANGE UP
BASOPHILS # BLD AUTO: 0.06 K/UL — SIGNIFICANT CHANGE UP (ref 0–0.2)
BASOPHILS NFR BLD AUTO: 0.8 % — SIGNIFICANT CHANGE UP (ref 0–2)
BILIRUB SERPL-MCNC: 0.2 MG/DL — LOW (ref 0.4–2)
BLD GP AB SCN SERPL QL: SIGNIFICANT CHANGE UP
BUN SERPL-MCNC: 28 MG/DL — HIGH (ref 8–20)
CALCIUM SERPL-MCNC: 8.9 MG/DL — SIGNIFICANT CHANGE UP (ref 8.6–10.2)
CHLORIDE SERPL-SCNC: 99 MMOL/L — SIGNIFICANT CHANGE UP (ref 98–107)
CO2 SERPL-SCNC: 24 MMOL/L — SIGNIFICANT CHANGE UP (ref 22–29)
CREAT SERPL-MCNC: 0.85 MG/DL — SIGNIFICANT CHANGE UP (ref 0.5–1.3)
EOSINOPHIL # BLD AUTO: 0.24 K/UL — SIGNIFICANT CHANGE UP (ref 0–0.5)
EOSINOPHIL NFR BLD AUTO: 3.3 % — SIGNIFICANT CHANGE UP (ref 0–6)
FERRITIN SERPL-MCNC: 106 NG/ML — SIGNIFICANT CHANGE UP (ref 30–400)
GLUCOSE SERPL-MCNC: 181 MG/DL — HIGH (ref 70–99)
HCT VFR BLD CALC: 29.8 % — LOW (ref 39–50)
HGB BLD-MCNC: 9 G/DL — LOW (ref 13–17)
IMM GRANULOCYTES NFR BLD AUTO: 0.4 % — SIGNIFICANT CHANGE UP (ref 0–1.5)
INR BLD: 1.38 RATIO — HIGH (ref 0.88–1.16)
IRON SATN MFR SERPL: 21 UG/DL — LOW (ref 59–158)
LYMPHOCYTES # BLD AUTO: 1.45 K/UL — SIGNIFICANT CHANGE UP (ref 1–3.3)
LYMPHOCYTES # BLD AUTO: 20.2 % — SIGNIFICANT CHANGE UP (ref 13–44)
MCHC RBC-ENTMCNC: 25.1 PG — LOW (ref 27–34)
MCHC RBC-ENTMCNC: 30.2 GM/DL — LOW (ref 32–36)
MCV RBC AUTO: 83.2 FL — SIGNIFICANT CHANGE UP (ref 80–100)
MONOCYTES # BLD AUTO: 0.54 K/UL — SIGNIFICANT CHANGE UP (ref 0–0.9)
MONOCYTES NFR BLD AUTO: 7.5 % — SIGNIFICANT CHANGE UP (ref 2–14)
NEUTROPHILS # BLD AUTO: 4.85 K/UL — SIGNIFICANT CHANGE UP (ref 1.8–7.4)
NEUTROPHILS NFR BLD AUTO: 67.8 % — SIGNIFICANT CHANGE UP (ref 43–77)
OB PNL STL: NEGATIVE — SIGNIFICANT CHANGE UP
PLATELET # BLD AUTO: 287 K/UL — SIGNIFICANT CHANGE UP (ref 150–400)
POTASSIUM SERPL-MCNC: 3.5 MMOL/L — SIGNIFICANT CHANGE UP (ref 3.5–5.3)
POTASSIUM SERPL-SCNC: 3.5 MMOL/L — SIGNIFICANT CHANGE UP (ref 3.5–5.3)
PROT SERPL-MCNC: 8.5 G/DL — SIGNIFICANT CHANGE UP (ref 6.6–8.7)
PROTHROM AB SERPL-ACNC: 15.8 SEC — HIGH (ref 10.6–13.6)
RBC # BLD: 3.58 M/UL — LOW (ref 4.2–5.8)
RBC # FLD: 18.6 % — HIGH (ref 10.3–14.5)
SODIUM SERPL-SCNC: 135 MMOL/L — SIGNIFICANT CHANGE UP (ref 135–145)
WBC # BLD: 7.17 K/UL — SIGNIFICANT CHANGE UP (ref 3.8–10.5)
WBC # FLD AUTO: 7.17 K/UL — SIGNIFICANT CHANGE UP (ref 3.8–10.5)

## 2020-07-10 PROCEDURE — 85027 COMPLETE CBC AUTOMATED: CPT

## 2020-07-10 PROCEDURE — 71046 X-RAY EXAM CHEST 2 VIEWS: CPT | Mod: 26

## 2020-07-10 PROCEDURE — 85610 PROTHROMBIN TIME: CPT

## 2020-07-10 PROCEDURE — 83540 ASSAY OF IRON: CPT

## 2020-07-10 PROCEDURE — 80053 COMPREHEN METABOLIC PANEL: CPT

## 2020-07-10 PROCEDURE — 85730 THROMBOPLASTIN TIME PARTIAL: CPT

## 2020-07-10 PROCEDURE — 99284 EMERGENCY DEPT VISIT MOD MDM: CPT

## 2020-07-10 PROCEDURE — G0463: CPT

## 2020-07-10 PROCEDURE — 82272 OCCULT BLD FECES 1-3 TESTS: CPT

## 2020-07-10 PROCEDURE — 82728 ASSAY OF FERRITIN: CPT

## 2020-07-10 PROCEDURE — 86901 BLOOD TYPING SEROLOGIC RH(D): CPT

## 2020-07-10 PROCEDURE — 86850 RBC ANTIBODY SCREEN: CPT

## 2020-07-10 PROCEDURE — 99283 EMERGENCY DEPT VISIT LOW MDM: CPT

## 2020-07-10 PROCEDURE — 36415 COLL VENOUS BLD VENIPUNCTURE: CPT

## 2020-07-10 PROCEDURE — 86900 BLOOD TYPING SEROLOGIC ABO: CPT

## 2020-07-10 PROCEDURE — 93005 ELECTROCARDIOGRAM TRACING: CPT

## 2020-07-10 PROCEDURE — 71046 X-RAY EXAM CHEST 2 VIEWS: CPT

## 2020-07-10 PROCEDURE — 93010 ELECTROCARDIOGRAM REPORT: CPT

## 2020-07-10 RX ORDER — FERROUS SULFATE 325(65) MG
1 TABLET ORAL
Qty: 14 | Refills: 0
Start: 2020-07-10 | End: 2020-07-23

## 2020-07-10 NOTE — ED STATDOCS - MUSCULOSKELETAL, MLM
range of motion is not limited and there is no muscle tenderness. 3+ pitting edema bilaterally without calf tenderness

## 2020-07-10 NOTE — ED STATDOCS - SKIN, MLM
3+ pitting edema bilaterally with circumferential erythema and warmth circumferential erythema and warmth of vicki LEs

## 2020-07-10 NOTE — ED STATDOCS - NS_ ATTENDINGSCRIBEDETAILS _ED_A_ED_FT
I, Billy Maya, performed the initial face to face bedside interview with this patient regarding history of present illness, review of symptoms and relevant past medical, social and family history.  I completed an independent physical examination.  I was the provider who initially evaluated this patient.  The history, relevant review of systems, past medical and surgical history, medical decision making, and physical examination was documented by the scribe in my presence and I attest to the accuracy of the documentation. Follow-up on ordered tests (ie labs, radiologic studies) and re-evaluation of the patient's status has been communicated to the ACP.  Disposition of the patient will be based on test outcome and response to ED interventions.

## 2020-07-10 NOTE — H&P PST ADULT - EXTREMITIES COMMENTS
mild swelling vicki legs . red and warm to touch bilateral   right foot lower leg wrapped with dressing , tender to touch not removed at pt request vascular doc changes every monday toes mobile Positive pedal pulse  left lower leg foot uni boot in place non tender

## 2020-07-10 NOTE — ED STATDOCS - PMH
Ankylosing spondylitis of site in spine    Cellulitis, leg  bilateral  Chronic pain disorder    Chronic venous stasis dermatitis    Deep vein thrombosis (DVT)  25 years ago pt had THR was on coumadin for 6 months  Diabetes    Diabetic neuropathy    External iliac artery occlusion    Former smoker    Ischemic ulcer diabetic foot  left toe  Lupus    Obesity    Peripheral vascular disease Dr Martin Webb [phone]

## 2020-07-10 NOTE — ED STATDOCS - CARE PLAN
Principal Discharge DX:	Low hemoglobin Principal Discharge DX:	Low hemoglobin  Secondary Diagnosis:	Chronic edema

## 2020-07-10 NOTE — H&P PST ADULT - HISTORY OF PRESENT ILLNESS
64 yr old male presents with c/o anemia. Pt has had issues with cellulitis and ulcer s to vicki feet . Weekly wound care and uni boot done with vascular doctor . Pt went to PMD  for leg pain and on exam with bloodwork anemia was noted . Hgb 8. today hgb 9.0.  Pt saw DR. Dickey for hematology . NO tranfusions , denies any abdomjnal pain denies rectal bleeding . Scheduled for colonoscopy . 64 yr old male presents with c/o anemia. Pt has had issues with cellulitis and ulcer s to vicki feet . Weekly wound care and uni boot done with vascular doctor . Pt went to PMD  for leg pain and on exam with bloodwork anemia was noted . Hgb 8. today hgb 9.0.  Pt saw DR. Dickey for hematology . NO tranfusions , denies any abdominal pain denies rectal bleeding . Scheduled for colonoscopy .

## 2020-07-10 NOTE — H&P PST ADULT - NSICDXPASTMEDICALHX_GEN_ALL_CORE_FT
PAST MEDICAL HISTORY:  Anemia iron def treats zaira Dickey    Ankylosing spondylitis of site in spine     Cellulitis, leg bilateral    Chronic pain disorder     Chronic venous stasis dermatitis     Deep vein thrombosis (DVT) 25 years ago pt had THR was on coumadin for 6 months    Diabetes     Diabetic neuropathy     Diabetic ulcer of right foot     External iliac artery occlusion     Former smoker     Ischemic ulcer diabetic foot left toe    Lupus     Obesity     Peripheral vascular disease PAST MEDICAL HISTORY:  Anemia iron def treats with DR. Dickey    Ankylosing spondylitis of site in spine     Cellulitis, leg bilateral    Chronic pain disorder     Chronic venous stasis dermatitis     Deep vein thrombosis (DVT) 25 years ago  pt had THR was on coumadin for 6 months  also noted in Nov 2019 on sono    Diabetes     Diabetic neuropathy     Diabetic ulcer of right foot treats weekly with vascular doc dressing changed and uni boot    External iliac artery occlusion     Former smoker     Ischemic ulcer diabetic foot left toe    Lupus     Obesity     Peripheral vascular disease PAST MEDICAL HISTORY:  Anemia iron def treats with DR. Dickey    Ankylosing spondylitis of site in spine     Back pain pain management in past    Cellulitis, leg bilateral    Chronic pain disorder     Chronic venous stasis dermatitis     Deep vein thrombosis (DVT) 25 years ago  pt had THR was on coumadin for 6 months  also noted in Nov 2019 on sono    Diabetes     Diabetic neuropathy     Diabetic ulcer of right foot treats weekly with vascular doc dressing changed and uni boot    External iliac artery occlusion     Former smoker     Ischemic ulcer diabetic foot left toe    Lupus     Obesity     Peripheral vascular disease

## 2020-07-10 NOTE — H&P PST ADULT - NSICDXPROBLEM_GEN_ALL_CORE_FT
PROBLEM DIAGNOSES  Problem: Anemia  Assessment and Plan: colonoscopy with  DR. Medina     Problem: Deep vein thrombosis (DVT)  Assessment and Plan: cardiac clearance   on eliquis    Problem: HTN (hypertension)  Assessment and Plan: medical clearance DR. Medina     Problem: Need for prophylactic measure  Assessment and Plan: cxaprini scor e8

## 2020-07-10 NOTE — ED STATDOCS - OBJECTIVE STATEMENT
65 y/o M pt with significant PMHx of DM, PVD, lupus, DVT and neuropathy presents to the ED c/o abnormal lab results 65 y/o M pt with significant PMHx of DM, PVD, lupus, DVT and neuropathy presents to the ED c/o abnormally low Hemoglobin (Dr. Coates) Pt states he went to PCP for issues with his leg when he was found to have low hemoglobin. Pt states he always feels tired and dizzy.   Denies palpitations, chest pain, melena, prior transfusions. Pt was at pre op testing for a colonoscopy for 7/17 when his PCP called and instructed him to go the ED 63 y/o M pt with significant PMHx of DM, PVD, lupus, DVT and neuropathy presents to the ED c/o low Hemoglobin (Dr. Walker) Pt states he went to PCP for issues with his leg when he was found to have low hemoglobin.  patient was scheduled for colonoscopy on 7/17 and went for PST today.  Sent by PST for low hgb.  Reports chronic fatigue and dizzy.   reports occasional exertional dyspnea. Denies palpitations, chest pain, melena, prior transfusions.

## 2020-07-10 NOTE — H&P PST ADULT - NSICDXPASTSURGICALHX_GEN_ALL_CORE_FT
PAST SURGICAL HISTORY:  H/O aorto-femoral bypass     History of hip replacement, total, left x2 surgeries PAST SURGICAL HISTORY:  Cataract     H/O aorto-femoral bypass     History of hip replacement, total, left x2 surgeries

## 2020-07-10 NOTE — H&P PST ADULT - ASSESSMENT
64 yr old male presents in wheelchair , c/o right foot lower leg pain , ulcer to right foot wrapped with dsd. Left foot with uniboot . Pt was in ER prior to PST as per DR. Walker due to anemia. Hgb up to 9 from mid 8 and colonoscopy is scheduled for 2020. No active bleeding or abdominal pain as per pt . Denies any history of colon cancer or ever having colonoscopy . Hx DVT last 2019 , on eliquis getting cardiology clearance .   OPIOID RISK TOOL    AMANDA EACH BOX THAT APPLIES AND ADD TOTALS AT THE END    FAMILY HISTORY OF SUBSTANCE ABUSE                 FEMALE         MALE                                                Alcohol                             [  ]1 pt          [  ]3pts                                               Illegal Durgs                     [  ]2 pts        [  ]3pts                                               Rx Drugs                           [  ]4 pts        [  ]4 pts    PERSONAL HISTORY OF SUBSTANCE ABUSE                                                                                          Alcohol                             [  ]3 pts       [  ]3 pts                                               Illegal Durgs                     [  ]4 pts        [  ]4 pts                                               Rx Drugs                           [  ]5 pts        [  ]5 pts    AGE BETWEEN 16-45 YEARS                                      [  ]1 pt         [  ]1 pt    HISTORY OF PREADOLESCENT   SEXUAL ABUSE                                                             [  ]3 pts        [  ]0pts    PSYCHOLOGICAL DISEASE                     ADD, OCD, Bipolar, Schizophrenia        [  ]2 pts         [  ]2 pts                      Depression                                               [X  ]1 pt           [  ]1 pt           SCORING TOTAL   (add numbers and type here)              (*1**)                                     A score of 3 or lower indicated LOW risk for future opiod abuse  A score of 4 to 7 indicated moderate risk for future opiod abuse  A score of 8 or higher indicates a high risk for opiod abuse  CAPRINI VTE 2.0 SCORE [CLOT updated 2019]    AGE RELATED RISK FACTORS                                                       MOBILITY RELATED FACTORS  [ ] Age 41-60 years                                            (1 Point)                    [ ] Bed rest                                                        (1 Point)  [X] Age: 61-74 years                                           (2 Points)                  [ ] Plaster cast                                                   (2 Points)  [ ] Age= 75 years                                              (3 Points)                    [ ] Bed bound for more than 72 hours                 (2 Points)    DISEASE RELATED RISK FACTORS                                               GENDER SPECIFIC FACTORS  [X ] Edema in the lower extremities                       (1 Point)              [ ] Pregnancy                                                     (1 Point)  [ ] Varicose veins                                               (1 Point)                     [ ] Post-partum < 6 weeks                                   (1 Point)             [X ] BMI > 25 Kg/m2                                            (1 Point)                     [ ] Hormonal therapy  or oral contraception          (1 Point)                 [ ] Sepsis (in the previous month)                        (1 Point)               [ ] History of pregnancy complications                 (1 point)  [ ] Pneumonia or serious lung disease                                               [ ] Unexplained or recurrent                     (1 Point)           (in the previous month)                               (1 Point)  [ ] Abnormal pulmonary function test                     (1 Point)                 SURGERY RELATED RISK FACTORS  [ ] Acute myocardial infarction                              (1 Point)               [ ]  Section                                             (1 Point)  [ ] Congestive heart failure (in the previous month)  (1 Point)      [X ] Minor surgery                                                  (1 Point)   [ ] Inflammatory bowel disease                             (1 Point)               [ ] Arthroscopic surgery                                        (2 Points)  [ ] Central venous access                                      (2 Points)                [ ] General surgery lasting more than 45 minutes (2 points)  [ ] Malignancy- Present or previous                   (2 Points)                [ ] Elective arthroplasty                                         (5 points)    [ ] Stroke (in the previous month)                          (5 Points)                                                                                                                                                           HEMATOLOGY RELATED FACTORS                                                 TRAUMA RELATED RISK FACTORS  [ X] Prior episodes of VTE                                     (3 Points)                [ ] Fracture of the hip, pelvis, or leg                       (5 Points)  [ ] Positive family history for VTE                         (3 Points)             [ ] Acute spinal cord injury (in the previous month)  (5 Points)  [ ] Prothrombin 40022 A                                     (3 Points)               [ ] Paralysis  (less than 1 month)                             (5 Points)  [ ] Factor V Leiden                                             (3 Points)                  [ ] Multiple Trauma within 1 month                        (5 Points)  [ ] Lupus anticoagulants                                     (3 Points)                                                           [ ] Anticardiolipin antibodies                               (3 Points)                                                       [ ] High homocysteine in the blood                      (3 Points)                                             [ ] Other congenital or acquired thrombophilia      (3 Points)                                                [ ] Heparin induced thrombocytopenia                  (3 Points)                                     Total Score [     8     ]

## 2020-07-10 NOTE — H&P PST ADULT - NSICDXFAMILYHX_GEN_ALL_CORE_FT
FAMILY HISTORY:  Family history of diabetes mellitus (DM)    Sibling  Still living? Unknown  Family history of diabetes mellitus (DM), Age at diagnosis: Age Unknown

## 2020-07-10 NOTE — H&P PST ADULT - MUSCULOSKELETAL COMMENTS
back pain  and  right knee back pain  and  right knee pain c/o knee pain bilaterally back pain  and  right knee pain, pain management

## 2020-07-10 NOTE — ED STATDOCS - CLINICAL SUMMARY MEDICAL DECISION MAKING FREE TEXT BOX
Check labs, Obs for transfusion 1) fatigue/ anemia:  Check labs, +/- Observation for transfusion  2)  chronic LE edema with circumferential erthema vicki without weeping/ crusting or fever:  likely chronic stasis.  Labs, +/- abx

## 2020-07-10 NOTE — ED STATDOCS - PATIENT PORTAL LINK FT
You can access the FollowMyHealth Patient Portal offered by [facility] by registering at the following website: [email or web address]. By joining [employer]’s FollowMyHealth portal, you will also be able to view your health information using other applications (apps) compatible with our system.

## 2020-07-10 NOTE — ED STATDOCS - PROGRESS NOTE DETAILS
SENIA Munoz: Patient evaluated by intake physician. HPI/ROS/PE as noted above. Will follow up plan per intake physician and continue to assess patient. Hemoglobin as outpatient 8.2.   PCP: SENIA Munoz: Hemoglobin stable, no indication for transfusion. Iron noted to be 21, supplements sent to pharmacy.

## 2020-07-13 DIAGNOSIS — Z01.818 ENCOUNTER FOR OTHER PREPROCEDURAL EXAMINATION: ICD-10-CM

## 2020-07-14 ENCOUNTER — APPOINTMENT (OUTPATIENT)
Dept: DISASTER EMERGENCY | Facility: CLINIC | Age: 65
End: 2020-07-14

## 2020-07-14 LAB — SARS-COV-2 N GENE NPH QL NAA+PROBE: NOT DETECTED

## 2020-07-17 ENCOUNTER — APPOINTMENT (OUTPATIENT)
Dept: GASTROENTEROLOGY | Facility: HOSPITAL | Age: 65
End: 2020-07-17

## 2020-07-17 ENCOUNTER — OUTPATIENT (OUTPATIENT)
Dept: OUTPATIENT SERVICES | Facility: HOSPITAL | Age: 65
LOS: 1 days | End: 2020-07-17
Payer: MEDICARE

## 2020-07-17 DIAGNOSIS — D50.9 IRON DEFICIENCY ANEMIA, UNSPECIFIED: ICD-10-CM

## 2020-07-17 DIAGNOSIS — H26.9 UNSPECIFIED CATARACT: Chronic | ICD-10-CM

## 2020-07-17 DIAGNOSIS — Z95.828 PRESENCE OF OTHER VASCULAR IMPLANTS AND GRAFTS: Chronic | ICD-10-CM

## 2020-07-17 DIAGNOSIS — Z96.642 PRESENCE OF LEFT ARTIFICIAL HIP JOINT: Chronic | ICD-10-CM

## 2020-07-17 LAB — GLUCOSE BLDC GLUCOMTR-MCNC: 92 MG/DL — SIGNIFICANT CHANGE UP (ref 70–99)

## 2020-07-17 PROCEDURE — 45378 DIAGNOSTIC COLONOSCOPY: CPT

## 2020-07-17 PROCEDURE — 82962 GLUCOSE BLOOD TEST: CPT

## 2020-07-17 PROCEDURE — 45378 DIAGNOSTIC COLONOSCOPY: CPT | Mod: 53

## 2020-07-17 NOTE — REASON FOR VISIT
[Procedure: _________] : a [unfilled] procedure visit [FreeTextEntry1] : Pt. is here for colonoscopy for evaluation of new onset PRATIBHA [Colonoscopy] : a colonoscopy [FreeTextEntry2] : Pt. is here for colonoscopy for new onset PRATIBHA.

## 2020-07-17 NOTE — PROCEDURE
[Procedure Explained] : The procedure was explained [Iron Deficiency Anemia] : iron deficiency anemia [Allergies Reviewed] : allergies reviewed. [Benefits] : benefits [Risks] : Risks [Alternatives] : alternatives [Infection] : risk of infection [Bleeding] : bleeding risk [Consent Obtained] : written consent was obtained prior to the procedure and is detailed in the patient's record [Patient] : the patient [Approved Diet Followed] : the patient avoided solid foods and adhered to the approved diet list for 24 hours prior to the procedure [Bowel Prep Kit] : the patient took the appropriate bowel preparation kit as directed [Cardiac Monitor] : cardiac monitor [Pulse Oximeter] : pulse oximeter [Automated Blood Pressure Cuff] : automated blood pressure cuff [3] : 3 [Sedation Clearance] : the patient was cleared for moderate sedation [Performed By: ___] : Performed by:  AZEB [Left Lateral Decubitus] : The patient was positioned in the left lateral decubitus position [Propofol ___ mg IV] : Propofol [unfilled] ~Umg intravenously [Time started: ___] : Start Time:  [unfilled] [Prep Qualtiy: ___] : Prep Quality:  [unfilled] [Time Completed: ___] : Completion Time:  [unfilled] [Withdrawal Time: ___] : Withdrawal Time:  [unfilled] [Moderately Enlarged Prostate] : a moderately enlarged prostate [Ascending Colon] : and guided to the ascending colon [Single Pass Needed] : after a single pass [Insufflated] : insufflated [Retroflex View] : a retroflex view of the rectum was performed [Normal] : Normal [Tolerated Well] : the patient tolerated the procedure well [Vital Signs Stable] : the vital signs were stable [No Complications] : There were no complications [Abnormal Rectum] : a normal rectum [Patient Rotated Into Alternating Positions] : the patient was not rotated [External Hemorrhoids] : no external hemorrhoids [de-identified] : poor prep. Unable to reach cecum [de-identified] : Not seen because of poor prep [de-identified] : Poor prep. Grossly normal [de-identified] : Poor prep. Grossly normal. [de-identified] : Poor prep. Grossly normal [de-identified] : Poor prep. Grossly normal [de-identified] : Poor prep. Grossly normal. [de-identified] : Poor prep. Suboptimal exam. Unable to clear colon or reach cecum. Because of poor prep. Needs to be re-scheduled with Trilyte prep.

## 2020-07-17 NOTE — PHYSICAL EXAM
[General Appearance - In No Acute Distress] : in no acute distress [General Appearance - Well Nourished] : well nourished [General Appearance - Alert] : alert [General Appearance - Well Developed] : well developed [General Appearance - Well-Appearing] : healthy appearing [Extraocular Movements] : extraocular movements were intact [Sclera] : the sclera and conjunctiva were normal [PERRL With Normal Accommodation] : pupils were equal in size, round, and reactive to light [Examination Of The Oral Cavity] : the lips and gums were normal [Outer Ear] : the ears and nose were normal in appearance [Oropharynx] : the oropharynx was normal [Neck Appearance] : the appearance of the neck was normal [Jugular Venous Distention Increased] : there was no jugular-venous distention [Thyroid Diffuse Enlargement] : the thyroid was not enlarged [Thyroid Nodule] : there were no palpable thyroid nodules [Neck Cervical Mass (___cm)] : no neck mass was observed [Auscultation Breath Sounds / Voice Sounds] : lungs were clear to auscultation bilaterally [Murmurs] : no murmurs [Heart Rate And Rhythm] : heart rate was normal and rhythm regular [Heart Sounds Gallop] : no gallops [Heart Sounds] : normal S1 and S2 [Heart Sounds Pericardial Friction Rub] : no pericardial rub [Bowel Sounds] : normal bowel sounds [] : no hepato-splenomegaly [Abdomen Soft] : soft [Abdomen Tenderness] : non-tender [Patient Refused] : rectal exam was refused by the patient [Abdomen Mass (___ Cm)] : no abdominal mass palpated [Skin Color & Pigmentation] : normal skin color and pigmentation [No CVA Tenderness] : no ~M costovertebral angle tenderness [Skin Turgor] : normal skin turgor [Oriented To Time, Place, And Person] : oriented to person, place, and time [FreeTextEntry1] : patient with significant bilateral lower extremity swelling

## 2020-07-17 NOTE — PROCEDURE
[Procedure Explained] : The procedure was explained [Iron Deficiency Anemia] : iron deficiency anemia [Allergies Reviewed] : allergies reviewed. [Benefits] : benefits [Risks] : Risks [Alternatives] : alternatives [Bleeding] : bleeding risk [Infection] : risk of infection [Patient] : the patient [Consent Obtained] : written consent was obtained prior to the procedure and is detailed in the patient's record [Approved Diet Followed] : the patient avoided solid foods and adhered to the approved diet list for 24 hours prior to the procedure [Bowel Prep Kit] : the patient took the appropriate bowel preparation kit as directed [Pulse Oximeter] : pulse oximeter [Cardiac Monitor] : cardiac monitor [Automated Blood Pressure Cuff] : automated blood pressure cuff [Sedation Clearance] : the patient was cleared for moderate sedation [3] : 3 [Performed By: ___] : Performed by:  AZEB [Left Lateral Decubitus] : The patient was positioned in the left lateral decubitus position [Propofol ___ mg IV] : Propofol [unfilled] ~Umg intravenously [Time started: ___] : Start Time:  [unfilled] [Prep Qualtiy: ___] : Prep Quality:  [unfilled] [Withdrawal Time: ___] : Withdrawal Time:  [unfilled] [Time Completed: ___] : Completion Time:  [unfilled] [Moderately Enlarged Prostate] : a moderately enlarged prostate [Ascending Colon] : and guided to the ascending colon [Single Pass Needed] : after a single pass [Insufflated] : insufflated [Retroflex View] : a retroflex view of the rectum was performed [Normal] : Normal [Vital Signs Stable] : the vital signs were stable [Tolerated Well] : the patient tolerated the procedure well [No Complications] : There were no complications [External Hemorrhoids] : no external hemorrhoids [Patient Rotated Into Alternating Positions] : the patient was not rotated [Abnormal Rectum] : a normal rectum [de-identified] : poor prep. Unable to reach cecum [de-identified] : Not seen because of poor prep [de-identified] : Poor prep. Grossly normal [de-identified] : Poor prep. Grossly normal. [de-identified] : Poor prep. Grossly normal. [de-identified] : Poor prep. Grossly normal [de-identified] : Poor prep. Grossly normal [de-identified] : Poor prep. Suboptimal exam. Unable to clear colon or reach cecum. Because of poor prep. Needs to be re-scheduled with Trilyte prep.

## 2020-07-17 NOTE — PHYSICAL EXAM
[General Appearance - Well Nourished] : well nourished [General Appearance - Alert] : alert [General Appearance - In No Acute Distress] : in no acute distress [General Appearance - Well-Appearing] : healthy appearing [General Appearance - Well Developed] : well developed [PERRL With Normal Accommodation] : pupils were equal in size, round, and reactive to light [Extraocular Movements] : extraocular movements were intact [Sclera] : the sclera and conjunctiva were normal [Oropharynx] : the oropharynx was normal [Examination Of The Oral Cavity] : the lips and gums were normal [Outer Ear] : the ears and nose were normal in appearance [Neck Appearance] : the appearance of the neck was normal [Thyroid Diffuse Enlargement] : the thyroid was not enlarged [Neck Cervical Mass (___cm)] : no neck mass was observed [Thyroid Nodule] : there were no palpable thyroid nodules [Jugular Venous Distention Increased] : there was no jugular-venous distention [Auscultation Breath Sounds / Voice Sounds] : lungs were clear to auscultation bilaterally [Heart Rate And Rhythm] : heart rate was normal and rhythm regular [Heart Sounds] : normal S1 and S2 [Heart Sounds Gallop] : no gallops [Murmurs] : no murmurs [Heart Sounds Pericardial Friction Rub] : no pericardial rub [Bowel Sounds] : normal bowel sounds [Abdomen Tenderness] : non-tender [Abdomen Soft] : soft [] : no hepato-splenomegaly [Patient Refused] : rectal exam was refused by the patient [Abdomen Mass (___ Cm)] : no abdominal mass palpated [No CVA Tenderness] : no ~M costovertebral angle tenderness [Skin Color & Pigmentation] : normal skin color and pigmentation [Oriented To Time, Place, And Person] : oriented to person, place, and time [Skin Turgor] : normal skin turgor [FreeTextEntry1] : patient with significant bilateral lower extremity swelling

## 2020-07-20 ENCOUNTER — TRANSCRIPTION ENCOUNTER (OUTPATIENT)
Age: 65
End: 2020-07-20

## 2020-07-20 ENCOUNTER — APPOINTMENT (OUTPATIENT)
Dept: VASCULAR SURGERY | Facility: CLINIC | Age: 65
End: 2020-07-20
Payer: MEDICARE

## 2020-07-20 VITALS
HEART RATE: 83 BPM | HEIGHT: 63 IN | RESPIRATION RATE: 16 BRPM | TEMPERATURE: 97.9 F | BODY MASS INDEX: 50.68 KG/M2 | DIASTOLIC BLOOD PRESSURE: 73 MMHG | OXYGEN SATURATION: 96 % | SYSTOLIC BLOOD PRESSURE: 120 MMHG | WEIGHT: 286 LBS

## 2020-07-20 PROCEDURE — 29580 STRAPPING UNNA BOOT: CPT | Mod: 50

## 2020-07-27 ENCOUNTER — APPOINTMENT (OUTPATIENT)
Dept: ORTHOPEDIC SURGERY | Facility: CLINIC | Age: 65
End: 2020-07-27
Payer: MEDICARE

## 2020-07-27 VITALS — BODY MASS INDEX: 48.65 KG/M2 | WEIGHT: 285 LBS | HEIGHT: 64 IN

## 2020-07-27 VITALS — TEMPERATURE: 99.1 F

## 2020-07-27 DIAGNOSIS — M16.11 UNILATERAL PRIMARY OSTEOARTHRITIS, RIGHT HIP: ICD-10-CM

## 2020-07-27 PROBLEM — E11.621 TYPE 2 DIABETES MELLITUS WITH FOOT ULCER: Chronic | Status: ACTIVE | Noted: 2020-07-10

## 2020-07-27 PROBLEM — D64.9 ANEMIA, UNSPECIFIED: Chronic | Status: ACTIVE | Noted: 2020-07-10

## 2020-07-27 PROBLEM — M54.9 DORSALGIA, UNSPECIFIED: Chronic | Status: ACTIVE | Noted: 2020-07-10

## 2020-07-27 PROBLEM — I82.409 ACUTE EMBOLISM AND THROMBOSIS OF UNSPECIFIED DEEP VEINS OF UNSPECIFIED LOWER EXTREMITY: Chronic | Status: ACTIVE | Noted: 2019-09-30

## 2020-07-27 PROCEDURE — 99214 OFFICE O/P EST MOD 30 MIN: CPT

## 2020-07-27 NOTE — DISCUSSION/SUMMARY
[Surgical risks reviewed] : Surgical risks reviewed [de-identified] : 63 y/o M with bone on bone osteoarthritis with varus deformity of the right knee and moderate osteoarthritis of the right hip. Conservative therapy and surgical options discussed in detail with patient. We don't believe he is a candidate of a right TKR. He has a high risk for infection because he has open wounds, on his right leg as well as being being overweight, having DM, and having lymphedema. F/u with us in three months.\par \par The patient has been counseled regarding the elevated risks associated with surgical complications in patients with a BMI> 35. The patient demonstrates an understanding of the increased risk. After a lengthy discussion, the patient agreed to make a coordinated effort at weight loss prior to surgical intervention. The patient understands our BMI policy and will make a conscious effort to reduce their BMI.\par \par He is not a candidate for surgery at this time\par \par The patient is a 64 year individual with end stage arthritis of their right knee joint. . A long discussion took place with the patient describing what a total joint replacement is and what the procedure would entail. A total knee arthroplasty model, similar to the implant that was used during the operation, was utilized to demonstrate and to discuss the various bearing surfaces of the implants. The hospitalization and post-operative care and rehabilitation were also discussed. The use of perioperative antibiotics and DVT prophylaxis were discussed. The risk, benefits and alternatives to a surgical intervention were discussed at length with the patient. The patient was also advised of risks related to the medical comorbidities, elevated body mass index (BMI), and smoking where applicable. We discussed how to reduce modifiable risk factors and encouraged smoking cessation were applicable.. A lengthy discussion took place to review the most common complications including but not limited to: deep vein thrombosis, pulmonary embolus, heart attack, stroke, infection, wound breakdown, numbness, damage to nerves, tendon, muscles, arteries or other blood vessels, death and other possible complications from anesthesia. The patient was told that we will take steps to minimize these risks by using sterile technique, antibiotics and DVT prophylaxis when appropriate and follow the patient postoperatively in the office setting to monitor progress. The possibility of recurrent pain, no improvement in pain and actual worsening of pain were also discussed with the patient.\par The discharge plan of care focused on the patient going home following surgery. The patient was encouraged to make the necessary arrangements to have someone stay with them when they are discharged home. Following discharge, a home care nurse was to the patient. The home care nurse would open the patient’s home care case and request home physical therapy services. Home physical therapy was to commence following discharge provided it was appropriate and covered by the health insurance benefit plan. \par The benefits of surgery were discussed with the patient including the potential for improving his current clinical condition through operative intervention. Alternatives to surgical intervention including continued conservative management were also discussed in detail. All questions were answered to the satisfaction of the patient. The treatment plan of care, as well as a model of a total knee arthroplasty equivalent to the one that will be used for their total joint replacement, was shared with the patient. The patient agreed to the plan of care as well as the use of implants in their total joint replacement.

## 2020-07-27 NOTE — HISTORY OF PRESENT ILLNESS
[de-identified] : 63 y/o M presents with right knee pain. Patient states pain started after fall. He locates pain as diffuse throughout knee, hip, and groin. Patient uses cane. Patient had bilateral vascular surgery to groin in October 2019. Patient sees pain management for ankylosing spondylitis. Patient states WB increases pain. He has DM and is taking metformin. He does not know his A1c level. He denies taking blood thinners. He has lymphedema as well as ulcers on his legs. He had a left THR.\par  [Worsening] : worsening [___ yrs] : [unfilled] year(s) ago [5] : a minimum pain level of 5/10 [7] : an average pain level of 7/10 [Knee Flexion] : worsened with knee flexion [8] : a maximum pain level of 8/10 [Ataxia] : no ataxia [Knee Extension] : worsened with knee extension [Incontinence] : no incontinence [Loss of Dexterity] : good dexterity [Urinary Ret.] : no urinary retention

## 2020-07-27 NOTE — PHYSICAL EXAM
[Cane] : ambulates with cane [Normal] : Gait: normal [Antalgic] : antalgic [UE/LE] : Sensory: Intact in bilateral upper & lower extremities [ALL] : dorsalis pedis, posterior tibial, femoral, popliteal, and radial 2+ and symmetric bilaterally [de-identified] : Right knee exam shows a 15  deg flexure contracture\par Right hip exam shows pain with ROM\par He has significant lower extremity lymphedema of both legs.  He has chronic venous stasis on the right side.  He has a large superficial but open ulceration on the posterior aspect of his calf. [de-identified] : 3V xray of the right knee done on 11/7/2019 and reviewed by Dr. Antony Lay demonstrates bone on bone osteoarthritis with varus deformity\par \par 1V xray of the right hip done in the office today and reviewed by Dr. Antony Lay demonstrates moderate osteoarthritis  [de-identified] : GENERAL APPEARANCE: Well nourished and hydrated, pleasant, alert, and oriented x 3. Appears their stated age. \par HEENT: Normocephalic, extraocular eye motion intact. Nasal septum midline. Oral cavity clear. External auditory canal clear. \par RESPIRATORY: Breath sounds clear and audible in all lobes. No wheezing, No accessory muscle use.\par CARDIOVASCULAR: No apparent abnormalities. No lower leg edema. No varicosities. Pedal pulses are palpable.\par NEUROLOGIC: Sensation is normal, no muscle weakness in the upper or lower extremities.\par DERMATOLOGIC: No apparent skin lesions, moist, warm, no rash.\par SPINE: Cervical spine appears normal and moves freely; thoracic spine appears normal and moves freely; lumbosacral spine appears normal and moves freely, normal, nontender.\par MUSCULOSKELETAL: Hands, wrists, and elbows are normal and move freely, shoulders are normal and move freely. \par Musculoskeletal: Gait: normal.

## 2020-07-27 NOTE — END OF VISIT
[FreeTextEntry3] : I, Gonzalo Jason, acted solely as a scribe for Dr. Antony Lay on this date 07/27/2020.

## 2020-07-28 ENCOUNTER — APPOINTMENT (OUTPATIENT)
Dept: VASCULAR SURGERY | Facility: CLINIC | Age: 65
End: 2020-07-28
Payer: MEDICARE

## 2020-07-28 VITALS
HEIGHT: 68 IN | HEART RATE: 71 BPM | OXYGEN SATURATION: 99 % | TEMPERATURE: 97.3 F | DIASTOLIC BLOOD PRESSURE: 64 MMHG | SYSTOLIC BLOOD PRESSURE: 104 MMHG

## 2020-07-28 PROCEDURE — 29580 STRAPPING UNNA BOOT: CPT | Mod: RT

## 2020-07-28 RX ORDER — PEN NEEDLE, DIABETIC 29 G X1/2"
32G X 4 MM NEEDLE, DISPOSABLE MISCELLANEOUS
Qty: 100 | Refills: 0 | Status: ACTIVE | COMMUNITY
Start: 2020-03-19

## 2020-08-05 ENCOUNTER — APPOINTMENT (OUTPATIENT)
Dept: VASCULAR SURGERY | Facility: CLINIC | Age: 65
End: 2020-08-05
Payer: MEDICARE

## 2020-08-05 VITALS
DIASTOLIC BLOOD PRESSURE: 69 MMHG | HEIGHT: 68 IN | BODY MASS INDEX: 43.8 KG/M2 | SYSTOLIC BLOOD PRESSURE: 118 MMHG | OXYGEN SATURATION: 98 % | WEIGHT: 289 LBS | HEART RATE: 82 BPM | RESPIRATION RATE: 16 BRPM | TEMPERATURE: 98.4 F

## 2020-08-05 DIAGNOSIS — L97.529 NON-PRESSURE CHRONIC ULCER OF OTHER PART OF LEFT FOOT WITH UNSPECIFIED SEVERITY: ICD-10-CM

## 2020-08-05 PROCEDURE — 99213 OFFICE O/P EST LOW 20 MIN: CPT

## 2020-08-30 ENCOUNTER — EMERGENCY (EMERGENCY)
Facility: HOSPITAL | Age: 65
LOS: 1 days | Discharge: HOME | End: 2020-08-30
Attending: EMERGENCY MEDICINE
Payer: MEDICARE

## 2020-08-30 VITALS
SYSTOLIC BLOOD PRESSURE: 118 MMHG | DIASTOLIC BLOOD PRESSURE: 70 MMHG | OXYGEN SATURATION: 96 % | RESPIRATION RATE: 20 BRPM | HEART RATE: 80 BPM

## 2020-08-30 VITALS
HEART RATE: 84 BPM | DIASTOLIC BLOOD PRESSURE: 87 MMHG | OXYGEN SATURATION: 96 % | SYSTOLIC BLOOD PRESSURE: 130 MMHG | HEIGHT: 67 IN | TEMPERATURE: 100 F | WEIGHT: 279.99 LBS | RESPIRATION RATE: 18 BRPM

## 2020-08-30 DIAGNOSIS — H26.9 UNSPECIFIED CATARACT: Chronic | ICD-10-CM

## 2020-08-30 DIAGNOSIS — Z96.642 PRESENCE OF LEFT ARTIFICIAL HIP JOINT: Chronic | ICD-10-CM

## 2020-08-30 DIAGNOSIS — Z95.828 PRESENCE OF OTHER VASCULAR IMPLANTS AND GRAFTS: Chronic | ICD-10-CM

## 2020-08-30 LAB
ALBUMIN SERPL ELPH-MCNC: 3.2 G/DL — LOW (ref 3.3–5.2)
ALP SERPL-CCNC: 60 U/L — SIGNIFICANT CHANGE UP (ref 40–120)
ALT FLD-CCNC: 23 U/L — SIGNIFICANT CHANGE UP
ANION GAP SERPL CALC-SCNC: 11 MMOL/L — SIGNIFICANT CHANGE UP (ref 5–17)
APPEARANCE UR: CLEAR — SIGNIFICANT CHANGE UP
APTT BLD: 32.2 SEC — SIGNIFICANT CHANGE UP (ref 27.5–35.5)
AST SERPL-CCNC: 33 U/L — SIGNIFICANT CHANGE UP
BASOPHILS # BLD AUTO: 0.07 K/UL — SIGNIFICANT CHANGE UP (ref 0–0.2)
BASOPHILS NFR BLD AUTO: 1 % — SIGNIFICANT CHANGE UP (ref 0–2)
BILIRUB SERPL-MCNC: 0.4 MG/DL — SIGNIFICANT CHANGE UP (ref 0.4–2)
BILIRUB UR-MCNC: NEGATIVE — SIGNIFICANT CHANGE UP
BUN SERPL-MCNC: 24 MG/DL — HIGH (ref 8–20)
CALCIUM SERPL-MCNC: 9 MG/DL — SIGNIFICANT CHANGE UP (ref 8.6–10.2)
CHLORIDE SERPL-SCNC: 99 MMOL/L — SIGNIFICANT CHANGE UP (ref 98–107)
CK MB CFR SERPL CALC: 4.9 NG/ML — SIGNIFICANT CHANGE UP (ref 0–6.7)
CK SERPL-CCNC: 536 U/L — HIGH (ref 30–200)
CO2 SERPL-SCNC: 24 MMOL/L — SIGNIFICANT CHANGE UP (ref 22–29)
COLOR SPEC: YELLOW — SIGNIFICANT CHANGE UP
CREAT SERPL-MCNC: 0.82 MG/DL — SIGNIFICANT CHANGE UP (ref 0.5–1.3)
DIFF PNL FLD: NEGATIVE — SIGNIFICANT CHANGE UP
EOSINOPHIL # BLD AUTO: 0.13 K/UL — SIGNIFICANT CHANGE UP (ref 0–0.5)
EOSINOPHIL NFR BLD AUTO: 1.9 % — SIGNIFICANT CHANGE UP (ref 0–6)
GLUCOSE SERPL-MCNC: 227 MG/DL — HIGH (ref 70–99)
GLUCOSE UR QL: NEGATIVE MG/DL — SIGNIFICANT CHANGE UP
HCT VFR BLD CALC: 26.1 % — LOW (ref 39–50)
HGB BLD-MCNC: 8 G/DL — LOW (ref 13–17)
IMM GRANULOCYTES NFR BLD AUTO: 0.1 % — SIGNIFICANT CHANGE UP (ref 0–1.5)
INR BLD: 1.44 RATIO — HIGH (ref 0.88–1.16)
KETONES UR-MCNC: NEGATIVE — SIGNIFICANT CHANGE UP
LACTATE BLDV-MCNC: 1.2 MMOL/L — SIGNIFICANT CHANGE UP (ref 0.5–2)
LEUKOCYTE ESTERASE UR-ACNC: NEGATIVE — SIGNIFICANT CHANGE UP
LYMPHOCYTES # BLD AUTO: 1.44 K/UL — SIGNIFICANT CHANGE UP (ref 1–3.3)
LYMPHOCYTES # BLD AUTO: 21.5 % — SIGNIFICANT CHANGE UP (ref 13–44)
MCHC RBC-ENTMCNC: 24.8 PG — LOW (ref 27–34)
MCHC RBC-ENTMCNC: 30.7 GM/DL — LOW (ref 32–36)
MCV RBC AUTO: 81.1 FL — SIGNIFICANT CHANGE UP (ref 80–100)
MONOCYTES # BLD AUTO: 0.49 K/UL — SIGNIFICANT CHANGE UP (ref 0–0.9)
MONOCYTES NFR BLD AUTO: 7.3 % — SIGNIFICANT CHANGE UP (ref 2–14)
NEUTROPHILS # BLD AUTO: 4.56 K/UL — SIGNIFICANT CHANGE UP (ref 1.8–7.4)
NEUTROPHILS NFR BLD AUTO: 68.2 % — SIGNIFICANT CHANGE UP (ref 43–77)
NITRITE UR-MCNC: NEGATIVE — SIGNIFICANT CHANGE UP
NT-PROBNP SERPL-SCNC: 290 PG/ML — SIGNIFICANT CHANGE UP (ref 0–300)
PH UR: 5 — SIGNIFICANT CHANGE UP (ref 5–8)
PLATELET # BLD AUTO: 262 K/UL — SIGNIFICANT CHANGE UP (ref 150–400)
POTASSIUM SERPL-MCNC: 4 MMOL/L — SIGNIFICANT CHANGE UP (ref 3.5–5.3)
POTASSIUM SERPL-SCNC: 4 MMOL/L — SIGNIFICANT CHANGE UP (ref 3.5–5.3)
PROT SERPL-MCNC: 7.9 G/DL — SIGNIFICANT CHANGE UP (ref 6.6–8.7)
PROT UR-MCNC: NEGATIVE MG/DL — SIGNIFICANT CHANGE UP
PROTHROM AB SERPL-ACNC: 16.4 SEC — HIGH (ref 10.6–13.6)
RBC # BLD: 3.22 M/UL — LOW (ref 4.2–5.8)
RBC # FLD: 16.9 % — HIGH (ref 10.3–14.5)
SODIUM SERPL-SCNC: 134 MMOL/L — LOW (ref 135–145)
SP GR SPEC: 1.01 — SIGNIFICANT CHANGE UP (ref 1.01–1.02)
TROPONIN T SERPL-MCNC: <0.01 NG/ML — SIGNIFICANT CHANGE UP (ref 0–0.06)
UROBILINOGEN FLD QL: NEGATIVE MG/DL — SIGNIFICANT CHANGE UP
WBC # BLD: 6.7 K/UL — SIGNIFICANT CHANGE UP (ref 3.8–10.5)
WBC # FLD AUTO: 6.7 K/UL — SIGNIFICANT CHANGE UP (ref 3.8–10.5)

## 2020-08-30 PROCEDURE — 96374 THER/PROPH/DIAG INJ IV PUSH: CPT | Mod: XU

## 2020-08-30 PROCEDURE — 71045 X-RAY EXAM CHEST 1 VIEW: CPT | Mod: 26

## 2020-08-30 PROCEDURE — 85027 COMPLETE CBC AUTOMATED: CPT

## 2020-08-30 PROCEDURE — 71045 X-RAY EXAM CHEST 1 VIEW: CPT

## 2020-08-30 PROCEDURE — 76870 US EXAM SCROTUM: CPT

## 2020-08-30 PROCEDURE — 82553 CREATINE MB FRACTION: CPT

## 2020-08-30 PROCEDURE — 84484 ASSAY OF TROPONIN QUANT: CPT

## 2020-08-30 PROCEDURE — 83880 ASSAY OF NATRIURETIC PEPTIDE: CPT

## 2020-08-30 PROCEDURE — 83605 ASSAY OF LACTIC ACID: CPT

## 2020-08-30 PROCEDURE — 76870 US EXAM SCROTUM: CPT | Mod: 26

## 2020-08-30 PROCEDURE — 99282 EMERGENCY DEPT VISIT SF MDM: CPT

## 2020-08-30 PROCEDURE — 81003 URINALYSIS AUTO W/O SCOPE: CPT

## 2020-08-30 PROCEDURE — 87086 URINE CULTURE/COLONY COUNT: CPT

## 2020-08-30 PROCEDURE — 74177 CT ABD & PELVIS W/CONTRAST: CPT

## 2020-08-30 PROCEDURE — 93005 ELECTROCARDIOGRAM TRACING: CPT

## 2020-08-30 PROCEDURE — 87040 BLOOD CULTURE FOR BACTERIA: CPT

## 2020-08-30 PROCEDURE — 85730 THROMBOPLASTIN TIME PARTIAL: CPT

## 2020-08-30 PROCEDURE — 96375 TX/PRO/DX INJ NEW DRUG ADDON: CPT

## 2020-08-30 PROCEDURE — 93010 ELECTROCARDIOGRAM REPORT: CPT

## 2020-08-30 PROCEDURE — 99285 EMERGENCY DEPT VISIT HI MDM: CPT

## 2020-08-30 PROCEDURE — 82550 ASSAY OF CK (CPK): CPT

## 2020-08-30 PROCEDURE — 74177 CT ABD & PELVIS W/CONTRAST: CPT | Mod: 26

## 2020-08-30 PROCEDURE — 80053 COMPREHEN METABOLIC PANEL: CPT

## 2020-08-30 PROCEDURE — 85610 PROTHROMBIN TIME: CPT

## 2020-08-30 PROCEDURE — 99284 EMERGENCY DEPT VISIT MOD MDM: CPT | Mod: 25

## 2020-08-30 PROCEDURE — 36415 COLL VENOUS BLD VENIPUNCTURE: CPT

## 2020-08-30 RX ORDER — HYDROMORPHONE HYDROCHLORIDE 2 MG/ML
4 INJECTION INTRAMUSCULAR; INTRAVENOUS; SUBCUTANEOUS ONCE
Refills: 0 | Status: DISCONTINUED | OUTPATIENT
Start: 2020-08-30 | End: 2020-08-30

## 2020-08-30 RX ORDER — IBUPROFEN 200 MG
600 TABLET ORAL ONCE
Refills: 0 | Status: COMPLETED | OUTPATIENT
Start: 2020-08-30 | End: 2020-08-30

## 2020-08-30 RX ORDER — PIPERACILLIN AND TAZOBACTAM 4; .5 G/20ML; G/20ML
3.38 INJECTION, POWDER, LYOPHILIZED, FOR SOLUTION INTRAVENOUS ONCE
Refills: 0 | Status: COMPLETED | OUTPATIENT
Start: 2020-08-30 | End: 2020-08-30

## 2020-08-30 RX ORDER — FUROSEMIDE 40 MG
20 TABLET ORAL ONCE
Refills: 0 | Status: COMPLETED | OUTPATIENT
Start: 2020-08-30 | End: 2020-08-30

## 2020-08-30 RX ORDER — SODIUM CHLORIDE 9 MG/ML
2000 INJECTION, SOLUTION INTRAVENOUS ONCE
Refills: 0 | Status: COMPLETED | OUTPATIENT
Start: 2020-08-30 | End: 2020-08-30

## 2020-08-30 RX ORDER — VANCOMYCIN HCL 1 G
1000 VIAL (EA) INTRAVENOUS ONCE
Refills: 0 | Status: COMPLETED | OUTPATIENT
Start: 2020-08-30 | End: 2020-08-30

## 2020-08-30 RX ADMIN — SODIUM CHLORIDE 2000 MILLILITER(S): 9 INJECTION, SOLUTION INTRAVENOUS at 12:31

## 2020-08-30 RX ADMIN — Medication 250 MILLIGRAM(S): at 13:27

## 2020-08-30 RX ADMIN — PIPERACILLIN AND TAZOBACTAM 3.38 GRAM(S): 4; .5 INJECTION, POWDER, LYOPHILIZED, FOR SOLUTION INTRAVENOUS at 12:31

## 2020-08-30 RX ADMIN — PIPERACILLIN AND TAZOBACTAM 200 GRAM(S): 4; .5 INJECTION, POWDER, LYOPHILIZED, FOR SOLUTION INTRAVENOUS at 12:21

## 2020-08-30 RX ADMIN — SODIUM CHLORIDE 2000 MILLILITER(S): 9 INJECTION, SOLUTION INTRAVENOUS at 12:22

## 2020-08-30 RX ADMIN — HYDROMORPHONE HYDROCHLORIDE 4 MILLIGRAM(S): 2 INJECTION INTRAMUSCULAR; INTRAVENOUS; SUBCUTANEOUS at 21:03

## 2020-08-30 RX ADMIN — Medication 600 MILLIGRAM(S): at 21:04

## 2020-08-30 RX ADMIN — Medication 20 MILLIGRAM(S): at 16:00

## 2020-08-30 NOTE — ED ADULT TRIAGE NOTE - CHIEF COMPLAINT QUOTE
patient c/o swollen testicles last 3 weeks, stated that they are not painful just unconfortable and progressively got worse.

## 2020-08-30 NOTE — ED PROVIDER NOTE - PHYSICAL EXAMINATION
General:     NAD  Head:     NC/AT, EOMI, oral mucosa moist  Neck:     trachea midline  Lungs:     CTA b/l, no w/r/r  CVS:     S1S2, RRR, no m/g/r  Abd:     +BS, s/nt/nd, morbidly obese   (Chaperoned by MARY KAY Zuñiga):  scrotal edema and erythema extending over bilateral inguinal area and perineum with ttp over perineum.  no crepitus.  penile shaft obscured by scrotal edema.   Ext:    2+ radial and pedal pulses, 3++ pedal edema. R LE:  well healing ulcer over lateral foot.  L LE: well healing ulcer over anterior lower leg.  Neuro: AAOx3, no sensory/motor deficits

## 2020-08-30 NOTE — ED PROVIDER NOTE - PATIENT PORTAL LINK FT
You can access the FollowMyHealth Patient Portal offered by [facility] by registering at the following website: [email or web address]. By joining [facility]’s FollowMyHealth portal, you will also be able to view your health information using other applications (apps) compatible with our system.

## 2020-08-30 NOTE — ED ADULT NURSE REASSESSMENT NOTE - NS ED NURSE REASSESS COMMENT FT1
pt remains AOX3, VSS, pt to be sent to [facility] for further evaluation. pt seen by urology on call and no need for admission on their end as per NP.

## 2020-08-30 NOTE — CONSULT NOTE ADULT - SUBJECTIVE AND OBJECTIVE BOX
INTERVAL HPI/OVERNIGHT EVENTS:    STATUS POST:  64M PMHx HTN, DM, Lupus, PVD, Ankylosing Spondylosis chronic b/l LE cellulitis who presents to the ED for increasing scrotal cellulitis over the course of approx 1 month now to the point it has engulfed the shaft of his penis and has made it difficult to urinate.  He currently denies pain/tenderness, no foul smelling urine, no previous episodes such as this, no previous urological history of an kind.  Work up in the ED reveals essentially normal labwork except for anemia, CT shows large b/l hydroceles without evidence for perineal and/or scrotal infection to indicate Fourniers dz.  Currently resting comfortably in the ED.    ROS negative except for above    PMHx HTN, DM, Lupus, PVD, Ankylosing Spondylsosis, chronic b/l LE cellulitis  PSHx: ? fem-fem bypass, Cataract b/l  FamHx: non-contributory  Allergies: Denies        MEDICATIONS  (STANDING):  furosemide   Injectable 20 milliGRAM(s) IV Push Once    MEDICATIONS  (PRN):      Vital Signs Last 24 Hrs  T(C): 36.8 (30 Aug 2020 15:15), Max: 37.5 (30 Aug 2020 11:14)  T(F): 98.3 (30 Aug 2020 15:15), Max: 99.5 (30 Aug 2020 11:14)  HR: 66 (30 Aug 2020 15:15) (66 - 84)  BP: 122/62 (30 Aug 2020 15:15) (115/60 - 130/87)  BP(mean): --  RR: 20 (30 Aug 2020 15:15) (18 - 20)  SpO2: 99% (30 Aug 2020 15:15) (96% - 99%)    PHYSICAL EXAM:      Constitutional: NAD    Respiratory: no accessory muscle use    Cardiovascular: S1S2    Gastrointestinal: obese, soft, NT/ND    Genitourinary: very swollen scrotum, skin taught with mild erythema but no increased temperature to skin, with no visible penis or glans, non-tender, unable to palpate testicles through the swelling      Extremities: b/l LE dessings in place wrapped in ACE          I&O's Detail      LABS:                        8.0    6.70  )-----------( 262      ( 30 Aug 2020 12:37 )             26.1     08-30    134<L>  |  99  |  24.0<H>  ----------------------------<  227<H>  4.0   |  24.0  |  0.82    Ca    9.0      30 Aug 2020 12:37    TPro  7.9  /  Alb  3.2<L>  /  TBili  0.4  /  DBili  x   /  AST  33  /  ALT  23  /  AlkPhos  60  08-30    PT/INR - ( 30 Aug 2020 12:37 )   PT: 16.4 sec;   INR: 1.44 ratio         PTT - ( 30 Aug 2020 12:37 )  PTT:32.2 sec      RADIOLOGY & ADDITIONAL STUDIES: < from: CT Abdomen and Pelvis w/ IV Cont (08.30.20 @ 14:55) >     EXAM:  CT ABDOMEN AND PELVIS IC                          PROCEDURE DATE:  08/30/2020          INTERPRETATION:  Contrast enhanced CT of the abdomen and pelvis .  COMPARISON: 11/9/2019 abdominal CT scan.    CLINICAL HISTORY: Perineal pain and swelling.    Evaluate for Janay's disease. Scrotal swelling..    Technique: contiguous axial images were obtained with 2.5 mm slice thickness after intravenous/contrast administration.  Coronal and sagittal reformats were also submitted for interpretation.    100 ml of Omnipaque were injected intravenously, and 0 ml were discarded, without complications noted.    FINDINGS:  There are large bilateral scrotal hydroceles.  No evidence of bony is necrosis or subcutaneous peroneal air or/abscess.  There is a 3.5 cm RIGHT periumbilical Abdominal wall hernia containing omental fat, nonincarcerated.  The lung bases are clear.    The visualized portions of the heart are normal.    There is no free intra-abdominal air or ascites.    The liver, spleen, pancreas, adrenal glands, gallbladder are normal.    There is no intra or extrahepatic biliary ductal dilatation.    The stomach, duodenum, small and large bowel/are within normal limits.  Appendix not visualized.  Both kidneys show normal uptake of contrast media without masses or hydronephrosis.    The urinary bladder shows normal morphology and contour.    Prostate grossly normal in size.  There are no retroperitoneal masses or abnormal lymphadenopathy.  The retroperitoneal vessels are normal.    Osseous structures intact.  There is diffuse subcutaneous of edema of the upper thigh soft tissues.  IMPRESSION:  Large bilateral scrotal hydroceles. No evidence of perineum and scrotal infection.  No inflammatory bowel disease.  Periumbilical hernia defect containing nonobstructed incarcerated mesenteric fat.              CARMEN DRISCOLL M.D., ATTENDING RADIOLOGIST  This document has been electronically signed. Aug 30 2020  3:36PM    < end of copied text >

## 2020-08-30 NOTE — CONSULT NOTE ADULT - ASSESSMENT
64M PMHx HTN, DM, Lupus, PVD, Ankylosing Spondylosis chronic b/l LE cellulitis who presents to the ED for increasing scrotal swelling with difficulty urinated as a result.

## 2020-08-30 NOTE — ED PROVIDER NOTE - NS ED ROS FT
Constitutional: (-) fever  (-)chills  (-)sweats  Eyes/ENT: (-) blurry vision, (-) epistaxis  (-)rhinorrhea   (-) sore throat    Cardiovascular: (-) chest pain, (-) palpitations (-) edema   Respiratory: (-) cough, (-) shortness of breath   Gastrointestinal: (-)nausea  (-)vomiting, (-) diarrhea  (-) abdominal pain   :  (-)dysuria, (-)frequency, (-)urgency, (-)hematuria  +testicular swelling  Musculoskeletal: (-) neck pain, (-) back pain, (-) joint pain  Integumentary: (-) rash, (-) edema  Neurological: (-) headache, (-) altered mental status  (-)LOC

## 2020-08-30 NOTE — ED ADULT NURSE NOTE - OBJECTIVE STATEMENT
pt awake and alert, BIBA for reports of increased swelling to bilat lower extremities and scrotal area for 3 weeks. pt afebrile rectally on arrival, even and unlabored resps present, skin warm dry and intact. reports has chronic edema to bilat lower ext. and had been admitted recently for cellulitis. no abd pain no vomiting, states diff urinating because he can't access his penis due to swelling.

## 2020-08-30 NOTE — ED PROVIDER NOTE - CLINICAL SUMMARY MEDICAL DECISION MAKING FREE TEXT BOX
patient with scrotal swelling, found to have no acute infection, did find bilateral hydrocele. f/up with dr. velazquez.

## 2020-08-30 NOTE — ED PROVIDER NOTE - CARE PROVIDERS DIRECT ADDRESSES
[email or web address]

## 2020-08-30 NOTE — ED ADULT NURSE REASSESSMENT NOTE - NS ED NURSE REASSESS COMMENT FT1
pt still awaiting final dispo as well as urology consult. pt remains in no distress, meal tray provided. even and unlabored resps, VSS at this time, continues to void in urinal without assistance. will continue to monitor.

## 2020-08-30 NOTE — ED ADULT NURSE REASSESSMENT NOTE - NS ED NURSE REASSESS COMMENT FT1
pt remains AOX3. NSR on monitor with stable vitals. even and unlabored resps noted. skin warm dry and intact.

## 2020-08-30 NOTE — ED ADULT NURSE NOTE - PMH
Anemia  iron def treats with DR. Dickey  Ankylosing spondylitis of site in spine    Back pain  pain management in past  Cellulitis, leg  bilateral  Chronic pain disorder    Chronic venous stasis dermatitis    Deep vein thrombosis (DVT)  25 years ago  pt had THR was on coumadin for 6 months  also noted in Nov 2019 on sono  Diabetes    Diabetic neuropathy    Diabetic ulcer of right foot  treats weekly with vascular doc dressing changed and uni boot  External iliac artery occlusion    Former smoker    Ischemic ulcer diabetic foot  left toe  Lupus    Obesity    Peripheral vascular disease

## 2020-08-30 NOTE — ED PROVIDER NOTE - NSFOLLOWUPINSTRUCTIONS_ED_ALL_ED_FT
Hydrocele, Adult  A hydrocele is a collection of fluid in the loose pouch of skin that holds the testicles (scrotum). This may happen because:  The amount of fluid produced in the scrotum is not absorbed by the rest of the body.Fluid from the abdomen fills the scrotum. Normally, the testicles develop in the abdomen then move (drop) into to the scrotum before birth. The tube that the testicles travel through usually closes after the testicles drop. If the tube does not close, fluid from the abdomen can fill the scrotum. This is less common in adults.What are the causes?  The cause of a hydrocele in adults is usually not known. However, it may be caused by:  An injury to the scrotum.An infection (epididymitis).Decreased blood flow to the scrotum.Twisting of a testicle (testicular torsion).A birth defect.A tumor or cancer of the testicle.What are the signs or symptoms?  A hydrocele feels like a water-filled balloon. It may also feel heavy. Other symptoms include:  Swelling of the scrotum. The swelling may decrease when you lie down. You may also notice more swelling at night than in the morning.Swelling of the groin.Mild discomfort in the scrotum.Pain. This can develop if the hydrocele was caused by infection or twisting. The larger the hydrocele, the more likely you are to have pain.How is this diagnosed?  This condition may be diagnosed based on:  Physical exam.Medical history.You may also have other tests, including:  Imaging tests, such as ultrasound.Blood or urine tests.How is this treated?  Most hydroceles go away on their own. If you have no discomfort or pain, your health care provider may suggest close monitoring of your condition (called watch and wait or watchful waiting) until the condition goes away or symptoms develop. If treatment is needed, it may include:  Treating an underlying condition. This may include using an antibiotic medicine to treat an infection.Surgery to stop fluid from collecting in the scrotum.Surgery to drain the fluid. Options include:  Needle aspiration. A needle is used to drain fluid. However, the fluid buildup will come back quickly.Hydrocelectomy. For this procedure, an incision is made in the scrotum to remove the fluid sac.Follow these instructions at home:  Watch the hydrocele for any changes.Take over-the-counter and prescription medicines only as told by your health care provider.If you were prescribed an antibiotic medicine, use it as told by your health care provider. Do not stop taking the antibiotic even if you start to feel better.Keep all follow-up visits as told by your health care provider. This is important.Contact a health care provider if:  You notice any changes in the hydrocele.The swelling in your scrotum or groin gets worse.The hydrocele becomes red, firm, painful, or tender to the touch.You have a fever.Get help right away if you:  Develop a lot of pain, or your pain becomes worse.Summary  A hydrocele is a collection of fluid in the loose pouch of skin that holds the testicles (scrotum).Hydroceles can cause swelling, discomfort, and sometimes pain.In adults, the cause of a hydrocele usually is not known. However, it is sometimes caused by an infection or a rotation and twisting of the scrotum.Treatment is usually not needed. Hydroceles often go away on their own. If a hydrocele causes pain, treatment may be given to ease the pain.    Scrotal Swelling  Scrotal swelling refers to a condition in which the sac of skin that contains the testes (scrotum) is enlarged or swollen. Many things can cause the scrotum to enlarge or swell, including:  Fluid around the testicle (hydrocele).A weakened area in the muscles around the groin (hernia).An enlarged vein around the testicle (varicocele).An injury.An infection.Certain medical treatments.Certain medical conditions, such as congestive heart failure.A recent genital surgery or procedure.A twisting of the spermatic cord that cuts off blood supply (testicular torsion).Testicular cancer.Scrotal swelling can happen along with scrotal pain.  Follow these instructions at home:  Until the swelling goes away:  Rest. The best position to rest in is to lie down.Limit activity.Put ice on the scrotum:  Put ice in a plastic bag.Place a towel between your skin and the bag.Leave the ice on for 20 minutes, 2–3 times a day for 1–2 days.Place a rolled towel under your testicles for support.Wear loose-fitting clothing or an athletic support cup for comfort.Take over-the-counter and prescription medicines only as told by your health care provider.Perform a monthly self-exam of the scrotum and penis. Feel for changes. Ask your health care provider how to perform a monthly self-exam if you are unsure.Contact a health care provider if:  You have a sudden pain that is persistent and does not improve.You have a heavy feeling or notice fluid in the scrotum.You have pain or burning while urinating.You have blood in your urine or semen.You feel a lump around the testicle.You notice that one testicle is larger than the other. Keep in mind that a small difference in size is normal.You have a persistent dull ache or pain in your groin or scrotum.Get help right away if:  The pain does not go away.The pain becomes severe.You have a fever or chills.You have pain or vomiting that cannot be controlled.One or both sides of the scrotum are very red and swollen.There is redness spreading upward from your scrotum to your abdomen or downward from your scrotum to your thighs.Summary  Scrotal swelling refers to a condition in which the sac of skin that contains the testes (scrotum) is enlarged.Many things can cause the scrotum to swell, including hydrocele, a hernia, and a varicocele.Limiting activity and icing the scrotum may help reduce swelling and pain.Contact your health care provider if you develop scrotal pain that is sudden and persistent, or if you have pain while urinating. Do this also if you feel a lump around the testicle or notice blood in your urine or semen.Get help right away for uncontrolled pain or vomiting, for very red and swollen scrotum, or for fever or chills.

## 2020-08-30 NOTE — ED PROVIDER NOTE - CARE PROVIDER_API CALL
Jonel Merritt J  UROLOGY  [address], [address]  [place] [address]  Phone: [phone]  Fax: [phone]  Follow Up Time: 1-3 Days

## 2020-08-30 NOTE — CONSULT NOTE ADULT - ATTENDING COMMENTS
Impression/Plan:    scrotal edema--needs elevation, evaluation for non  etiology, consider diuretics  hydroceles--best imaged on scrotal ultrasonography

## 2020-09-01 LAB
CULTURE RESULTS: SIGNIFICANT CHANGE UP
SPECIMEN SOURCE: SIGNIFICANT CHANGE UP

## 2020-09-10 ENCOUNTER — APPOINTMENT (OUTPATIENT)
Dept: UROLOGY | Facility: CLINIC | Age: 65
End: 2020-09-10
Payer: MEDICARE

## 2020-09-10 VITALS
HEART RATE: 87 BPM | SYSTOLIC BLOOD PRESSURE: 113 MMHG | BODY MASS INDEX: 43.19 KG/M2 | TEMPERATURE: 97.9 F | WEIGHT: 285 LBS | HEIGHT: 68 IN | RESPIRATION RATE: 16 BRPM | DIASTOLIC BLOOD PRESSURE: 60 MMHG

## 2020-09-10 PROCEDURE — 99213 OFFICE O/P EST LOW 20 MIN: CPT

## 2020-09-10 RX ORDER — DOXYCYCLINE HYCLATE 100 MG/1
100 CAPSULE ORAL
Qty: 20 | Refills: 0 | Status: DISCONTINUED | COMMUNITY
Start: 2020-02-17 | End: 2020-09-10

## 2020-09-10 NOTE — LETTER BODY
[Dear  ___] : Dear  [unfilled], [Courtesy Letter:] : I had the pleasure of seeing your patient, [unfilled], in my office today. [Please see my note below.] : Please see my note below. [Sincerely,] : Sincerely, [FreeTextEntry3] : Ed\par \par Jonel Merritt MD\par [facility]\par  of Urology\par [employer] [facility]\par

## 2020-09-10 NOTE — HISTORY OF PRESENT ILLNESS
[FreeTextEntry1] : patient has noticed scrotal swelling for several weeks.  no voiding issues. no hematuria. no dysuria.  swelling better than when he was in the E.

## 2020-09-10 NOTE — PHYSICAL EXAM
[General Appearance - Well Developed] : well developed [General Appearance - Well Nourished] : well nourished [Normal Appearance] : normal appearance [Well Groomed] : well groomed [General Appearance - In No Acute Distress] : no acute distress [Edema] : no peripheral edema [Respiration, Rhythm And Depth] : normal respiratory rhythm and effort [Exaggerated Use Of Accessory Muscles For Inspiration] : no accessory muscle use [Normal Station and Gait] : the gait and station were normal for the patient's age [] : no rash [Oriented To Time, Place, And Person] : oriented to person, place, and time [Affect] : the affect was normal [Mood] : the mood was normal [Not Anxious] : not anxious

## 2020-09-10 NOTE — ASSESSMENT
[FreeTextEntry1] : Impression:\par \par scrotal edema\par \par Plan"\par \par not of  origin. \par Recommend he see primary for this. \par \par \par follow up with us PRN

## 2020-10-26 ENCOUNTER — APPOINTMENT (OUTPATIENT)
Dept: ORTHOPEDIC SURGERY | Facility: CLINIC | Age: 65
End: 2020-10-26

## 2020-12-21 PROBLEM — Z87.440 HISTORY OF URINARY TRACT INFECTION: Status: RESOLVED | Noted: 2019-11-19 | Resolved: 2020-12-21

## 2020-12-23 ENCOUNTER — APPOINTMENT (OUTPATIENT)
Dept: VASCULAR SURGERY | Facility: CLINIC | Age: 65
End: 2020-12-23
Payer: MEDICARE

## 2020-12-23 VITALS
SYSTOLIC BLOOD PRESSURE: 130 MMHG | TEMPERATURE: 97.3 F | OXYGEN SATURATION: 97 % | RESPIRATION RATE: 16 BRPM | HEART RATE: 94 BPM | DIASTOLIC BLOOD PRESSURE: 72 MMHG

## 2020-12-23 PROCEDURE — 29580 STRAPPING UNNA BOOT: CPT | Mod: 50

## 2020-12-30 ENCOUNTER — APPOINTMENT (OUTPATIENT)
Dept: VASCULAR SURGERY | Facility: CLINIC | Age: 65
End: 2020-12-30
Payer: MEDICARE

## 2020-12-30 VITALS
DIASTOLIC BLOOD PRESSURE: 76 MMHG | HEART RATE: 73 BPM | HEIGHT: 68 IN | SYSTOLIC BLOOD PRESSURE: 145 MMHG | OXYGEN SATURATION: 97 % | TEMPERATURE: 97.2 F

## 2020-12-30 PROCEDURE — 29580 STRAPPING UNNA BOOT: CPT | Mod: 50

## 2021-01-06 ENCOUNTER — APPOINTMENT (OUTPATIENT)
Dept: VASCULAR SURGERY | Facility: CLINIC | Age: 66
End: 2021-01-06
Payer: MEDICARE

## 2021-01-06 VITALS
TEMPERATURE: 97.2 F | OXYGEN SATURATION: 98 % | HEART RATE: 75 BPM | HEIGHT: 68 IN | SYSTOLIC BLOOD PRESSURE: 122 MMHG | DIASTOLIC BLOOD PRESSURE: 74 MMHG

## 2021-01-06 PROCEDURE — 29580 STRAPPING UNNA BOOT: CPT | Mod: 50

## 2021-01-06 PROCEDURE — 93925 LOWER EXTREMITY STUDY: CPT

## 2021-01-07 ENCOUNTER — APPOINTMENT (OUTPATIENT)
Dept: VASCULAR SURGERY | Facility: CLINIC | Age: 66
End: 2021-01-07

## 2021-01-13 ENCOUNTER — APPOINTMENT (OUTPATIENT)
Dept: VASCULAR SURGERY | Facility: CLINIC | Age: 66
End: 2021-01-13
Payer: MEDICARE

## 2021-01-13 VITALS
OXYGEN SATURATION: 92 % | HEART RATE: 98 BPM | HEIGHT: 68 IN | DIASTOLIC BLOOD PRESSURE: 74 MMHG | TEMPERATURE: 97.6 F | SYSTOLIC BLOOD PRESSURE: 125 MMHG

## 2021-01-13 PROCEDURE — 29580 STRAPPING UNNA BOOT: CPT | Mod: 50

## 2021-01-20 ENCOUNTER — APPOINTMENT (OUTPATIENT)
Dept: VASCULAR SURGERY | Facility: CLINIC | Age: 66
End: 2021-01-20
Payer: MEDICARE

## 2021-01-20 VITALS
HEART RATE: 90 BPM | RESPIRATION RATE: 16 BRPM | OXYGEN SATURATION: 96 % | TEMPERATURE: 97.2 F | DIASTOLIC BLOOD PRESSURE: 74 MMHG | HEIGHT: 68 IN | SYSTOLIC BLOOD PRESSURE: 120 MMHG

## 2021-01-20 PROCEDURE — 29580 STRAPPING UNNA BOOT: CPT | Mod: 50

## 2021-01-20 RX ORDER — TRIAMCINOLONE ACETONIDE 1 MG/G
0.1 CREAM TOPICAL TWICE DAILY
Qty: 1 | Refills: 3 | Status: ACTIVE | COMMUNITY
Start: 2021-01-20 | End: 1900-01-01

## 2021-02-03 ENCOUNTER — APPOINTMENT (OUTPATIENT)
Dept: VASCULAR SURGERY | Facility: CLINIC | Age: 66
End: 2021-02-03

## 2021-02-17 ENCOUNTER — APPOINTMENT (OUTPATIENT)
Dept: VASCULAR SURGERY | Facility: CLINIC | Age: 66
End: 2021-02-17
Payer: MEDICARE

## 2021-02-17 VITALS
OXYGEN SATURATION: 96 % | DIASTOLIC BLOOD PRESSURE: 79 MMHG | HEART RATE: 93 BPM | HEIGHT: 68 IN | SYSTOLIC BLOOD PRESSURE: 129 MMHG | TEMPERATURE: 98.6 F

## 2021-02-17 PROCEDURE — 29580 STRAPPING UNNA BOOT: CPT | Mod: 50

## 2021-02-17 RX ORDER — CIPROFLOXACIN HYDROCHLORIDE 500 MG/1
500 TABLET, FILM COATED ORAL
Qty: 20 | Refills: 0 | Status: COMPLETED | COMMUNITY
Start: 2020-08-25 | End: 2021-02-17

## 2021-02-17 RX ORDER — DOXYCYCLINE HYCLATE 100 MG/1
100 TABLET ORAL
Qty: 20 | Refills: 0 | Status: COMPLETED | COMMUNITY
Start: 2020-08-18 | End: 2021-02-17

## 2021-02-24 ENCOUNTER — APPOINTMENT (OUTPATIENT)
Dept: VASCULAR SURGERY | Facility: CLINIC | Age: 66
End: 2021-02-24
Payer: MEDICARE

## 2021-02-24 VITALS
DIASTOLIC BLOOD PRESSURE: 77 MMHG | HEIGHT: 68 IN | HEART RATE: 85 BPM | OXYGEN SATURATION: 94 % | WEIGHT: 280 LBS | BODY MASS INDEX: 42.44 KG/M2 | SYSTOLIC BLOOD PRESSURE: 121 MMHG | TEMPERATURE: 97.2 F

## 2021-02-24 PROCEDURE — 29580 STRAPPING UNNA BOOT: CPT | Mod: 50

## 2021-03-03 ENCOUNTER — APPOINTMENT (OUTPATIENT)
Dept: VASCULAR SURGERY | Facility: CLINIC | Age: 66
End: 2021-03-03
Payer: MEDICARE

## 2021-03-03 VITALS
DIASTOLIC BLOOD PRESSURE: 74 MMHG | BODY MASS INDEX: 42.44 KG/M2 | HEIGHT: 68 IN | OXYGEN SATURATION: 91 % | WEIGHT: 280 LBS | SYSTOLIC BLOOD PRESSURE: 151 MMHG | HEART RATE: 89 BPM

## 2021-03-03 PROCEDURE — 99213 OFFICE O/P EST LOW 20 MIN: CPT

## 2021-03-17 ENCOUNTER — APPOINTMENT (OUTPATIENT)
Dept: VASCULAR SURGERY | Facility: CLINIC | Age: 66
End: 2021-03-17

## 2021-03-24 ENCOUNTER — APPOINTMENT (OUTPATIENT)
Dept: VASCULAR SURGERY | Facility: CLINIC | Age: 66
End: 2021-03-24
Payer: MEDICARE

## 2021-03-24 VITALS
DIASTOLIC BLOOD PRESSURE: 84 MMHG | HEART RATE: 80 BPM | TEMPERATURE: 97.4 F | OXYGEN SATURATION: 99 % | SYSTOLIC BLOOD PRESSURE: 145 MMHG

## 2021-03-24 PROCEDURE — 29580 STRAPPING UNNA BOOT: CPT | Mod: 50

## 2021-03-31 ENCOUNTER — APPOINTMENT (OUTPATIENT)
Dept: VASCULAR SURGERY | Facility: CLINIC | Age: 66
End: 2021-03-31
Payer: MEDICARE

## 2021-03-31 VITALS
OXYGEN SATURATION: 97 % | SYSTOLIC BLOOD PRESSURE: 132 MMHG | HEART RATE: 77 BPM | DIASTOLIC BLOOD PRESSURE: 76 MMHG | HEIGHT: 68 IN | TEMPERATURE: 97.3 F

## 2021-03-31 PROCEDURE — 29580 STRAPPING UNNA BOOT: CPT | Mod: 50

## 2021-04-07 ENCOUNTER — APPOINTMENT (OUTPATIENT)
Dept: VASCULAR SURGERY | Facility: CLINIC | Age: 66
End: 2021-04-07
Payer: MEDICARE

## 2021-04-07 VITALS
TEMPERATURE: 97.6 F | OXYGEN SATURATION: 97 % | DIASTOLIC BLOOD PRESSURE: 55 MMHG | HEART RATE: 78 BPM | SYSTOLIC BLOOD PRESSURE: 124 MMHG

## 2021-04-07 PROCEDURE — 29580 STRAPPING UNNA BOOT: CPT | Mod: 50

## 2021-04-07 RX ORDER — SULFAMETHOXAZOLE AND TRIMETHOPRIM 800; 160 MG/1; MG/1
800-160 TABLET ORAL TWICE DAILY
Qty: 20 | Refills: 0 | Status: COMPLETED | COMMUNITY
Start: 2021-03-31 | End: 2021-04-07

## 2021-04-14 ENCOUNTER — APPOINTMENT (OUTPATIENT)
Dept: VASCULAR SURGERY | Facility: CLINIC | Age: 66
End: 2021-04-14
Payer: MEDICARE

## 2021-04-14 VITALS
SYSTOLIC BLOOD PRESSURE: 132 MMHG | DIASTOLIC BLOOD PRESSURE: 62 MMHG | HEIGHT: 68 IN | TEMPERATURE: 97.5 F | OXYGEN SATURATION: 97 % | HEART RATE: 76 BPM

## 2021-04-14 PROCEDURE — 29580 STRAPPING UNNA BOOT: CPT | Mod: 50

## 2021-04-21 ENCOUNTER — APPOINTMENT (OUTPATIENT)
Dept: VASCULAR SURGERY | Facility: CLINIC | Age: 66
End: 2021-04-21
Payer: MEDICARE

## 2021-04-21 VITALS
HEART RATE: 83 BPM | HEIGHT: 68 IN | DIASTOLIC BLOOD PRESSURE: 53 MMHG | TEMPERATURE: 97.6 F | OXYGEN SATURATION: 96 % | SYSTOLIC BLOOD PRESSURE: 120 MMHG

## 2021-04-21 PROCEDURE — 29580 STRAPPING UNNA BOOT: CPT | Mod: 50

## 2021-04-21 RX ORDER — POLYETHYLENE GLYOCOL 3350, SODIUM CHLORIDE, SODIUM BICARBONATE AND POTASSIUM CHLORIDE 420; 11.2; 5.72; 1.48 G/4L; G/4L; G/4L; G/4L
420 POWDER, FOR SOLUTION NASOGASTRIC; ORAL
Qty: 1 | Refills: 0 | Status: COMPLETED | COMMUNITY
Start: 2020-07-17 | End: 2021-04-21

## 2021-04-21 RX ORDER — HYDROCHLOROTHIAZIDE 12.5 MG/1
TABLET ORAL
Refills: 0 | Status: COMPLETED | COMMUNITY
End: 2021-04-21

## 2021-04-21 RX ORDER — PIOGLITAZONE HYDROCHLORIDE 45 MG/1
TABLET ORAL
Refills: 0 | Status: COMPLETED | COMMUNITY
End: 2021-04-21

## 2021-04-21 RX ORDER — METFORMIN HYDROCHLORIDE 625 MG/1
TABLET ORAL
Refills: 0 | Status: COMPLETED | COMMUNITY
End: 2021-04-21

## 2021-04-28 ENCOUNTER — APPOINTMENT (OUTPATIENT)
Dept: VASCULAR SURGERY | Facility: CLINIC | Age: 66
End: 2021-04-28
Payer: MEDICARE

## 2021-04-28 VITALS
HEART RATE: 83 BPM | RESPIRATION RATE: 16 BRPM | DIASTOLIC BLOOD PRESSURE: 75 MMHG | TEMPERATURE: 97.4 F | HEIGHT: 68 IN | SYSTOLIC BLOOD PRESSURE: 135 MMHG | OXYGEN SATURATION: 97 %

## 2021-04-28 PROCEDURE — 29580 STRAPPING UNNA BOOT: CPT | Mod: 50

## 2021-04-28 RX ORDER — LEVOFLOXACIN 750 MG/1
750 TABLET, FILM COATED ORAL DAILY
Qty: 10 | Refills: 0 | Status: DISCONTINUED | COMMUNITY
End: 2021-04-28

## 2021-04-28 NOTE — PHYSICAL THERAPY INITIAL EVALUATION ADULT - MD/RN NOTIFIED
Pt presents to the clinic for an EKG.     EKG was ordered to evaluate a potential medication reaction between citalopram and pantoprazole prolonging pt's QT interval.     Pt states she had skipped the pantoprazole yesterday and today. She took one TUMS tablet yesterday, and one TUMS tablets today prior to her nurse visit. Pt states she could still feel minor reflux symptoms, but they are manageable. Pt's dad and brother had Christian's Esophagus. She notes a personal hx of ulcers when she was in college, and was warned to keep her reflux in check so she does not also develope christian's. She adds she has made some personal dietary changes in the past few months, she's been eating less processed foods and trying to incorporate more vegetables into her diet.     EKG completed, reviewed by PCP. Per Dr. Sarah- Pt's QT interval is normal, pt can safely resume taking pantoprazole daily. Pt is agreeable and had no further questions/concerns at this time.   
yes

## 2021-05-05 ENCOUNTER — APPOINTMENT (OUTPATIENT)
Dept: VASCULAR SURGERY | Facility: CLINIC | Age: 66
End: 2021-05-05
Payer: MEDICARE

## 2021-05-05 VITALS
HEIGHT: 68 IN | HEART RATE: 95 BPM | TEMPERATURE: 97.1 F | RESPIRATION RATE: 16 BRPM | OXYGEN SATURATION: 95 % | DIASTOLIC BLOOD PRESSURE: 79 MMHG | SYSTOLIC BLOOD PRESSURE: 124 MMHG

## 2021-05-05 PROCEDURE — 29580 STRAPPING UNNA BOOT: CPT | Mod: 50

## 2021-05-12 ENCOUNTER — APPOINTMENT (OUTPATIENT)
Dept: VASCULAR SURGERY | Facility: CLINIC | Age: 66
End: 2021-05-12
Payer: MEDICARE

## 2021-05-12 VITALS
TEMPERATURE: 97 F | RESPIRATION RATE: 16 BRPM | OXYGEN SATURATION: 97 % | DIASTOLIC BLOOD PRESSURE: 76 MMHG | HEIGHT: 68 IN | SYSTOLIC BLOOD PRESSURE: 109 MMHG | HEART RATE: 71 BPM

## 2021-05-12 DIAGNOSIS — L03.115 CELLULITIS OF RIGHT LOWER LIMB: ICD-10-CM

## 2021-05-12 DIAGNOSIS — T14.8XXA OTHER INJURY OF UNSPECIFIED BODY REGION, INITIAL ENCOUNTER: ICD-10-CM

## 2021-05-12 DIAGNOSIS — L98.491 NON-PRESSURE CHRONIC ULCER OF SKIN OF OTHER SITES LIMITED TO BREAKDOWN OF SKIN: ICD-10-CM

## 2021-05-12 DIAGNOSIS — L97.911 NON-PRESSURE CHRONIC ULCER OF UNSPECIFIED PART OF RIGHT LOWER LEG LIMITED TO BREAKDOWN OF SKIN: ICD-10-CM

## 2021-05-12 PROCEDURE — 99213 OFFICE O/P EST LOW 20 MIN: CPT

## 2021-05-19 ENCOUNTER — APPOINTMENT (OUTPATIENT)
Dept: VASCULAR SURGERY | Facility: CLINIC | Age: 66
End: 2021-05-19

## 2021-05-24 NOTE — ED ADULT NURSE NOTE - NSFALLRSKINDICATORS_ED_ALL_ED
The transport originated from [address] [place] [address]. Pt. was transported to [facility]. Assisting with the transport was DIANN PERALES. Appropriate devices were applied to monitor the patient's condition during transport. Patient transported  via 100% O2 via ventilator. Patient tolerated well.         Teresita Aguilar RCP  4:32 AM no

## 2021-05-26 ENCOUNTER — APPOINTMENT (OUTPATIENT)
Dept: VASCULAR SURGERY | Facility: CLINIC | Age: 66
End: 2021-05-26

## 2021-06-02 ENCOUNTER — APPOINTMENT (OUTPATIENT)
Dept: VASCULAR SURGERY | Facility: CLINIC | Age: 66
End: 2021-06-02
Payer: MEDICARE

## 2021-06-02 VITALS
RESPIRATION RATE: 16 BRPM | HEART RATE: 80 BPM | DIASTOLIC BLOOD PRESSURE: 82 MMHG | SYSTOLIC BLOOD PRESSURE: 132 MMHG | OXYGEN SATURATION: 96 % | HEIGHT: 68 IN | TEMPERATURE: 97.4 F

## 2021-06-02 PROCEDURE — 29580 STRAPPING UNNA BOOT: CPT | Mod: 50

## 2021-06-09 ENCOUNTER — APPOINTMENT (OUTPATIENT)
Dept: VASCULAR SURGERY | Facility: CLINIC | Age: 66
End: 2021-06-09
Payer: MEDICARE

## 2021-06-09 VITALS
HEART RATE: 83 BPM | OXYGEN SATURATION: 96 % | SYSTOLIC BLOOD PRESSURE: 144 MMHG | TEMPERATURE: 97.2 F | DIASTOLIC BLOOD PRESSURE: 101 MMHG

## 2021-06-09 PROCEDURE — 29580 STRAPPING UNNA BOOT: CPT | Mod: 50

## 2021-06-16 ENCOUNTER — APPOINTMENT (OUTPATIENT)
Dept: VASCULAR SURGERY | Facility: CLINIC | Age: 66
End: 2021-06-16
Payer: MEDICARE

## 2021-06-16 VITALS
TEMPERATURE: 97.4 F | HEIGHT: 68 IN | HEART RATE: 80 BPM | SYSTOLIC BLOOD PRESSURE: 134 MMHG | DIASTOLIC BLOOD PRESSURE: 76 MMHG | OXYGEN SATURATION: 93 %

## 2021-06-16 PROCEDURE — 29580 STRAPPING UNNA BOOT: CPT | Mod: 50

## 2021-06-21 NOTE — PROCEDURE NOTE - ADDITIONAL PROCEDURE DETAILS
Symptoms: shortness of breath at rest and wheezing    Lab Results   Component Value Date    SARSCOV2 Negative 06/15/2021    SARSCOV2 Positive (A) 05/11/2021     · Imaging:  XR chest 1 view portable - Result Date: 6/15/2021  Impression: Moderate bilateral pulmonary infiltrates which are nonspecific and may represent pneumonia or edema  · CTA ED chest PE Study - Result Date: 6/15/2021  Impression: No pulmonary embolism  Diffuse groundglass opacities in the lungs  Differential considerations include bacterial and viral pneumonia (including COVID 19), and vascular congestion  Minimal left pleural effusion      Recent Labs     06/18/21  1401 06/19/21  0533 06/21/21  0259   WBC 14 96* 12 03* 12 68*     Recent Labs     06/19/21  0533 06/20/21  0440   FERRITIN 558* 579*   CRP 29 7* 18 5*   DDIMER 1 56* 1 56*     · On no supplemental oxygen at baseline, status post COVID infection 5/12/21  · Increased from mid flow to HFNC, back to mid flow, and now back on HFNC due to hypoxemic event overnight after removing BiPap  · Started on IV Solu-Medrol and duo nebs  · Previously on trastuzumab for treatment of esophageal cancer  · Differential includes pneumonitis per Pneumotox versus post COVID syndrome  · Inflammatory markers decreasing  · Blood clx, legionella, strep Negative    Plan:  · Labs:  ? CBC tomorrow AM  · Antibiotics:  ? Cefdinir #7 - final day today per ID  · Supportive care:  ? Incentive spirometry  ? Guaifenesin 1200 mg q 12 hours  ? Change Albuterol Nebs 2 5 mg q6 h PRN To scheduled  ? Continue high-flow nasal cannula 40 L at 70% FiO2  ? Continue Solumedrol 40 mg q8h scheduled, wean tomorrow?   ? Tessalon Perles Pt tolerated procedure well. Tourniquet removed, sharps disposed of, bed lowered.

## 2021-06-22 ENCOUNTER — APPOINTMENT (OUTPATIENT)
Dept: VASCULAR SURGERY | Facility: CLINIC | Age: 66
End: 2021-06-22
Payer: MEDICARE

## 2021-06-22 VITALS
HEART RATE: 67 BPM | HEIGHT: 68 IN | DIASTOLIC BLOOD PRESSURE: 78 MMHG | TEMPERATURE: 97.6 F | SYSTOLIC BLOOD PRESSURE: 135 MMHG | OXYGEN SATURATION: 93 %

## 2021-06-22 PROCEDURE — 29580 STRAPPING UNNA BOOT: CPT | Mod: 50

## 2021-06-30 ENCOUNTER — APPOINTMENT (OUTPATIENT)
Dept: VASCULAR SURGERY | Facility: CLINIC | Age: 66
End: 2021-06-30
Payer: MEDICARE

## 2021-06-30 VITALS
OXYGEN SATURATION: 95 % | HEART RATE: 84 BPM | HEIGHT: 68 IN | DIASTOLIC BLOOD PRESSURE: 72 MMHG | TEMPERATURE: 97.1 F | SYSTOLIC BLOOD PRESSURE: 135 MMHG

## 2021-06-30 PROCEDURE — 29580 STRAPPING UNNA BOOT: CPT | Mod: 50

## 2021-07-07 ENCOUNTER — APPOINTMENT (OUTPATIENT)
Dept: VASCULAR SURGERY | Facility: CLINIC | Age: 66
End: 2021-07-07
Payer: MEDICARE

## 2021-07-07 VITALS
TEMPERATURE: 97.2 F | OXYGEN SATURATION: 94 % | SYSTOLIC BLOOD PRESSURE: 116 MMHG | DIASTOLIC BLOOD PRESSURE: 73 MMHG | HEART RATE: 88 BPM | RESPIRATION RATE: 16 BRPM

## 2021-07-07 PROCEDURE — 29580 STRAPPING UNNA BOOT: CPT | Mod: 50

## 2021-07-14 ENCOUNTER — APPOINTMENT (OUTPATIENT)
Dept: VASCULAR SURGERY | Facility: CLINIC | Age: 66
End: 2021-07-14
Payer: MEDICARE

## 2021-07-14 VITALS
TEMPERATURE: 96.7 F | SYSTOLIC BLOOD PRESSURE: 122 MMHG | RESPIRATION RATE: 15 BRPM | HEIGHT: 68 IN | DIASTOLIC BLOOD PRESSURE: 74 MMHG | HEART RATE: 81 BPM | OXYGEN SATURATION: 94 %

## 2021-07-14 PROCEDURE — 29580 STRAPPING UNNA BOOT: CPT | Mod: 50

## 2021-07-19 ENCOUNTER — APPOINTMENT (OUTPATIENT)
Dept: VASCULAR SURGERY | Facility: CLINIC | Age: 66
End: 2021-07-19
Payer: MEDICARE

## 2021-07-19 VITALS
HEIGHT: 68 IN | HEART RATE: 81 BPM | DIASTOLIC BLOOD PRESSURE: 72 MMHG | OXYGEN SATURATION: 97 % | TEMPERATURE: 98.7 F | SYSTOLIC BLOOD PRESSURE: 107 MMHG

## 2021-07-19 PROCEDURE — 29580 STRAPPING UNNA BOOT: CPT | Mod: 50

## 2021-07-23 NOTE — ASSESSMENT
[FreeTextEntry1] : 64 y/o male s/p fem-fem bypass on 10/24/19 and lymphedema. I applied BLE Calamine Unna Boots. He will continue with conservative treatment: lymphedema pumps, elevating legs and ambulate as often as possible. I provided him with the numbers for lymphedema therapy.\par \par Unna boots wrapped to bilaterally LE

## 2021-07-23 NOTE — REVIEW OF SYSTEMS
[Limb Swelling] : limb swelling [As Noted in HPI] : as noted in HPI [Skin Wound] : skin wound [Negative] : Psychiatric

## 2021-07-23 NOTE — DATA REVIEWED
[FreeTextEntry1] : CTA  Abd and Pelvis 7/16/19 demonstrates Left external iliac artery occlusion with suboptimal distal arterial opacification.\par U/S Arterial Duplex BLE 1/6/21 demonstrates a patent Fem-Fem bypass with PTFE and no flow restive lesions.

## 2021-07-23 NOTE — HISTORY OF PRESENT ILLNESS
[FreeTextEntry1] : 64 y/o male s/p fem-fem bypass on 10/24/19 and BLE lymphedema. He has been wearing Unna Boot for several weeks. His legs have improved edema, no weeping of legs, no wounds. He has been compliant with conservative treatments: Frequent ambulation, leg elevation, above the level of the heart at rest and use of lymphedema pumps daily.  He contacted lymphedema clinic however they have no appointments due to one of their therapists be out on leave.  He is awaiting for them to call him with an appointment in the next month.  He reports no current pain. No fever or chills. He is a nonsmoker.\par \par  [de-identified] : Tolerating Unna boots bilateral LE\par

## 2021-07-23 NOTE — PHYSICAL EXAM
[Normal Breath Sounds] : Normal breath sounds [Normal Heart Sounds] : normal heart sounds [Normal Rate and Rhythm] : normal rate and rhythm [2+] : left 2+ [Ankle Swelling (On Exam)] : present [Ankle Swelling On The Right] : of the right ankle [Varicose Veins Of Lower Extremities] : bilaterally [Ankle Swelling On The Left] : moderate [] : bilaterally [Ankle Swelling Bilaterally] : severe [No Rash or Lesion] : No rash or lesion [Alert] : alert [Oriented to Person] : oriented to person [Oriented to Place] : oriented to place [Oriented to Time] : oriented to time [Calm] : calm [de-identified] : WD, WN, NAD. Awake, alert, interactive. Ambulates slowly with a cane for support. [de-identified] : TAYLOR, PERGEOVANNIL [de-identified] : supple [de-identified] : non-labored [FreeTextEntry1] : BLE with moderate non-pitting lymphedema.\par No weeping of legs.\par No wounds, erythema and tenderness. \par + hyperpigmentation, lipodermatosclerosis, fibrosis and skin changes.\par Chronic venous stasis dermatitis.  [de-identified] : soft, obese, NT, ND [de-identified] : no cyanosis or deformity. full ROM, MS 5/5\par  [de-identified] : NANNETTE.

## 2021-07-25 RX ORDER — AMOXICILLIN AND CLAVULANATE POTASSIUM 875; 125 MG/1; MG/1
875-125 TABLET, COATED ORAL
Qty: 20 | Refills: 0 | Status: COMPLETED | COMMUNITY
Start: 2019-09-18 | End: 2021-07-25

## 2021-07-28 ENCOUNTER — APPOINTMENT (OUTPATIENT)
Dept: VASCULAR SURGERY | Facility: CLINIC | Age: 66
End: 2021-07-28
Payer: MEDICARE

## 2021-07-28 VITALS
SYSTOLIC BLOOD PRESSURE: 144 MMHG | TEMPERATURE: 97.4 F | HEIGHT: 68 IN | DIASTOLIC BLOOD PRESSURE: 75 MMHG | HEART RATE: 70 BPM | OXYGEN SATURATION: 95 %

## 2021-07-28 PROCEDURE — 29580 STRAPPING UNNA BOOT: CPT | Mod: 50

## 2021-07-29 ENCOUNTER — APPOINTMENT (OUTPATIENT)
Dept: VASCULAR SURGERY | Facility: CLINIC | Age: 66
End: 2021-07-29

## 2021-08-04 ENCOUNTER — APPOINTMENT (OUTPATIENT)
Dept: VASCULAR SURGERY | Facility: CLINIC | Age: 66
End: 2021-08-04
Payer: MEDICARE

## 2021-08-04 VITALS
TEMPERATURE: 97.3 F | OXYGEN SATURATION: 95 % | SYSTOLIC BLOOD PRESSURE: 138 MMHG | DIASTOLIC BLOOD PRESSURE: 79 MMHG | HEART RATE: 90 BPM | HEIGHT: 68 IN

## 2021-08-04 PROCEDURE — 93926 LOWER EXTREMITY STUDY: CPT

## 2021-08-04 PROCEDURE — 29580 STRAPPING UNNA BOOT: CPT | Mod: 50

## 2021-08-11 ENCOUNTER — APPOINTMENT (OUTPATIENT)
Dept: VASCULAR SURGERY | Facility: CLINIC | Age: 66
End: 2021-08-11
Payer: MEDICARE

## 2021-08-11 VITALS
TEMPERATURE: 97.3 F | RESPIRATION RATE: 15 BRPM | SYSTOLIC BLOOD PRESSURE: 145 MMHG | HEIGHT: 68 IN | OXYGEN SATURATION: 96 % | DIASTOLIC BLOOD PRESSURE: 84 MMHG | HEART RATE: 78 BPM

## 2021-08-11 PROCEDURE — 29580 STRAPPING UNNA BOOT: CPT | Mod: 50

## 2021-08-18 ENCOUNTER — APPOINTMENT (OUTPATIENT)
Dept: VASCULAR SURGERY | Facility: CLINIC | Age: 66
End: 2021-08-18
Payer: MEDICARE

## 2021-08-18 VITALS
SYSTOLIC BLOOD PRESSURE: 98 MMHG | HEART RATE: 81 BPM | TEMPERATURE: 97.4 F | HEIGHT: 68 IN | RESPIRATION RATE: 16 BRPM | DIASTOLIC BLOOD PRESSURE: 63 MMHG | OXYGEN SATURATION: 96 %

## 2021-08-18 PROCEDURE — 29580 STRAPPING UNNA BOOT: CPT | Mod: 50

## 2021-08-23 ENCOUNTER — APPOINTMENT (OUTPATIENT)
Dept: VASCULAR SURGERY | Facility: CLINIC | Age: 66
End: 2021-08-23
Payer: MEDICARE

## 2021-08-23 VITALS
HEART RATE: 75 BPM | OXYGEN SATURATION: 95 % | RESPIRATION RATE: 15 BRPM | DIASTOLIC BLOOD PRESSURE: 78 MMHG | TEMPERATURE: 97.1 F | SYSTOLIC BLOOD PRESSURE: 133 MMHG | HEIGHT: 68 IN

## 2021-08-23 PROCEDURE — 29580 STRAPPING UNNA BOOT: CPT | Mod: 50

## 2021-08-23 NOTE — ASSESSMENT
[FreeTextEntry1] : 66 y/o male s/p fem-fem bypass on 10/24/19 and lymphedema. I applied BLE Calamine Unna Boots. He will continue with conservative treatment: lymphedema pumps, elevating legs and ambulate as often as possible. I provided him with the numbers for lymphedema therapy.\par \par Unna boots wrapped to bilaterally LE

## 2021-08-23 NOTE — HISTORY OF PRESENT ILLNESS
[FreeTextEntry1] : 64 y/o male s/p fem-fem bypass on 10/24/19 and BLE lymphedema. He has been wearing Unna Boot for several weeks. His legs have improved edema, no weeping of legs, no wounds. He has been compliant with conservative treatments: Frequent ambulation, leg elevation, above the level of the heart at rest and use of lymphedema pumps daily.  He contacted lymphedema clinic however they have no appointments due to one of their therapists be out on leave.  He is awaiting for them to call him with an appointment in the next month.  He reports no current pain. No fever or chills. He is a nonsmoker.\par \par  [de-identified] : Tolerating Unna boots bilateral LE\par

## 2021-08-23 NOTE — PHYSICAL EXAM
[Normal Breath Sounds] : Normal breath sounds [Normal Heart Sounds] : normal heart sounds [Normal Rate and Rhythm] : normal rate and rhythm [2+] : left 2+ [Ankle Swelling (On Exam)] : present [Ankle Swelling On The Right] : of the right ankle [Varicose Veins Of Lower Extremities] : bilaterally [Ankle Swelling On The Left] : moderate [] : bilaterally [Ankle Swelling Bilaterally] : severe [No Rash or Lesion] : No rash or lesion [Alert] : alert [Oriented to Person] : oriented to person [Oriented to Place] : oriented to place [Calm] : calm [Oriented to Time] : oriented to time [de-identified] : WD, WN, NAD. Awake, alert, interactive. Ambulates slowly with a cane for support. [de-identified] : TAYLOR, PERGEOVANNIL [de-identified] : supple [de-identified] : non-labored [FreeTextEntry1] : BLE with moderate non-pitting lymphedema.\par No weeping of legs.\par No wounds, erythema and tenderness. \par + hyperpigmentation, lipodermatosclerosis, fibrosis and skin changes.\par Chronic venous stasis dermatitis.  [de-identified] : soft, obese, NT, ND [de-identified] : no cyanosis or deformity. full ROM, MS 5/5\par  [de-identified] : NANNETTE.

## 2021-09-01 ENCOUNTER — APPOINTMENT (OUTPATIENT)
Dept: VASCULAR SURGERY | Facility: CLINIC | Age: 66
End: 2021-09-01
Payer: MEDICARE

## 2021-09-01 ENCOUNTER — APPOINTMENT (OUTPATIENT)
Dept: VASCULAR SURGERY | Facility: CLINIC | Age: 66
End: 2021-09-01

## 2021-09-01 VITALS
TEMPERATURE: 97.3 F | WEIGHT: 280 LBS | DIASTOLIC BLOOD PRESSURE: 76 MMHG | BODY MASS INDEX: 42.44 KG/M2 | HEART RATE: 74 BPM | OXYGEN SATURATION: 98 % | SYSTOLIC BLOOD PRESSURE: 177 MMHG | HEIGHT: 68 IN

## 2021-09-01 PROCEDURE — 29580 STRAPPING UNNA BOOT: CPT | Mod: 50

## 2021-09-01 PROCEDURE — 99213 OFFICE O/P EST LOW 20 MIN: CPT | Mod: 25

## 2021-09-01 NOTE — HISTORY OF PRESENT ILLNESS
[FreeTextEntry1] : 64 y/o male s/p fem-fem bypass on 10/24/19 and BLE lymphedema. He has been wearing Unna Boot for several weeks. His legs have improved edema, no weeping of legs, no wounds. He has been compliant with conservative treatments: Frequent ambulation, leg elevation, above the level of the heart at rest and use of lymphedema pumps daily. He contacted lymphedema clinic however they have no appointments due to one of their therapists be out on leave. He is awaiting for them to call him with an appointment in the next month. He reports no current pain. No fever or chills. He is a nonsmoker. [de-identified] : Tolerating unna boots bilaterally. Reports still with weeping, however improved.

## 2021-09-01 NOTE — ASSESSMENT
[FreeTextEntry1] : 66 y/o male s/p fem-fem bypass on 10/24/19 and lymphedema. \par \par Unna boots wrapped to bilaterally LE. Return for exchange next week. he has  CVI and Lymphedema  Return in  next week for change in Unna boots [Arterial/Venous Disease] : arterial/venous disease

## 2021-09-01 NOTE — PHYSICAL EXAM
[1+] : left 1+ [Ankle Swelling (On Exam)] : present [] : bilaterally [Ankle Swelling Bilaterally] : severe [de-identified] : NAD [FreeTextEntry1] : Bilateral posterior calf wounds with some weeping. Venous stasis wounds present.

## 2021-09-07 ENCOUNTER — EMERGENCY (EMERGENCY)
Facility: HOSPITAL | Age: 66
LOS: 1 days | Discharge: HOME | End: 2021-09-07
Attending: EMERGENCY MEDICINE
Payer: MEDICARE

## 2021-09-07 VITALS
HEART RATE: 88 BPM | WEIGHT: 285.06 LBS | TEMPERATURE: 98 F | RESPIRATION RATE: 22 BRPM | HEIGHT: 67 IN | OXYGEN SATURATION: 96 % | DIASTOLIC BLOOD PRESSURE: 76 MMHG | SYSTOLIC BLOOD PRESSURE: 124 MMHG

## 2021-09-07 DIAGNOSIS — H26.9 UNSPECIFIED CATARACT: Chronic | ICD-10-CM

## 2021-09-07 DIAGNOSIS — Z95.828 PRESENCE OF OTHER VASCULAR IMPLANTS AND GRAFTS: Chronic | ICD-10-CM

## 2021-09-07 DIAGNOSIS — Z96.642 PRESENCE OF LEFT ARTIFICIAL HIP JOINT: Chronic | ICD-10-CM

## 2021-09-07 PROCEDURE — 82962 GLUCOSE BLOOD TEST: CPT

## 2021-09-07 PROCEDURE — 99283 EMERGENCY DEPT VISIT LOW MDM: CPT | Mod: 25,GC

## 2021-09-07 PROCEDURE — 12002 RPR S/N/AX/GEN/TRNK2.6-7.5CM: CPT

## 2021-09-07 PROCEDURE — 99283 EMERGENCY DEPT VISIT LOW MDM: CPT | Mod: 25

## 2021-09-07 PROCEDURE — 12001 RPR S/N/AX/GEN/TRNK 2.5CM/<: CPT

## 2021-09-07 NOTE — ED PROVIDER NOTE - PATIENT PORTAL LINK FT
You can access the FollowMyHealth Patient Portal offered by [facility] by registering at the following website: [email or web address]. By joining [employer]’s FollowMyHealth portal, you will also be able to view your health information using other applications (apps) compatible with our system.

## 2021-09-07 NOTE — ED PROVIDER NOTE - PHYSICAL EXAMINATION
Head: atraumatic, normacephalic  Face: atraumatic, no crepitus no orbiral/maxillary/mandibular ttp  throat: uvula midline no exudates  eyes: perrla eomi  heart: rrr s1s2  lungs: ctab  abd: soft, nt nd +bs no rebound/guarding no cva ttp  skin: warm  LE: bl edema no calf ttp left foot skin tear superficial lac 1.5 cm bleeding controled cap refill<2sec neurovascularly intact  back: no midline cervical/thoracic/lumbar ttp

## 2021-09-07 NOTE — ED PROVIDER NOTE - NSICDXPASTMEDICALHX_GEN_ALL_CORE_FT
PAST MEDICAL HISTORY:  Anemia iron def treats with DR. Dickey    Ankylosing spondylitis of site in spine     Back pain pain management in past    Cellulitis, leg bilateral    Chronic pain disorder     Chronic venous stasis dermatitis     Deep vein thrombosis (DVT) 25 years ago  pt had THR was on coumadin for 6 months  also noted in Nov 2019 on sono    Diabetes     Diabetic neuropathy     Diabetic ulcer of right foot treats weekly with vascular doc dressing changed and uni boot    External iliac artery occlusion     Former smoker     Ischemic ulcer diabetic foot left toe    Lupus     Obesity     Peripheral vascular disease

## 2021-09-07 NOTE — ED ADULT NURSE REASSESSMENT NOTE - NS ED NURSE REASSESS COMMENT FT1
pt seen and treated by MD Gastelum and MD Farrell. pt in no apparent distress, denies any pain at the moment, dc papers given by MD Gastelum.

## 2021-09-07 NOTE — ED ADULT TRIAGE NOTE - CHIEF COMPLAINT QUOTE
Patient with laceration to right foot X 2 hours, denies blood thinners, dressing placed, continue to bleed, HX of varicose  veins, patient states he injured himself while changing dressing.
as schedule permits

## 2021-09-07 NOTE — ED PROVIDER NOTE - ATTENDING CONTRIBUTION TO CARE
Dr. Ballard : I have personally seen and examined this patient at the bedside. I have fully participated in the care of this patient. I have reviewed all pertinent clinical information, including history, physical exam, plan and the Resident's note and agree except as noted.     64 yo M hx of DM, PVD pw r L foot laceration. pt notes that was changing chronic wound dressing on left foot and accidentally cut himself with scissors.   Denies f/c/n/v/cp/sob/palpitations/cough/abd.pain/d/c/dysuria/hematuria. no sick contacts/recent travel.    PE:  left foot skin tear superficial lac 1cm bleeding controled cap refill<2sec neurovascularly intact      -->lac repair tdap pt has no other complaints Dr. Ballard : I have personally seen and examined this patient at the bedside. I have fully participated in the care of this patient. I have reviewed all pertinent clinical information, including history, physical exam, plan and the Resident's note and agree except as noted.     66 yo M hx of DM, PVD pw r L foot laceration. pt notes that was changing chronic wound dressing on left foot and accidentally cut himself with scissors.   Denies f/c/n/v/cp/sob/palpitations/cough/abd.pain/d/c/dysuria/hematuria. no sick contacts/recent travel.    PE:  left foot skin tear superficial lac 1.5 cm bleeding controled cap refill<2sec neurovascularly intact      -->lac repair tdap pt has no other complaints

## 2021-09-07 NOTE — ED PROVIDER NOTE - OBJECTIVE STATEMENT
64 yo male with hx of DM, PVD presents to the ED for L foot laceration. Changing chronic wound dressing on left foot and accidentally cut himself with scissors. Unable to control bleeding. Denies weakness, dizziness, loss of motor or sensation in foot, pallor.

## 2021-09-07 NOTE — ED PROVIDER NOTE - NSFOLLOWUPINSTRUCTIONS_ED_ALL_ED_FT
Please return here or go to your primary care doctor in 7 days to remove sutures.    Laceration    A laceration is a cut that goes through all of the layers of the skin and into the tissue that is right under the skin. Some lacerations heal on their own. Others need to be closed with skin adhesive strips, skin glue, stitches (sutures), or staples. Proper laceration care minimizes the risk of infection and helps the laceration to heal better.  If non-absorbable stitches or staples have been placed, they must be taken out within the time frame instructed by your healthcare provider.    SEEK IMMEDIATE MEDICAL CARE IF YOU HAVE ANY OF THE FOLLOWING SYMPTOMS: swelling around the wound, worsening pain, drainage from the wound, red streaking going away from your wound, inability to move finger or toe near the laceration, or discoloration of skin near the laceration.

## 2021-09-07 NOTE — ED PROVIDER NOTE - NSICDXPASTSURGICALHX_GEN_ALL_CORE_FT
PAST SURGICAL HISTORY:  Cataract     H/O aorto-femoral bypass     History of hip replacement, total, left x2 surgeries

## 2021-09-08 ENCOUNTER — APPOINTMENT (OUTPATIENT)
Dept: VASCULAR SURGERY | Facility: CLINIC | Age: 66
End: 2021-09-08
Payer: MEDICARE

## 2021-09-08 VITALS
WEIGHT: 280 LBS | OXYGEN SATURATION: 97 % | DIASTOLIC BLOOD PRESSURE: 71 MMHG | BODY MASS INDEX: 42.44 KG/M2 | TEMPERATURE: 97.8 F | HEIGHT: 68 IN | HEART RATE: 105 BPM | SYSTOLIC BLOOD PRESSURE: 108 MMHG

## 2021-09-08 PROCEDURE — 29580 STRAPPING UNNA BOOT: CPT | Mod: 50

## 2021-09-15 ENCOUNTER — APPOINTMENT (OUTPATIENT)
Dept: VASCULAR SURGERY | Facility: CLINIC | Age: 66
End: 2021-09-15
Payer: MEDICARE

## 2021-09-15 VITALS
SYSTOLIC BLOOD PRESSURE: 143 MMHG | DIASTOLIC BLOOD PRESSURE: 74 MMHG | HEIGHT: 68 IN | RESPIRATION RATE: 16 BRPM | OXYGEN SATURATION: 96 % | TEMPERATURE: 97 F | HEART RATE: 79 BPM

## 2021-09-15 PROCEDURE — 99213 OFFICE O/P EST LOW 20 MIN: CPT | Mod: 25

## 2021-09-15 PROCEDURE — 29580 STRAPPING UNNA BOOT: CPT | Mod: 50

## 2021-09-15 NOTE — PHYSICAL EXAM
[2+] : left 2+ [Ankle Swelling (On Exam)] : present [Ankle Swelling Bilaterally] : bilaterally  [Varicose Veins Of Lower Extremities] : bilaterally [] : bilaterally [FreeTextEntry1] : 1cm laceration to left medial foot sutures removed, incision well healed, minimal erythema.

## 2021-09-15 NOTE — ASSESSMENT
[FreeTextEntry1] : 64 y/o male s/p fem-fem bypass on 10/24/19 and lymphedema.  Recently had  a cut  left  foot  trying to remove the  Unna boot  Wound healed well  sutures  removed \par \par Unna boots reapplied.  [Arterial/Venous Disease] : arterial/venous disease [Ulcer Care] : ulcer care

## 2021-09-15 NOTE — HISTORY OF PRESENT ILLNESS
[FreeTextEntry1] : 66 y/o male s/p fem-fem bypass on 10/24/19 and BLE lymphedema. He has been wearing Unna Boot for several months. Edema extends and wanes, despite compliance with conservative treatment: frequent ambulation, leg elevation, above the level of the heart at rest and use of lymphedema pumps daily. He states yesterday he was coming off his bandage and. Skin 1 cm laceration to his foot which would not stop bleeding. He went to the local ER received 2 sutures. He states laceration started bleeding again today when he was removing the dressing. He denies walk or rest pain. No current pain. No fever or chills. He is a nonsmoker. [de-identified] : Stitch removed at bedside, hemostatic.

## 2021-09-22 ENCOUNTER — APPOINTMENT (OUTPATIENT)
Dept: VASCULAR SURGERY | Facility: CLINIC | Age: 66
End: 2021-09-22
Payer: MEDICARE

## 2021-09-22 VITALS
DIASTOLIC BLOOD PRESSURE: 76 MMHG | HEIGHT: 68 IN | OXYGEN SATURATION: 98 % | SYSTOLIC BLOOD PRESSURE: 127 MMHG | RESPIRATION RATE: 16 BRPM | TEMPERATURE: 98.2 F | HEART RATE: 74 BPM

## 2021-09-22 PROCEDURE — 29580 STRAPPING UNNA BOOT: CPT | Mod: 50

## 2021-09-29 ENCOUNTER — APPOINTMENT (OUTPATIENT)
Dept: VASCULAR SURGERY | Facility: CLINIC | Age: 66
End: 2021-09-29
Payer: MEDICARE

## 2021-09-29 VITALS
RESPIRATION RATE: 14 BRPM | OXYGEN SATURATION: 96 % | DIASTOLIC BLOOD PRESSURE: 77 MMHG | HEART RATE: 91 BPM | TEMPERATURE: 98.3 F | HEIGHT: 68 IN | SYSTOLIC BLOOD PRESSURE: 131 MMHG

## 2021-09-29 PROCEDURE — 99213 OFFICE O/P EST LOW 20 MIN: CPT

## 2021-09-29 NOTE — PHYSICAL EXAM
[2+] : left 2+ [Ankle Swelling (On Exam)] : present [Ankle Swelling Bilaterally] : severe [FreeTextEntry1] : lymphedema present

## 2021-09-29 NOTE — ASSESSMENT
[Arterial/Venous Disease] : arterial/venous disease [Foot care/Footwear] : foot care/footwear [FreeTextEntry1] : 64 y/o male s/p fem-fem bypass on 10/24/19 and lymphedema. \par \par Continue lymphedema pumps, elevation, ambulation. \par \par Follow up next week.

## 2021-09-29 NOTE — HISTORY OF PRESENT ILLNESS
[FreeTextEntry1] : 64 y/o male s/p fem-fem bypass on 10/24/19 and BLE lymphedema. He has been wearing Unna Boot for several months. Edema extends and wanes, despite compliance with conservative treatment: frequent ambulation, leg elevation, above the level of the heart at rest and use of lymphedema pumps daily. He states yesterday he was coming off his bandage and. Skin 1 cm laceration to his foot which would not stop bleeding. He went to the local ER received 2 sutures. He states laceration started bleeding again today when he was removing the dressing. He denies walk or rest pain. No current pain. No fever or chills. He is a nonsmoker. [de-identified] : Some pain in lateral aspect of L foot. Able to ambulate. No wound present.

## 2021-10-06 ENCOUNTER — APPOINTMENT (OUTPATIENT)
Dept: VASCULAR SURGERY | Facility: CLINIC | Age: 66
End: 2021-10-06
Payer: MEDICARE

## 2021-10-06 VITALS
HEART RATE: 80 BPM | DIASTOLIC BLOOD PRESSURE: 77 MMHG | HEIGHT: 68 IN | OXYGEN SATURATION: 97 % | TEMPERATURE: 97.3 F | SYSTOLIC BLOOD PRESSURE: 131 MMHG | RESPIRATION RATE: 15 BRPM

## 2021-10-06 PROCEDURE — 99213 OFFICE O/P EST LOW 20 MIN: CPT

## 2021-10-07 NOTE — HISTORY OF PRESENT ILLNESS
[FreeTextEntry1] : 66 y/o male s/p fem-fem bypass on 10/24/19 and BLE lymphedema. He has been wearing Unna Boot for several months. Edema extends and wanes, despite compliance with conservative treatment: frequent ambulation, leg elevation, above the level of the heart at rest and use of lymphedema pumps daily. He states yesterday he was coming off his bandage and. Skin 1 cm laceration to his foot which would not stop bleeding. He went to the local ER received 2 sutures. He states laceration started bleeding again today when he was removing the dressing. He denies walk or rest pain. No current pain. No fever or chills. He is a nonsmoker. \par \par  [de-identified] : Unna boots changed. Ulceration appears significantly improved.

## 2021-10-07 NOTE — ASSESSMENT
[Arterial/Venous Disease] : arterial/venous disease [Foot care/Footwear] : foot care/footwear [Ulcer Care] : ulcer care [FreeTextEntry1] : 64 y/o male s/p fem-fem bypass on 10/24/19 and lymphedema. \par \par Continue lymphedema pumps, elevation, ambulation. \par \par No additional need for unna boots. Recommended compression stockings, however unable to tolerate. Follow up next week for exchange. Patient  feels  more  comfortable  with Unna  Boots  Stocking  more  reasonable

## 2021-10-07 NOTE — PHYSICAL EXAM
[2+] : left 2+ [Ankle Swelling (On Exam)] : present [] : present [Ankle Swelling Bilaterally] : severe [Varicose Veins Of Lower Extremities] : not present [de-identified] : NAD [FreeTextEntry1] : Lymphedema present.

## 2021-10-13 ENCOUNTER — APPOINTMENT (OUTPATIENT)
Dept: VASCULAR SURGERY | Facility: CLINIC | Age: 66
End: 2021-10-13
Payer: MEDICARE

## 2021-10-13 VITALS
RESPIRATION RATE: 15 BRPM | SYSTOLIC BLOOD PRESSURE: 131 MMHG | TEMPERATURE: 97.1 F | DIASTOLIC BLOOD PRESSURE: 66 MMHG | HEART RATE: 74 BPM | OXYGEN SATURATION: 96 % | HEIGHT: 68 IN

## 2021-10-13 PROCEDURE — 29580 STRAPPING UNNA BOOT: CPT | Mod: 50

## 2021-10-13 PROCEDURE — 99213 OFFICE O/P EST LOW 20 MIN: CPT | Mod: 25

## 2021-10-13 NOTE — ASSESSMENT
[FreeTextEntry1] : No ulcers  seen  No Blisters  seen    Lipodermatoseclerosis  present   reapply  Unna boots  per his request  He wants to come to the  office for  changing  Does not want  Home  visits

## 2021-10-13 NOTE — HISTORY OF PRESENT ILLNESS
[FreeTextEntry1] : He is here for  changing  Unna boots  for Lymphedema  No complaints  related to the legs

## 2021-10-13 NOTE — PHYSICAL EXAM
[2+] : right 2+ [Ankle Swelling (On Exam)] : present [Ankle Swelling Bilaterally] : bilaterally  [Varicose Veins Of Lower Extremities] : not present [] : bilaterally

## 2021-10-20 ENCOUNTER — APPOINTMENT (OUTPATIENT)
Dept: VASCULAR SURGERY | Facility: CLINIC | Age: 66
End: 2021-10-20
Payer: MEDICARE

## 2021-10-20 VITALS
DIASTOLIC BLOOD PRESSURE: 79 MMHG | TEMPERATURE: 97.4 F | OXYGEN SATURATION: 93 % | SYSTOLIC BLOOD PRESSURE: 152 MMHG | HEIGHT: 78 IN | HEART RATE: 83 BPM | RESPIRATION RATE: 15 BRPM

## 2021-10-20 PROCEDURE — 99213 OFFICE O/P EST LOW 20 MIN: CPT | Mod: 25

## 2021-10-20 PROCEDURE — 29580 STRAPPING UNNA BOOT: CPT | Mod: 50

## 2021-10-20 NOTE — ASSESSMENT
[FreeTextEntry1] : Unna boots  applied both legs  return for change   Suggest changing the  shoes to avoid injury right heel [Arterial/Venous Disease] : arterial/venous disease

## 2021-10-27 ENCOUNTER — APPOINTMENT (OUTPATIENT)
Dept: VASCULAR SURGERY | Facility: CLINIC | Age: 66
End: 2021-10-27
Payer: MEDICARE

## 2021-10-27 VITALS
OXYGEN SATURATION: 98 % | SYSTOLIC BLOOD PRESSURE: 134 MMHG | HEIGHT: 78 IN | RESPIRATION RATE: 15 BRPM | HEART RATE: 84 BPM | DIASTOLIC BLOOD PRESSURE: 74 MMHG | TEMPERATURE: 97.3 F

## 2021-10-27 PROCEDURE — 29580 STRAPPING UNNA BOOT: CPT | Mod: 50

## 2021-10-27 PROCEDURE — 99212 OFFICE O/P EST SF 10 MIN: CPT | Mod: 25

## 2021-10-27 NOTE — ASSESSMENT
[FreeTextEntry1] : Both legs  stable  No cellulitis   Unna boots  changed  patient  does not  want  home  help he  wants to  come  to the office
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
## 2021-11-03 ENCOUNTER — APPOINTMENT (OUTPATIENT)
Dept: VASCULAR SURGERY | Facility: CLINIC | Age: 66
End: 2021-11-03
Payer: MEDICARE

## 2021-11-03 VITALS
DIASTOLIC BLOOD PRESSURE: 80 MMHG | OXYGEN SATURATION: 96 % | TEMPERATURE: 97.3 F | RESPIRATION RATE: 15 BRPM | HEART RATE: 76 BPM | SYSTOLIC BLOOD PRESSURE: 165 MMHG

## 2021-11-03 VITALS — DIASTOLIC BLOOD PRESSURE: 81 MMHG | SYSTOLIC BLOOD PRESSURE: 153 MMHG

## 2021-11-03 PROCEDURE — 99212 OFFICE O/P EST SF 10 MIN: CPT

## 2021-11-03 NOTE — PHYSICAL EXAM
[1+] : left 1+ [Ankle Swelling (On Exam)] : present [Varicose Veins Of Lower Extremities] : not present [] : present

## 2021-11-03 NOTE — ASSESSMENT
[FreeTextEntry1] : Ulcers  healed  Still has  severe  edema  Needs compression replace  Unna boots  and  pumps at home  [Arterial/Venous Disease] : arterial/venous disease

## 2021-11-03 NOTE — REASON FOR VISIT
[Follow-Up: _____] : a [unfilled] follow-up visit [FreeTextEntry1] : Unna boot change for  Lymphdema  with ulcers

## 2021-11-10 ENCOUNTER — APPOINTMENT (OUTPATIENT)
Dept: VASCULAR SURGERY | Facility: CLINIC | Age: 66
End: 2021-11-10
Payer: MEDICARE

## 2021-11-10 VITALS
RESPIRATION RATE: 15 BRPM | DIASTOLIC BLOOD PRESSURE: 84 MMHG | OXYGEN SATURATION: 97 % | SYSTOLIC BLOOD PRESSURE: 160 MMHG | HEART RATE: 63 BPM | TEMPERATURE: 97.5 F

## 2021-11-10 PROCEDURE — 99212 OFFICE O/P EST SF 10 MIN: CPT

## 2021-11-10 NOTE — PHYSICAL EXAM
[2+] : left 2+ [Ankle Swelling (On Exam)] : present [Varicose Veins Of Lower Extremities] : not present [] : present

## 2021-11-10 NOTE — ASSESSMENT
[FreeTextEntry1] : Unna boots removed  Edema  is less No ulcers  No cellulitis  No  blisters  Plan to reapply unna boots  return next week [Arterial/Venous Disease] : arterial/venous disease

## 2021-11-16 ENCOUNTER — APPOINTMENT (OUTPATIENT)
Dept: VASCULAR SURGERY | Facility: CLINIC | Age: 66
End: 2021-11-16
Payer: MEDICARE

## 2021-11-16 VITALS
RESPIRATION RATE: 15 BRPM | HEART RATE: 82 BPM | TEMPERATURE: 97.4 F | DIASTOLIC BLOOD PRESSURE: 80 MMHG | SYSTOLIC BLOOD PRESSURE: 149 MMHG | OXYGEN SATURATION: 97 %

## 2021-11-16 PROCEDURE — 29580 STRAPPING UNNA BOOT: CPT | Mod: 50

## 2021-11-16 PROCEDURE — 99213 OFFICE O/P EST LOW 20 MIN: CPT | Mod: 25

## 2021-11-16 NOTE — PHYSICAL EXAM
[2+] : left 2+ [Ankle Swelling (On Exam)] : present [Ankle Swelling Bilaterally] : severe [Varicose Veins Of Lower Extremities] : not present [] : bilaterally [Ankle Swelling On The Left] : moderate

## 2021-11-22 ENCOUNTER — APPOINTMENT (OUTPATIENT)
Dept: VASCULAR SURGERY | Facility: CLINIC | Age: 66
End: 2021-11-22
Payer: MEDICARE

## 2021-11-22 VITALS
TEMPERATURE: 96.8 F | RESPIRATION RATE: 15 BRPM | OXYGEN SATURATION: 98 % | DIASTOLIC BLOOD PRESSURE: 73 MMHG | HEART RATE: 76 BPM | HEIGHT: 68 IN | SYSTOLIC BLOOD PRESSURE: 126 MMHG

## 2021-11-22 DIAGNOSIS — Z87.39 PERSONAL HISTORY OF OTHER DISEASES OF THE MUSCULOSKELETAL SYSTEM AND CONNECTIVE TISSUE: ICD-10-CM

## 2021-11-22 PROCEDURE — 99212 OFFICE O/P EST SF 10 MIN: CPT | Mod: 25

## 2021-11-22 PROCEDURE — 29580 STRAPPING UNNA BOOT: CPT

## 2021-11-22 NOTE — PHYSICAL EXAM
[2+] : left 2+ [Ankle Swelling (On Exam)] : present [Ankle Swelling Bilaterally] : severe [] : bilaterally [Ankle Swelling On The Left] : moderate [Varicose Veins Of Lower Extremities] : not present

## 2021-11-22 NOTE — ASSESSMENT
[FreeTextEntry1] : No Ulcers or  cellulitis   plan  reapply Unna Boots per  patient  request [Arterial/Venous Disease] : arterial/venous disease

## 2021-11-30 ENCOUNTER — APPOINTMENT (OUTPATIENT)
Dept: VASCULAR SURGERY | Facility: CLINIC | Age: 66
End: 2021-11-30
Payer: MEDICARE

## 2021-11-30 VITALS
TEMPERATURE: 96.9 F | HEIGHT: 68 IN | HEART RATE: 89 BPM | OXYGEN SATURATION: 97 % | DIASTOLIC BLOOD PRESSURE: 81 MMHG | SYSTOLIC BLOOD PRESSURE: 151 MMHG | RESPIRATION RATE: 15 BRPM

## 2021-11-30 PROCEDURE — 99213 OFFICE O/P EST LOW 20 MIN: CPT | Mod: 25

## 2021-11-30 PROCEDURE — 29580 STRAPPING UNNA BOOT: CPT | Mod: 50

## 2021-11-30 NOTE — ASSESSMENT
[FreeTextEntry1] : Previous  Unna boots  removed  No skin lesions  seen  Bilateral Unna  boots  reapplied per  patient's request He xcannot  apply stockings  because of his  hands   and back [Arterial/Venous Disease] : arterial/venous disease

## 2021-11-30 NOTE — PHYSICAL EXAM
[Ankle Swelling (On Exam)] : present [Varicose Veins Of Lower Extremities] : not present [] : present [Ankle Swelling Bilaterally] : severe [FreeTextEntry1] : Ulcers  all healed  still  has residual edema

## 2021-12-01 PROCEDURE — G9005: CPT

## 2021-12-07 ENCOUNTER — APPOINTMENT (OUTPATIENT)
Dept: VASCULAR SURGERY | Facility: CLINIC | Age: 66
End: 2021-12-07
Payer: MEDICARE

## 2021-12-07 VITALS
WEIGHT: 278 LBS | DIASTOLIC BLOOD PRESSURE: 80 MMHG | RESPIRATION RATE: 15 BRPM | TEMPERATURE: 97.2 F | HEIGHT: 68 IN | BODY MASS INDEX: 42.13 KG/M2 | OXYGEN SATURATION: 99 % | HEART RATE: 88 BPM | SYSTOLIC BLOOD PRESSURE: 150 MMHG

## 2021-12-07 PROCEDURE — 99213 OFFICE O/P EST LOW 20 MIN: CPT | Mod: 25

## 2021-12-07 PROCEDURE — 29580 STRAPPING UNNA BOOT: CPT | Mod: 50

## 2021-12-07 NOTE — ASSESSMENT
[FreeTextEntry1] : Both legs  cleaned  New  Unna boots  applied  No ulcers  or  cellulitis   [Arterial/Venous Disease] : arterial/venous disease

## 2021-12-07 NOTE — PHYSICAL EXAM
[2+] : left 2+ [Ankle Swelling (On Exam)] : present [Ankle Swelling Bilaterally] : bilaterally  [Ankle Swelling On The Left] : moderate [Varicose Veins Of Lower Extremities] : not present [] : not present

## 2021-12-14 ENCOUNTER — APPOINTMENT (OUTPATIENT)
Dept: VASCULAR SURGERY | Facility: CLINIC | Age: 66
End: 2021-12-14
Payer: MEDICARE

## 2021-12-14 VITALS
DIASTOLIC BLOOD PRESSURE: 71 MMHG | HEIGHT: 68 IN | SYSTOLIC BLOOD PRESSURE: 123 MMHG | TEMPERATURE: 97.3 F | RESPIRATION RATE: 15 BRPM | OXYGEN SATURATION: 100 % | HEART RATE: 77 BPM

## 2021-12-14 PROCEDURE — 99213 OFFICE O/P EST LOW 20 MIN: CPT | Mod: 25

## 2021-12-14 PROCEDURE — 29580 STRAPPING UNNA BOOT: CPT | Mod: 50

## 2021-12-14 NOTE — ASSESSMENT
[FreeTextEntry1] : No  soft tissue  infection reapply unna boots  compression working well [Arterial/Venous Disease] : arterial/venous disease

## 2021-12-14 NOTE — PHYSICAL EXAM
[2+] : left 2+ [1+] : left 1+ [Ankle Swelling (On Exam)] : present [Ankle Swelling Bilaterally] : severe [] : bilaterally

## 2021-12-21 ENCOUNTER — APPOINTMENT (OUTPATIENT)
Dept: VASCULAR SURGERY | Facility: CLINIC | Age: 66
End: 2021-12-21
Payer: MEDICARE

## 2021-12-21 VITALS
RESPIRATION RATE: 16 BRPM | OXYGEN SATURATION: 100 % | TEMPERATURE: 97.5 F | HEART RATE: 75 BPM | SYSTOLIC BLOOD PRESSURE: 151 MMHG | DIASTOLIC BLOOD PRESSURE: 70 MMHG | HEIGHT: 68 IN

## 2021-12-21 DIAGNOSIS — R94.39 ABNORMAL RESULT OF OTHER CARDIOVASCULAR FUNCTION STUDY: ICD-10-CM

## 2021-12-21 PROCEDURE — 99213 OFFICE O/P EST LOW 20 MIN: CPT | Mod: 25

## 2021-12-21 PROCEDURE — 29580 STRAPPING UNNA BOOT: CPT | Mod: 50

## 2021-12-21 NOTE — PHYSICAL EXAM
[Ankle Swelling (On Exam)] : present [Ankle Swelling Bilaterally] : bilaterally  [] : bilaterally [Ankle Swelling On The Left] : moderate

## 2021-12-21 NOTE — ASSESSMENT
[FreeTextEntry1] : Previuos  Unna boots removed   New ones  applied   [Arterial/Venous Disease] : arterial/venous disease

## 2021-12-28 ENCOUNTER — APPOINTMENT (OUTPATIENT)
Dept: VASCULAR SURGERY | Facility: CLINIC | Age: 66
End: 2021-12-28
Payer: MEDICARE

## 2021-12-28 VITALS
BODY MASS INDEX: 43.19 KG/M2 | OXYGEN SATURATION: 99 % | WEIGHT: 285 LBS | SYSTOLIC BLOOD PRESSURE: 144 MMHG | RESPIRATION RATE: 16 BRPM | TEMPERATURE: 97.7 F | HEART RATE: 81 BPM | HEIGHT: 68 IN | DIASTOLIC BLOOD PRESSURE: 62 MMHG

## 2021-12-28 PROCEDURE — 99212 OFFICE O/P EST SF 10 MIN: CPT

## 2021-12-28 NOTE — HISTORY OF PRESENT ILLNESS
[FreeTextEntry1] : 65 y/o male s/p fem-fem bypass on 10/24/19 and BLE lymphedema. He has been wearing Unna Boot for several months. [de-identified] : Patient returns for reevaluation\par He was last seen 1 week ago and bilateral unna boots were applied

## 2021-12-28 NOTE — ASSESSMENT
[FreeTextEntry1] : 65 y/o male s/p fem-fem bypass on 10/24/19 and BLE lymphedema. He has been wearing Unna Boot for several months.\par Unna boots reapplied today\par Will follow up in 1 week

## 2021-12-28 NOTE — PHYSICAL EXAM
[Normal Rate and Rhythm] : normal rate and rhythm [Ankle Swelling (On Exam)] : present [Ankle Swelling Bilaterally] : severe [Varicose Veins Of Lower Extremities] : not present [] : bilaterally [Ankle Swelling On The Right] : mild [Abdomen Masses] : No abdominal masses [Alert] : alert [Oriented to Person] : oriented to person [Oriented to Place] : oriented to place [Oriented to Time] : oriented to time [Calm] : calm [de-identified] : NAD [de-identified] : NCAT [FreeTextEntry1] : Bilateral lower extremity lymphedema\par No cellulitis or ulceration [de-identified] : Obese

## 2022-01-04 ENCOUNTER — APPOINTMENT (OUTPATIENT)
Dept: VASCULAR SURGERY | Facility: CLINIC | Age: 67
End: 2022-01-04
Payer: MEDICARE

## 2022-01-04 VITALS
RESPIRATION RATE: 16 BRPM | OXYGEN SATURATION: 100 % | HEIGHT: 68 IN | BODY MASS INDEX: 43.5 KG/M2 | DIASTOLIC BLOOD PRESSURE: 80 MMHG | SYSTOLIC BLOOD PRESSURE: 155 MMHG | TEMPERATURE: 97.8 F | WEIGHT: 287.03 LBS

## 2022-01-04 PROCEDURE — 99212 OFFICE O/P EST SF 10 MIN: CPT

## 2022-01-04 NOTE — ASSESSMENT
[FreeTextEntry1] : 65 y/o male s/p fem-fem bypass on 10/24/19 and BLE lymphedema. He has been wearing Unna Boot for several months.\par Unna boots reapplied today\par Will follow up in 1 week.

## 2022-01-04 NOTE — PHYSICAL EXAM
[Normal Rate and Rhythm] : normal rate and rhythm [Ankle Swelling (On Exam)] : present [Ankle Swelling Bilaterally] : severe [Varicose Veins Of Lower Extremities] : not present [] : bilaterally [Ankle Swelling On The Right] : mild [Abdomen Masses] : No abdominal masses [Alert] : alert [Oriented to Person] : oriented to person [Oriented to Place] : oriented to place [Oriented to Time] : oriented to time [Calm] : calm [de-identified] : NAD [de-identified] : NCAT [FreeTextEntry1] : Bilateral lower extremity lymphedema\par No cellulitis or ulceration [de-identified] : Obese

## 2022-01-04 NOTE — HISTORY OF PRESENT ILLNESS
[FreeTextEntry1] : 65 y/o male s/p fem-fem bypass on 10/24/19 and BLE lymphedema. He has been wearing Unna Boot for several months. [de-identified] : Patient returns for reevaluation\par He was last seen 1 week ago and bilateral unna boots were applied

## 2022-01-11 ENCOUNTER — APPOINTMENT (OUTPATIENT)
Dept: VASCULAR SURGERY | Facility: CLINIC | Age: 67
End: 2022-01-11

## 2022-01-12 ENCOUNTER — APPOINTMENT (OUTPATIENT)
Dept: VASCULAR SURGERY | Facility: CLINIC | Age: 67
End: 2022-01-12
Payer: MEDICARE

## 2022-01-12 VITALS
RESPIRATION RATE: 16 BRPM | SYSTOLIC BLOOD PRESSURE: 130 MMHG | TEMPERATURE: 97.1 F | HEART RATE: 80 BPM | HEIGHT: 68 IN | OXYGEN SATURATION: 100 % | DIASTOLIC BLOOD PRESSURE: 66 MMHG

## 2022-01-12 PROCEDURE — 99213 OFFICE O/P EST LOW 20 MIN: CPT

## 2022-01-12 NOTE — HISTORY OF PRESENT ILLNESS
[FreeTextEntry1] : 65 y/o male s/p fem-fem bypass on 10/24/19 and BLE lymphedema. He has been wearing Unna Boot for several months.  Denies new complaints today, denies fever or chills, denies c/p, denies worsening SOB.   [de-identified] : Presented today for re-evaluation, has been having bilateral UNNA boots, reapplication today

## 2022-01-12 NOTE — PHYSICAL EXAM
[Normal Breath Sounds] : Normal breath sounds [Normal Heart Sounds] : normal heart sounds [2+] : left 2+ [Ankle Swelling (On Exam)] : present [Varicose Veins Of Lower Extremities] : bilaterally [] : bilaterally [Ankle Swelling Bilaterally] : severe [Abdomen Masses] : No abdominal masses [No HSM] : no hepatosplenomegaly [Tender] : was nontender [Enlarged] : not enlarged [Stool Sample Taken] : No stool obtained  on rectal exam [Purpura] : no purpura  [Petechiae] : no petechiae [Skin Ulcer] : no ulcer [Skin Induration] : no induration [Alert] : alert [Oriented to Person] : oriented to person [Oriented to Place] : oriented to place [Oriented to Time] : oriented to time [Calm] : calm [de-identified] : Well nourished  [de-identified] : NC/AT  [de-identified] : Ray  [de-identified] : Bilateral lower extremity swelling/edema, venous stasis changes, minimum erythema

## 2022-01-12 NOTE — ASSESSMENT
[FreeTextEntry1] : 66 year old M, s/p fem-fem bypass in 2019, BLE edema, presents for lower extremity assessment, improving edema\par \par - Reapplication of UNNA boots today \par - FU in 1 week \par - Case seen and examined by Dr. Rojo, Vascular Surgery attending

## 2022-01-12 NOTE — REVIEW OF SYSTEMS
[Fever] : no fever [Chills] : no chills [Feeling Poorly] : not feeling poorly [Feeling Tired] : not feeling tired [Recent Weight Gain (___ Lbs)] : no recent weight gain [Recent Weight Loss (___ Lbs)] : no recent weight loss [Eye Pain] : no eye pain [Red Eyes] : eyes not red [Eyesight Problems] : no eyesight problems [Discharge From Eyes] : no purulent discharge from the eyes [Dry Eyes] : no dryness of the eyes [Eyes Itch] : no itching of the eyes [Earache] : no earache [Loss Of Hearing] : no hearing loss [Nosebleeds] : no nosebleeds [Nasal Discharge] : no nasal discharge [Sore Throat] : no sore throat [Hoarseness] : no hoarseness [Heart Rate Is Slow] : the heart rate was not slow [Heart Rate Is Fast] : the heart rate was not fast [Chest Pain] : no chest pain [Palpitations] : no palpitations [Leg Claudication] : no intermittent leg claudication [Lower Ext Edema] : no extremity edema [Shortness Of Breath] : no shortness of breath [Wheezing] : no wheezing [Cough] : no cough [SOB on Exertion] : no shortness of breath during exertion [Orthopnea] : no orthopnea [PND] : no PND [Abdominal Pain] : no abdominal pain [Vomiting] : no vomiting [Constipation] : no constipation [Diarrhea] : no diarrhea [Heartburn] : no heartburn [Melena] : no melena [Dysuria] : no dysuria [Incontinence] : no incontinence [Hesitancy] : no urinary hesitancy [Nocturia] : no nocturia [Genital Lesion] : no genital lesions [Testicular Pain] : no testicular pain [Arthralgias] : no arthralgias [Joint Swelling] : no joint swelling [Joint Stiffness] : no joint stiffness [Limb Pain] : no limb pain [Limb Swelling] : no limb swelling [Skin Lesions] : no skin lesions [Skin Wound] : no skin wound [Itching] : itching [Dry Skin] : dry skin [Confused] : no confusion [Convulsions] : no convulsions [Dizziness] : no dizziness [Fainting] : no fainting [Limb Weakness] : no limb weakness [Difficulty Walking] : no difficulty walking [Suicidal] : not suicidal [Sleep Disturbances] : no sleep disturbances [Anxiety] : no anxiety [Depression] : no depression [Change In Personality] : no personality change [Emotional Problems] : no emotional problems [Proptosis] : no proptosis [Hot Flashes] : no hot flashes [Muscle Weakness] : no muscle weakness [Erectile Dysfunction] : no erectile dysfunction [Deepening Of The Voice] : no deepening of the voice [Feelings Of Weakness] : no feelings of weakness [Easy Bleeding] : no tendency for easy bleeding [Easy Bruising] : no tendency for easy bruising [Swollen Glands] : no swollen glands [Swollen Glands In The Neck] : no swollen glands in the neck [de-identified] : Lower extremity dry skin

## 2022-01-18 ENCOUNTER — APPOINTMENT (OUTPATIENT)
Dept: VASCULAR SURGERY | Facility: CLINIC | Age: 67
End: 2022-01-18
Payer: MEDICARE

## 2022-01-18 VITALS
DIASTOLIC BLOOD PRESSURE: 67 MMHG | RESPIRATION RATE: 16 BRPM | OXYGEN SATURATION: 97 % | SYSTOLIC BLOOD PRESSURE: 133 MMHG | HEART RATE: 74 BPM | HEIGHT: 68 IN | TEMPERATURE: 97.6 F

## 2022-01-18 PROCEDURE — 29580 STRAPPING UNNA BOOT: CPT | Mod: 50

## 2022-01-18 PROCEDURE — 99212 OFFICE O/P EST SF 10 MIN: CPT | Mod: 25

## 2022-01-18 NOTE — PHYSICAL EXAM
[Ankle Swelling Bilaterally] : bilaterally  [Ankle Swelling On The Left] : moderate [FreeTextEntry1] : Edema  improved No ulcers  or blisters

## 2022-01-25 ENCOUNTER — APPOINTMENT (OUTPATIENT)
Dept: VASCULAR SURGERY | Facility: CLINIC | Age: 67
End: 2022-01-25
Payer: MEDICARE

## 2022-01-25 VITALS
WEIGHT: 280 LBS | DIASTOLIC BLOOD PRESSURE: 94 MMHG | OXYGEN SATURATION: 100 % | RESPIRATION RATE: 16 BRPM | HEIGHT: 68 IN | SYSTOLIC BLOOD PRESSURE: 167 MMHG | HEART RATE: 79 BPM | TEMPERATURE: 97.3 F | BODY MASS INDEX: 42.44 KG/M2

## 2022-01-25 VITALS — SYSTOLIC BLOOD PRESSURE: 168 MMHG | DIASTOLIC BLOOD PRESSURE: 84 MMHG

## 2022-01-25 PROCEDURE — 99212 OFFICE O/P EST SF 10 MIN: CPT | Mod: 25

## 2022-01-25 PROCEDURE — 29580 STRAPPING UNNA BOOT: CPT | Mod: 50

## 2022-01-25 NOTE — ASSESSMENT
[FreeTextEntry1] : No blisters  or  ulcers  Changed the  Unna Boots   [Arterial/Venous Disease] : arterial/venous disease

## 2022-01-25 NOTE — PHYSICAL EXAM
[2+] : left 2+ [Ankle Swelling (On Exam)] : present [Ankle Swelling Bilaterally] : bilaterally  [] : bilaterally

## 2022-02-01 ENCOUNTER — APPOINTMENT (OUTPATIENT)
Dept: VASCULAR SURGERY | Facility: CLINIC | Age: 67
End: 2022-02-01

## 2022-02-02 NOTE — PATIENT PROFILE ADULT - NSASFUNCLEVELADLTRANSFER_GEN_A_NUR
Quality 130: Documentation Of Current Medications In The Medical Record: Current Medications Documented
Quality 402: Tobacco Use And Help With Quitting Among Adolescents: Patient screened for tobacco and never smoked
Quality 431: Preventive Care And Screening: Unhealthy Alcohol Use - Screening: Patient identified as an unhealthy alcohol user when screened for unhealthy alcohol use using a systematic screening method and received brief counseling
Detail Level: Detailed
0 = independent

## 2022-02-08 ENCOUNTER — APPOINTMENT (OUTPATIENT)
Dept: VASCULAR SURGERY | Facility: CLINIC | Age: 67
End: 2022-02-08
Payer: MEDICARE

## 2022-02-08 VITALS
OXYGEN SATURATION: 97 % | SYSTOLIC BLOOD PRESSURE: 167 MMHG | DIASTOLIC BLOOD PRESSURE: 82 MMHG | HEIGHT: 68 IN | TEMPERATURE: 97.4 F | HEART RATE: 108 BPM | RESPIRATION RATE: 16 BRPM

## 2022-02-08 PROCEDURE — 29580 STRAPPING UNNA BOOT: CPT | Mod: RT

## 2022-02-08 PROCEDURE — 99213 OFFICE O/P EST LOW 20 MIN: CPT | Mod: 25

## 2022-02-08 NOTE — ASSESSMENT
[FreeTextEntry1] : RLE  small 2 to 3 cm blisters  No cellulitis  Plan reapply unna boots  Return next week [Arterial/Venous Disease] : arterial/venous disease

## 2022-02-08 NOTE — HISTORY OF PRESENT ILLNESS
[FreeTextEntry1] : Missed  an appointment  He took the unna boots  himself  he developed blisters  right leg prompltly

## 2022-02-14 NOTE — PATIENT PROFILE ADULT - DO YOU FEEL UNSAFE AT SCHOOL?
a/p  # SOB due to Acute CHF exacerbation, HFrEF  in presence of   COPD on 3L home O2 ( not in exac) and severe pulmonary hypertension and mild to mod pericardial effusion, neg PE,   pericardial effusion , - No intervention per CTS  pleural effusion, consider thoracentesis, dw Pulm, Dr Farias, she will see pt, possibly IR to do both thora and bx at the same time   followup heart failure team   - Daily weight. Strict I & Os. Keep I < O. Fluid restriction  - Continue Metoprolol 50mg PO QD, Entresto 49-51 BID and spironolactone 25mg PO QD  cont bumex per heart failure team for now     # PAD  - Duplex showed b/l SFA occlusion  - c/w ASA, plavix and lipitor  - No acute surgical intervention as per vascular  - outpt follow up with vascular in 2-4 weeks    #CAD s/p PCI to RCA - Continue ASA 81mg PO QD, Plavix 75mg PO QD, Metoprolol 50mg PO QD    #CORNELIUS on CPAP - CPAP at night     #Hx of CVA with residual weakness   #HTN/DLD  - Continue atorvastatin 80mg PO QD, ASA 81mg PO QD, Plavix 75mg PO QD, fenofibrate 145mg PO QD    #Diabetes Mellitus type II   - Last HbA1C 7.7 % (11-10-21)  - c/w insulin per protocol     #Progress Note Handoff  Pending (specify):  clinical optimization , IR for liver bx and poss thoracentesis, dvt to evaluate for lower ext dvt ( ? if ac can be stopped)   Family discussion: natalie pt   Disposition: Home with home care when stable a/p  # SOB due to Acute CHF exacerbation, HFrEF  in presence of   COPD on 3L home O2 ( not in [place]) and severe pulmonary hypertension and mild to mod pericardial effusion, neg PE,   pericardial effusion , - No intervention per CTS  followup heart failure team   - Daily weight. Strict I & Os. Keep I < O. Fluid restriction  - Continue Metoprolol 50mg PO QD, Entresto 49-51 BID and spironolactone 25mg PO QD  cont bumex per heart failure team for now     # PAD  - Duplex showed b/l SFA occlusion  - c/w ASA, plavix and lipitor  - No acute surgical intervention as per vascular  - outpt follow up with vascular in 2-4 weeks    #CAD s/p PCI to RCA - Continue ASA 81mg PO QD, Plavix 75mg PO QD, Metoprolol 50mg PO QD    #CORNELIUS on CPAP - CPAP at night     #Hx of CVA with residual weakness   #HTN/DLD  - Continue atorvastatin 80mg PO QD, ASA 81mg PO QD, Plavix 75mg PO QD, fenofibrate 145mg PO QD    #Diabetes Mellitus type II   - Last HbA1C 7.7 % (11-10-21)  - c/w insulin per protocol     #Progress Note Handoff  Pending (specify):  clinical optimization ,   Family discussion: natalie pt   Disposition: Home with home care when stable not applicable

## 2022-02-15 ENCOUNTER — APPOINTMENT (OUTPATIENT)
Dept: VASCULAR SURGERY | Facility: CLINIC | Age: 67
End: 2022-02-15
Payer: MEDICARE

## 2022-02-15 VITALS
HEIGHT: 68 IN | RESPIRATION RATE: 16 BRPM | HEART RATE: 90 BPM | DIASTOLIC BLOOD PRESSURE: 70 MMHG | TEMPERATURE: 97.2 F | OXYGEN SATURATION: 97 % | SYSTOLIC BLOOD PRESSURE: 102 MMHG

## 2022-02-15 PROCEDURE — 99213 OFFICE O/P EST LOW 20 MIN: CPT | Mod: 25

## 2022-02-15 PROCEDURE — 29580 STRAPPING UNNA BOOT: CPT | Mod: 50

## 2022-02-15 NOTE — ASSESSMENT
[Arterial/Venous Disease] : arterial/venous disease [FreeTextEntry1] : Reapply unna boots   bilaterally  return for  change

## 2022-02-15 NOTE — PHYSICAL EXAM
[Ankle Swelling (On Exam)] : present [] : bilaterally [Ankle Swelling Bilaterally] : severe [FreeTextEntry1] : superficial ulcer  RLe  No ulcers LLE

## 2022-02-22 ENCOUNTER — APPOINTMENT (OUTPATIENT)
Dept: VASCULAR SURGERY | Facility: CLINIC | Age: 67
End: 2022-02-22
Payer: MEDICARE

## 2022-02-22 VITALS
DIASTOLIC BLOOD PRESSURE: 86 MMHG | RESPIRATION RATE: 16 BRPM | SYSTOLIC BLOOD PRESSURE: 129 MMHG | WEIGHT: 279 LBS | TEMPERATURE: 97.2 F | OXYGEN SATURATION: 97 % | HEART RATE: 80 BPM | HEIGHT: 68 IN | BODY MASS INDEX: 42.28 KG/M2

## 2022-02-22 PROCEDURE — 29580 STRAPPING UNNA BOOT: CPT | Mod: 50

## 2022-02-22 PROCEDURE — 99213 OFFICE O/P EST LOW 20 MIN: CPT | Mod: 25

## 2022-02-23 NOTE — PHYSICAL EXAM
[2+] : left 2+ [Ankle Swelling (On Exam)] : present [Ankle Swelling On The Left] : moderate [Varicose Veins Of Lower Extremities] : not present [] : bilaterally [Ankle Swelling Bilaterally] : severe

## 2022-02-23 NOTE — ASSESSMENT
[FreeTextEntry1] : No ulcers  Responding  to unna boots   They are changed  No local sepsis  he is requesting  crutches which  we ordered   Return  as needed   [Arterial/Venous Disease] : arterial/venous disease

## 2022-03-01 ENCOUNTER — APPOINTMENT (OUTPATIENT)
Dept: VASCULAR SURGERY | Facility: CLINIC | Age: 67
End: 2022-03-01
Payer: MEDICARE

## 2022-03-01 VITALS
RESPIRATION RATE: 16 BRPM | OXYGEN SATURATION: 99 % | SYSTOLIC BLOOD PRESSURE: 144 MMHG | HEART RATE: 82 BPM | DIASTOLIC BLOOD PRESSURE: 75 MMHG | TEMPERATURE: 97.3 F | HEIGHT: 68 IN

## 2022-03-01 PROCEDURE — 29580 STRAPPING UNNA BOOT: CPT | Mod: 50

## 2022-03-01 PROCEDURE — 99213 OFFICE O/P EST LOW 20 MIN: CPT | Mod: 25

## 2022-03-01 NOTE — ASSESSMENT
[FreeTextEntry1] : May have  heelspur  Should  see podiatry  Bilateral Unna Boots changed   [Arterial/Venous Disease] : arterial/venous disease [Foot care/Footwear] : foot care/footwear

## 2022-03-01 NOTE — PHYSICAL EXAM
[Ankle Swelling (On Exam)] : present [Ankle Swelling Bilaterally] : severe [Varicose Veins Of Lower Extremities] : not present [] : bilaterally [Ankle Swelling On The Left] : moderate [FreeTextEntry1] : Right  heel tender  No  ulcers  or blisters  or local sepsis

## 2022-03-08 ENCOUNTER — APPOINTMENT (OUTPATIENT)
Dept: VASCULAR SURGERY | Facility: CLINIC | Age: 67
End: 2022-03-08
Payer: MEDICARE

## 2022-03-08 VITALS
SYSTOLIC BLOOD PRESSURE: 121 MMHG | RESPIRATION RATE: 16 BRPM | HEIGHT: 68 IN | HEART RATE: 82 BPM | TEMPERATURE: 97.5 F | WEIGHT: 279 LBS | DIASTOLIC BLOOD PRESSURE: 68 MMHG | OXYGEN SATURATION: 98 % | BODY MASS INDEX: 42.28 KG/M2

## 2022-03-08 DIAGNOSIS — Z78.9 OTHER SPECIFIED HEALTH STATUS: ICD-10-CM

## 2022-03-08 DIAGNOSIS — Z83.3 FAMILY HISTORY OF DIABETES MELLITUS: ICD-10-CM

## 2022-03-08 PROCEDURE — 99213 OFFICE O/P EST LOW 20 MIN: CPT | Mod: 25

## 2022-03-08 PROCEDURE — 29580 STRAPPING UNNA BOOT: CPT | Mod: 50

## 2022-03-08 NOTE — PHYSICAL EXAM
[2+] : left 2+ [] : bilaterally [Ankle Swelling Bilaterally] : severe [FreeTextEntry1] : No  Ulcers or blisters

## 2022-03-08 NOTE — ASSESSMENT
[FreeTextEntry1] : I  spoke to him regarding  wearing  stockings  and not  UNNa boots  He  cannot  do this  himself  because of his  disability  He prefers  changing  Unna boots   weekly [Arterial/Venous Disease] : arterial/venous disease

## 2022-03-11 NOTE — DISCHARGE NOTE ADULT - NSCORESITESY/N_GEN_A_CORE_RD
Spoke with the pt's daughter, Ira regarding DC plan; let her know that [facility] did decline the pt. I have sent the referral to [facility] as she requested, I spoke with Becky who will review the referral and let me know.   I told Ira at this point [facility] the only willing to accept the pt so she needs to call Alie or Shon in admissions to finalize the financial arrangements and paperwork, she verbalized understanding.   I left a message for Mrs Albrecht, EPS worker to update her.    03/11/22 0957   Discharge Reassessment   Assessment Type Discharge Planning Reassessment      No

## 2022-03-15 ENCOUNTER — APPOINTMENT (OUTPATIENT)
Dept: VASCULAR SURGERY | Facility: CLINIC | Age: 67
End: 2022-03-15
Payer: MEDICARE

## 2022-03-15 VITALS
DIASTOLIC BLOOD PRESSURE: 72 MMHG | SYSTOLIC BLOOD PRESSURE: 142 MMHG | HEART RATE: 79 BPM | RESPIRATION RATE: 18 BRPM | TEMPERATURE: 98.6 F | OXYGEN SATURATION: 97 %

## 2022-03-15 PROCEDURE — 29580 STRAPPING UNNA BOOT: CPT | Mod: 50

## 2022-03-15 PROCEDURE — 99213 OFFICE O/P EST LOW 20 MIN: CPT | Mod: 25

## 2022-03-15 NOTE — ASSESSMENT
[FreeTextEntry1] : Legs   skin  excoriated  removed  scrubbed  and  new Unna boots  applied  bilaterally [Arterial/Venous Disease] : arterial/venous disease

## 2022-03-15 NOTE — PHYSICAL EXAM
[1+] : left 1+ [Ankle Swelling (On Exam)] : present [Varicose Veins Of Lower Extremities] : not present [] : bilaterally [Ankle Swelling Bilaterally] : severe

## 2022-03-22 ENCOUNTER — APPOINTMENT (OUTPATIENT)
Dept: VASCULAR SURGERY | Facility: CLINIC | Age: 67
End: 2022-03-22
Payer: MEDICARE

## 2022-03-22 VITALS
RESPIRATION RATE: 18 BRPM | OXYGEN SATURATION: 96 % | DIASTOLIC BLOOD PRESSURE: 79 MMHG | SYSTOLIC BLOOD PRESSURE: 128 MMHG | HEART RATE: 70 BPM | TEMPERATURE: 97.8 F

## 2022-03-22 DIAGNOSIS — L97.919 VARICOSE VEINS OF RIGHT LOWER EXTREMITY WITH ULCER OF UNSPECIFIED SITE: ICD-10-CM

## 2022-03-22 DIAGNOSIS — R60.0 LOCALIZED EDEMA: ICD-10-CM

## 2022-03-22 DIAGNOSIS — Z86.69 PERSONAL HISTORY OF OTHER DISEASES OF THE NERVOUS SYSTEM AND SENSE ORGANS: ICD-10-CM

## 2022-03-22 DIAGNOSIS — L97.929 VARICOSE VEINS OF RIGHT LOWER EXTREMITY WITH ULCER OF UNSPECIFIED SITE: ICD-10-CM

## 2022-03-22 DIAGNOSIS — I83.029 VARICOSE VEINS OF RIGHT LOWER EXTREMITY WITH ULCER OF UNSPECIFIED SITE: ICD-10-CM

## 2022-03-22 DIAGNOSIS — I83.019 VARICOSE VEINS OF RIGHT LOWER EXTREMITY WITH ULCER OF UNSPECIFIED SITE: ICD-10-CM

## 2022-03-22 PROCEDURE — 99213 OFFICE O/P EST LOW 20 MIN: CPT | Mod: 25

## 2022-03-22 PROCEDURE — 29580 STRAPPING UNNA BOOT: CPT | Mod: 50

## 2022-03-22 NOTE — PHYSICAL EXAM
[Ankle Swelling (On Exam)] : present [Varicose Veins Of Lower Extremities] : not present [] : bilaterally [Ankle Swelling Bilaterally] : severe [FreeTextEntry1] : Tender right heel  No  open wounds

## 2022-03-22 NOTE — ASSESSMENT
[FreeTextEntry1] : Most likely calcaneal spur   right heel  Both legs  clean No ulcers  seen  Reapplied Unna Boots   return for  change  he  needs to see his  podiatrist to get xray  right  foot [Arterial/Venous Disease] : arterial/venous disease [Ulcer Care] : ulcer care

## 2022-03-29 ENCOUNTER — APPOINTMENT (OUTPATIENT)
Dept: VASCULAR SURGERY | Facility: CLINIC | Age: 67
End: 2022-03-29
Payer: MEDICARE

## 2022-03-29 VITALS
OXYGEN SATURATION: 95 % | BODY MASS INDEX: 42.44 KG/M2 | WEIGHT: 280 LBS | HEIGHT: 68 IN | HEART RATE: 85 BPM | RESPIRATION RATE: 18 BRPM | SYSTOLIC BLOOD PRESSURE: 156 MMHG | TEMPERATURE: 97.5 F | DIASTOLIC BLOOD PRESSURE: 75 MMHG

## 2022-03-29 PROCEDURE — 29580 STRAPPING UNNA BOOT: CPT | Mod: 50

## 2022-03-29 PROCEDURE — 99212 OFFICE O/P EST SF 10 MIN: CPT | Mod: 25

## 2022-03-29 NOTE — HISTORY OF PRESENT ILLNESS
[FreeTextEntry1] : Asymptomatic  as far as legs  are concerned  he fell at home   few days ago No  other symptoms related to the fall

## 2022-04-06 ENCOUNTER — APPOINTMENT (OUTPATIENT)
Dept: VASCULAR SURGERY | Facility: CLINIC | Age: 67
End: 2022-04-06

## 2022-04-12 ENCOUNTER — APPOINTMENT (OUTPATIENT)
Dept: VASCULAR SURGERY | Facility: CLINIC | Age: 67
End: 2022-04-12

## 2022-04-13 ENCOUNTER — APPOINTMENT (OUTPATIENT)
Dept: VASCULAR SURGERY | Facility: CLINIC | Age: 67
End: 2022-04-13
Payer: MEDICARE

## 2022-04-13 VITALS
OXYGEN SATURATION: 95 % | WEIGHT: 280 LBS | HEIGHT: 68 IN | TEMPERATURE: 97.5 F | BODY MASS INDEX: 42.44 KG/M2 | SYSTOLIC BLOOD PRESSURE: 151 MMHG | RESPIRATION RATE: 16 BRPM | HEART RATE: 84 BPM | DIASTOLIC BLOOD PRESSURE: 72 MMHG

## 2022-04-13 PROCEDURE — 99213 OFFICE O/P EST LOW 20 MIN: CPT

## 2022-04-15 NOTE — PHYSICAL EXAM
[Ankle Swelling (On Exam)] : present [Ankle Swelling Bilaterally] : bilaterally  [] : bilaterally [de-identified] : A&O x 3. NAD [FreeTextEntry1] : No ulcers

## 2022-04-15 NOTE — HISTORY OF PRESENT ILLNESS
[FreeTextEntry1] : 3/29/22: Asymptomatic  as far as legs  are concerned  he fell at home   few days ago No  other symptoms related to the fall\par \par 4/13/22: Pt is here for UNNA boot change today. He normally follows with Dr. Brown. He denies any fever or chills.

## 2022-04-19 ENCOUNTER — APPOINTMENT (OUTPATIENT)
Dept: VASCULAR SURGERY | Facility: CLINIC | Age: 67
End: 2022-04-19
Payer: MEDICARE

## 2022-04-19 VITALS
OXYGEN SATURATION: 97 % | DIASTOLIC BLOOD PRESSURE: 73 MMHG | HEART RATE: 87 BPM | TEMPERATURE: 97.3 F | RESPIRATION RATE: 16 BRPM | SYSTOLIC BLOOD PRESSURE: 122 MMHG

## 2022-04-19 DIAGNOSIS — D64.9 ANEMIA, UNSPECIFIED: ICD-10-CM

## 2022-04-19 DIAGNOSIS — D89.0 POLYCLONAL HYPERGAMMAGLOBULINEMIA: ICD-10-CM

## 2022-04-19 PROCEDURE — 29580 STRAPPING UNNA BOOT: CPT | Mod: 50

## 2022-04-19 PROCEDURE — 99213 OFFICE O/P EST LOW 20 MIN: CPT | Mod: 25

## 2022-04-19 NOTE — ASSESSMENT
[FreeTextEntry1] : Left leg  is near normal  with Charcot   RLE has moderate  edema  Plan Bilateral UNNa boots  He  cannot  apply stockings because of severe arthritis   back issues  with  spondylitis  Return in a week [Foot care/Footwear] : foot care/footwear

## 2022-04-19 NOTE — PHYSICAL EXAM
[2+] : left 2+ [Ankle Swelling (On Exam)] : present [Ankle Swelling On The Right] : of the right ankle [Ankle Swelling On The Left] : moderate [Varicose Veins Of Lower Extremities] : not present [] : bilaterally [Ankle Swelling Bilaterally] : severe [FreeTextEntry1] : Charcot feet  bilaterally  Left leg has no edema

## 2022-04-26 ENCOUNTER — APPOINTMENT (OUTPATIENT)
Dept: VASCULAR SURGERY | Facility: CLINIC | Age: 67
End: 2022-04-26
Payer: MEDICARE

## 2022-04-26 VITALS
SYSTOLIC BLOOD PRESSURE: 125 MMHG | OXYGEN SATURATION: 96 % | HEART RATE: 81 BPM | TEMPERATURE: 97.5 F | HEIGHT: 68 IN | WEIGHT: 281 LBS | DIASTOLIC BLOOD PRESSURE: 69 MMHG | RESPIRATION RATE: 16 BRPM | BODY MASS INDEX: 42.59 KG/M2

## 2022-04-26 DIAGNOSIS — M45.9 ANKYLOSING SPONDYLITIS OF UNSPECIFIED SITES IN SPINE: ICD-10-CM

## 2022-04-26 PROCEDURE — 29580 STRAPPING UNNA BOOT: CPT | Mod: 50

## 2022-04-26 PROCEDURE — 99213 OFFICE O/P EST LOW 20 MIN: CPT | Mod: 25

## 2022-04-26 NOTE — ASSESSMENT
[FreeTextEntry1] : Bilateral Unnaboots  changed  replaced  Return in  1 week [Arterial/Venous Disease] : arterial/venous disease

## 2022-04-26 NOTE — PHYSICAL EXAM
[Ankle Swelling (On Exam)] : present [] : bilaterally [Ankle Swelling Bilaterally] : severe [FreeTextEntry1] : No ulcers  sor  blisters

## 2022-05-03 ENCOUNTER — APPOINTMENT (OUTPATIENT)
Dept: VASCULAR SURGERY | Facility: CLINIC | Age: 67
End: 2022-05-03
Payer: MEDICARE

## 2022-05-03 VITALS
OXYGEN SATURATION: 97 % | HEIGHT: 68 IN | TEMPERATURE: 98.4 F | RESPIRATION RATE: 15 BRPM | HEART RATE: 80 BPM | DIASTOLIC BLOOD PRESSURE: 78 MMHG | SYSTOLIC BLOOD PRESSURE: 135 MMHG

## 2022-05-03 PROCEDURE — 29580 STRAPPING UNNA BOOT: CPT | Mod: 50

## 2022-05-03 PROCEDURE — 99213 OFFICE O/P EST LOW 20 MIN: CPT | Mod: 25

## 2022-05-03 NOTE — PHYSICAL EXAM
[Ankle Swelling (On Exam)] : present [Varicose Veins Of Lower Extremities] : not present [] : bilaterally [Ankle Swelling Bilaterally] : severe

## 2022-05-03 NOTE — ASSESSMENT
[FreeTextEntry1] : No active ulcers   Replaced the  bilateral Unna Boots  No  cellulitis   Patient  can  wear stockings  but he  refuses  and wants to come to the office for  weekly Unna Boots  [Arterial/Venous Disease] : arterial/venous disease

## 2022-05-10 ENCOUNTER — APPOINTMENT (OUTPATIENT)
Dept: VASCULAR SURGERY | Facility: CLINIC | Age: 67
End: 2022-05-10
Payer: MEDICARE

## 2022-05-10 VITALS
RESPIRATION RATE: 16 BRPM | DIASTOLIC BLOOD PRESSURE: 71 MMHG | HEART RATE: 91 BPM | TEMPERATURE: 98.4 F | OXYGEN SATURATION: 95 % | SYSTOLIC BLOOD PRESSURE: 136 MMHG

## 2022-05-10 PROCEDURE — 99213 OFFICE O/P EST LOW 20 MIN: CPT | Mod: 25

## 2022-05-10 PROCEDURE — 29580 STRAPPING UNNA BOOT: CPT | Mod: 50

## 2022-05-10 NOTE — ASSESSMENT
[FreeTextEntry1] : Bilateral Unna Boots  changed reapplied  No ulcers or blisters or cellulitis  Return as needed  [Arterial/Venous Disease] : arterial/venous disease

## 2022-05-10 NOTE — PHYSICAL EXAM
[Ankle Swelling (On Exam)] : present [Ankle Swelling Bilaterally] : bilaterally  [Varicose Veins Of Lower Extremities] : not present [] : bilaterally [Ankle Swelling On The Left] : moderate

## 2022-05-17 ENCOUNTER — APPOINTMENT (OUTPATIENT)
Dept: VASCULAR SURGERY | Facility: CLINIC | Age: 67
End: 2022-05-17
Payer: MEDICARE

## 2022-05-17 VITALS
HEIGHT: 68 IN | DIASTOLIC BLOOD PRESSURE: 69 MMHG | OXYGEN SATURATION: 94 % | SYSTOLIC BLOOD PRESSURE: 138 MMHG | HEART RATE: 87 BPM | TEMPERATURE: 98.7 F

## 2022-05-17 PROCEDURE — 99213 OFFICE O/P EST LOW 20 MIN: CPT

## 2022-05-17 NOTE — ASSESSMENT
[Arterial/Venous Disease] : arterial/venous disease [FreeTextEntry1] : 65 yo male with lymphedema and chronic wound on right heel. Wound improving with UNNA boot treatment \par \par Pt counseled on diagnosis of lymphedema \par BLE UNNA boots applied\par RTC in 1 week for wound check and bandage change \par Pt counseled to continue all medications. \par \par A total of 25 minutes was spent with patient and coordinating care.\par

## 2022-05-17 NOTE — HISTORY OF PRESENT ILLNESS
[FreeTextEntry1] : 5/17/22: 65 yo male with BLE lymphedema formerly being treated by Dr. Brown. Pt had BL UNNA boot on for the past week. He complains of pain in the right plantar lateral heel area where he has a small ulcer. He feels the area around the wound is tender. He has had this wound for several months. He denies any fever or chills. Pt is on ASA, Eliquis, and Gabapentin.

## 2022-05-17 NOTE — PHYSICAL EXAM
[Ankle Swelling (On Exam)] : present [] : bilaterally [Ankle Swelling On The Left] : moderate [0] : left 0 [2+] : left 2+ [Ankle Swelling Bilaterally] : severe [Varicose Veins Of Lower Extremities] : not present [de-identified] : NAD. Obese.  [FreeTextEntry1] : PT non-palpable due to edema. \par right heel ulcer on lateral aspect about 1 cm x 0.5 cm x 0.1 cm with granulation tissue in wound base. Tender to palpation

## 2022-05-24 ENCOUNTER — APPOINTMENT (OUTPATIENT)
Dept: VASCULAR SURGERY | Facility: CLINIC | Age: 67
End: 2022-05-24
Payer: MEDICARE

## 2022-05-24 VITALS
SYSTOLIC BLOOD PRESSURE: 121 MMHG | OXYGEN SATURATION: 98 % | TEMPERATURE: 97.8 F | HEART RATE: 80 BPM | DIASTOLIC BLOOD PRESSURE: 73 MMHG | RESPIRATION RATE: 16 BRPM

## 2022-05-24 PROCEDURE — 99213 OFFICE O/P EST LOW 20 MIN: CPT

## 2022-05-24 NOTE — ASSESSMENT
[Arterial/Venous Disease] : arterial/venous disease [FreeTextEntry1] : 67 yo male with lymphedema and chronic wound on right heel. Wound improving with UNNA boot treatment \par \par Pt counseled on diagnosis of lymphedema \par BLE UNNA boots applied\par RTC in 1 week for wound check and bandage change \par Pt counseled to continue all medications. \par \par A total of 25 minutes was spent with patient and coordinating care.\par

## 2022-05-24 NOTE — HISTORY OF PRESENT ILLNESS
[FreeTextEntry1] : 5/17/22: 65 yo male with BLE lymphedema formerly being treated by Dr. Brown. Pt had BL UNNA boot on for the past week. He complains of pain in the right plantar lateral heel area where he has a small ulcer. He feels the area around the wound is tender. He has had this wound for several months. He denies any fever or chills. Pt is on ASA, Eliquis, and Gabapentin. \par \par 5/24/22: Pt kept UNNA boots on the for the past week. He still has severe pain in his right heel. He states he gets a shooting pain in the foot when he puts pressure on the foot to stand up. He tries to stay off the foot as much as possible. He denies any fever or chills. He is compliant with his medication.

## 2022-05-24 NOTE — PHYSICAL EXAM
[0] : left 0 [2+] : left 2+ [Ankle Swelling (On Exam)] : present [Ankle Swelling Bilaterally] : severe [] : bilaterally [Ankle Swelling On The Left] : moderate [Varicose Veins Of Lower Extremities] : not present [de-identified] : NAD. Obese.  [FreeTextEntry1] : PT non-palpable due to edema. \par right heel ulcer on lateral aspect about 0.47 cm x 0.5 cm x 0.1 cm with granulation tissue in wound base. Tender to palpation

## 2022-05-26 ENCOUNTER — APPOINTMENT (OUTPATIENT)
Dept: ORTHOPEDIC SURGERY | Facility: CLINIC | Age: 67
End: 2022-05-26
Payer: MEDICARE

## 2022-05-26 VITALS
TEMPERATURE: 98.1 F | BODY MASS INDEX: 43.95 KG/M2 | HEART RATE: 88 BPM | SYSTOLIC BLOOD PRESSURE: 161 MMHG | WEIGHT: 280 LBS | HEIGHT: 67 IN | DIASTOLIC BLOOD PRESSURE: 68 MMHG

## 2022-05-26 DIAGNOSIS — M72.2 PLANTAR FASCIAL FIBROMATOSIS: ICD-10-CM

## 2022-05-26 PROCEDURE — 99214 OFFICE O/P EST MOD 30 MIN: CPT

## 2022-05-26 PROCEDURE — 73630 X-RAY EXAM OF FOOT: CPT | Mod: RT

## 2022-05-26 RX ORDER — LISINOPRIL 30 MG/1
TABLET ORAL
Refills: 0 | Status: DISCONTINUED | COMMUNITY
End: 2022-05-26

## 2022-05-26 RX ORDER — SERTRALINE HYDROCHLORIDE 100 MG/1
100 TABLET, FILM COATED ORAL
Refills: 0 | Status: DISCONTINUED | COMMUNITY
Start: 2018-10-25 | End: 2022-05-26

## 2022-05-26 RX ORDER — GLIMEPIRIDE 4 MG/1
TABLET ORAL
Refills: 0 | Status: DISCONTINUED | COMMUNITY
End: 2022-05-26

## 2022-05-26 RX ORDER — POTASSIUM CHLORIDE 750 MG/1
10 TABLET, FILM COATED, EXTENDED RELEASE ORAL
Qty: 90 | Refills: 0 | Status: DISCONTINUED | COMMUNITY
Start: 2021-05-29 | End: 2022-05-26

## 2022-05-26 RX ORDER — FUROSEMIDE 40 MG/1
40 TABLET ORAL
Qty: 30 | Refills: 0 | Status: DISCONTINUED | COMMUNITY
Start: 2020-11-25 | End: 2022-05-26

## 2022-05-26 RX ORDER — SIMVASTATIN 80 MG/1
TABLET, FILM COATED ORAL
Refills: 0 | Status: DISCONTINUED | COMMUNITY
End: 2022-05-26

## 2022-05-26 RX ORDER — TRAZODONE HYDROCHLORIDE 50 MG/1
50 TABLET ORAL
Qty: 30 | Refills: 0 | Status: DISCONTINUED | COMMUNITY
Start: 2020-07-25 | End: 2022-05-26

## 2022-05-26 RX ORDER — POTASSIUM CHLORIDE 750 MG/1
10 TABLET, EXTENDED RELEASE ORAL
Qty: 30 | Refills: 0 | Status: DISCONTINUED | COMMUNITY
Start: 2020-11-25 | End: 2022-05-26

## 2022-05-26 RX ORDER — GLIPIZIDE 5 MG/1
5 TABLET, FILM COATED, EXTENDED RELEASE ORAL
Qty: 60 | Refills: 0 | Status: DISCONTINUED | COMMUNITY
Start: 2020-06-01 | End: 2022-05-26

## 2022-05-26 RX ORDER — APIXABAN 5 MG/1
5 TABLET, FILM COATED ORAL
Qty: 60 | Refills: 0 | Status: DISCONTINUED | COMMUNITY
Start: 2020-05-03 | End: 2022-05-26

## 2022-05-26 RX ORDER — CHLORHEXIDINE GLUCONATE 4 %
325 (65 FE) LIQUID (ML) TOPICAL
Qty: 14 | Refills: 0 | Status: DISCONTINUED | COMMUNITY
Start: 2020-07-10 | End: 2022-05-26

## 2022-05-26 NOTE — PHYSICAL EXAM
[Walker] : ambulates with walker [de-identified] : Alert & oriented to person place & time\par No acute distress. Normal affect.\par Normocephalic atraumatic\par Extraocular muscles intact \par Normal respiratory rate and effort, no nasal flaring or use of accessory muscles \par Marked antalgic gait\par \par BMI = 43.85\par Right Foot:\par Lower extremity is completely wrapped and I am unable to examine the skin other than the forefoot\par Toenails are thickened\par foot is edematous\par pes planus deformity\par no pain with ROM\par good strength against resistance\par + marked tenderness at plantar medial heel\par no other tender areas\par unable to stand without support of walker\par sensation intact to LT\par \par Left foot \par lower extremity is wrapped\par severe valgus deformity at ankle, ambulates on medial aspect of foot.\par Nontender to palpation\par Restricted ROM in all planes\par unable to perform resistive ROM\par \par   [de-identified] : \par  [de-identified] : X-rays obtained of the right foot reveal a marked pes planus deformity, mild osteophyte production on the plantar and posterior aspect of the calcaneus. Mild diffuse degenerative change is present. No fractures or dislocations.

## 2022-05-26 NOTE — HISTORY OF PRESENT ILLNESS
[de-identified] : This is a 66 y.o. M, who presents with c/o Right heel pain for several months.  Pain is variable, but constant.  There is also significant pain when getting out of a chair after being seated for a length of time. Pain is described as sharp. He denies any pain when seated. He tells me he has weeping lymphedema associated with some ulcers on his lower legs and ankles. He had unna boots placed last week and has bilateral lower extremities wrapped. Patient admits to being noncompliant with some of his medications. He reports that he is struggling to ambulate and is hoping he can be sent to a rehab facility. He is on chronic pain medication.

## 2022-05-26 NOTE — DISCUSSION/SUMMARY
[de-identified] : X-rays were reviewed with patient. \par I discussed the role of physical therapy with the patient and a prescription for PT has been generated. I discussed the importance of medication compliance with him and recommended that he follow up with his PCP.  Patient discussed with me that his right knee is very arthritic. I explained that he is not a good surgical candidate because of his vascular issues, lymphedema, and diabetes. His medical issues need to be better controlled. Patient states that he may go to the ED and seek admission to [facility] so that he can be discharged to rehab as he does not feel that he can manage at home/\par

## 2022-05-26 NOTE — REVIEW OF SYSTEMS
[Negative] : Heme/Lymph [Joint Pain] : joint pain [Feeling Tired] : fatigue [Lower Ext Edema] : lower extremity edema

## 2022-05-31 ENCOUNTER — APPOINTMENT (OUTPATIENT)
Dept: VASCULAR SURGERY | Facility: CLINIC | Age: 67
End: 2022-05-31
Payer: MEDICARE

## 2022-05-31 VITALS
HEIGHT: 67 IN | HEART RATE: 81 BPM | DIASTOLIC BLOOD PRESSURE: 73 MMHG | SYSTOLIC BLOOD PRESSURE: 118 MMHG | TEMPERATURE: 97.8 F | OXYGEN SATURATION: 96 %

## 2022-05-31 PROCEDURE — 99213 OFFICE O/P EST LOW 20 MIN: CPT

## 2022-05-31 NOTE — HISTORY OF PRESENT ILLNESS
[FreeTextEntry1] : 5/17/22: 67 yo male with BLE lymphedema formerly being treated by Dr. Brown. Pt had BL UNNA boot on for the past week. He complains of pain in the right plantar lateral heel area where he has a small ulcer. He feels the area around the wound is tender. He has had this wound for several months. He denies any fever or chills. Pt is on ASA, Eliquis, and Gabapentin. \par \par 5/24/22: Pt kept UNNA boots on the for the past week. He still has severe pain in his right heel. He states he gets a shooting pain in the foot when he puts pressure on the foot to stand up. He tries to stay off the foot as much as possible. He denies any fever or chills. He is compliant with his medication. \par \par 5/31/22: Pt kept UNNA boots on the for the past week. He still has severe pain in his right heel. He states he gets a shooting pain in the foot when he puts pressure on the foot to stand up. He tries to stay off the foot as much as possible. He denies any fever or chills. He is compliant with his medication. \par

## 2022-05-31 NOTE — PHYSICAL EXAM
[0] : left 0 [2+] : left 2+ [Ankle Swelling (On Exam)] : present [Ankle Swelling Bilaterally] : severe [] : bilaterally [Ankle Swelling On The Left] : moderate [Varicose Veins Of Lower Extremities] : not present [de-identified] : NAD. Obese.  [FreeTextEntry1] : PT non-palpable due to edema. \par right heel ulcer on lateral aspect about 0.47 cm x 0.5 cm x 0.1 cm with granulation tissue in wound base. Tender to palpation

## 2022-06-07 ENCOUNTER — APPOINTMENT (OUTPATIENT)
Dept: VASCULAR SURGERY | Facility: CLINIC | Age: 67
End: 2022-06-07

## 2022-06-07 ENCOUNTER — APPOINTMENT (OUTPATIENT)
Dept: VASCULAR SURGERY | Facility: CLINIC | Age: 67
End: 2022-06-07
Payer: MEDICARE

## 2022-06-07 VITALS
OXYGEN SATURATION: 95 % | RESPIRATION RATE: 16 BRPM | SYSTOLIC BLOOD PRESSURE: 116 MMHG | DIASTOLIC BLOOD PRESSURE: 64 MMHG | TEMPERATURE: 97.4 F | HEART RATE: 83 BPM

## 2022-06-07 PROCEDURE — 99213 OFFICE O/P EST LOW 20 MIN: CPT

## 2022-06-07 NOTE — PHYSICAL EXAM
[0] : left 0 [2+] : left 2+ [Ankle Swelling (On Exam)] : present [Ankle Swelling Bilaterally] : severe [] : bilaterally [Ankle Swelling On The Left] : moderate [Varicose Veins Of Lower Extremities] : not present [de-identified] : NAD. Obese.  [FreeTextEntry1] : PT non-palpable due to edema. \par right heel ulcer on lateral aspect about 0.47 cm x 0.5 cm scabbed over. Tender to palpation

## 2022-06-07 NOTE — HISTORY OF PRESENT ILLNESS
[FreeTextEntry1] : 5/17/22: 67 yo male with BLE lymphedema formerly being treated by Dr. Brown. Pt had BL UNNA boot on for the past week. He complains of pain in the right plantar lateral heel area where he has a small ulcer. He feels the area around the wound is tender. He has had this wound for several months. He denies any fever or chills. Pt is on ASA, Eliquis, and Gabapentin. \par \par 5/24/22: Pt kept UNNA boots on the for the past week. He still has severe pain in his right heel. He states he gets a shooting pain in the foot when he puts pressure on the foot to stand up. He tries to stay off the foot as much as possible. He denies any fever or chills. He is compliant with his medication. \par \par 5/31/22: Pt kept UNNA boots on the for the past week. He still has severe pain in his right heel. He states he gets a shooting pain in the foot when he puts pressure on the foot to stand up. He tries to stay off the foot as much as possible. He denies any fever or chills. He is compliant with his medication. \par \par 6/7/22: Pt kept UNNA boots on the for the past week. He still has less pain in his right heel. He states he gets a shooting pain in the foot when he puts pressure on the foot to stand up. He tries to stay off the foot as much as possible. He denies any fever or chills. He is compliant with his medication. \par

## 2022-06-14 ENCOUNTER — APPOINTMENT (OUTPATIENT)
Dept: VASCULAR SURGERY | Facility: CLINIC | Age: 67
End: 2022-06-14
Payer: MEDICARE

## 2022-06-14 ENCOUNTER — APPOINTMENT (OUTPATIENT)
Dept: VASCULAR SURGERY | Facility: CLINIC | Age: 67
End: 2022-06-14

## 2022-06-14 VITALS
RESPIRATION RATE: 16 BRPM | BODY MASS INDEX: 43.95 KG/M2 | OXYGEN SATURATION: 96 % | HEIGHT: 67 IN | HEART RATE: 83 BPM | DIASTOLIC BLOOD PRESSURE: 64 MMHG | WEIGHT: 280 LBS | TEMPERATURE: 97.3 F | SYSTOLIC BLOOD PRESSURE: 130 MMHG

## 2022-06-14 PROCEDURE — 99213 OFFICE O/P EST LOW 20 MIN: CPT

## 2022-06-14 NOTE — PHYSICAL EXAM
[0] : left 0 [2+] : left 2+ [Ankle Swelling (On Exam)] : present [Ankle Swelling Bilaterally] : severe [] : bilaterally [Ankle Swelling On The Left] : moderate [Varicose Veins Of Lower Extremities] : not present [de-identified] : NAD. Obese.  [FreeTextEntry1] : PT non-palpable due to edema. \par right heel ulcer on lateral aspect about 0.4 cm x 0.5 cm scabbed over. Tender to palpation

## 2022-06-21 ENCOUNTER — APPOINTMENT (OUTPATIENT)
Dept: VASCULAR SURGERY | Facility: CLINIC | Age: 67
End: 2022-06-21

## 2022-07-05 ENCOUNTER — APPOINTMENT (OUTPATIENT)
Dept: VASCULAR SURGERY | Facility: CLINIC | Age: 67
End: 2022-07-05

## 2022-07-05 PROCEDURE — 99212 OFFICE O/P EST SF 10 MIN: CPT

## 2022-07-05 NOTE — HISTORY OF PRESENT ILLNESS
[FreeTextEntry1] : 65 y/o male s/p fem-fem bypass on 10/24/19 and BLE lymphedema. He has been wearing Unna Boot for several months. [de-identified] : Patient returns for reevaluation\par He was last seen 1 week ago and bilateral unna boots were applied

## 2022-07-05 NOTE — PHYSICAL EXAM
[Normal Rate and Rhythm] : normal rate and rhythm [Ankle Swelling (On Exam)] : present [Ankle Swelling Bilaterally] : severe [Varicose Veins Of Lower Extremities] : not present [] : bilaterally [Ankle Swelling On The Right] : mild [Abdomen Masses] : No abdominal masses [Alert] : alert [Oriented to Person] : oriented to person [Oriented to Place] : oriented to place [Oriented to Time] : oriented to time [Calm] : calm [de-identified] : NAD [de-identified] : NCAT [FreeTextEntry1] : Bilateral lower extremity lymphedema\par No cellulitis or ulceration [de-identified] : Obese

## 2022-07-05 NOTE — ASSESSMENT
[FreeTextEntry1] : 67 yo male with lymphedema and chronic wound on right heel. Wound improving with UNNA boot treatment \par \par Pt counseled on diagnosis of lymphedema \par BLE UNNA boots applied\par RTC in 1 week for wound check and bandage change \par Pt counseled to continue all medications. \par

## 2022-07-12 ENCOUNTER — APPOINTMENT (OUTPATIENT)
Dept: VASCULAR SURGERY | Facility: CLINIC | Age: 67
End: 2022-07-12

## 2022-07-12 VITALS
HEART RATE: 96 BPM | SYSTOLIC BLOOD PRESSURE: 143 MMHG | DIASTOLIC BLOOD PRESSURE: 73 MMHG | OXYGEN SATURATION: 95 % | RESPIRATION RATE: 16 BRPM | TEMPERATURE: 97.3 F

## 2022-07-12 PROCEDURE — 99213 OFFICE O/P EST LOW 20 MIN: CPT

## 2022-07-12 RX ORDER — COLLAGENASE SANTYL 250 [ARB'U]/G
250 OINTMENT TOPICAL
Qty: 90 | Refills: 0 | Status: ACTIVE | COMMUNITY
Start: 2022-05-05

## 2022-07-12 NOTE — ASSESSMENT
[FreeTextEntry1] : 65 yo male with lymphedema and chronic wound on right heel. Wound improving with UNNA boot treatment \par \par Pt counseled on diagnosis of lymphedema \par BLE ACE wraps applied\par RTC in tomorrow for UNNA boot application after custom shoe fitting \par Pt counseled to continue all medications. \par \par A total of 20 minutes was spent with patient and coordinating care.\par

## 2022-07-12 NOTE — HISTORY OF PRESENT ILLNESS
[FreeTextEntry1] : 5/17/22: 65 yo male with BLE lymphedema formerly being treated by Dr. Brown. Pt had BL UNNA boot on for the past week. He complains of pain in the right plantar lateral heel area where he has a small ulcer. He feels the area around the wound is tender. He has had this wound for several months. He denies any fever or chills. Pt is on ASA, Eliquis, and Gabapentin. \par \par 5/24/22: Pt kept UNNA boots on the for the past week. He still has severe pain in his right heel. He states he gets a shooting pain in the foot when he puts pressure on the foot to stand up. He tries to stay off the foot as much as possible. He denies any fever or chills. He is compliant with his medication. \par \par 5/31/22: Pt kept UNNA boots on the for the past week. He still has severe pain in his right heel. He states he gets a shooting pain in the foot when he puts pressure on the foot to stand up. He tries to stay off the foot as much as possible. He denies any fever or chills. He is compliant with his medication. \par \par 6/7/22: Pt kept UNNA boots on the for the past week. He still has less pain in his right heel. He states he gets a shooting pain in the foot when he puts pressure on the foot to stand up. He tries to stay off the foot as much as possible. He denies any fever or chills. He is compliant with his medication. \par \par 6/14/22: Pt kept UNNA boots on the for the past week. He still has significant pain in his right heel. He states he gets a shooting pain in the foot when he puts pressure on the foot to stand up. He tries to stay off the foot as much as possible. He denies any fever or chills. He is compliant with his medication. \par \par 7/12/22: Pt had BLE UNNA boots applied 7/5/22. He has an appointment tomorrow for custom shoe fitting and cannot have UNNA boots on. He denies any new symptoms. \par

## 2022-07-12 NOTE — PHYSICAL EXAM
[0] : left 0 [2+] : left 2+ [Ankle Swelling (On Exam)] : present [Ankle Swelling Bilaterally] : severe [] : bilaterally [Ankle Swelling On The Left] : moderate [Varicose Veins Of Lower Extremities] : not present [de-identified] : NAD. Obese.  [FreeTextEntry1] : PT non-palpable due to edema. \par right heel ulcer on lateral aspect about 0.4 cm x 0.5 cm scabbed over. Tender to palpation

## 2022-07-19 ENCOUNTER — APPOINTMENT (OUTPATIENT)
Dept: VASCULAR SURGERY | Facility: CLINIC | Age: 67
End: 2022-07-19

## 2022-07-26 ENCOUNTER — APPOINTMENT (OUTPATIENT)
Dept: VASCULAR SURGERY | Facility: CLINIC | Age: 67
End: 2022-07-26

## 2022-07-26 VITALS
WEIGHT: 272 LBS | RESPIRATION RATE: 16 BRPM | HEIGHT: 67 IN | OXYGEN SATURATION: 95 % | HEART RATE: 93 BPM | TEMPERATURE: 97.3 F | SYSTOLIC BLOOD PRESSURE: 144 MMHG | DIASTOLIC BLOOD PRESSURE: 63 MMHG | BODY MASS INDEX: 42.69 KG/M2

## 2022-07-26 PROCEDURE — 99213 OFFICE O/P EST LOW 20 MIN: CPT

## 2022-07-26 NOTE — ASSESSMENT
[FreeTextEntry1] : 67 yo male with lymphedema and chronic wound on right heel. Wound improving with UNNA boot treatment \par \par Pt counseled on diagnosis of lymphedema \par BLE UNNA boots applied \par RTC in 1 week for bandage change and to monitor wounds \par \par A total of 20 minutes was spent with patient and coordinating care.\par

## 2022-07-26 NOTE — HISTORY OF PRESENT ILLNESS
[FreeTextEntry1] : 5/17/22: 67 yo male with BLE lymphedema formerly being treated by Dr. Brown. Pt had BL UNNA boot on for the past week. He complains of pain in the right plantar lateral heel area where he has a small ulcer. He feels the area around the wound is tender. He has had this wound for several months. He denies any fever or chills. Pt is on ASA, Eliquis, and Gabapentin. \par \par 5/24/22: Pt kept UNNA boots on the for the past week. He still has severe pain in his right heel. He states he gets a shooting pain in the foot when he puts pressure on the foot to stand up. He tries to stay off the foot as much as possible. He denies any fever or chills. He is compliant with his medication. \par \par 5/31/22: Pt kept UNNA boots on the for the past week. He still has severe pain in his right heel. He states he gets a shooting pain in the foot when he puts pressure on the foot to stand up. He tries to stay off the foot as much as possible. He denies any fever or chills. He is compliant with his medication. \par \par 6/7/22: Pt kept UNNA boots on the for the past week. He still has less pain in his right heel. He states he gets a shooting pain in the foot when he puts pressure on the foot to stand up. He tries to stay off the foot as much as possible. He denies any fever or chills. He is compliant with his medication. \par \par 6/14/22: Pt kept UNNA boots on the for the past week. He still has significant pain in his right heel. He states he gets a shooting pain in the foot when he puts pressure on the foot to stand up. He tries to stay off the foot as much as possible. He denies any fever or chills. He is compliant with his medication. \par \par 7/12/22: Pt had BLE UNNA boots applied 7/5/22. He has an appointment tomorrow for custom shoe fitting and cannot have UNNA boots on. He denies any new symptoms. \par \par 7/26/22: Pt kept ACE wraps on mid calves for the past two weeks and now has significant weeping of his upper calves bilaterally. He denies any fever or chills. He has pain in the BLE. \par

## 2022-07-26 NOTE — PHYSICAL EXAM
[0] : left 0 [2+] : left 2+ [Ankle Swelling (On Exam)] : present [Ankle Swelling Bilaterally] : severe [] : bilaterally [Ankle Swelling On The Left] : moderate [Varicose Veins Of Lower Extremities] : not present [de-identified] : NAD. Obese.  [FreeTextEntry1] : PT non-palpable due to edema. \par right heel ulcer on lateral aspect about 0.4 cm x 0.5 cm scabbed over. Tender to palpation \par significant weeping of upper calves bilaterally

## 2022-08-02 ENCOUNTER — APPOINTMENT (OUTPATIENT)
Dept: VASCULAR SURGERY | Facility: CLINIC | Age: 67
End: 2022-08-02

## 2022-08-02 VITALS
SYSTOLIC BLOOD PRESSURE: 157 MMHG | DIASTOLIC BLOOD PRESSURE: 72 MMHG | HEART RATE: 81 BPM | RESPIRATION RATE: 16 BRPM | TEMPERATURE: 97.7 F | OXYGEN SATURATION: 95 %

## 2022-08-02 PROCEDURE — 99213 OFFICE O/P EST LOW 20 MIN: CPT

## 2022-08-02 NOTE — PHYSICAL EXAM
[0] : left 0 [2+] : left 2+ [Ankle Swelling (On Exam)] : present [Ankle Swelling Bilaterally] : severe [] : bilaterally [Ankle Swelling On The Left] : moderate [Varicose Veins Of Lower Extremities] : not present [de-identified] : NAD. Obese.  [FreeTextEntry1] : PT non-palpable due to edema. \par right heel ulcer on lateral aspect about 0.4 cm x 0.5 cm scabbed over. Tender to palpation \par circumferential calf ulcer bilaterally with significant weeping

## 2022-08-02 NOTE — ASSESSMENT
[FreeTextEntry1] : 65 yo male with lymphedema and chronic wound on right heel. Pt now has circumferential weeping ulcers. Wound improving with UNNA boot treatment \par \par Pt counseled on diagnosis of lymphedema and Charcot foot deformity \par BLE UNNA boots applied \par RTC in 1 week for bandage change and to monitor wounds \par \par A total of 20 minutes was spent with patient and coordinating care.\par

## 2022-08-02 NOTE — HISTORY OF PRESENT ILLNESS
[FreeTextEntry1] : 5/17/22: 65 yo male with BLE lymphedema formerly being treated by Dr. Brown. Pt had BL UNNA boot on for the past week. He complains of pain in the right plantar lateral heel area where he has a small ulcer. He feels the area around the wound is tender. He has had this wound for several months. He denies any fever or chills. Pt is on ASA, Eliquis, and Gabapentin. \par \par 5/24/22: Pt kept UNNA boots on the for the past week. He still has severe pain in his right heel. He states he gets a shooting pain in the foot when he puts pressure on the foot to stand up. He tries to stay off the foot as much as possible. He denies any fever or chills. He is compliant with his medication. \par \par 5/31/22: Pt kept UNNA boots on the for the past week. He still has severe pain in his right heel. He states he gets a shooting pain in the foot when he puts pressure on the foot to stand up. He tries to stay off the foot as much as possible. He denies any fever or chills. He is compliant with his medication. \par \par 6/7/22: Pt kept UNNA boots on the for the past week. He still has less pain in his right heel. He states he gets a shooting pain in the foot when he puts pressure on the foot to stand up. He tries to stay off the foot as much as possible. He denies any fever or chills. He is compliant with his medication. \par \par 6/14/22: Pt kept UNNA boots on the for the past week. He still has significant pain in his right heel. He states he gets a shooting pain in the foot when he puts pressure on the foot to stand up. He tries to stay off the foot as much as possible. He denies any fever or chills. He is compliant with his medication. \par \par 7/12/22: Pt had BLE UNNA boots applied 7/5/22. He has an appointment tomorrow for custom shoe fitting and cannot have UNNA boots on. He denies any new symptoms. \par \par 7/26/22: Pt kept ACE wraps on mid calves for the past two weeks and now has significant weeping of his upper calves bilaterally. He denies any fever or chills. He has pain in the BLE. \par \par 8/2/22: Pt kept BLE UNNA boots on for the past week. He still has circumferential weeping ulcers from his mid calves bilaterally. He denies any fever or chills. He has pain in the BLE. \par

## 2022-08-09 ENCOUNTER — APPOINTMENT (OUTPATIENT)
Dept: VASCULAR SURGERY | Facility: CLINIC | Age: 67
End: 2022-08-09

## 2022-08-09 VITALS
TEMPERATURE: 97.3 F | SYSTOLIC BLOOD PRESSURE: 135 MMHG | OXYGEN SATURATION: 95 % | DIASTOLIC BLOOD PRESSURE: 71 MMHG | HEART RATE: 88 BPM | RESPIRATION RATE: 16 BRPM

## 2022-08-09 PROCEDURE — 99213 OFFICE O/P EST LOW 20 MIN: CPT

## 2022-08-09 NOTE — ASSESSMENT
[FreeTextEntry1] : 67 yo male with lymphedema and chronic wound on right heel. Pt now has circumferential weeping ulcers. Wound improving with UNNA boot treatment \par \par Pt counseled on diagnosis of lymphedema and Charcot foot deformity \par BLE UNNA boots applied \par RTC in 1 week for bandage change and to monitor wounds \par \par A total of 20 minutes was spent with patient and coordinating care.\par

## 2022-08-09 NOTE — HISTORY OF PRESENT ILLNESS
[FreeTextEntry1] : 5/17/22: 65 yo male with BLE lymphedema formerly being treated by Dr. Brown. Pt had BL UNNA boot on for the past week. He complains of pain in the right plantar lateral heel area where he has a small ulcer. He feels the area around the wound is tender. He has had this wound for several months. He denies any fever or chills. Pt is on ASA, Eliquis, and Gabapentin. \par \par 5/24/22: Pt kept UNNA boots on the for the past week. He still has severe pain in his right heel. He states he gets a shooting pain in the foot when he puts pressure on the foot to stand up. He tries to stay off the foot as much as possible. He denies any fever or chills. He is compliant with his medication. \par \par 5/31/22: Pt kept UNNA boots on the for the past week. He still has severe pain in his right heel. He states he gets a shooting pain in the foot when he puts pressure on the foot to stand up. He tries to stay off the foot as much as possible. He denies any fever or chills. He is compliant with his medication. \par \par 6/7/22: Pt kept UNNA boots on the for the past week. He still has less pain in his right heel. He states he gets a shooting pain in the foot when he puts pressure on the foot to stand up. He tries to stay off the foot as much as possible. He denies any fever or chills. He is compliant with his medication. \par \par 6/14/22: Pt kept UNNA boots on the for the past week. He still has significant pain in his right heel. He states he gets a shooting pain in the foot when he puts pressure on the foot to stand up. He tries to stay off the foot as much as possible. He denies any fever or chills. He is compliant with his medication. \par \par 7/12/22: Pt had BLE UNNA boots applied 7/5/22. He has an appointment tomorrow for custom shoe fitting and cannot have UNNA boots on. He denies any new symptoms. \par \par 7/26/22: Pt kept ACE wraps on mid calves for the past two weeks and now has significant weeping of his upper calves bilaterally. He denies any fever or chills. He has pain in the BLE. \par \par 8/2/22: Pt kept BLE UNNA boots on for the past week. He still has circumferential weeping ulcers from his mid calves bilaterally. He denies any fever or chills. He has pain in the BLE. \par \par 8/9/22: Pt kept BLE UNNA boots on for the past week. He still has circumferential weeping ulcers from his mid calves bilaterally. He still has significant pain in his right heel. He denies any fever or chills. He has pain in the BLE.  Azelaic Acid Pregnancy And Lactation Text: This medication is considered safe during pregnancy and breast feeding.

## 2022-08-09 NOTE — PHYSICAL EXAM
[0] : left 0 [2+] : left 2+ [Ankle Swelling (On Exam)] : present [Ankle Swelling Bilaterally] : severe [] : bilaterally [Ankle Swelling On The Left] : moderate [Varicose Veins Of Lower Extremities] : not present [FreeTextEntry1] : PT non-palpable due to edema. \par right heel ulcer on lateral aspect about 0.4 cm x 0.5 cm scabbed over. Tender to palpation \par circumferential calf ulcer bilaterally with significant weeping  [de-identified] : NAD. Obese.

## 2022-08-16 ENCOUNTER — APPOINTMENT (OUTPATIENT)
Dept: VASCULAR SURGERY | Facility: CLINIC | Age: 67
End: 2022-08-16

## 2022-08-17 ENCOUNTER — APPOINTMENT (OUTPATIENT)
Dept: VASCULAR SURGERY | Facility: CLINIC | Age: 67
End: 2022-08-17

## 2022-08-17 VITALS
SYSTOLIC BLOOD PRESSURE: 128 MMHG | TEMPERATURE: 98.4 F | RESPIRATION RATE: 16 BRPM | DIASTOLIC BLOOD PRESSURE: 78 MMHG | HEART RATE: 76 BPM | OXYGEN SATURATION: 96 %

## 2022-08-17 PROCEDURE — 99213 OFFICE O/P EST LOW 20 MIN: CPT

## 2022-08-17 NOTE — ASSESSMENT
[FreeTextEntry1] : 67 yo male with lymphedema and chronic wound on right heel. Pts circumferential weeping ulcers have closed and healed well. Wound improving with UNNA boot treatment \par \par Pt counseled on diagnosis of lymphedema and Charcot foot deformity \par BLE UNNA boots applied \par RTC in 1 week for bandage change and to monitor wounds \par \par A total of 20 minutes was spent with patient and coordinating care.\par

## 2022-08-17 NOTE — PHYSICAL EXAM
[0] : left 0 [2+] : left 2+ [Ankle Swelling (On Exam)] : present [Ankle Swelling Bilaterally] : severe [] : bilaterally [Ankle Swelling On The Left] : moderate [Varicose Veins Of Lower Extremities] : not present [de-identified] : NAD. Obese.  [FreeTextEntry1] : PT non-palpable due to edema. \par right heel ulcer on lateral aspect about 0.2 cm x 0.2 cm scabbed over. Tender to palpation \par

## 2022-08-17 NOTE — HISTORY OF PRESENT ILLNESS
[FreeTextEntry1] : 5/17/22: 67 yo male with BLE lymphedema formerly being treated by Dr. Brown. Pt had BL UNNA boot on for the past week. He complains of pain in the right plantar lateral heel area where he has a small ulcer. He feels the area around the wound is tender. He has had this wound for several months. He denies any fever or chills. Pt is on ASA, Eliquis, and Gabapentin. \par \par 5/24/22: Pt kept UNNA boots on the for the past week. He still has severe pain in his right heel. He states he gets a shooting pain in the foot when he puts pressure on the foot to stand up. He tries to stay off the foot as much as possible. He denies any fever or chills. He is compliant with his medication. \par \par 5/31/22: Pt kept UNNA boots on the for the past week. He still has severe pain in his right heel. He states he gets a shooting pain in the foot when he puts pressure on the foot to stand up. He tries to stay off the foot as much as possible. He denies any fever or chills. He is compliant with his medication. \par \par 6/7/22: Pt kept UNNA boots on the for the past week. He still has less pain in his right heel. He states he gets a shooting pain in the foot when he puts pressure on the foot to stand up. He tries to stay off the foot as much as possible. He denies any fever or chills. He is compliant with his medication. \par \par 6/14/22: Pt kept UNNA boots on the for the past week. He still has significant pain in his right heel. He states he gets a shooting pain in the foot when he puts pressure on the foot to stand up. He tries to stay off the foot as much as possible. He denies any fever or chills. He is compliant with his medication. \par \par 7/12/22: Pt had BLE UNNA boots applied 7/5/22. He has an appointment tomorrow for custom shoe fitting and cannot have UNNA boots on. He denies any new symptoms. \par \par 7/26/22: Pt kept ACE wraps on mid calves for the past two weeks and now has significant weeping of his upper calves bilaterally. He denies any fever or chills. He has pain in the BLE. \par \par 8/2/22: Pt kept BLE UNNA boots on for the past week. He still has circumferential weeping ulcers from his mid calves bilaterally. He denies any fever or chills. He has pain in the BLE. \par \par 8/9/22: Pt kept BLE UNNA boots on for the past week. He still has circumferential weeping ulcers from his mid calves bilaterally. He still has significant pain in his right heel. He denies any fever or chills. He has pain in the BLE.\par \par 8/17/22: Pt kept BLE UNNA boots on for the past week. He no longer has circumferential weeping ulcers, they have closed and healed with UNNA boot treatment . He still has significant pain in his right heel. He denies any fever or chills. He has pain in the BLE.

## 2022-08-23 ENCOUNTER — APPOINTMENT (OUTPATIENT)
Dept: VASCULAR SURGERY | Facility: CLINIC | Age: 67
End: 2022-08-23

## 2022-08-23 VITALS
TEMPERATURE: 97.3 F | RESPIRATION RATE: 16 BRPM | DIASTOLIC BLOOD PRESSURE: 74 MMHG | SYSTOLIC BLOOD PRESSURE: 127 MMHG | HEART RATE: 94 BPM | OXYGEN SATURATION: 96 %

## 2022-08-23 PROCEDURE — 99213 OFFICE O/P EST LOW 20 MIN: CPT

## 2022-08-23 NOTE — HISTORY OF PRESENT ILLNESS
[FreeTextEntry1] : 5/17/22: 65 yo male with BLE lymphedema formerly being treated by Dr. Brown. Pt had BL UNNA boot on for the past week. He complains of pain in the right plantar lateral heel area where he has a small ulcer. He feels the area around the wound is tender. He has had this wound for several months. He denies any fever or chills. Pt is on ASA, Eliquis, and Gabapentin. \par \par 5/24/22: Pt kept UNNA boots on the for the past week. He still has severe pain in his right heel. He states he gets a shooting pain in the foot when he puts pressure on the foot to stand up. He tries to stay off the foot as much as possible. He denies any fever or chills. He is compliant with his medication. \par \par 5/31/22: Pt kept UNNA boots on the for the past week. He still has severe pain in his right heel. He states he gets a shooting pain in the foot when he puts pressure on the foot to stand up. He tries to stay off the foot as much as possible. He denies any fever or chills. He is compliant with his medication. \par \par 6/7/22: Pt kept UNNA boots on the for the past week. He still has less pain in his right heel. He states he gets a shooting pain in the foot when he puts pressure on the foot to stand up. He tries to stay off the foot as much as possible. He denies any fever or chills. He is compliant with his medication. \par \par 6/14/22: Pt kept UNNA boots on the for the past week. He still has significant pain in his right heel. He states he gets a shooting pain in the foot when he puts pressure on the foot to stand up. He tries to stay off the foot as much as possible. He denies any fever or chills. He is compliant with his medication. \par \par 7/12/22: Pt had BLE UNNA boots applied 7/5/22. He has an appointment tomorrow for custom shoe fitting and cannot have UNNA boots on. He denies any new symptoms. \par \par 7/26/22: Pt kept ACE wraps on mid calves for the past two weeks and now has significant weeping of his upper calves bilaterally. He denies any fever or chills. He has pain in the BLE. \par \par 8/2/22: Pt kept BLE UNNA boots on for the past week. He still has circumferential weeping ulcers from his mid calves bilaterally. He denies any fever or chills. He has pain in the BLE. \par \par 8/9/22: Pt kept BLE UNNA boots on for the past week. He still has circumferential weeping ulcers from his mid calves bilaterally. He still has significant pain in his right heel. He denies any fever or chills. He has pain in the BLE.\par \par 8/17/22: Pt kept BLE UNNA boots on for the past week. He no longer has circumferential weeping ulcers, they have closed and healed with UNNA boot treatment . He still has significant pain in his right heel. He denies any fever or chills. He has pain in the BLE. \par \par 8/23/22: Pt kept BLE UNNA boots on for the past week. He no longer has circumferential weeping ulcers, they have closed and healed with UNNA boot treatment . He still has significant pain in his right heel. He denies any fever or chills. He has pain in the BLE.

## 2022-08-23 NOTE — PHYSICAL EXAM
[0] : left 0 [2+] : left 2+ [Ankle Swelling (On Exam)] : present [Ankle Swelling Bilaterally] : severe [] : bilaterally [Ankle Swelling On The Left] : moderate [Varicose Veins Of Lower Extremities] : not present [de-identified] : NAD. Obese.  [FreeTextEntry1] : PT non-palpable due to edema. \par right heel ulcer on lateral aspect about 0.2 cm x 0.2 cm scabbed over. Tender to palpation \par

## 2022-08-30 ENCOUNTER — APPOINTMENT (OUTPATIENT)
Dept: VASCULAR SURGERY | Facility: CLINIC | Age: 67
End: 2022-08-30

## 2022-08-30 VITALS
DIASTOLIC BLOOD PRESSURE: 68 MMHG | OXYGEN SATURATION: 95 % | SYSTOLIC BLOOD PRESSURE: 125 MMHG | RESPIRATION RATE: 16 BRPM | TEMPERATURE: 97.7 F | HEART RATE: 92 BPM

## 2022-08-30 DIAGNOSIS — L03.116 CELLULITIS OF LEFT LOWER LIMB: ICD-10-CM

## 2022-08-30 PROCEDURE — 99213 OFFICE O/P EST LOW 20 MIN: CPT

## 2022-08-30 RX ORDER — CEPHALEXIN 500 MG/1
500 CAPSULE ORAL TWICE DAILY
Qty: 20 | Refills: 0 | Status: ACTIVE | COMMUNITY
Start: 2022-08-30 | End: 1900-01-01

## 2022-08-30 NOTE — ASSESSMENT
[FreeTextEntry1] : 67 yo male with lymphedema and chronic wound on right heel. Pts circumferential weeping ulcers have closed and healed well. Wound improving with UNNA boot treatment \par \par Pt counseled on diagnosis of lymphedema and Charcot foot deformity \par Pt has possible LLE cellulitis. Pt given rx for keflex \par BLE UNNA boots applied \par RTC in 1 week for bandage change and to monitor wounds \par \par A total of 20 minutes was spent with patient and coordinating care.\par

## 2022-08-30 NOTE — PHYSICAL EXAM
[0] : left 0 [2+] : left 2+ [Ankle Swelling (On Exam)] : present [Ankle Swelling Bilaterally] : severe [Varicose Veins Of Lower Extremities] : not present [] : bilaterally [Ankle Swelling On The Left] : moderate [de-identified] : NAD. Obese.  [FreeTextEntry1] : PT non-palpable due to edema. \par right heel ulcer on lateral aspect about 0.2 cm x 0.2 cm scabbed over. Tender to palpation \par left upper calf erythema and warmth \par

## 2022-08-30 NOTE — HISTORY OF PRESENT ILLNESS
[FreeTextEntry1] : 5/17/22: 65 yo male with BLE lymphedema formerly being treated by Dr. Brown. Pt had BL UNNA boot on for the past week. He complains of pain in the right plantar lateral heel area where he has a small ulcer. He feels the area around the wound is tender. He has had this wound for several months. He denies any fever or chills. Pt is on ASA, Eliquis, and Gabapentin. \par \par 5/24/22: Pt kept UNNA boots on the for the past week. He still has severe pain in his right heel. He states he gets a shooting pain in the foot when he puts pressure on the foot to stand up. He tries to stay off the foot as much as possible. He denies any fever or chills. He is compliant with his medication. \par \par 5/31/22: Pt kept UNNA boots on the for the past week. He still has severe pain in his right heel. He states he gets a shooting pain in the foot when he puts pressure on the foot to stand up. He tries to stay off the foot as much as possible. He denies any fever or chills. He is compliant with his medication. \par \par 6/7/22: Pt kept UNNA boots on the for the past week. He still has less pain in his right heel. He states he gets a shooting pain in the foot when he puts pressure on the foot to stand up. He tries to stay off the foot as much as possible. He denies any fever or chills. He is compliant with his medication. \par \par 6/14/22: Pt kept UNNA boots on the for the past week. He still has significant pain in his right heel. He states he gets a shooting pain in the foot when he puts pressure on the foot to stand up. He tries to stay off the foot as much as possible. He denies any fever or chills. He is compliant with his medication. \par \par 7/12/22: Pt had BLE UNNA boots applied 7/5/22. He has an appointment tomorrow for custom shoe fitting and cannot have UNNA boots on. He denies any new symptoms. \par \par 7/26/22: Pt kept ACE wraps on mid calves for the past two weeks and now has significant weeping of his upper calves bilaterally. He denies any fever or chills. He has pain in the BLE. \par \par 8/2/22: Pt kept BLE UNNA boots on for the past week. He still has circumferential weeping ulcers from his mid calves bilaterally. He denies any fever or chills. He has pain in the BLE. \par \par 8/9/22: Pt kept BLE UNNA boots on for the past week. He still has circumferential weeping ulcers from his mid calves bilaterally. He still has significant pain in his right heel. He denies any fever or chills. He has pain in the BLE.\par \par 8/17/22: Pt kept BLE UNNA boots on for the past week. He no longer has circumferential weeping ulcers, they have closed and healed with UNNA boot treatment . He still has significant pain in his right heel. He denies any fever or chills. He has pain in the BLE. \par \par 8/23/22: Pt kept BLE UNNA boots on for the past week. He no longer has circumferential weeping ulcers, they have closed and healed with UNNA boot treatment . He still has significant pain in his right heel. He denies any fever or chills. He has pain in the BLE. \par \par 8/30/22: Pt kept BLE UNNA boots on for the past week. Pt has gotten his custom fitted shoes and started to wear them daily. He still has significant pain in his right heel. He denies any fever or chills. He has pain in the BLE.

## 2022-09-06 ENCOUNTER — APPOINTMENT (OUTPATIENT)
Dept: VASCULAR SURGERY | Facility: CLINIC | Age: 67
End: 2022-09-06

## 2022-09-06 VITALS
TEMPERATURE: 97.8 F | DIASTOLIC BLOOD PRESSURE: 72 MMHG | SYSTOLIC BLOOD PRESSURE: 134 MMHG | HEART RATE: 90 BPM | HEIGHT: 67 IN | OXYGEN SATURATION: 96 % | RESPIRATION RATE: 16 BRPM

## 2022-09-06 DIAGNOSIS — M79.671 PAIN IN RIGHT FOOT: ICD-10-CM

## 2022-09-06 PROCEDURE — 99213 OFFICE O/P EST LOW 20 MIN: CPT

## 2022-09-06 NOTE — HISTORY OF PRESENT ILLNESS
[FreeTextEntry1] : 5/17/22: 67 yo male with BLE lymphedema formerly being treated by Dr. Brown. Pt had BL UNNA boot on for the past week. He complains of pain in the right plantar lateral heel area where he has a small ulcer. He feels the area around the wound is tender. He has had this wound for several months. He denies any fever or chills. Pt is on ASA, Eliquis, and Gabapentin. \par \par 5/24/22: Pt kept UNNA boots on the for the past week. He still has severe pain in his right heel. He states he gets a shooting pain in the foot when he puts pressure on the foot to stand up. He tries to stay off the foot as much as possible. He denies any fever or chills. He is compliant with his medication. \par \par 5/31/22: Pt kept UNNA boots on the for the past week. He still has severe pain in his right heel. He states he gets a shooting pain in the foot when he puts pressure on the foot to stand up. He tries to stay off the foot as much as possible. He denies any fever or chills. He is compliant with his medication. \par \par 6/7/22: Pt kept UNNA boots on the for the past week. He still has less pain in his right heel. He states he gets a shooting pain in the foot when he puts pressure on the foot to stand up. He tries to stay off the foot as much as possible. He denies any fever or chills. He is compliant with his medication. \par \par 6/14/22: Pt kept UNNA boots on the for the past week. He still has significant pain in his right heel. He states he gets a shooting pain in the foot when he puts pressure on the foot to stand up. He tries to stay off the foot as much as possible. He denies any fever or chills. He is compliant with his medication. \par \par 7/12/22: Pt had BLE UNNA boots applied 7/5/22. He has an appointment tomorrow for custom shoe fitting and cannot have UNNA boots on. He denies any new symptoms. \par \par 7/26/22: Pt kept ACE wraps on mid calves for the past two weeks and now has significant weeping of his upper calves bilaterally. He denies any fever or chills. He has pain in the BLE. \par \par 8/2/22: Pt kept BLE UNNA boots on for the past week. He still has circumferential weeping ulcers from his mid calves bilaterally. He denies any fever or chills. He has pain in the BLE. \par \par 8/9/22: Pt kept BLE UNNA boots on for the past week. He still has circumferential weeping ulcers from his mid calves bilaterally. He still has significant pain in his right heel. He denies any fever or chills. He has pain in the BLE.\par \par 8/17/22: Pt kept BLE UNNA boots on for the past week. He no longer has circumferential weeping ulcers, they have closed and healed with UNNA boot treatment . He still has significant pain in his right heel. He denies any fever or chills. He has pain in the BLE. \par \par 8/23/22: Pt kept BLE UNNA boots on for the past week. He no longer has circumferential weeping ulcers, they have closed and healed with UNNA boot treatment . He still has significant pain in his right heel. He denies any fever or chills. He has pain in the BLE. \par \par 8/30/22: Pt kept BLE UNNA boots on for the past week. Pt has gotten his custom fitted shoes and started to wear them daily. He still has significant pain in his right heel. He denies any fever or chills. He has pain in the BLE. \par \par 9/6/22: Pt kept BLE UNNA boots on for the past week. Pt has gotten his custom fitted shoes and started to wear them daily. He still has significant pain in his right heel. He denies any fever or chills. He has pain in the BLE.

## 2022-09-06 NOTE — ASSESSMENT
[FreeTextEntry1] : 65 yo male with lymphedema and chronic wound on right heel. Pts circumferential weeping ulcers have closed and healed well. Wound improving with UNNA boot treatment \par \par Pt counseled on diagnosis of lymphedema and Charcot foot deformity \par BLE UNNA boots applied \par RTC in 1 week for bandage change and to monitor wounds \par \par A total of 20 minutes was spent with patient and coordinating care.\par

## 2022-09-06 NOTE — PHYSICAL EXAM
[0] : left 0 [2+] : left 2+ [Ankle Swelling (On Exam)] : present [Ankle Swelling Bilaterally] : severe [] : bilaterally [Ankle Swelling On The Left] : moderate [Varicose Veins Of Lower Extremities] : not present [de-identified] : NAD. Obese.  [FreeTextEntry1] : PT non-palpable due to edema. \par right heel ulcer on lateral aspect about 0.2 cm x 0.2 cm scabbed over. Tender to palpation \par

## 2022-09-13 ENCOUNTER — APPOINTMENT (OUTPATIENT)
Dept: VASCULAR SURGERY | Facility: CLINIC | Age: 67
End: 2022-09-13

## 2022-09-13 VITALS
RESPIRATION RATE: 15 BRPM | SYSTOLIC BLOOD PRESSURE: 127 MMHG | HEART RATE: 81 BPM | TEMPERATURE: 96.5 F | DIASTOLIC BLOOD PRESSURE: 80 MMHG | OXYGEN SATURATION: 96 %

## 2022-09-13 PROCEDURE — 99213 OFFICE O/P EST LOW 20 MIN: CPT

## 2022-09-13 NOTE — HISTORY OF PRESENT ILLNESS
[FreeTextEntry1] : 5/17/22: 67 yo male with BLE lymphedema formerly being treated by Dr. Brown. Pt had BL UNNA boot on for the past week. He complains of pain in the right plantar lateral heel area where he has a small ulcer. He feels the area around the wound is tender. He has had this wound for several months. He denies any fever or chills. Pt is on ASA, Eliquis, and Gabapentin. \par \par 5/24/22: Pt kept UNNA boots on the for the past week. He still has severe pain in his right heel. He states he gets a shooting pain in the foot when he puts pressure on the foot to stand up. He tries to stay off the foot as much as possible. He denies any fever or chills. He is compliant with his medication. \par \par 5/31/22: Pt kept UNNA boots on the for the past week. He still has severe pain in his right heel. He states he gets a shooting pain in the foot when he puts pressure on the foot to stand up. He tries to stay off the foot as much as possible. He denies any fever or chills. He is compliant with his medication. \par \par 6/7/22: Pt kept UNNA boots on the for the past week. He still has less pain in his right heel. He states he gets a shooting pain in the foot when he puts pressure on the foot to stand up. He tries to stay off the foot as much as possible. He denies any fever or chills. He is compliant with his medication. \par \par 6/14/22: Pt kept UNNA boots on the for the past week. He still has significant pain in his right heel. He states he gets a shooting pain in the foot when he puts pressure on the foot to stand up. He tries to stay off the foot as much as possible. He denies any fever or chills. He is compliant with his medication. \par \par 7/12/22: Pt had BLE UNNA boots applied 7/5/22. He has an appointment tomorrow for custom shoe fitting and cannot have UNNA boots on. He denies any new symptoms. \par \par 7/26/22: Pt kept ACE wraps on mid calves for the past two weeks and now has significant weeping of his upper calves bilaterally. He denies any fever or chills. He has pain in the BLE. \par \par 8/2/22: Pt kept BLE UNNA boots on for the past week. He still has circumferential weeping ulcers from his mid calves bilaterally. He denies any fever or chills. He has pain in the BLE. \par \par 8/9/22: Pt kept BLE UNNA boots on for the past week. He still has circumferential weeping ulcers from his mid calves bilaterally. He still has significant pain in his right heel. He denies any fever or chills. He has pain in the BLE.\par \par 8/17/22: Pt kept BLE UNNA boots on for the past week. He no longer has circumferential weeping ulcers, they have closed and healed with UNNA boot treatment . He still has significant pain in his right heel. He denies any fever or chills. He has pain in the BLE. \par \par 8/23/22: Pt kept BLE UNNA boots on for the past week. He no longer has circumferential weeping ulcers, they have closed and healed with UNNA boot treatment . He still has significant pain in his right heel. He denies any fever or chills. He has pain in the BLE. \par \par 8/30/22: Pt kept BLE UNNA boots on for the past week. Pt has gotten his custom fitted shoes and started to wear them daily. He still has significant pain in his right heel. He denies any fever or chills. He has pain in the BLE. \par \par 9/6/22: Pt kept BLE UNNA boots on for the past week. Pt has gotten his custom fitted shoes and started to wear them daily. He still has significant pain in his right heel. He denies any fever or chills. He has pain in the BLE. \par \par 9/6/22: Pt kept BLE UNNA boots on for the past week. Pt picked up his custom fitted shoes and started to wear them daily. He still has significant pain in his right heel. He denies any fever or chills.

## 2022-09-13 NOTE — PHYSICAL EXAM
[0] : left 0 [2+] : left 2+ [Ankle Swelling (On Exam)] : present [Ankle Swelling Bilaterally] : severe [] : bilaterally [Ankle Swelling On The Left] : moderate [Varicose Veins Of Lower Extremities] : not present [de-identified] : NAD. Obese.  [FreeTextEntry1] : PT non-palpable due to edema. \par right heel ulcer on lateral aspect about 0.2 cm x 0.2 cm scabbed over. Tender to palpation \par left medial foot pressure wound starting

## 2022-09-20 ENCOUNTER — APPOINTMENT (OUTPATIENT)
Dept: VASCULAR SURGERY | Facility: CLINIC | Age: 67
End: 2022-09-20

## 2022-09-20 VITALS
DIASTOLIC BLOOD PRESSURE: 73 MMHG | SYSTOLIC BLOOD PRESSURE: 121 MMHG | TEMPERATURE: 97.3 F | HEART RATE: 75 BPM | RESPIRATION RATE: 16 BRPM | OXYGEN SATURATION: 95 %

## 2022-09-20 PROCEDURE — 99213 OFFICE O/P EST LOW 20 MIN: CPT

## 2022-09-20 NOTE — HISTORY OF PRESENT ILLNESS
[FreeTextEntry1] : 5/17/22: 67 yo male with BLE lymphedema formerly being treated by Dr. Brown. Pt had BL UNNA boot on for the past week. He complains of pain in the right plantar lateral heel area where he has a small ulcer. He feels the area around the wound is tender. He has had this wound for several months. He denies any fever or chills. Pt is on ASA, Eliquis, and Gabapentin. \par \par 5/24/22: Pt kept UNNA boots on the for the past week. He still has severe pain in his right heel. He states he gets a shooting pain in the foot when he puts pressure on the foot to stand up. He tries to stay off the foot as much as possible. He denies any fever or chills. He is compliant with his medication. \par \par 5/31/22: Pt kept UNNA boots on the for the past week. He still has severe pain in his right heel. He states he gets a shooting pain in the foot when he puts pressure on the foot to stand up. He tries to stay off the foot as much as possible. He denies any fever or chills. He is compliant with his medication. \par \par 6/7/22: Pt kept UNNA boots on the for the past week. He still has less pain in his right heel. He states he gets a shooting pain in the foot when he puts pressure on the foot to stand up. He tries to stay off the foot as much as possible. He denies any fever or chills. He is compliant with his medication. \par \par 6/14/22: Pt kept UNNA boots on the for the past week. He still has significant pain in his right heel. He states he gets a shooting pain in the foot when he puts pressure on the foot to stand up. He tries to stay off the foot as much as possible. He denies any fever or chills. He is compliant with his medication. \par \par 7/12/22: Pt had BLE UNNA boots applied 7/5/22. He has an appointment tomorrow for custom shoe fitting and cannot have UNNA boots on. He denies any new symptoms. \par \par 7/26/22: Pt kept ACE wraps on mid calves for the past two weeks and now has significant weeping of his upper calves bilaterally. He denies any fever or chills. He has pain in the BLE. \par \par 8/2/22: Pt kept BLE UNNA boots on for the past week. He still has circumferential weeping ulcers from his mid calves bilaterally. He denies any fever or chills. He has pain in the BLE. \par \par 8/9/22: Pt kept BLE UNNA boots on for the past week. He still has circumferential weeping ulcers from his mid calves bilaterally. He still has significant pain in his right heel. He denies any fever or chills. He has pain in the BLE.\par \par 8/17/22: Pt kept BLE UNNA boots on for the past week. He no longer has circumferential weeping ulcers, they have closed and healed with UNNA boot treatment . He still has significant pain in his right heel. He denies any fever or chills. He has pain in the BLE. \par \par 8/23/22: Pt kept BLE UNNA boots on for the past week. He no longer has circumferential weeping ulcers, they have closed and healed with UNNA boot treatment . He still has significant pain in his right heel. He denies any fever or chills. He has pain in the BLE. \par \par 8/30/22: Pt kept BLE UNNA boots on for the past week. Pt has gotten his custom fitted shoes and started to wear them daily. He still has significant pain in his right heel. He denies any fever or chills. He has pain in the BLE. \par \par 9/6/22: Pt kept BLE UNNA boots on for the past week. Pt has gotten his custom fitted shoes and started to wear them daily. He still has significant pain in his right heel. He denies any fever or chills. He has pain in the BLE. \par \par 9/13/22: Pt kept BLE UNNA boots on for the past week. Pt picked up his custom fitted shoes and started to wear them daily. He still has significant pain in his right heel. He denies any fever or chills. \par \par 9/20/22: Pt kept BLE UNNA boots on for the past week. Pt picked up his custom fitted shoes and started to wear them daily. He still has significant pain in his right heel. He denies any fever or chills.

## 2022-09-20 NOTE — PHYSICAL EXAM
[0] : left 0 [2+] : left 2+ [Ankle Swelling (On Exam)] : present [Ankle Swelling Bilaterally] : severe [] : bilaterally [Ankle Swelling On The Left] : moderate [Varicose Veins Of Lower Extremities] : not present [de-identified] : NAD. Obese.  [FreeTextEntry1] : PT non-palpable due to edema. \par right heel ulcer on lateral aspect about 0.2 cm x 0.2 cm scabbed over. Tender to palpation \par left medial foot pressure wound starting

## 2022-09-27 ENCOUNTER — APPOINTMENT (OUTPATIENT)
Dept: VASCULAR SURGERY | Facility: CLINIC | Age: 67
End: 2022-09-27

## 2022-10-04 ENCOUNTER — APPOINTMENT (OUTPATIENT)
Dept: VASCULAR SURGERY | Facility: CLINIC | Age: 67
End: 2022-10-04

## 2022-10-04 VITALS
RESPIRATION RATE: 14 BRPM | HEART RATE: 72 BPM | SYSTOLIC BLOOD PRESSURE: 156 MMHG | DIASTOLIC BLOOD PRESSURE: 82 MMHG | TEMPERATURE: 97.2 F | OXYGEN SATURATION: 98 %

## 2022-10-04 PROCEDURE — 99213 OFFICE O/P EST LOW 20 MIN: CPT

## 2022-10-04 NOTE — HISTORY OF PRESENT ILLNESS
[FreeTextEntry1] : 5/17/22: 65 yo male with BLE lymphedema formerly being treated by Dr. Brown. Pt had BL UNNA boot on for the past week. He complains of pain in the right plantar lateral heel area where he has a small ulcer. He feels the area around the wound is tender. He has had this wound for several months. He denies any fever or chills. Pt is on ASA, Eliquis, and Gabapentin. \par \par 5/24/22: Pt kept UNNA boots on the for the past week. He still has severe pain in his right heel. He states he gets a shooting pain in the foot when he puts pressure on the foot to stand up. He tries to stay off the foot as much as possible. He denies any fever or chills. He is compliant with his medication. \par \par 5/31/22: Pt kept UNNA boots on the for the past week. He still has severe pain in his right heel. He states he gets a shooting pain in the foot when he puts pressure on the foot to stand up. He tries to stay off the foot as much as possible. He denies any fever or chills. He is compliant with his medication. \par \par 6/7/22: Pt kept UNNA boots on the for the past week. He still has less pain in his right heel. He states he gets a shooting pain in the foot when he puts pressure on the foot to stand up. He tries to stay off the foot as much as possible. He denies any fever or chills. He is compliant with his medication. \par \par 6/14/22: Pt kept UNNA boots on the for the past week. He still has significant pain in his right heel. He states he gets a shooting pain in the foot when he puts pressure on the foot to stand up. He tries to stay off the foot as much as possible. He denies any fever or chills. He is compliant with his medication. \par \par 7/12/22: Pt had BLE UNNA boots applied 7/5/22. He has an appointment tomorrow for custom shoe fitting and cannot have UNNA boots on. He denies any new symptoms. \par \par 7/26/22: Pt kept ACE wraps on mid calves for the past two weeks and now has significant weeping of his upper calves bilaterally. He denies any fever or chills. He has pain in the BLE. \par \par 8/2/22: Pt kept BLE UNNA boots on for the past week. He still has circumferential weeping ulcers from his mid calves bilaterally. He denies any fever or chills. He has pain in the BLE. \par \par 8/9/22: Pt kept BLE UNNA boots on for the past week. He still has circumferential weeping ulcers from his mid calves bilaterally. He still has significant pain in his right heel. He denies any fever or chills. He has pain in the BLE.\par \par 8/17/22: Pt kept BLE UNNA boots on for the past week. He no longer has circumferential weeping ulcers, they have closed and healed with UNNA boot treatment . He still has significant pain in his right heel. He denies any fever or chills. He has pain in the BLE. \par \par 8/23/22: Pt kept BLE UNNA boots on for the past week. He no longer has circumferential weeping ulcers, they have closed and healed with UNNA boot treatment . He still has significant pain in his right heel. He denies any fever or chills. He has pain in the BLE. \par \par 8/30/22: Pt kept BLE UNNA boots on for the past week. Pt has gotten his custom fitted shoes and started to wear them daily. He still has significant pain in his right heel. He denies any fever or chills. He has pain in the BLE. \par \par 9/6/22: Pt kept BLE UNNA boots on for the past week. Pt has gotten his custom fitted shoes and started to wear them daily. He still has significant pain in his right heel. He denies any fever or chills. He has pain in the BLE. \par \par 9/13/22: Pt kept BLE UNNA boots on for the past week. Pt picked up his custom fitted shoes and started to wear them daily. He still has significant pain in his right heel. He denies any fever or chills. \par \par 9/20/22: Pt kept BLE UNNA boots on for the past week. Pt picked up his custom fitted shoes and started to wear them daily. He still has significant pain in his right heel. He denies any fever or chills. \par \par 10/4/22: Pt kept BLE UNNA boots on for the past two weeks. The bandages slid down to mid calf and he has been having severe weeping from his right leg.  He still has significant pain in his right heel. He denies any fever or chills.

## 2022-10-04 NOTE — PHYSICAL EXAM
[0] : left 0 [2+] : left 2+ [Ankle Swelling (On Exam)] : present [Ankle Swelling Bilaterally] : severe [] : bilaterally [Ankle Swelling On The Left] : moderate [Varicose Veins Of Lower Extremities] : not present [de-identified] : NAD. Obese.  [FreeTextEntry1] : PT non-palpable due to edema. \par large right posterior calf weeping superficial ulcer \par right heel ulcer on lateral aspect about 0.2 cm x 0.2 cm scabbed over. Tender to palpation \par left medial foot pressure wound starting

## 2022-10-04 NOTE — ASSESSMENT
[FreeTextEntry1] : 65 yo male with lymphedema and chronic wound on right heel. Pt has new right posterior calf weeping and ulcer \par \par Pt counseled on diagnosis of lymphedema and Charcot foot deformity \par BLE UNNA boots applied \par RTC in 1 week for bandage change and to monitor wounds \par \par A total of 20 minutes was spent with patient and coordinating care.\par

## 2022-10-11 ENCOUNTER — APPOINTMENT (OUTPATIENT)
Dept: VASCULAR SURGERY | Facility: CLINIC | Age: 67
End: 2022-10-11

## 2022-10-11 VITALS
RESPIRATION RATE: 14 BRPM | DIASTOLIC BLOOD PRESSURE: 78 MMHG | HEART RATE: 87 BPM | OXYGEN SATURATION: 96 % | SYSTOLIC BLOOD PRESSURE: 151 MMHG | TEMPERATURE: 97.3 F

## 2022-10-11 PROCEDURE — 99213 OFFICE O/P EST LOW 20 MIN: CPT

## 2022-10-11 NOTE — PHYSICAL EXAM
[0] : left 0 [2+] : left 2+ [Ankle Swelling (On Exam)] : present [Ankle Swelling Bilaterally] : severe [] : bilaterally [Ankle Swelling On The Left] : moderate [Varicose Veins Of Lower Extremities] : not present [de-identified] : NAD. Obese.  [FreeTextEntry1] : PT non-palpable due to edema. \par large right posterior calf weeping superficial ulcer \par right heel ulcer on lateral aspect about 0.2 cm x 0.2 cm scabbed over. Tender to palpation \par left medial foot pressure wound starting

## 2022-10-11 NOTE — HISTORY OF PRESENT ILLNESS
[FreeTextEntry1] : 5/17/22: 65 yo male with BLE lymphedema formerly being treated by Dr. Brown. Pt had BL UNNA boot on for the past week. He complains of pain in the right plantar lateral heel area where he has a small ulcer. He feels the area around the wound is tender. He has had this wound for several months. He denies any fever or chills. Pt is on ASA, Eliquis, and Gabapentin. \par \par 5/24/22: Pt kept UNNA boots on the for the past week. He still has severe pain in his right heel. He states he gets a shooting pain in the foot when he puts pressure on the foot to stand up. He tries to stay off the foot as much as possible. He denies any fever or chills. He is compliant with his medication. \par \par 5/31/22: Pt kept UNNA boots on the for the past week. He still has severe pain in his right heel. He states he gets a shooting pain in the foot when he puts pressure on the foot to stand up. He tries to stay off the foot as much as possible. He denies any fever or chills. He is compliant with his medication. \par \par 6/7/22: Pt kept UNNA boots on the for the past week. He still has less pain in his right heel. He states he gets a shooting pain in the foot when he puts pressure on the foot to stand up. He tries to stay off the foot as much as possible. He denies any fever or chills. He is compliant with his medication. \par \par 6/14/22: Pt kept UNNA boots on the for the past week. He still has significant pain in his right heel. He states he gets a shooting pain in the foot when he puts pressure on the foot to stand up. He tries to stay off the foot as much as possible. He denies any fever or chills. He is compliant with his medication. \par \par 7/12/22: Pt had BLE UNNA boots applied 7/5/22. He has an appointment tomorrow for custom shoe fitting and cannot have UNNA boots on. He denies any new symptoms. \par \par 7/26/22: Pt kept ACE wraps on mid calves for the past two weeks and now has significant weeping of his upper calves bilaterally. He denies any fever or chills. He has pain in the BLE. \par \par 8/2/22: Pt kept BLE UNNA boots on for the past week. He still has circumferential weeping ulcers from his mid calves bilaterally. He denies any fever or chills. He has pain in the BLE. \par \par 8/9/22: Pt kept BLE UNNA boots on for the past week. He still has circumferential weeping ulcers from his mid calves bilaterally. He still has significant pain in his right heel. He denies any fever or chills. He has pain in the BLE.\par \par 8/17/22: Pt kept BLE UNNA boots on for the past week. He no longer has circumferential weeping ulcers, they have closed and healed with UNNA boot treatment . He still has significant pain in his right heel. He denies any fever or chills. He has pain in the BLE. \par \par 8/23/22: Pt kept BLE UNNA boots on for the past week. He no longer has circumferential weeping ulcers, they have closed and healed with UNNA boot treatment . He still has significant pain in his right heel. He denies any fever or chills. He has pain in the BLE. \par \par 8/30/22: Pt kept BLE UNNA boots on for the past week. Pt has gotten his custom fitted shoes and started to wear them daily. He still has significant pain in his right heel. He denies any fever or chills. He has pain in the BLE. \par \par 9/6/22: Pt kept BLE UNNA boots on for the past week. Pt has gotten his custom fitted shoes and started to wear them daily. He still has significant pain in his right heel. He denies any fever or chills. He has pain in the BLE. \par \par 9/13/22: Pt kept BLE UNNA boots on for the past week. Pt picked up his custom fitted shoes and started to wear them daily. He still has significant pain in his right heel. He denies any fever or chills. \par \par 9/20/22: Pt kept BLE UNNA boots on for the past week. Pt picked up his custom fitted shoes and started to wear them daily. He still has significant pain in his right heel. He denies any fever or chills. \par \par 10/4/22: Pt kept BLE UNNA boots on for the past two weeks. The bandages slid down to mid calf and he has been having severe weeping from his right leg.  He still has significant pain in his right heel. He denies any fever or chills. \par \par 10/10/22: Pt kept BLE UNNA boots on for the past week. He has weeping from his right posterior calf.  He still has significant pain in his right heel. He denies any fever or chills.

## 2022-10-14 DIAGNOSIS — Z13.21 ENCOUNTER FOR SCREENING FOR NUTRITIONAL DISORDER: ICD-10-CM

## 2022-10-14 DIAGNOSIS — Z01.818 ENCOUNTER FOR OTHER PREPROCEDURAL EXAMINATION: ICD-10-CM

## 2022-10-17 ENCOUNTER — APPOINTMENT (OUTPATIENT)
Dept: SURGERY | Facility: CLINIC | Age: 67
End: 2022-10-17
Payer: MEDICARE

## 2022-10-17 VITALS
WEIGHT: 260.25 LBS | HEART RATE: 90 BPM | SYSTOLIC BLOOD PRESSURE: 163 MMHG | OXYGEN SATURATION: 97 % | RESPIRATION RATE: 16 BRPM | HEIGHT: 67 IN | TEMPERATURE: 97.2 F | DIASTOLIC BLOOD PRESSURE: 82 MMHG | BODY MASS INDEX: 40.85 KG/M2

## 2022-10-17 DIAGNOSIS — Z72.820 SLEEP DEPRIVATION: ICD-10-CM

## 2022-10-17 DIAGNOSIS — K42.9 UMBILICAL HERNIA W/OUT OBSTRUCTION OR GANGRENE: ICD-10-CM

## 2022-10-17 DIAGNOSIS — R52 PAIN, UNSPECIFIED: ICD-10-CM

## 2022-10-17 DIAGNOSIS — M17.11 UNILATERAL PRIMARY OSTEOARTHRITIS, RIGHT KNEE: ICD-10-CM

## 2022-10-17 PROCEDURE — 99204 OFFICE O/P NEW MOD 45 MIN: CPT

## 2022-10-17 PROCEDURE — 99214 OFFICE O/P EST MOD 30 MIN: CPT

## 2022-10-17 NOTE — ASSESSMENT
[FreeTextEntry1] : 66 Male with morbid obesity, BMI of 40 who is interested in weight loss surgery, patient is motivated and would benefit from sleeve gastrectomy. Will need complete workup including psychological evaluation, cardiologic evaluation, pulmonogy evaluation, right upper quadrant ultrasound, upper GI study and EGD. Will also consult with bariatric dietitian. Will follow up once above is complete

## 2022-10-17 NOTE — REVIEW OF SYSTEMS
[Lower Ext Edema] : lower extremity edema [Hernia] : hernia [Joint Pain] : joint pain [Joint Stiffness] : joint stiffness [Skin Wound] : skin wound [Negative] : Psychiatric [Abdominal Pain] : no abdominal pain [Vomiting] : no vomiting [Constipation] : no constipation [Diarrhea] : no diarrhea [Reflux/Heartburn] : no reflex/heartburn

## 2022-10-17 NOTE — HISTORY OF PRESENT ILLNESS
[de-identified] : 66 year old male with Obesity (BMI 40) maximum weight of 280 lbs per chart review who consults for weight loss surgery\par \par also history of diabetes on lantus, methadone for pain left hip replacement who is interested in weight loss surgery, most recently had a fall and is afraid excess weight will be a burden in care. He denies reflux, or GERD symptoms. He has history for lymphedema and varicose veins of lower extremities requiring weekly visits for unna boot excahnge.\par \par He has tried diet, without results\par \par

## 2022-10-17 NOTE — PHYSICAL EXAM
[Obese] : obese [Normal] : affect appropriate [de-identified] : well perfused [de-identified] : Lower extremity edema with presence of unna boots [de-identified] : Normoactive bowel sounds, soft, non tender, umbilical reducible hernia [de-identified] : tenderness bialteral knees with motion

## 2022-10-17 NOTE — PLAN
[FreeTextEntry1] : Cards referral\par Pulmonology referral\par EGD\par UGI\par Psychological eval\par Dietitian eval

## 2022-10-18 ENCOUNTER — APPOINTMENT (OUTPATIENT)
Dept: VASCULAR SURGERY | Facility: CLINIC | Age: 67
End: 2022-10-18

## 2022-10-18 VITALS
HEART RATE: 78 BPM | DIASTOLIC BLOOD PRESSURE: 77 MMHG | WEIGHT: 260 LBS | SYSTOLIC BLOOD PRESSURE: 163 MMHG | HEIGHT: 67 IN | BODY MASS INDEX: 40.81 KG/M2 | TEMPERATURE: 97.3 F | OXYGEN SATURATION: 98 % | RESPIRATION RATE: 16 BRPM

## 2022-10-18 PROCEDURE — 99213 OFFICE O/P EST LOW 20 MIN: CPT

## 2022-10-18 NOTE — PHYSICAL EXAM
[0] : left 0 [2+] : left 2+ [Ankle Swelling (On Exam)] : present [Ankle Swelling Bilaterally] : severe [] : bilaterally [Ankle Swelling On The Left] : moderate [Varicose Veins Of Lower Extremities] : not present [de-identified] : NAD. Obese.  [FreeTextEntry1] : PT non-palpable due to edema. \par large right posterior calf weeping superficial ulcer \par right heel ulcer on lateral aspect about 0.2 cm x 0.2 cm scabbed over. Tender to palpation \par left medial foot pressure wound starting

## 2022-10-18 NOTE — HISTORY OF PRESENT ILLNESS
[FreeTextEntry1] : 5/17/22: 65 yo male with BLE lymphedema formerly being treated by Dr. Brown. Pt had BL UNNA boot on for the past week. He complains of pain in the right plantar lateral heel area where he has a small ulcer. He feels the area around the wound is tender. He has had this wound for several months. He denies any fever or chills. Pt is on ASA, Eliquis, and Gabapentin. \par \par 5/24/22: Pt kept UNNA boots on the for the past week. He still has severe pain in his right heel. He states he gets a shooting pain in the foot when he puts pressure on the foot to stand up. He tries to stay off the foot as much as possible. He denies any fever or chills. He is compliant with his medication. \par \par 5/31/22: Pt kept UNNA boots on the for the past week. He still has severe pain in his right heel. He states he gets a shooting pain in the foot when he puts pressure on the foot to stand up. He tries to stay off the foot as much as possible. He denies any fever or chills. He is compliant with his medication. \par \par 6/7/22: Pt kept UNNA boots on the for the past week. He still has less pain in his right heel. He states he gets a shooting pain in the foot when he puts pressure on the foot to stand up. He tries to stay off the foot as much as possible. He denies any fever or chills. He is compliant with his medication. \par \par 6/14/22: Pt kept UNNA boots on the for the past week. He still has significant pain in his right heel. He states he gets a shooting pain in the foot when he puts pressure on the foot to stand up. He tries to stay off the foot as much as possible. He denies any fever or chills. He is compliant with his medication. \par \par 7/12/22: Pt had BLE UNNA boots applied 7/5/22. He has an appointment tomorrow for custom shoe fitting and cannot have UNNA boots on. He denies any new symptoms. \par \par 7/26/22: Pt kept ACE wraps on mid calves for the past two weeks and now has significant weeping of his upper calves bilaterally. He denies any fever or chills. He has pain in the BLE. \par \par 8/2/22: Pt kept BLE UNNA boots on for the past week. He still has circumferential weeping ulcers from his mid calves bilaterally. He denies any fever or chills. He has pain in the BLE. \par \par 8/9/22: Pt kept BLE UNNA boots on for the past week. He still has circumferential weeping ulcers from his mid calves bilaterally. He still has significant pain in his right heel. He denies any fever or chills. He has pain in the BLE.\par \par 8/17/22: Pt kept BLE UNNA boots on for the past week. He no longer has circumferential weeping ulcers, they have closed and healed with UNNA boot treatment . He still has significant pain in his right heel. He denies any fever or chills. He has pain in the BLE. \par \par 8/23/22: Pt kept BLE UNNA boots on for the past week. He no longer has circumferential weeping ulcers, they have closed and healed with UNNA boot treatment . He still has significant pain in his right heel. He denies any fever or chills. He has pain in the BLE. \par \par 8/30/22: Pt kept BLE UNNA boots on for the past week. Pt has gotten his custom fitted shoes and started to wear them daily. He still has significant pain in his right heel. He denies any fever or chills. He has pain in the BLE. \par \par 9/6/22: Pt kept BLE UNNA boots on for the past week. Pt has gotten his custom fitted shoes and started to wear them daily. He still has significant pain in his right heel. He denies any fever or chills. He has pain in the BLE. \par \par 9/13/22: Pt kept BLE UNNA boots on for the past week. Pt picked up his custom fitted shoes and started to wear them daily. He still has significant pain in his right heel. He denies any fever or chills. \par \par 9/20/22: Pt kept BLE UNNA boots on for the past week. Pt picked up his custom fitted shoes and started to wear them daily. He still has significant pain in his right heel. He denies any fever or chills. \par \par 10/4/22: Pt kept BLE UNNA boots on for the past two weeks. The bandages slid down to mid calf and he has been having severe weeping from his right leg.  He still has significant pain in his right heel. He denies any fever or chills. \par \par 10/10/22: Pt kept BLE UNNA boots on for the past week. He has weeping from his right posterior calf.  He still has significant pain in his right heel. He denies any fever or chills. \par \par 10/18/22: Pt kept BLE UNNA boots on for the past week. He has weeping from his right posterior calf.  He still has significant pain in his right heel. He denies any fever or chills.

## 2022-10-18 NOTE — ASSESSMENT
[FreeTextEntry1] : 67 yo male with lymphedema and chronic wound on right heel. Pt has new right posterior calf weeping and ulcer \par \par Pt counseled on diagnosis of lymphedema and Charcot foot deformity \par BLE UNNA boots applied \par RTC in 1 week for bandage change and to monitor wounds \par \par A total of 20 minutes was spent with patient and coordinating care.\par

## 2022-10-21 LAB
25(OH)D3 SERPL-MCNC: 11.7 NG/ML
ALBUMIN SERPL ELPH-MCNC: 4.2 G/DL
ALP BLD-CCNC: 121 U/L
ALT SERPL-CCNC: 11 U/L
ANION GAP SERPL CALC-SCNC: 8 MMOL/L
APPEARANCE: CLEAR
APTT BLD: 34.6 SEC
AST SERPL-CCNC: 17 U/L
BACTERIA: NEGATIVE
BASOPHILS # BLD AUTO: 0.1 K/UL
BASOPHILS NFR BLD AUTO: 1.3 %
BILIRUB SERPL-MCNC: 0.4 MG/DL
BILIRUBIN URINE: NEGATIVE
BLOOD URINE: NEGATIVE
BUN SERPL-MCNC: 17 MG/DL
CALCIUM SERPL-MCNC: 9.4 MG/DL
CALCIUM SERPL-MCNC: 9.4 MG/DL
CHLORIDE SERPL-SCNC: 103 MMOL/L
CHOLEST SERPL-MCNC: 152 MG/DL
CO2 SERPL-SCNC: 29 MMOL/L
COLOR: YELLOW
CREAT SERPL-MCNC: 0.94 MG/DL
EGFR: 89 ML/MIN/1.73M2
EOSINOPHIL # BLD AUTO: 0.16 K/UL
EOSINOPHIL NFR BLD AUTO: 2.1 %
FOLATE SERPL-MCNC: 12.1 NG/ML
GLUCOSE QUALITATIVE U: NEGATIVE
GLUCOSE SERPL-MCNC: 82 MG/DL
HCT VFR BLD CALC: 39.7 %
HDLC SERPL-MCNC: 57 MG/DL
HGB BLD-MCNC: 11.9 G/DL
HYALINE CASTS: 1 /LPF
IMM GRANULOCYTES NFR BLD AUTO: 0.3 %
INR PPP: 1.14 RATIO
IRON SATN MFR SERPL: 16 %
IRON SERPL-MCNC: 40 UG/DL
KETONES URINE: NEGATIVE
LDLC SERPL CALC-MCNC: 79 MG/DL
LEUKOCYTE ESTERASE URINE: NEGATIVE
LYMPHOCYTES # BLD AUTO: 2.52 K/UL
LYMPHOCYTES NFR BLD AUTO: 33 %
MAN DIFF?: NORMAL
MCHC RBC-ENTMCNC: 24.5 PG
MCHC RBC-ENTMCNC: 30 GM/DL
MCV RBC AUTO: 81.9 FL
MICROSCOPIC-UA: NORMAL
MONOCYTES # BLD AUTO: 0.45 K/UL
MONOCYTES NFR BLD AUTO: 5.9 %
NEUTROPHILS # BLD AUTO: 4.39 K/UL
NEUTROPHILS NFR BLD AUTO: 57.4 %
NITRITE URINE: NEGATIVE
NONHDLC SERPL-MCNC: 96 MG/DL
PARATHYROID HORMONE INTACT: 63 PG/ML
PH URINE: 6
PLATELET # BLD AUTO: 292 K/UL
POTASSIUM SERPL-SCNC: 4.9 MMOL/L
PREALB SERPL NEPH-MCNC: 19 MG/DL
PROT SERPL-MCNC: 8.9 G/DL
PROTEIN URINE: ABNORMAL
PT BLD: 13.3 SEC
RBC # BLD: 4.85 M/UL
RBC # FLD: 17.3 %
RED BLOOD CELLS URINE: 1 /HPF
SODIUM SERPL-SCNC: 140 MMOL/L
SPECIFIC GRAVITY URINE: 1.02
SQUAMOUS EPITHELIAL CELLS: 1 /HPF
TIBC SERPL-MCNC: 261 UG/DL
TRIGL SERPL-MCNC: 85 MG/DL
TSH SERPL-ACNC: 2.14 UIU/ML
UIBC SERPL-MCNC: 220 UG/DL
UROBILINOGEN URINE: NORMAL
VIT B12 SERPL-MCNC: 557 PG/ML
WBC # FLD AUTO: 7.64 K/UL
WHITE BLOOD CELLS URINE: 0 /HPF

## 2022-10-24 LAB
ESTIMATED AVERAGE GLUCOSE: 114 MG/DL
HBA1C MFR BLD HPLC: 5.6 %

## 2022-10-25 ENCOUNTER — APPOINTMENT (OUTPATIENT)
Dept: VASCULAR SURGERY | Facility: CLINIC | Age: 67
End: 2022-10-25

## 2022-10-25 VITALS
WEIGHT: 260 LBS | HEART RATE: 82 BPM | OXYGEN SATURATION: 96 % | TEMPERATURE: 97.6 F | BODY MASS INDEX: 40.81 KG/M2 | DIASTOLIC BLOOD PRESSURE: 74 MMHG | RESPIRATION RATE: 16 BRPM | SYSTOLIC BLOOD PRESSURE: 139 MMHG | HEIGHT: 67 IN

## 2022-10-25 LAB
H PYLORI AB SER-ACNC: <5 UNITS
H PYLORI IGA SER-ACNC: 25.7 UNITS
VIT B1 SERPL-MCNC: 159.2 NMOL/L
ZINC SERPL-MCNC: 66 UG/DL

## 2022-10-25 PROCEDURE — 99213 OFFICE O/P EST LOW 20 MIN: CPT

## 2022-10-25 NOTE — HISTORY OF PRESENT ILLNESS
[FreeTextEntry1] : 5/17/22: 65 yo male with BLE lymphedema formerly being treated by Dr. Brown. Pt had BL UNNA boot on for the past week. He complains of pain in the right plantar lateral heel area where he has a small ulcer. He feels the area around the wound is tender. He has had this wound for several months. He denies any fever or chills. Pt is on ASA, Eliquis, and Gabapentin. \par \par 5/24/22: Pt kept UNNA boots on the for the past week. He still has severe pain in his right heel. He states he gets a shooting pain in the foot when he puts pressure on the foot to stand up. He tries to stay off the foot as much as possible. He denies any fever or chills. He is compliant with his medication. \par \par 5/31/22: Pt kept UNNA boots on the for the past week. He still has severe pain in his right heel. He states he gets a shooting pain in the foot when he puts pressure on the foot to stand up. He tries to stay off the foot as much as possible. He denies any fever or chills. He is compliant with his medication. \par \par 6/7/22: Pt kept UNNA boots on the for the past week. He still has less pain in his right heel. He states he gets a shooting pain in the foot when he puts pressure on the foot to stand up. He tries to stay off the foot as much as possible. He denies any fever or chills. He is compliant with his medication. \par \par 6/14/22: Pt kept UNNA boots on the for the past week. He still has significant pain in his right heel. He states he gets a shooting pain in the foot when he puts pressure on the foot to stand up. He tries to stay off the foot as much as possible. He denies any fever or chills. He is compliant with his medication. \par \par 7/12/22: Pt had BLE UNNA boots applied 7/5/22. He has an appointment tomorrow for custom shoe fitting and cannot have UNNA boots on. He denies any new symptoms. \par \par 7/26/22: Pt kept ACE wraps on mid calves for the past two weeks and now has significant weeping of his upper calves bilaterally. He denies any fever or chills. He has pain in the BLE. \par \par 8/2/22: Pt kept BLE UNNA boots on for the past week. He still has circumferential weeping ulcers from his mid calves bilaterally. He denies any fever or chills. He has pain in the BLE. \par \par 8/9/22: Pt kept BLE UNNA boots on for the past week. He still has circumferential weeping ulcers from his mid calves bilaterally. He still has significant pain in his right heel. He denies any fever or chills. He has pain in the BLE.\par \par 8/17/22: Pt kept BLE UNNA boots on for the past week. He no longer has circumferential weeping ulcers, they have closed and healed with UNNA boot treatment . He still has significant pain in his right heel. He denies any fever or chills. He has pain in the BLE. \par \par 8/23/22: Pt kept BLE UNNA boots on for the past week. He no longer has circumferential weeping ulcers, they have closed and healed with UNNA boot treatment . He still has significant pain in his right heel. He denies any fever or chills. He has pain in the BLE. \par \par 8/30/22: Pt kept BLE UNNA boots on for the past week. Pt has gotten his custom fitted shoes and started to wear them daily. He still has significant pain in his right heel. He denies any fever or chills. He has pain in the BLE. \par \par 9/6/22: Pt kept BLE UNNA boots on for the past week. Pt has gotten his custom fitted shoes and started to wear them daily. He still has significant pain in his right heel. He denies any fever or chills. He has pain in the BLE. \par \par 9/13/22: Pt kept BLE UNNA boots on for the past week. Pt picked up his custom fitted shoes and started to wear them daily. He still has significant pain in his right heel. He denies any fever or chills. \par \par 9/20/22: Pt kept BLE UNNA boots on for the past week. Pt picked up his custom fitted shoes and started to wear them daily. He still has significant pain in his right heel. He denies any fever or chills. \par \par 10/4/22: Pt kept BLE UNNA boots on for the past two weeks. The bandages slid down to mid calf and he has been having severe weeping from his right leg.  He still has significant pain in his right heel. He denies any fever or chills. \par \par 10/10/22: Pt kept BLE UNNA boots on for the past week. He has weeping from his right posterior calf.  He still has significant pain in his right heel. He denies any fever or chills. \par \par 10/18/22: Pt kept BLE UNNA boots on for the past week. He has weeping from his right posterior calf.  He still has significant pain in his right heel. He denies any fever or chills. \par \par 10/25/22: Pt kept BLE UNNA boots on for the past week. He no longer has any weeping from the calves.  He still has significant pain in his right heel. He denies any fever or chills.

## 2022-10-25 NOTE — PHYSICAL EXAM
[0] : left 0 [2+] : left 2+ [Ankle Swelling (On Exam)] : present [Ankle Swelling Bilaterally] : severe [] : bilaterally [Ankle Swelling On The Left] : moderate [Varicose Veins Of Lower Extremities] : not present [de-identified] : NAD. Obese.  [FreeTextEntry1] : PT non-palpable due to edema. \par All ulcers closed. No weeping from legs\par right heel ulcer closed. Tender to palpation \par

## 2022-10-25 NOTE — ASSESSMENT
[FreeTextEntry1] : 65 yo male with lymphedema and chronic wound on right heel. Right heel wound closed and healed well. Pt no longer weeping. \par \par Pt counseled on diagnosis of lymphedema and Charcot foot deformity \par BLE UNNA boots applied \par RTC in 1 week for bandage change and to monitor wounds \par \par A total of 20 minutes was spent with patient and coordinating care.\par

## 2022-10-26 ENCOUNTER — APPOINTMENT (OUTPATIENT)
Dept: VASCULAR SURGERY | Facility: CLINIC | Age: 67
End: 2022-10-26

## 2022-10-26 ENCOUNTER — NON-APPOINTMENT (OUTPATIENT)
Age: 67
End: 2022-10-26

## 2022-10-26 VITALS
DIASTOLIC BLOOD PRESSURE: 82 MMHG | RESPIRATION RATE: 15 BRPM | HEART RATE: 83 BPM | OXYGEN SATURATION: 96 % | TEMPERATURE: 97.5 F | SYSTOLIC BLOOD PRESSURE: 128 MMHG

## 2022-10-26 PROCEDURE — 99213 OFFICE O/P EST LOW 20 MIN: CPT

## 2022-10-26 NOTE — ASSESSMENT
[FreeTextEntry1] : 67 yo male with lymphedema and chronic wound on right heel. Right heel wound closed and healed well. Pt has weeping from right posterior calf. \par \par Pt counseled on diagnosis of lymphedema and Charcot foot deformity \par VEE leon applied \par RTC in 1 week for bandage change and to monitor wounds \par \par A total of 20 minutes was spent with patient and coordinating care.\par

## 2022-10-26 NOTE — HISTORY OF PRESENT ILLNESS
[FreeTextEntry1] : 5/17/22: 65 yo male with BLE lymphedema formerly being treated by Dr. Brown. Pt had BL UNNA boot on for the past week. He complains of pain in the right plantar lateral heel area where he has a small ulcer. He feels the area around the wound is tender. He has had this wound for several months. He denies any fever or chills. Pt is on ASA, Eliquis, and Gabapentin. \par \par 5/24/22: Pt kept UNNA boots on the for the past week. He still has severe pain in his right heel. He states he gets a shooting pain in the foot when he puts pressure on the foot to stand up. He tries to stay off the foot as much as possible. He denies any fever or chills. He is compliant with his medication. \par \par 5/31/22: Pt kept UNNA boots on the for the past week. He still has severe pain in his right heel. He states he gets a shooting pain in the foot when he puts pressure on the foot to stand up. He tries to stay off the foot as much as possible. He denies any fever or chills. He is compliant with his medication. \par \par 6/7/22: Pt kept UNNA boots on the for the past week. He still has less pain in his right heel. He states he gets a shooting pain in the foot when he puts pressure on the foot to stand up. He tries to stay off the foot as much as possible. He denies any fever or chills. He is compliant with his medication. \par \par 6/14/22: Pt kept UNNA boots on the for the past week. He still has significant pain in his right heel. He states he gets a shooting pain in the foot when he puts pressure on the foot to stand up. He tries to stay off the foot as much as possible. He denies any fever or chills. He is compliant with his medication. \par \par 7/12/22: Pt had BLE UNNA boots applied 7/5/22. He has an appointment tomorrow for custom shoe fitting and cannot have UNNA boots on. He denies any new symptoms. \par \par 7/26/22: Pt kept ACE wraps on mid calves for the past two weeks and now has significant weeping of his upper calves bilaterally. He denies any fever or chills. He has pain in the BLE. \par \par 8/2/22: Pt kept BLE UNNA boots on for the past week. He still has circumferential weeping ulcers from his mid calves bilaterally. He denies any fever or chills. He has pain in the BLE. \par \par 8/9/22: Pt kept BLE UNNA boots on for the past week. He still has circumferential weeping ulcers from his mid calves bilaterally. He still has significant pain in his right heel. He denies any fever or chills. He has pain in the BLE.\par \par 8/17/22: Pt kept BLE UNNA boots on for the past week. He no longer has circumferential weeping ulcers, they have closed and healed with UNNA boot treatment . He still has significant pain in his right heel. He denies any fever or chills. He has pain in the BLE. \par \par 8/23/22: Pt kept BLE UNNA boots on for the past week. He no longer has circumferential weeping ulcers, they have closed and healed with UNNA boot treatment . He still has significant pain in his right heel. He denies any fever or chills. He has pain in the BLE. \par \par 8/30/22: Pt kept BLE UNNA boots on for the past week. Pt has gotten his custom fitted shoes and started to wear them daily. He still has significant pain in his right heel. He denies any fever or chills. He has pain in the BLE. \par \par 9/6/22: Pt kept BLE UNNA boots on for the past week. Pt has gotten his custom fitted shoes and started to wear them daily. He still has significant pain in his right heel. He denies any fever or chills. He has pain in the BLE. \par \par 9/13/22: Pt kept BLE UNNA boots on for the past week. Pt picked up his custom fitted shoes and started to wear them daily. He still has significant pain in his right heel. He denies any fever or chills. \par \par 9/20/22: Pt kept BLE UNNA boots on for the past week. Pt picked up his custom fitted shoes and started to wear them daily. He still has significant pain in his right heel. He denies any fever or chills. \par \par 10/4/22: Pt kept BLE UNNA boots on for the past two weeks. The bandages slid down to mid calf and he has been having severe weeping from his right leg.  He still has significant pain in his right heel. He denies any fever or chills. \par \par 10/10/22: Pt kept BLE UNNA boots on for the past week. He has weeping from his right posterior calf.  He still has significant pain in his right heel. He denies any fever or chills. \par \par 10/18/22: Pt kept BLE UNNA boots on for the past week. He has weeping from his right posterior calf.  He still has significant pain in his right heel. He denies any fever or chills. \par \par 10/25/22: Pt kept BLE UNNA boots on for the past week. He no longer has any weeping from the calves.  He still has significant pain in his right heel. He denies any fever or chills. \par \par 10/26/22: Pt states his RLE UNNA boot slid down last night and he has increased weeping from the posterior calf. He denies any fever or chills.

## 2022-10-26 NOTE — PHYSICAL EXAM
[0] : left 0 [2+] : left 2+ [Ankle Swelling (On Exam)] : present [Ankle Swelling Bilaterally] : severe [] : bilaterally [Ankle Swelling On The Left] : moderate [Varicose Veins Of Lower Extremities] : not present [de-identified] : NAD. Obese.  [FreeTextEntry1] : PT non-palpable due to edema. \par right posterior calf weeping from superficial ulcer 2 cm x 2 cm \par right heel ulcer closed. Tender to palpation \par

## 2022-10-27 DIAGNOSIS — R79.89 OTHER SPECIFIED ABNORMAL FINDINGS OF BLOOD CHEMISTRY: ICD-10-CM

## 2022-10-27 LAB
A-TOCOPHEROL VIT E SERPL-MCNC: 8.9 MG/L
BETA+GAMMA TOCOPHEROL SERPL-MCNC: 1.5 MG/L
MENADIONE SERPL-MCNC: <0.1 NG/ML

## 2022-10-27 RX ORDER — ERGOCALCIFEROL 1.25 MG/1
1.25 MG CAPSULE, LIQUID FILLED ORAL
Qty: 12 | Refills: 4 | Status: ACTIVE | COMMUNITY
Start: 2022-10-27 | End: 1900-01-01

## 2022-10-28 LAB — VIT A SERPL-MCNC: 33.4 UG/DL

## 2022-10-31 ENCOUNTER — OUTPATIENT (OUTPATIENT)
Dept: OUTPATIENT SERVICES | Facility: HOSPITAL | Age: 67
LOS: 1 days | End: 2022-10-31
Payer: MEDICARE

## 2022-10-31 DIAGNOSIS — H26.9 UNSPECIFIED CATARACT: Chronic | ICD-10-CM

## 2022-10-31 DIAGNOSIS — Z95.828 PRESENCE OF OTHER VASCULAR IMPLANTS AND GRAFTS: Chronic | ICD-10-CM

## 2022-10-31 DIAGNOSIS — Z96.642 PRESENCE OF LEFT ARTIFICIAL HIP JOINT: Chronic | ICD-10-CM

## 2022-10-31 DIAGNOSIS — E11.9 TYPE 2 DIABETES MELLITUS WITHOUT COMPLICATIONS: ICD-10-CM

## 2022-10-31 PROCEDURE — 74240 X-RAY XM UPR GI TRC 1CNTRST: CPT | Mod: 26

## 2022-10-31 PROCEDURE — 74240 X-RAY XM UPR GI TRC 1CNTRST: CPT

## 2022-11-01 ENCOUNTER — APPOINTMENT (OUTPATIENT)
Dept: VASCULAR SURGERY | Facility: CLINIC | Age: 67
End: 2022-11-01

## 2022-11-01 VITALS
TEMPERATURE: 97.3 F | RESPIRATION RATE: 16 BRPM | SYSTOLIC BLOOD PRESSURE: 146 MMHG | HEART RATE: 89 BPM | DIASTOLIC BLOOD PRESSURE: 78 MMHG | OXYGEN SATURATION: 96 %

## 2022-11-01 PROCEDURE — 99213 OFFICE O/P EST LOW 20 MIN: CPT

## 2022-11-01 NOTE — HISTORY OF PRESENT ILLNESS
[FreeTextEntry1] : 5/17/22: 65 yo male with BLE lymphedema formerly being treated by Dr. Brown. Pt had BL UNNA boot on for the past week. He complains of pain in the right plantar lateral heel area where he has a small ulcer. He feels the area around the wound is tender. He has had this wound for several months. He denies any fever or chills. Pt is on ASA, Eliquis, and Gabapentin. \par \par 5/24/22: Pt kept UNNA boots on the for the past week. He still has severe pain in his right heel. He states he gets a shooting pain in the foot when he puts pressure on the foot to stand up. He tries to stay off the foot as much as possible. He denies any fever or chills. He is compliant with his medication. \par \par 5/31/22: Pt kept UNNA boots on the for the past week. He still has severe pain in his right heel. He states he gets a shooting pain in the foot when he puts pressure on the foot to stand up. He tries to stay off the foot as much as possible. He denies any fever or chills. He is compliant with his medication. \par \par 6/7/22: Pt kept UNNA boots on the for the past week. He still has less pain in his right heel. He states he gets a shooting pain in the foot when he puts pressure on the foot to stand up. He tries to stay off the foot as much as possible. He denies any fever or chills. He is compliant with his medication. \par \par 6/14/22: Pt kept UNNA boots on the for the past week. He still has significant pain in his right heel. He states he gets a shooting pain in the foot when he puts pressure on the foot to stand up. He tries to stay off the foot as much as possible. He denies any fever or chills. He is compliant with his medication. \par \par 7/12/22: Pt had BLE UNNA boots applied 7/5/22. He has an appointment tomorrow for custom shoe fitting and cannot have UNNA boots on. He denies any new symptoms. \par \par 7/26/22: Pt kept ACE wraps on mid calves for the past two weeks and now has significant weeping of his upper calves bilaterally. He denies any fever or chills. He has pain in the BLE. \par \par 8/2/22: Pt kept BLE UNNA boots on for the past week. He still has circumferential weeping ulcers from his mid calves bilaterally. He denies any fever or chills. He has pain in the BLE. \par \par 8/9/22: Pt kept BLE UNNA boots on for the past week. He still has circumferential weeping ulcers from his mid calves bilaterally. He still has significant pain in his right heel. He denies any fever or chills. He has pain in the BLE.\par \par 8/17/22: Pt kept BLE UNNA boots on for the past week. He no longer has circumferential weeping ulcers, they have closed and healed with UNNA boot treatment . He still has significant pain in his right heel. He denies any fever or chills. He has pain in the BLE. \par \par 8/23/22: Pt kept BLE UNNA boots on for the past week. He no longer has circumferential weeping ulcers, they have closed and healed with UNNA boot treatment . He still has significant pain in his right heel. He denies any fever or chills. He has pain in the BLE. \par \par 8/30/22: Pt kept BLE UNNA boots on for the past week. Pt has gotten his custom fitted shoes and started to wear them daily. He still has significant pain in his right heel. He denies any fever or chills. He has pain in the BLE. \par \par 9/6/22: Pt kept BLE UNNA boots on for the past week. Pt has gotten his custom fitted shoes and started to wear them daily. He still has significant pain in his right heel. He denies any fever or chills. He has pain in the BLE. \par \par 9/13/22: Pt kept BLE UNNA boots on for the past week. Pt picked up his custom fitted shoes and started to wear them daily. He still has significant pain in his right heel. He denies any fever or chills. \par \par 9/20/22: Pt kept BLE UNNA boots on for the past week. Pt picked up his custom fitted shoes and started to wear them daily. He still has significant pain in his right heel. He denies any fever or chills. \par \par 10/4/22: Pt kept BLE UNNA boots on for the past two weeks. The bandages slid down to mid calf and he has been having severe weeping from his right leg.  He still has significant pain in his right heel. He denies any fever or chills. \par \par 10/10/22: Pt kept BLE UNNA boots on for the past week. He has weeping from his right posterior calf.  He still has significant pain in his right heel. He denies any fever or chills. \par \par 10/18/22: Pt kept BLE UNNA boots on for the past week. He has weeping from his right posterior calf.  He still has significant pain in his right heel. He denies any fever or chills. \par \par 10/25/22: Pt kept BLE UNNA boots on for the past week. He no longer has any weeping from the calves.  He still has significant pain in his right heel. He denies any fever or chills. \par \par 10/26/22: Pt states his RLE UNNA boot slid down last night and he has increased weeping from the posterior calf. He denies any fever or chills. \par \par 11/1/22: Pt kept BLE UNNA boots on for the past week. He has weeping from the posterior calves bilaterally.  He still has significant pain in his right heel. He denies any fever or chills.

## 2022-11-01 NOTE — PHYSICAL EXAM
[0] : left 0 [2+] : left 2+ [Ankle Swelling (On Exam)] : present [Ankle Swelling Bilaterally] : severe [] : bilaterally [Ankle Swelling On The Left] : moderate [Varicose Veins Of Lower Extremities] : not present [de-identified] : NAD. Obese.  [FreeTextEntry1] : PT non-palpable due to edema. \par right posterior calf weeping from superficial ulcer 2 cm x 2 cm \par right heel ulcer closed. Tender to palpation \par

## 2022-11-01 NOTE — ASSESSMENT
[FreeTextEntry1] : 67 yo male with lymphedema and chronic wound on right heel. Right heel wound closed and healed well. Pt has weeping from bilateral posterior calves\par \par Pt counseled on diagnosis of lymphedema and Charcot foot deformity \par BLE UNNA boot applied \par RTC in 1 week for bandage change and to monitor wounds \par \par A total of 20 minutes was spent with patient and coordinating care.\par

## 2022-11-02 ENCOUNTER — OUTPATIENT (OUTPATIENT)
Dept: OUTPATIENT SERVICES | Facility: HOSPITAL | Age: 67
LOS: 1 days | End: 2022-11-02
Payer: MEDICARE

## 2022-11-02 ENCOUNTER — APPOINTMENT (OUTPATIENT)
Dept: ULTRASOUND IMAGING | Facility: CLINIC | Age: 67
End: 2022-11-02

## 2022-11-02 DIAGNOSIS — E11.9 TYPE 2 DIABETES MELLITUS WITHOUT COMPLICATIONS: ICD-10-CM

## 2022-11-02 DIAGNOSIS — Z96.642 PRESENCE OF LEFT ARTIFICIAL HIP JOINT: Chronic | ICD-10-CM

## 2022-11-02 DIAGNOSIS — Z95.828 PRESENCE OF OTHER VASCULAR IMPLANTS AND GRAFTS: Chronic | ICD-10-CM

## 2022-11-02 DIAGNOSIS — H26.9 UNSPECIFIED CATARACT: Chronic | ICD-10-CM

## 2022-11-02 PROCEDURE — 76700 US EXAM ABDOM COMPLETE: CPT | Mod: 26

## 2022-11-02 PROCEDURE — 76700 US EXAM ABDOM COMPLETE: CPT

## 2022-11-08 ENCOUNTER — APPOINTMENT (OUTPATIENT)
Dept: VASCULAR SURGERY | Facility: CLINIC | Age: 67
End: 2022-11-08

## 2022-11-08 VITALS
RESPIRATION RATE: 14 BRPM | HEART RATE: 85 BPM | DIASTOLIC BLOOD PRESSURE: 71 MMHG | OXYGEN SATURATION: 95 % | TEMPERATURE: 97.5 F | SYSTOLIC BLOOD PRESSURE: 113 MMHG

## 2022-11-08 PROCEDURE — 99213 OFFICE O/P EST LOW 20 MIN: CPT

## 2022-11-08 RX ORDER — AMOXICILLIN AND CLAVULANATE POTASSIUM 875; 125 MG/1; MG/1
875-125 TABLET, COATED ORAL
Qty: 28 | Refills: 0 | Status: ACTIVE | COMMUNITY
Start: 2022-11-08 | End: 1900-01-01

## 2022-11-08 NOTE — PHYSICAL EXAM
[0] : left 0 [2+] : left 2+ [Ankle Swelling (On Exam)] : present [Ankle Swelling Bilaterally] : severe [] : bilaterally [Ankle Swelling On The Left] : moderate [Varicose Veins Of Lower Extremities] : not present [de-identified] : NAD. Obese.  [FreeTextEntry1] : PT non-palpable due to edema. \par left posterior calf weeping from superficial ulcer 2 cm x 2 cm \par right heel ulcer closed. Tender to palpation \par

## 2022-11-08 NOTE — HISTORY OF PRESENT ILLNESS
[FreeTextEntry1] : 5/17/22: 65 yo male with BLE lymphedema formerly being treated by Dr. Brown. Pt had BL UNNA boot on for the past week. He complains of pain in the right plantar lateral heel area where he has a small ulcer. He feels the area around the wound is tender. He has had this wound for several months. He denies any fever or chills. Pt is on ASA, Eliquis, and Gabapentin. \par \par 5/24/22: Pt kept UNNA boots on the for the past week. He still has severe pain in his right heel. He states he gets a shooting pain in the foot when he puts pressure on the foot to stand up. He tries to stay off the foot as much as possible. He denies any fever or chills. He is compliant with his medication. \par \par 5/31/22: Pt kept UNNA boots on the for the past week. He still has severe pain in his right heel. He states he gets a shooting pain in the foot when he puts pressure on the foot to stand up. He tries to stay off the foot as much as possible. He denies any fever or chills. He is compliant with his medication. \par \par 6/7/22: Pt kept UNNA boots on the for the past week. He still has less pain in his right heel. He states he gets a shooting pain in the foot when he puts pressure on the foot to stand up. He tries to stay off the foot as much as possible. He denies any fever or chills. He is compliant with his medication. \par \par 6/14/22: Pt kept UNNA boots on the for the past week. He still has significant pain in his right heel. He states he gets a shooting pain in the foot when he puts pressure on the foot to stand up. He tries to stay off the foot as much as possible. He denies any fever or chills. He is compliant with his medication. \par \par 7/12/22: Pt had BLE UNNA boots applied 7/5/22. He has an appointment tomorrow for custom shoe fitting and cannot have UNNA boots on. He denies any new symptoms. \par \par 7/26/22: Pt kept ACE wraps on mid calves for the past two weeks and now has significant weeping of his upper calves bilaterally. He denies any fever or chills. He has pain in the BLE. \par \par 8/2/22: Pt kept BLE UNNA boots on for the past week. He still has circumferential weeping ulcers from his mid calves bilaterally. He denies any fever or chills. He has pain in the BLE. \par \par 8/9/22: Pt kept BLE UNNA boots on for the past week. He still has circumferential weeping ulcers from his mid calves bilaterally. He still has significant pain in his right heel. He denies any fever or chills. He has pain in the BLE.\par \par 8/17/22: Pt kept BLE UNNA boots on for the past week. He no longer has circumferential weeping ulcers, they have closed and healed with UNNA boot treatment . He still has significant pain in his right heel. He denies any fever or chills. He has pain in the BLE. \par \par 8/23/22: Pt kept BLE UNNA boots on for the past week. He no longer has circumferential weeping ulcers, they have closed and healed with UNNA boot treatment . He still has significant pain in his right heel. He denies any fever or chills. He has pain in the BLE. \par \par 8/30/22: Pt kept BLE UNNA boots on for the past week. Pt has gotten his custom fitted shoes and started to wear them daily. He still has significant pain in his right heel. He denies any fever or chills. He has pain in the BLE. \par \par 9/6/22: Pt kept BLE UNNA boots on for the past week. Pt has gotten his custom fitted shoes and started to wear them daily. He still has significant pain in his right heel. He denies any fever or chills. He has pain in the BLE. \par \par 9/13/22: Pt kept BLE UNNA boots on for the past week. Pt picked up his custom fitted shoes and started to wear them daily. He still has significant pain in his right heel. He denies any fever or chills. \par \par 9/20/22: Pt kept BLE UNNA boots on for the past week. Pt picked up his custom fitted shoes and started to wear them daily. He still has significant pain in his right heel. He denies any fever or chills. \par \par 10/4/22: Pt kept BLE UNNA boots on for the past two weeks. The bandages slid down to mid calf and he has been having severe weeping from his right leg.  He still has significant pain in his right heel. He denies any fever or chills. \par \par 10/10/22: Pt kept BLE UNNA boots on for the past week. He has weeping from his right posterior calf.  He still has significant pain in his right heel. He denies any fever or chills. \par \par 10/18/22: Pt kept BLE UNNA boots on for the past week. He has weeping from his right posterior calf.  He still has significant pain in his right heel. He denies any fever or chills. \par \par 10/25/22: Pt kept BLE UNNA boots on for the past week. He no longer has any weeping from the calves.  He still has significant pain in his right heel. He denies any fever or chills. \par \par 10/26/22: Pt states his RLE UNNA boot slid down last night and he has increased weeping from the posterior calf. He denies any fever or chills. \par \par 11/1/22: Pt kept BLE UNNA boots on for the past week. He has weeping from the posterior calves bilaterally.  He still has significant pain in his right heel. He denies any fever or chills. \par \par 11/8/22: Pt kept BLE UNNA boots on for the past week. He has weeping from the left posterior calf still. He still has significant pain in his right heel. He denies any fever or chills.

## 2022-11-08 NOTE — ASSESSMENT
[FreeTextEntry1] : 66 yo male with lymphedema and chronic wound on right heel. Right heel wound closed and healed well. Pt has weeping from left posterior calves\par \par Pt counseled on diagnosis of lymphedema and Charcot foot deformity \par BLE UNNA boot applied \par RTC in 1 week for bandage change and to monitor wounds \par \par A total of 20 minutes was spent with patient and coordinating care.\par

## 2022-11-15 ENCOUNTER — APPOINTMENT (OUTPATIENT)
Dept: VASCULAR SURGERY | Facility: CLINIC | Age: 67
End: 2022-11-15

## 2022-11-15 VITALS
RESPIRATION RATE: 14 BRPM | SYSTOLIC BLOOD PRESSURE: 118 MMHG | TEMPERATURE: 97.4 F | OXYGEN SATURATION: 97 % | HEART RATE: 94 BPM | DIASTOLIC BLOOD PRESSURE: 74 MMHG

## 2022-11-15 PROCEDURE — 99213 OFFICE O/P EST LOW 20 MIN: CPT

## 2022-11-15 NOTE — PHYSICAL EXAM
[0] : left 0 [2+] : left 2+ [Ankle Swelling (On Exam)] : present [Ankle Swelling Bilaterally] : severe [] : bilaterally [Ankle Swelling On The Left] : moderate [Varicose Veins Of Lower Extremities] : not present [de-identified] : NAD. Obese.  [FreeTextEntry1] : PT non-palpable due to edema. \par left posterior calf closed and healed well \par right heel ulcer closed. Tender to palpation \par

## 2022-11-15 NOTE — REASON FOR VISIT
Called mom and is to be faxed to [facility] in [place] -phone number is [phone]-tried to call and is closed until Monday.   [Follow-Up: _____] : a [unfilled] follow-up visit [FreeTextEntry1] : Bilateral Lymphedema

## 2022-11-15 NOTE — HISTORY OF PRESENT ILLNESS
Fentanyl and Clonidine patches on back, Nicotine patch on left arm. Will continue to monitor.    [FreeTextEntry1] : 5/17/22: 65 yo male with BLE lymphedema formerly being treated by Dr. Brown. Pt had BL UNNA boot on for the past week. He complains of pain in the right plantar lateral heel area where he has a small ulcer. He feels the area around the wound is tender. He has had this wound for several months. He denies any fever or chills. Pt is on ASA, Eliquis, and Gabapentin. \par \par 5/24/22: Pt kept UNNA boots on the for the past week. He still has severe pain in his right heel. He states he gets a shooting pain in the foot when he puts pressure on the foot to stand up. He tries to stay off the foot as much as possible. He denies any fever or chills. He is compliant with his medication. \par \par 5/31/22: Pt kept UNNA boots on the for the past week. He still has severe pain in his right heel. He states he gets a shooting pain in the foot when he puts pressure on the foot to stand up. He tries to stay off the foot as much as possible. He denies any fever or chills. He is compliant with his medication. \par \par 6/7/22: Pt kept UNNA boots on the for the past week. He still has less pain in his right heel. He states he gets a shooting pain in the foot when he puts pressure on the foot to stand up. He tries to stay off the foot as much as possible. He denies any fever or chills. He is compliant with his medication. \par \par 6/14/22: Pt kept UNNA boots on the for the past week. He still has significant pain in his right heel. He states he gets a shooting pain in the foot when he puts pressure on the foot to stand up. He tries to stay off the foot as much as possible. He denies any fever or chills. He is compliant with his medication. \par \par 7/12/22: Pt had BLE UNNA boots applied 7/5/22. He has an appointment tomorrow for custom shoe fitting and cannot have UNNA boots on. He denies any new symptoms. \par \par 7/26/22: Pt kept ACE wraps on mid calves for the past two weeks and now has significant weeping of his upper calves bilaterally. He denies any fever or chills. He has pain in the BLE. \par \par 8/2/22: Pt kept BLE UNNA boots on for the past week. He still has circumferential weeping ulcers from his mid calves bilaterally. He denies any fever or chills. He has pain in the BLE. \par \par 8/9/22: Pt kept BLE UNNA boots on for the past week. He still has circumferential weeping ulcers from his mid calves bilaterally. He still has significant pain in his right heel. He denies any fever or chills. He has pain in the BLE.\par \par 8/17/22: Pt kept BLE UNNA boots on for the past week. He no longer has circumferential weeping ulcers, they have closed and healed with UNNA boot treatment . He still has significant pain in his right heel. He denies any fever or chills. He has pain in the BLE. \par \par 8/23/22: Pt kept BLE UNNA boots on for the past week. He no longer has circumferential weeping ulcers, they have closed and healed with UNNA boot treatment . He still has significant pain in his right heel. He denies any fever or chills. He has pain in the BLE. \par \par 8/30/22: Pt kept BLE UNNA boots on for the past week. Pt has gotten his custom fitted shoes and started to wear them daily. He still has significant pain in his right heel. He denies any fever or chills. He has pain in the BLE. \par \par 9/6/22: Pt kept BLE UNNA boots on for the past week. Pt has gotten his custom fitted shoes and started to wear them daily. He still has significant pain in his right heel. He denies any fever or chills. He has pain in the BLE. \par \par 9/13/22: Pt kept BLE UNNA boots on for the past week. Pt picked up his custom fitted shoes and started to wear them daily. He still has significant pain in his right heel. He denies any fever or chills. \par \par 9/20/22: Pt kept BLE UNNA boots on for the past week. Pt picked up his custom fitted shoes and started to wear them daily. He still has significant pain in his right heel. He denies any fever or chills. \par \par 10/4/22: Pt kept BLE UNNA boots on for the past two weeks. The bandages slid down to mid calf and he has been having severe weeping from his right leg.  He still has significant pain in his right heel. He denies any fever or chills. \par \par 10/10/22: Pt kept BLE UNNA boots on for the past week. He has weeping from his right posterior calf.  He still has significant pain in his right heel. He denies any fever or chills. \par \par 10/18/22: Pt kept BLE UNNA boots on for the past week. He has weeping from his right posterior calf.  He still has significant pain in his right heel. He denies any fever or chills. \par \par 10/25/22: Pt kept BLE UNNA boots on for the past week. He no longer has any weeping from the calves.  He still has significant pain in his right heel. He denies any fever or chills. \par \par 10/26/22: Pt states his RLE UNNA boot slid down last night and he has increased weeping from the posterior calf. He denies any fever or chills. \par \par 11/1/22: Pt kept BLE UNNA boots on for the past week. He has weeping from the posterior calves bilaterally.  He still has significant pain in his right heel. He denies any fever or chills. \par \par 11/8/22: Pt kept BLE UNNA boots on for the past week. He has weeping from the left posterior calf still. He still has significant pain in his right heel. He denies any fever or chills. \par \par 11/8/22: Pt kept BLE UNNA boots on for the past week. He no longer has any weeping from the calf. He has bilateral posterior thigh wounds for the past two weeks.  He still has significant pain in his right heel. He denies any fever or chills.

## 2022-11-15 NOTE — ASSESSMENT
[FreeTextEntry1] : 68 yo male with lymphedema and chronic wound on right heel. Right heel wound closed and healed well. Pt has weeping from left posterior calves\par \par Pt counseled on diagnosis of lymphedema and Charcot foot deformity \par BLE UNNA boot applied \par RTC in 1 week for bandage change and to monitor wounds \par \par A total of 20 minutes was spent with patient and coordinating care.\par

## 2022-11-21 ENCOUNTER — APPOINTMENT (OUTPATIENT)
Dept: CARDIOLOGY | Facility: CLINIC | Age: 67
End: 2022-11-21

## 2022-11-21 ENCOUNTER — NON-APPOINTMENT (OUTPATIENT)
Age: 67
End: 2022-11-21

## 2022-11-21 VITALS — DIASTOLIC BLOOD PRESSURE: 80 MMHG | SYSTOLIC BLOOD PRESSURE: 138 MMHG

## 2022-11-21 VITALS
TEMPERATURE: 98.6 F | HEART RATE: 77 BPM | WEIGHT: 273 LBS | HEIGHT: 67 IN | BODY MASS INDEX: 42.85 KG/M2 | SYSTOLIC BLOOD PRESSURE: 136 MMHG | OXYGEN SATURATION: 97 % | DIASTOLIC BLOOD PRESSURE: 80 MMHG

## 2022-11-21 DIAGNOSIS — Z78.9 OTHER SPECIFIED HEALTH STATUS: ICD-10-CM

## 2022-11-21 DIAGNOSIS — Z72.3 LACK OF PHYSICAL EXERCISE: ICD-10-CM

## 2022-11-21 DIAGNOSIS — I10 ESSENTIAL (PRIMARY) HYPERTENSION: ICD-10-CM

## 2022-11-21 DIAGNOSIS — F12.91 CANNABIS USE, UNSPECIFIED, IN REMISSION: ICD-10-CM

## 2022-11-21 DIAGNOSIS — Z01.810 ENCOUNTER FOR PREPROCEDURAL CARDIOVASCULAR EXAMINATION: ICD-10-CM

## 2022-11-21 PROCEDURE — 99214 OFFICE O/P EST MOD 30 MIN: CPT

## 2022-11-21 PROCEDURE — 93000 ELECTROCARDIOGRAM COMPLETE: CPT

## 2022-11-21 RX ORDER — CEPHALEXIN 500 MG/1
500 CAPSULE ORAL TWICE DAILY
Qty: 20 | Refills: 0 | Status: DISCONTINUED | COMMUNITY
Start: 2022-08-30 | End: 2022-11-21

## 2022-11-22 ENCOUNTER — APPOINTMENT (OUTPATIENT)
Dept: VASCULAR SURGERY | Facility: CLINIC | Age: 67
End: 2022-11-22

## 2022-11-22 VITALS
SYSTOLIC BLOOD PRESSURE: 130 MMHG | OXYGEN SATURATION: 97 % | TEMPERATURE: 97.2 F | HEART RATE: 75 BPM | DIASTOLIC BLOOD PRESSURE: 79 MMHG | RESPIRATION RATE: 14 BRPM

## 2022-11-22 PROCEDURE — 99213 OFFICE O/P EST LOW 20 MIN: CPT

## 2022-11-22 NOTE — HISTORY OF PRESENT ILLNESS
[FreeTextEntry1] : 5/17/22: 65 yo male with BLE lymphedema formerly being treated by Dr. Brown. Pt had BL UNNA boot on for the past week. He complains of pain in the right plantar lateral heel area where he has a small ulcer. He feels the area around the wound is tender. He has had this wound for several months. He denies any fever or chills. Pt is on ASA, Eliquis, and Gabapentin. \par \par 5/24/22: Pt kept UNNA boots on the for the past week. He still has severe pain in his right heel. He states he gets a shooting pain in the foot when he puts pressure on the foot to stand up. He tries to stay off the foot as much as possible. He denies any fever or chills. He is compliant with his medication. \par \par 5/31/22: Pt kept UNNA boots on the for the past week. He still has severe pain in his right heel. He states he gets a shooting pain in the foot when he puts pressure on the foot to stand up. He tries to stay off the foot as much as possible. He denies any fever or chills. He is compliant with his medication. \par \par 6/7/22: Pt kept UNNA boots on the for the past week. He still has less pain in his right heel. He states he gets a shooting pain in the foot when he puts pressure on the foot to stand up. He tries to stay off the foot as much as possible. He denies any fever or chills. He is compliant with his medication. \par \par 6/14/22: Pt kept UNNA boots on the for the past week. He still has significant pain in his right heel. He states he gets a shooting pain in the foot when he puts pressure on the foot to stand up. He tries to stay off the foot as much as possible. He denies any fever or chills. He is compliant with his medication. \par \par 7/12/22: Pt had BLE UNNA boots applied 7/5/22. He has an appointment tomorrow for custom shoe fitting and cannot have UNNA boots on. He denies any new symptoms. \par \par 7/26/22: Pt kept ACE wraps on mid calves for the past two weeks and now has significant weeping of his upper calves bilaterally. He denies any fever or chills. He has pain in the BLE. \par \par 8/2/22: Pt kept BLE UNNA boots on for the past week. He still has circumferential weeping ulcers from his mid calves bilaterally. He denies any fever or chills. He has pain in the BLE. \par \par 8/9/22: Pt kept BLE UNNA boots on for the past week. He still has circumferential weeping ulcers from his mid calves bilaterally. He still has significant pain in his right heel. He denies any fever or chills. He has pain in the BLE.\par \par 8/17/22: Pt kept BLE UNNA boots on for the past week. He no longer has circumferential weeping ulcers, they have closed and healed with UNNA boot treatment . He still has significant pain in his right heel. He denies any fever or chills. He has pain in the BLE. \par \par 8/23/22: Pt kept BLE UNNA boots on for the past week. He no longer has circumferential weeping ulcers, they have closed and healed with UNNA boot treatment . He still has significant pain in his right heel. He denies any fever or chills. He has pain in the BLE. \par \par 8/30/22: Pt kept BLE UNNA boots on for the past week. Pt has gotten his custom fitted shoes and started to wear them daily. He still has significant pain in his right heel. He denies any fever or chills. He has pain in the BLE. \par \par 9/6/22: Pt kept BLE UNNA boots on for the past week. Pt has gotten his custom fitted shoes and started to wear them daily. He still has significant pain in his right heel. He denies any fever or chills. He has pain in the BLE. \par \par 9/13/22: Pt kept BLE UNNA boots on for the past week. Pt picked up his custom fitted shoes and started to wear them daily. He still has significant pain in his right heel. He denies any fever or chills. \par \par 9/20/22: Pt kept BLE UNNA boots on for the past week. Pt picked up his custom fitted shoes and started to wear them daily. He still has significant pain in his right heel. He denies any fever or chills. \par \par 10/4/22: Pt kept BLE UNNA boots on for the past two weeks. The bandages slid down to mid calf and he has been having severe weeping from his right leg.  He still has significant pain in his right heel. He denies any fever or chills. \par \par 10/10/22: Pt kept BLE UNNA boots on for the past week. He has weeping from his right posterior calf.  He still has significant pain in his right heel. He denies any fever or chills. \par \par 10/18/22: Pt kept BLE UNNA boots on for the past week. He has weeping from his right posterior calf.  He still has significant pain in his right heel. He denies any fever or chills. \par \par 10/25/22: Pt kept BLE UNNA boots on for the past week. He no longer has any weeping from the calves.  He still has significant pain in his right heel. He denies any fever or chills. \par \par 10/26/22: Pt states his RLE UNNA boot slid down last night and he has increased weeping from the posterior calf. He denies any fever or chills. \par \par 11/1/22: Pt kept BLE UNNA boots on for the past week. He has weeping from the posterior calves bilaterally.  He still has significant pain in his right heel. He denies any fever or chills. \par \par 11/8/22: Pt kept BLE UNNA boots on for the past week. He has weeping from the left posterior calf still. He still has significant pain in his right heel. He denies any fever or chills. \par \par 11/22/22: Pt kept BLE UNNA boots on for the past week. He no longer has any weeping from the calf. He has bilateral posterior thigh wounds for the past several weeks.  He still has significant pain in his right heel. He denies any fever or chills.

## 2022-11-22 NOTE — PHYSICAL EXAM
[0] : left 0 [2+] : left 2+ [Ankle Swelling (On Exam)] : present [Ankle Swelling Bilaterally] : severe [] : bilaterally [Ankle Swelling On The Left] : moderate [Varicose Veins Of Lower Extremities] : not present [de-identified] : NAD. Obese.  [FreeTextEntry1] : PT non-palpable due to edema. \par left posterior calf closed and healed well \par right heel ulcer closed. Tender to palpation \par

## 2022-11-22 NOTE — ASSESSMENT
[FreeTextEntry1] : 68 yo male with lymphedema and chronic wound on right heel. Right heel wound closed and healed well. \par \par Pt counseled on diagnosis of lymphedema and Charcot foot deformity \par BLE UNNA boot applied \par RTC in 1 week for bandage change and to monitor wounds \par \par A total of 20 minutes was spent with patient and coordinating care.\par

## 2022-11-29 ENCOUNTER — APPOINTMENT (OUTPATIENT)
Dept: VASCULAR SURGERY | Facility: CLINIC | Age: 67
End: 2022-11-29

## 2022-11-29 VITALS
SYSTOLIC BLOOD PRESSURE: 168 MMHG | RESPIRATION RATE: 16 BRPM | TEMPERATURE: 97.2 F | DIASTOLIC BLOOD PRESSURE: 78 MMHG | HEART RATE: 85 BPM | OXYGEN SATURATION: 97 %

## 2022-11-29 PROCEDURE — 99213 OFFICE O/P EST LOW 20 MIN: CPT

## 2022-11-29 NOTE — ASSESSMENT
[FreeTextEntry1] : 68 yo male with lymphedema and chronic wound on right heel. Right heel wound closed and healed well. \par \par Pt counseled on diagnosis of lymphedema and Charcot foot deformity \par BLE UNNA boot applied \par Pt counseled to see PCP or urgent care for treatment of right posterior thigh abscess \par RTC in 1 week for bandage change and to monitor wounds \par \par A total of 20 minutes was spent with patient and coordinating care.\par

## 2022-11-29 NOTE — PHYSICAL EXAM
[0] : left 0 [2+] : left 2+ [Ankle Swelling (On Exam)] : present [Ankle Swelling Bilaterally] : severe [] : bilaterally [Ankle Swelling On The Left] : moderate [Varicose Veins Of Lower Extremities] : not present [de-identified] : NAD. Obese.  [FreeTextEntry1] : PT non-palpable due to edema. \par right posterior calf blisters with eeping \par right heel ulcer closed. Tender to palpation \par

## 2022-11-29 NOTE — HISTORY OF PRESENT ILLNESS
[FreeTextEntry1] : 5/17/22: 67 yo male with BLE lymphedema formerly being treated by Dr. Brown. Pt had BL UNNA boot on for the past week. He complains of pain in the right plantar lateral heel area where he has a small ulcer. He feels the area around the wound is tender. He has had this wound for several months. He denies any fever or chills. Pt is on ASA, Eliquis, and Gabapentin. \par \par 5/24/22: Pt kept UNNA boots on the for the past week. He still has severe pain in his right heel. He states he gets a shooting pain in the foot when he puts pressure on the foot to stand up. He tries to stay off the foot as much as possible. He denies any fever or chills. He is compliant with his medication. \par \par 5/31/22: Pt kept UNNA boots on the for the past week. He still has severe pain in his right heel. He states he gets a shooting pain in the foot when he puts pressure on the foot to stand up. He tries to stay off the foot as much as possible. He denies any fever or chills. He is compliant with his medication. \par \par 6/7/22: Pt kept UNNA boots on the for the past week. He still has less pain in his right heel. He states he gets a shooting pain in the foot when he puts pressure on the foot to stand up. He tries to stay off the foot as much as possible. He denies any fever or chills. He is compliant with his medication. \par \par 6/14/22: Pt kept UNNA boots on the for the past week. He still has significant pain in his right heel. He states he gets a shooting pain in the foot when he puts pressure on the foot to stand up. He tries to stay off the foot as much as possible. He denies any fever or chills. He is compliant with his medication. \par \par 7/12/22: Pt had BLE UNNA boots applied 7/5/22. He has an appointment tomorrow for custom shoe fitting and cannot have UNNA boots on. He denies any new symptoms. \par \par 7/26/22: Pt kept ACE wraps on mid calves for the past two weeks and now has significant weeping of his upper calves bilaterally. He denies any fever or chills. He has pain in the BLE. \par \par 8/2/22: Pt kept BLE UNNA boots on for the past week. He still has circumferential weeping ulcers from his mid calves bilaterally. He denies any fever or chills. He has pain in the BLE. \par \par 8/9/22: Pt kept BLE UNNA boots on for the past week. He still has circumferential weeping ulcers from his mid calves bilaterally. He still has significant pain in his right heel. He denies any fever or chills. He has pain in the BLE.\par \par 8/17/22: Pt kept BLE UNNA boots on for the past week. He no longer has circumferential weeping ulcers, they have closed and healed with UNNA boot treatment . He still has significant pain in his right heel. He denies any fever or chills. He has pain in the BLE. \par \par 8/23/22: Pt kept BLE UNNA boots on for the past week. He no longer has circumferential weeping ulcers, they have closed and healed with UNNA boot treatment . He still has significant pain in his right heel. He denies any fever or chills. He has pain in the BLE. \par \par 8/30/22: Pt kept BLE UNNA boots on for the past week. Pt has gotten his custom fitted shoes and started to wear them daily. He still has significant pain in his right heel. He denies any fever or chills. He has pain in the BLE. \par \par 9/6/22: Pt kept BLE UNNA boots on for the past week. Pt has gotten his custom fitted shoes and started to wear them daily. He still has significant pain in his right heel. He denies any fever or chills. He has pain in the BLE. \par \par 9/13/22: Pt kept BLE UNNA boots on for the past week. Pt picked up his custom fitted shoes and started to wear them daily. He still has significant pain in his right heel. He denies any fever or chills. \par \par 9/20/22: Pt kept BLE UNNA boots on for the past week. Pt picked up his custom fitted shoes and started to wear them daily. He still has significant pain in his right heel. He denies any fever or chills. \par \par 10/4/22: Pt kept BLE UNNA boots on for the past two weeks. The bandages slid down to mid calf and he has been having severe weeping from his right leg.  He still has significant pain in his right heel. He denies any fever or chills. \par \par 10/10/22: Pt kept BLE UNNA boots on for the past week. He has weeping from his right posterior calf.  He still has significant pain in his right heel. He denies any fever or chills. \par \par 10/18/22: Pt kept BLE UNNA boots on for the past week. He has weeping from his right posterior calf.  He still has significant pain in his right heel. He denies any fever or chills. \par \par 10/25/22: Pt kept BLE UNNA boots on for the past week. He no longer has any weeping from the calves.  He still has significant pain in his right heel. He denies any fever or chills. \par \par 10/26/22: Pt states his RLE UNNA boot slid down last night and he has increased weeping from the posterior calf. He denies any fever or chills. \par \par 11/1/22: Pt kept BLE UNNA boots on for the past week. He has weeping from the posterior calves bilaterally.  He still has significant pain in his right heel. He denies any fever or chills. \par \par 11/8/22: Pt kept BLE UNNA boots on for the past week. He has weeping from the left posterior calf still. He still has significant pain in his right heel. He denies any fever or chills. \par \par 11/22/22: Pt kept BLE UNNA boots on for the past week. He no longer has any weeping from the calf. He has bilateral posterior thigh wounds for the past several weeks.  He still has significant pain in his right heel. He denies any fever or chills. \par \par 11/29/22: Pt kept BLE UNNA boots on for the past week. He has weeping from his right posterior calf. He has a right posterior thigh wound that is very painful.  He still has significant pain in his right heel. He denies any fever or chills.

## 2022-11-30 ENCOUNTER — APPOINTMENT (OUTPATIENT)
Dept: CARDIOLOGY | Facility: CLINIC | Age: 67
End: 2022-11-30

## 2022-11-30 PROCEDURE — 93306 TTE W/DOPPLER COMPLETE: CPT

## 2022-12-02 ENCOUNTER — NON-APPOINTMENT (OUTPATIENT)
Age: 67
End: 2022-12-02

## 2022-12-02 PROBLEM — I10 HTN (HYPERTENSION): Status: ACTIVE | Noted: 2019-09-27

## 2022-12-02 PROBLEM — Z01.810 PREPROCEDURAL CARDIOVASCULAR EXAMINATION: Status: ACTIVE | Noted: 2020-06-24

## 2022-12-06 ENCOUNTER — APPOINTMENT (OUTPATIENT)
Dept: VASCULAR SURGERY | Facility: CLINIC | Age: 67
End: 2022-12-06

## 2022-12-06 VITALS
RESPIRATION RATE: 15 BRPM | DIASTOLIC BLOOD PRESSURE: 82 MMHG | SYSTOLIC BLOOD PRESSURE: 138 MMHG | OXYGEN SATURATION: 95 % | TEMPERATURE: 97.3 F | HEART RATE: 85 BPM

## 2022-12-06 PROCEDURE — 99213 OFFICE O/P EST LOW 20 MIN: CPT

## 2022-12-06 RX ORDER — CIPROFLOXACIN HYDROCHLORIDE 500 MG/1
500 TABLET, FILM COATED ORAL
Qty: 14 | Refills: 0 | Status: ACTIVE | COMMUNITY
Start: 2022-12-06

## 2022-12-06 NOTE — PHYSICAL EXAM
[0] : left 0 [2+] : left 2+ [Ankle Swelling (On Exam)] : present [Ankle Swelling Bilaterally] : severe [] : bilaterally [Ankle Swelling On The Left] : moderate [Varicose Veins Of Lower Extremities] : not present [de-identified] : NAD. Obese.  [FreeTextEntry1] : PT non-palpable due to edema. \par right posterior calf blisters with eeping \par right heel ulcer closed. Tender to palpation \par

## 2022-12-06 NOTE — HISTORY OF PRESENT ILLNESS
[FreeTextEntry1] : 5/17/22: 67 yo male with BLE lymphedema formerly being treated by Dr. Brown. Pt had BL UNNA boot on for the past week. He complains of pain in the right plantar lateral heel area where he has a small ulcer. He feels the area around the wound is tender. He has had this wound for several months. He denies any fever or chills. Pt is on ASA, Eliquis, and Gabapentin. \par \par 5/24/22: Pt kept UNNA boots on the for the past week. He still has severe pain in his right heel. He states he gets a shooting pain in the foot when he puts pressure on the foot to stand up. He tries to stay off the foot as much as possible. He denies any fever or chills. He is compliant with his medication. \par \par 5/31/22: Pt kept UNNA boots on the for the past week. He still has severe pain in his right heel. He states he gets a shooting pain in the foot when he puts pressure on the foot to stand up. He tries to stay off the foot as much as possible. He denies any fever or chills. He is compliant with his medication. \par \par 6/7/22: Pt kept UNNA boots on the for the past week. He still has less pain in his right heel. He states he gets a shooting pain in the foot when he puts pressure on the foot to stand up. He tries to stay off the foot as much as possible. He denies any fever or chills. He is compliant with his medication. \par \par 6/14/22: Pt kept UNNA boots on the for the past week. He still has significant pain in his right heel. He states he gets a shooting pain in the foot when he puts pressure on the foot to stand up. He tries to stay off the foot as much as possible. He denies any fever or chills. He is compliant with his medication. \par \par 7/12/22: Pt had BLE UNNA boots applied 7/5/22. He has an appointment tomorrow for custom shoe fitting and cannot have UNNA boots on. He denies any new symptoms. \par \par 7/26/22: Pt kept ACE wraps on mid calves for the past two weeks and now has significant weeping of his upper calves bilaterally. He denies any fever or chills. He has pain in the BLE. \par \par 8/2/22: Pt kept BLE UNNA boots on for the past week. He still has circumferential weeping ulcers from his mid calves bilaterally. He denies any fever or chills. He has pain in the BLE. \par \par 8/9/22: Pt kept BLE UNNA boots on for the past week. He still has circumferential weeping ulcers from his mid calves bilaterally. He still has significant pain in his right heel. He denies any fever or chills. He has pain in the BLE.\par \par 8/17/22: Pt kept BLE UNNA boots on for the past week. He no longer has circumferential weeping ulcers, they have closed and healed with UNNA boot treatment . He still has significant pain in his right heel. He denies any fever or chills. He has pain in the BLE. \par \par 8/23/22: Pt kept BLE UNNA boots on for the past week. He no longer has circumferential weeping ulcers, they have closed and healed with UNNA boot treatment . He still has significant pain in his right heel. He denies any fever or chills. He has pain in the BLE. \par \par 8/30/22: Pt kept BLE UNNA boots on for the past week. Pt has gotten his custom fitted shoes and started to wear them daily. He still has significant pain in his right heel. He denies any fever or chills. He has pain in the BLE. \par \par 9/6/22: Pt kept BLE UNNA boots on for the past week. Pt has gotten his custom fitted shoes and started to wear them daily. He still has significant pain in his right heel. He denies any fever or chills. He has pain in the BLE. \par \par 9/13/22: Pt kept BLE UNNA boots on for the past week. Pt picked up his custom fitted shoes and started to wear them daily. He still has significant pain in his right heel. He denies any fever or chills. \par \par 9/20/22: Pt kept BLE UNNA boots on for the past week. Pt picked up his custom fitted shoes and started to wear them daily. He still has significant pain in his right heel. He denies any fever or chills. \par \par 10/4/22: Pt kept BLE UNNA boots on for the past two weeks. The bandages slid down to mid calf and he has been having severe weeping from his right leg.  He still has significant pain in his right heel. He denies any fever or chills. \par \par 10/10/22: Pt kept BLE UNNA boots on for the past week. He has weeping from his right posterior calf.  He still has significant pain in his right heel. He denies any fever or chills. \par \par 10/18/22: Pt kept BLE UNNA boots on for the past week. He has weeping from his right posterior calf.  He still has significant pain in his right heel. He denies any fever or chills. \par \par 10/25/22: Pt kept BLE UNNA boots on for the past week. He no longer has any weeping from the calves.  He still has significant pain in his right heel. He denies any fever or chills. \par \par 10/26/22: Pt states his RLE UNNA boot slid down last night and he has increased weeping from the posterior calf. He denies any fever or chills. \par \par 11/1/22: Pt kept BLE UNNA boots on for the past week. He has weeping from the posterior calves bilaterally.  He still has significant pain in his right heel. He denies any fever or chills. \par \par 11/8/22: Pt kept BLE UNNA boots on for the past week. He has weeping from the left posterior calf still. He still has significant pain in his right heel. He denies any fever or chills. \par \par 11/22/22: Pt kept BLE UNNA boots on for the past week. He no longer has any weeping from the calf. He has bilateral posterior thigh wounds for the past several weeks.  He still has significant pain in his right heel. He denies any fever or chills. \par \par 11/29/22: Pt kept BLE UNNA boots on for the past week. He has weeping from his right posterior calf. He has a right posterior thigh wound that is very painful.  He still has significant pain in his right heel. He denies any fever or chills. \par \par 12/6/22: Pt kept BLE UNNA boots on for the past week. He no longer has any weeping from his legs. He denies any fever or chills.

## 2022-12-06 NOTE — ASSESSMENT
[FreeTextEntry1] : 66 yo male with lymphedema and chronic wound on right heel. Right heel wound closed and healed well. \par \par Pt counseled on diagnosis of lymphedema and Charcot foot deformity \par BLE UNNA boot applied \par Pt counseled to see PCP or urgent care for treatment of right posterior thigh abscess \par RTC in 1 week for bandage change and to monitor wounds \par \par A total of 20 minutes was spent with patient and coordinating care.\par

## 2022-12-13 ENCOUNTER — APPOINTMENT (OUTPATIENT)
Dept: BARIATRICS | Facility: CLINIC | Age: 67
End: 2022-12-13

## 2022-12-13 ENCOUNTER — APPOINTMENT (OUTPATIENT)
Dept: VASCULAR SURGERY | Facility: CLINIC | Age: 67
End: 2022-12-13

## 2022-12-13 VITALS
RESPIRATION RATE: 14 BRPM | TEMPERATURE: 97.2 F | OXYGEN SATURATION: 96 % | SYSTOLIC BLOOD PRESSURE: 139 MMHG | HEART RATE: 69 BPM | DIASTOLIC BLOOD PRESSURE: 78 MMHG

## 2022-12-13 PROCEDURE — 99213 OFFICE O/P EST LOW 20 MIN: CPT

## 2022-12-13 NOTE — HISTORY OF PRESENT ILLNESS
[FreeTextEntry1] : 5/17/22: 65 yo male with BLE lymphedema formerly being treated by Dr. Brown. Pt had BL UNNA boot on for the past week. He complains of pain in the right plantar lateral heel area where he has a small ulcer. He feels the area around the wound is tender. He has had this wound for several months. He denies any fever or chills. Pt is on ASA, Eliquis, and Gabapentin. \par \par 5/24/22: Pt kept UNNA boots on the for the past week. He still has severe pain in his right heel. He states he gets a shooting pain in the foot when he puts pressure on the foot to stand up. He tries to stay off the foot as much as possible. He denies any fever or chills. He is compliant with his medication. \par \par 5/31/22: Pt kept UNNA boots on the for the past week. He still has severe pain in his right heel. He states he gets a shooting pain in the foot when he puts pressure on the foot to stand up. He tries to stay off the foot as much as possible. He denies any fever or chills. He is compliant with his medication. \par \par 6/7/22: Pt kept UNNA boots on the for the past week. He still has less pain in his right heel. He states he gets a shooting pain in the foot when he puts pressure on the foot to stand up. He tries to stay off the foot as much as possible. He denies any fever or chills. He is compliant with his medication. \par \par 6/14/22: Pt kept UNNA boots on the for the past week. He still has significant pain in his right heel. He states he gets a shooting pain in the foot when he puts pressure on the foot to stand up. He tries to stay off the foot as much as possible. He denies any fever or chills. He is compliant with his medication. \par \par 7/12/22: Pt had BLE UNNA boots applied 7/5/22. He has an appointment tomorrow for custom shoe fitting and cannot have UNNA boots on. He denies any new symptoms. \par \par 7/26/22: Pt kept ACE wraps on mid calves for the past two weeks and now has significant weeping of his upper calves bilaterally. He denies any fever or chills. He has pain in the BLE. \par \par 8/2/22: Pt kept BLE UNNA boots on for the past week. He still has circumferential weeping ulcers from his mid calves bilaterally. He denies any fever or chills. He has pain in the BLE. \par \par 8/9/22: Pt kept BLE UNNA boots on for the past week. He still has circumferential weeping ulcers from his mid calves bilaterally. He still has significant pain in his right heel. He denies any fever or chills. He has pain in the BLE.\par \par 8/17/22: Pt kept BLE UNNA boots on for the past week. He no longer has circumferential weeping ulcers, they have closed and healed with UNNA boot treatment . He still has significant pain in his right heel. He denies any fever or chills. He has pain in the BLE. \par \par 8/23/22: Pt kept BLE UNNA boots on for the past week. He no longer has circumferential weeping ulcers, they have closed and healed with UNNA boot treatment . He still has significant pain in his right heel. He denies any fever or chills. He has pain in the BLE. \par \par 8/30/22: Pt kept BLE UNNA boots on for the past week. Pt has gotten his custom fitted shoes and started to wear them daily. He still has significant pain in his right heel. He denies any fever or chills. He has pain in the BLE. \par \par 9/6/22: Pt kept BLE UNNA boots on for the past week. Pt has gotten his custom fitted shoes and started to wear them daily. He still has significant pain in his right heel. He denies any fever or chills. He has pain in the BLE. \par \par 9/13/22: Pt kept BLE UNNA boots on for the past week. Pt picked up his custom fitted shoes and started to wear them daily. He still has significant pain in his right heel. He denies any fever or chills. \par \par 9/20/22: Pt kept BLE UNNA boots on for the past week. Pt picked up his custom fitted shoes and started to wear them daily. He still has significant pain in his right heel. He denies any fever or chills. \par \par 10/4/22: Pt kept BLE UNNA boots on for the past two weeks. The bandages slid down to mid calf and he has been having severe weeping from his right leg.  He still has significant pain in his right heel. He denies any fever or chills. \par \par 10/10/22: Pt kept BLE UNNA boots on for the past week. He has weeping from his right posterior calf.  He still has significant pain in his right heel. He denies any fever or chills. \par \par 10/18/22: Pt kept BLE UNNA boots on for the past week. He has weeping from his right posterior calf.  He still has significant pain in his right heel. He denies any fever or chills. \par \par 10/25/22: Pt kept BLE UNNA boots on for the past week. He no longer has any weeping from the calves.  He still has significant pain in his right heel. He denies any fever or chills. \par \par 10/26/22: Pt states his RLE UNNA boot slid down last night and he has increased weeping from the posterior calf. He denies any fever or chills. \par \par 11/1/22: Pt kept BLE UNNA boots on for the past week. He has weeping from the posterior calves bilaterally.  He still has significant pain in his right heel. He denies any fever or chills. \par \par 11/8/22: Pt kept BLE UNNA boots on for the past week. He has weeping from the left posterior calf still. He still has significant pain in his right heel. He denies any fever or chills. \par \par 11/22/22: Pt kept BLE UNNA boots on for the past week. He no longer has any weeping from the calf. He has bilateral posterior thigh wounds for the past several weeks.  He still has significant pain in his right heel. He denies any fever or chills. \par \par 11/29/22: Pt kept BLE UNNA boots on for the past week. He has weeping from his right posterior calf. He has a right posterior thigh wound that is very painful.  He still has significant pain in his right heel. He denies any fever or chills. \par \par 12/6/22: Pt kept BLE UNNA boots on for the past week. He no longer has any weeping from his legs. He denies any fever or chills. \par \par 12/13/22: Pt kept BLE UNNA boots on for the past week. his RLE UNNA boot rolled down and he has blisters on posterior calf.  He no longer has any weeping from his left leg. He denies any fever or chills.

## 2022-12-13 NOTE — PHYSICAL EXAM
[0] : left 0 [2+] : left 2+ [Ankle Swelling (On Exam)] : present [Ankle Swelling Bilaterally] : severe [] : bilaterally [Ankle Swelling On The Left] : moderate [Varicose Veins Of Lower Extremities] : not present [de-identified] : NAD. Obese.  [FreeTextEntry1] : PT non-palpable due to edema. \par right posterior calf blisters with weeping \par right heel ulcer closed. Tender to palpation \par

## 2022-12-15 ENCOUNTER — APPOINTMENT (OUTPATIENT)
Dept: CARDIOLOGY | Facility: CLINIC | Age: 67
End: 2022-12-15

## 2022-12-15 PROCEDURE — A9500: CPT

## 2022-12-15 PROCEDURE — 78452 HT MUSCLE IMAGE SPECT MULT: CPT

## 2022-12-15 PROCEDURE — 93015 CV STRESS TEST SUPVJ I&R: CPT

## 2022-12-20 ENCOUNTER — APPOINTMENT (OUTPATIENT)
Dept: VASCULAR SURGERY | Facility: CLINIC | Age: 67
End: 2022-12-20

## 2022-12-20 PROCEDURE — 99213 OFFICE O/P EST LOW 20 MIN: CPT

## 2022-12-20 NOTE — ASSESSMENT
[FreeTextEntry1] : 68 yo male with BLE lymphedema. Pt has weeping from right posterior calf. \par \par Pt counseled on diagnosis of lymphedema and Charcot foot deformity \par BLE UNNA boot applied \par Pt counseled to see PCP or urgent care for treatment of right posterior thigh abscess \par RTC in 1 week for bandage change and to monitor wounds \par \par A total of 20 minutes was spent with patient and coordinating care.\par

## 2022-12-20 NOTE — HISTORY OF PRESENT ILLNESS
[FreeTextEntry1] : 5/17/22: 67 yo male with BLE lymphedema formerly being treated by Dr. Brown. Pt had BL UNNA boot on for the past week. He complains of pain in the right plantar lateral heel area where he has a small ulcer. He feels the area around the wound is tender. He has had this wound for several months. He denies any fever or chills. Pt is on ASA, Eliquis, and Gabapentin. \par \par 5/24/22: Pt kept UNNA boots on the for the past week. He still has severe pain in his right heel. He states he gets a shooting pain in the foot when he puts pressure on the foot to stand up. He tries to stay off the foot as much as possible. He denies any fever or chills. He is compliant with his medication. \par \par 5/31/22: Pt kept UNNA boots on the for the past week. He still has severe pain in his right heel. He states he gets a shooting pain in the foot when he puts pressure on the foot to stand up. He tries to stay off the foot as much as possible. He denies any fever or chills. He is compliant with his medication. \par \par 6/7/22: Pt kept UNNA boots on the for the past week. He still has less pain in his right heel. He states he gets a shooting pain in the foot when he puts pressure on the foot to stand up. He tries to stay off the foot as much as possible. He denies any fever or chills. He is compliant with his medication. \par \par 6/14/22: Pt kept UNNA boots on the for the past week. He still has significant pain in his right heel. He states he gets a shooting pain in the foot when he puts pressure on the foot to stand up. He tries to stay off the foot as much as possible. He denies any fever or chills. He is compliant with his medication. \par \par 7/12/22: Pt had BLE UNNA boots applied 7/5/22. He has an appointment tomorrow for custom shoe fitting and cannot have UNNA boots on. He denies any new symptoms. \par \par 7/26/22: Pt kept ACE wraps on mid calves for the past two weeks and now has significant weeping of his upper calves bilaterally. He denies any fever or chills. He has pain in the BLE. \par \par 8/2/22: Pt kept BLE UNNA boots on for the past week. He still has circumferential weeping ulcers from his mid calves bilaterally. He denies any fever or chills. He has pain in the BLE. \par \par 8/9/22: Pt kept BLE UNNA boots on for the past week. He still has circumferential weeping ulcers from his mid calves bilaterally. He still has significant pain in his right heel. He denies any fever or chills. He has pain in the BLE.\par \par 8/17/22: Pt kept BLE UNNA boots on for the past week. He no longer has circumferential weeping ulcers, they have closed and healed with UNNA boot treatment . He still has significant pain in his right heel. He denies any fever or chills. He has pain in the BLE. \par \par 8/23/22: Pt kept BLE UNNA boots on for the past week. He no longer has circumferential weeping ulcers, they have closed and healed with UNNA boot treatment . He still has significant pain in his right heel. He denies any fever or chills. He has pain in the BLE. \par \par 8/30/22: Pt kept BLE UNNA boots on for the past week. Pt has gotten his custom fitted shoes and started to wear them daily. He still has significant pain in his right heel. He denies any fever or chills. He has pain in the BLE. \par \par 9/6/22: Pt kept BLE UNNA boots on for the past week. Pt has gotten his custom fitted shoes and started to wear them daily. He still has significant pain in his right heel. He denies any fever or chills. He has pain in the BLE. \par \par 9/13/22: Pt kept BLE UNNA boots on for the past week. Pt picked up his custom fitted shoes and started to wear them daily. He still has significant pain in his right heel. He denies any fever or chills. \par \par 9/20/22: Pt kept BLE UNNA boots on for the past week. Pt picked up his custom fitted shoes and started to wear them daily. He still has significant pain in his right heel. He denies any fever or chills. \par \par 10/4/22: Pt kept BLE UNNA boots on for the past two weeks. The bandages slid down to mid calf and he has been having severe weeping from his right leg.  He still has significant pain in his right heel. He denies any fever or chills. \par \par 10/10/22: Pt kept BLE UNNA boots on for the past week. He has weeping from his right posterior calf.  He still has significant pain in his right heel. He denies any fever or chills. \par \par 10/18/22: Pt kept BLE UNNA boots on for the past week. He has weeping from his right posterior calf.  He still has significant pain in his right heel. He denies any fever or chills. \par \par 10/25/22: Pt kept BLE UNNA boots on for the past week. He no longer has any weeping from the calves.  He still has significant pain in his right heel. He denies any fever or chills. \par \par 10/26/22: Pt states his RLE UNNA boot slid down last night and he has increased weeping from the posterior calf. He denies any fever or chills. \par \par 11/1/22: Pt kept BLE UNNA boots on for the past week. He has weeping from the posterior calves bilaterally.  He still has significant pain in his right heel. He denies any fever or chills. \par \par 11/8/22: Pt kept BLE UNNA boots on for the past week. He has weeping from the left posterior calf still. He still has significant pain in his right heel. He denies any fever or chills. \par \par 11/22/22: Pt kept BLE UNNA boots on for the past week. He no longer has any weeping from the calf. He has bilateral posterior thigh wounds for the past several weeks.  He still has significant pain in his right heel. He denies any fever or chills. \par \par 11/29/22: Pt kept BLE UNNA boots on for the past week. He has weeping from his right posterior calf. He has a right posterior thigh wound that is very painful.  He still has significant pain in his right heel. He denies any fever or chills. \par \par 12/6/22: Pt kept BLE UNNA boots on for the past week. He no longer has any weeping from his legs. He denies any fever or chills. \par \par 12/13/22: Pt kept BLE UNNA boots on for the past week. his RLE UNNA boot rolled down and he has blisters on posterior calf.  He no longer has any weeping from his left leg. He denies any fever or chills. \par \par 12/20/22: Pt kept BLE UNNA boots on for the past week. His RLE  blisters on posterior calf are improving.  He no longer has any weeping from his left leg. He denies any fever or chills.

## 2022-12-20 NOTE — PHYSICAL EXAM
[0] : left 0 [2+] : left 2+ [Ankle Swelling (On Exam)] : present [Ankle Swelling Bilaterally] : severe [] : bilaterally [Ankle Swelling On The Left] : moderate [Varicose Veins Of Lower Extremities] : not present [de-identified] : NAD. Obese.  [FreeTextEntry1] : PT non-palpable due to edema. \par right posterior calf blisters with weeping \par right heel ulcer closed. Tender to palpation \par

## 2022-12-23 ENCOUNTER — APPOINTMENT (OUTPATIENT)
Dept: BARIATRICS | Facility: CLINIC | Age: 67
End: 2022-12-23
Payer: MEDICARE

## 2022-12-23 VITALS
DIASTOLIC BLOOD PRESSURE: 73 MMHG | TEMPERATURE: 97.3 F | WEIGHT: 274 LBS | HEART RATE: 87 BPM | SYSTOLIC BLOOD PRESSURE: 158 MMHG | OXYGEN SATURATION: 98 % | BODY MASS INDEX: 43 KG/M2 | RESPIRATION RATE: 14 BRPM | HEIGHT: 67 IN

## 2022-12-23 DIAGNOSIS — Z00.00 ENCOUNTER FOR GENERAL ADULT MEDICAL EXAMINATION W/OUT ABNORMAL FINDINGS: ICD-10-CM

## 2022-12-23 DIAGNOSIS — E11.9 TYPE 2 DIABETES MELLITUS W/OUT COMPLICATIONS: ICD-10-CM

## 2022-12-23 PROCEDURE — 97803 MED NUTRITION INDIV SUBSEQ: CPT

## 2022-12-27 ENCOUNTER — APPOINTMENT (OUTPATIENT)
Dept: VASCULAR SURGERY | Facility: CLINIC | Age: 67
End: 2022-12-27

## 2022-12-27 VITALS
RESPIRATION RATE: 14 BRPM | HEART RATE: 87 BPM | HEIGHT: 67 IN | TEMPERATURE: 97.4 F | DIASTOLIC BLOOD PRESSURE: 81 MMHG | OXYGEN SATURATION: 95 % | SYSTOLIC BLOOD PRESSURE: 157 MMHG

## 2022-12-29 PROBLEM — E11.9 DIABETES MELLITUS: Status: ACTIVE | Noted: 2018-02-13

## 2023-01-03 ENCOUNTER — APPOINTMENT (OUTPATIENT)
Dept: VASCULAR SURGERY | Facility: CLINIC | Age: 68
End: 2023-01-03

## 2023-01-09 NOTE — CARDIOLOGY SUMMARY
[de-identified] : 11/21/2022: Sinus  Rhythm @ 68 bpm  -Old inferior infarct.  -  Nonspecific T-abnormality. \par \par  [de-identified] : Nuclear stress test: 12/15/2022: 1. Probably normal pharmacological myocardial perfusion SPECT study.\par 2. No clear evidence of ischemia or infarct.\par 3. Post stress with normal LV EF 66% and systolic function.\par 4. Prominent RV uptake noted.\par 5. The overall study imaging quality was deemed to be Poor with baseline splanchnic and diaphgramatic artifacts, prone imaging not available. [de-identified] : LVEF 55-60% normal global left ventricular systolic function\par Grade I diastolic function \par Normal RV function and size \par Dilatation of the aortic root at the sinus of Valsalva is 3.9 cm  [de-identified] : Cardiac Cath: 10/2019- LM: mild atherosclerosis with no flow limiting lesion \par LAD: the vessel sized vessel \par LCx: large sized vessel with mild atherosclerosis no flow limiting lesion \par RCA:  large sized vessel with mild atherosclerosis no flow limiting lesion (DOMINANT)

## 2023-01-09 NOTE — PHYSICAL EXAM

## 2023-01-09 NOTE — ASSESSMENT
[FreeTextEntry1] : 68 y/o obese M former smoker with a PMHx of PAD s/p fem-fem bypass with PTFE graft 10/24/20 for critical limb ischemia c/b site infection and right DVT (on Eliquis), non-obstructive mild CAD ([facility] 10/19), DMII (on insulin), HTN, ASHIA (non-compliant with CPAP, sleeps in a recliner), ankylosing spondylitis,\par chronic lymphedema, lymphorreha, recurrent ulcers/cellulitis, chronic foot ulcer presents for cardiovascular assessment \par \par 1) Mild CAD non obstructive CAD \par - no chest pain or shortness of breath \par - EKG Sinus  Rhythm @ 68 bpm  -Old inferior infarct.  -  Nonspecific T-abnormality. \par - patient has mild CAD with no obstructive disease \par - TTE in 2019 shows normal LVEF with no significant valvulopathy \par -refer for nuclear stress testing as per Bariatric surgeon \par \par \par Aline Anne D.O. [facility]\par Cardiology/Vascular Cardiology -[facility]\par Telephone # [phone]\par \par \par Addendum: 1/9/2023: \par 1) Preoperative cardiovascular assessment prior to bariatric surgery \par - no active chest pain or shortness of breath with mild non obstructive CAD\par - EKG Sinus  Rhythm @ 68 bpm  -Old inferior infarct.  -  Nonspecific T-abnormality. \par - patient has mild CAD with no obstructive disease \par - TTE in 2019 shows normal LVEF with no significant valvulopathy  \par Pharmacologic stress test done: 1. Probably normal pharmacological myocardial perfusion SPECT study. No clear evidence of ischemia or infarct. Post stress with normal LV EF 66% and systolic function. Prominent RV uptake noted. The overall study imaging quality was deemed to be Poor with baseline splanchnic and diaphragmatic artifacts, prone imaging not available.\par - patient has a RCRI of 0 with Class I risk thus 3.9% 30 day risk of death MI or cardiac arrest and TTE is performed and shows no evidence of valvulopathy or wall motion abnormalities, and NM Myocardial showing no, is considered low risk for a low risk procedure and thus optimized from a cardiac standpoint and may proceed with planned procedure \par - close intra procedural monitoring\par - early out of bed to chair and ambulation\par \par Aline Anne D.O. FACC\par Cardiology/Vascular Cardiology -[facility]\par Telephone # [phone]\par \par \par

## 2023-01-09 NOTE — REASON FOR VISIT
[Symptom and Test Evaluation] : symptom and test evaluation [Hypertension] : hypertension [FreeTextEntry1] : 66 y/o obese M former smoker with a PMHx of PAD s/p fem-fem bypass with PTFE graft 10/24/20 for critical limb ischemia c/b site infection and right DVT (on Eliquis), non-obstructive mild CAD ([facility] 10/19), DMII (on insulin), HTN, ASHIA (non-compliant with CPAP, sleeps in a recliner), ankylosing spondylitis,\par chronic lymphedema, lymphorreha, recurrent ulcers/cellulitis, chronic foot ulcer presents for cardiovascular assessment \par \par Notes that he has been doing well with no complaints of chest pain or shortness of breath at rest or upon exertion. Patient notes that he has chronic lymphedema and has not noted worsening lower extrmeity edema, no orthopnea or PND. \par Follows with vascular surgery with regards to lympedema

## 2023-01-09 NOTE — ED ADULT TRIAGE NOTE - CHIEF COMPLAINT QUOTE
Pt brought from [facility] and sent by PCP for abnormally low H/H, pt at [facility] to get cleared for colonoscopy under anesthesia. Pt reports feeling fatigued and mild SOB. Pt unable to ambulate without assist, pt in wheelchair. Hx HTN, HLD, CHF
numerical 0-10

## 2023-01-10 ENCOUNTER — APPOINTMENT (OUTPATIENT)
Dept: VASCULAR SURGERY | Facility: CLINIC | Age: 68
End: 2023-01-10
Payer: MEDICARE

## 2023-01-10 ENCOUNTER — NON-APPOINTMENT (OUTPATIENT)
Age: 68
End: 2023-01-10

## 2023-01-10 VITALS
RESPIRATION RATE: 16 BRPM | OXYGEN SATURATION: 98 % | HEIGHT: 67 IN | DIASTOLIC BLOOD PRESSURE: 85 MMHG | HEART RATE: 87 BPM | SYSTOLIC BLOOD PRESSURE: 175 MMHG | TEMPERATURE: 97 F

## 2023-01-10 DIAGNOSIS — S80.829A BLISTER (NONTHERMAL), UNSPECIFIED LOWER LEG, INITIAL ENCOUNTER: ICD-10-CM

## 2023-01-10 DIAGNOSIS — B20 HUMAN IMMUNODEFICIENCY VIRUS [HIV] DISEASE: ICD-10-CM

## 2023-01-10 DIAGNOSIS — Z79.891 LONG TERM (CURRENT) USE OF OPIATE ANALGESIC: ICD-10-CM

## 2023-01-10 DIAGNOSIS — R52 PAIN, UNSPECIFIED: ICD-10-CM

## 2023-01-10 PROCEDURE — 99213 OFFICE O/P EST LOW 20 MIN: CPT

## 2023-01-10 RX ORDER — IBUPROFEN 600 MG/1
600 TABLET, FILM COATED ORAL 3 TIMES DAILY
Refills: 0 | Status: ACTIVE | COMMUNITY
Start: 2018-07-30

## 2023-01-10 RX ORDER — LISINOPRIL 2.5 MG/1
2.5 TABLET ORAL DAILY
Refills: 0 | Status: ACTIVE | COMMUNITY

## 2023-01-10 RX ORDER — PIOGLITAZONE HYDROCHLORIDE 30 MG/1
30 TABLET ORAL DAILY
Refills: 0 | Status: ACTIVE | COMMUNITY

## 2023-01-10 RX ORDER — MUPIROCIN 20 MG/G
2 OINTMENT TOPICAL
Qty: 60 | Refills: 0 | Status: ACTIVE | COMMUNITY
Start: 2022-11-08 | End: 1900-01-01

## 2023-01-10 RX ORDER — GABAPENTIN 100 MG/1
100 CAPSULE ORAL TWICE DAILY
Refills: 0 | Status: ACTIVE | COMMUNITY

## 2023-01-10 RX ORDER — HYDROCHLOROTHIAZIDE 25 MG/1
25 TABLET ORAL DAILY
Refills: 0 | Status: ACTIVE | COMMUNITY

## 2023-01-10 NOTE — HISTORY OF PRESENT ILLNESS
[FreeTextEntry1] : 5/17/22: 65 yo male with BLE lymphedema formerly being treated by Dr. Brown. Pt had BL UNNA boot on for the past week. He complains of pain in the right plantar lateral heel area where he has a small ulcer. He feels the area around the wound is tender. He has had this wound for several months. He denies any fever or chills. Pt is on ASA, Eliquis, and Gabapentin. \par \par 5/24/22: Pt kept UNNA boots on the for the past week. He still has severe pain in his right heel. He states he gets a shooting pain in the foot when he puts pressure on the foot to stand up. He tries to stay off the foot as much as possible. He denies any fever or chills. He is compliant with his medication. \par \par 5/31/22: Pt kept UNNA boots on the for the past week. He still has severe pain in his right heel. He states he gets a shooting pain in the foot when he puts pressure on the foot to stand up. He tries to stay off the foot as much as possible. He denies any fever or chills. He is compliant with his medication. \par \par 6/7/22: Pt kept UNNA boots on the for the past week. He still has less pain in his right heel. He states he gets a shooting pain in the foot when he puts pressure on the foot to stand up. He tries to stay off the foot as much as possible. He denies any fever or chills. He is compliant with his medication. \par \par 6/14/22: Pt kept UNNA boots on the for the past week. He still has significant pain in his right heel. He states he gets a shooting pain in the foot when he puts pressure on the foot to stand up. He tries to stay off the foot as much as possible. He denies any fever or chills. He is compliant with his medication. \par \par 7/12/22: Pt had BLE UNNA boots applied 7/5/22. He has an appointment tomorrow for custom shoe fitting and cannot have UNNA boots on. He denies any new symptoms. \par \par 7/26/22: Pt kept ACE wraps on mid calves for the past two weeks and now has significant weeping of his upper calves bilaterally. He denies any fever or chills. He has pain in the BLE. \par \par 8/2/22: Pt kept BLE UNNA boots on for the past week. He still has circumferential weeping ulcers from his mid calves bilaterally. He denies any fever or chills. He has pain in the BLE. \par \par 8/9/22: Pt kept BLE UNNA boots on for the past week. He still has circumferential weeping ulcers from his mid calves bilaterally. He still has significant pain in his right heel. He denies any fever or chills. He has pain in the BLE.\par \par 8/17/22: Pt kept BLE UNNA boots on for the past week. He no longer has circumferential weeping ulcers, they have closed and healed with UNNA boot treatment . He still has significant pain in his right heel. He denies any fever or chills. He has pain in the BLE. \par \par 8/23/22: Pt kept BLE UNNA boots on for the past week. He no longer has circumferential weeping ulcers, they have closed and healed with UNNA boot treatment . He still has significant pain in his right heel. He denies any fever or chills. He has pain in the BLE. \par \par 8/30/22: Pt kept BLE UNNA boots on for the past week. Pt has gotten his custom fitted shoes and started to wear them daily. He still has significant pain in his right heel. He denies any fever or chills. He has pain in the BLE. \par \par 9/6/22: Pt kept BLE UNNA boots on for the past week. Pt has gotten his custom fitted shoes and started to wear them daily. He still has significant pain in his right heel. He denies any fever or chills. He has pain in the BLE. \par \par 9/13/22: Pt kept BLE UNNA boots on for the past week. Pt picked up his custom fitted shoes and started to wear them daily. He still has significant pain in his right heel. He denies any fever or chills. \par \par 9/20/22: Pt kept BLE UNNA boots on for the past week. Pt picked up his custom fitted shoes and started to wear them daily. He still has significant pain in his right heel. He denies any fever or chills. \par \par 10/4/22: Pt kept BLE UNNA boots on for the past two weeks. The bandages slid down to mid calf and he has been having severe weeping from his right leg.  He still has significant pain in his right heel. He denies any fever or chills. \par \par 10/10/22: Pt kept BLE UNNA boots on for the past week. He has weeping from his right posterior calf.  He still has significant pain in his right heel. He denies any fever or chills. \par \par 10/18/22: Pt kept BLE UNNA boots on for the past week. He has weeping from his right posterior calf.  He still has significant pain in his right heel. He denies any fever or chills. \par \par 10/25/22: Pt kept BLE UNNA boots on for the past week. He no longer has any weeping from the calves.  He still has significant pain in his right heel. He denies any fever or chills. \par \par 10/26/22: Pt states his RLE UNNA boot slid down last night and he has increased weeping from the posterior calf. He denies any fever or chills. \par \par 11/1/22: Pt kept BLE UNNA boots on for the past week. He has weeping from the posterior calves bilaterally.  He still has significant pain in his right heel. He denies any fever or chills. \par \par 11/8/22: Pt kept BLE UNNA boots on for the past week. He has weeping from the left posterior calf still. He still has significant pain in his right heel. He denies any fever or chills. \par \par 11/22/22: Pt kept BLE UNNA boots on for the past week. He no longer has any weeping from the calf. He has bilateral posterior thigh wounds for the past several weeks.  He still has significant pain in his right heel. He denies any fever or chills. \par \par 11/29/22: Pt kept BLE UNNA boots on for the past week. He has weeping from his right posterior calf. He has a right posterior thigh wound that is very painful.  He still has significant pain in his right heel. He denies any fever or chills. \par \par 12/6/22: Pt kept BLE UNNA boots on for the past week. He no longer has any weeping from his legs. He denies any fever or chills. \par \par 12/13/22: Pt kept BLE UNNA boots on for the past week. his RLE UNNA boot rolled down and he has blisters on posterior calf.  He no longer has any weeping from his left leg. He denies any fever or chills. \par \par 12/20/22: Pt kept BLE UNNA boots on for the past week. His RLE  blisters on posterior calf are improving.  He no longer has any weeping from his left leg. He denies any fever or chills. \par \par 12/27/22: Pt kept BLE UNNA boots on for the past week. His RLE  blisters on posterior calf are improving.  He no longer has any weeping from his left leg. He denies any fever or chills. \par \par 1/10/23: Pt kept BLE UNNA boots on for the past two weeks. His RLE  blisters on posterior calf are improving.  He no longer has any weeping from his left leg. He denies any fever or chills.

## 2023-01-10 NOTE — PHYSICAL EXAM
[0] : left 0 [2+] : left 2+ [Ankle Swelling (On Exam)] : present [Ankle Swelling Bilaterally] : severe [] : bilaterally [Ankle Swelling On The Left] : moderate [Varicose Veins Of Lower Extremities] : not present [de-identified] : NAD. Obese.  [FreeTextEntry1] : PT non-palpable due to edema. \par right posterior calf blisters with weeping \par right heel ulcer closed. Tender to palpation \par

## 2023-01-10 NOTE — ASSESSMENT
[FreeTextEntry1] : 66 yo male with BLE lymphedema. Pt has weeping from right posterior calf. \par \par Pt counseled on diagnosis of lymphedema and Charcot foot deformity \par BLE UNNA boot applied \par Pt counseled to see PCP or urgent care for treatment of right posterior thigh abscess \par RTC in 1 week for bandage change and to monitor wounds \par \par A total of 20 minutes was spent with patient and coordinating care.\par

## 2023-01-17 ENCOUNTER — APPOINTMENT (OUTPATIENT)
Dept: VASCULAR SURGERY | Facility: CLINIC | Age: 68
End: 2023-01-17
Payer: MEDICARE

## 2023-01-17 VITALS
OXYGEN SATURATION: 96 % | DIASTOLIC BLOOD PRESSURE: 76 MMHG | HEART RATE: 76 BPM | RESPIRATION RATE: 16 BRPM | TEMPERATURE: 97.3 F | SYSTOLIC BLOOD PRESSURE: 133 MMHG | HEIGHT: 67 IN

## 2023-01-17 PROCEDURE — 99213 OFFICE O/P EST LOW 20 MIN: CPT

## 2023-01-17 RX ORDER — TRIAMCINOLONE ACETONIDE 5 MG/G
0.5 CREAM TOPICAL 3 TIMES DAILY
Qty: 90 | Refills: 3 | Status: ACTIVE | COMMUNITY
Start: 2023-01-17 | End: 1900-01-01

## 2023-01-17 NOTE — PHYSICAL EXAM
[0] : left 0 [2+] : left 2+ [Ankle Swelling (On Exam)] : present [Ankle Swelling Bilaterally] : severe [] : bilaterally [Ankle Swelling On The Left] : moderate [Varicose Veins Of Lower Extremities] : not present [de-identified] : NAD. Obese.  [FreeTextEntry1] : PT non-palpable due to edema. \par right posterior calf blisters with weeping \par right heel ulcer closed. Tender to palpation \par

## 2023-01-17 NOTE — HISTORY OF PRESENT ILLNESS
[FreeTextEntry1] : 5/17/22: 67 yo male with BLE lymphedema formerly being treated by Dr. Brown. Pt had BL UNNA boot on for the past week. He complains of pain in the right plantar lateral heel area where he has a small ulcer. He feels the area around the wound is tender. He has had this wound for several months. He denies any fever or chills. Pt is on ASA, Eliquis, and Gabapentin. \par \par 5/24/22: Pt kept UNNA boots on the for the past week. He still has severe pain in his right heel. He states he gets a shooting pain in the foot when he puts pressure on the foot to stand up. He tries to stay off the foot as much as possible. He denies any fever or chills. He is compliant with his medication. \par \par 5/31/22: Pt kept UNNA boots on the for the past week. He still has severe pain in his right heel. He states he gets a shooting pain in the foot when he puts pressure on the foot to stand up. He tries to stay off the foot as much as possible. He denies any fever or chills. He is compliant with his medication. \par \par 6/7/22: Pt kept UNNA boots on the for the past week. He still has less pain in his right heel. He states he gets a shooting pain in the foot when he puts pressure on the foot to stand up. He tries to stay off the foot as much as possible. He denies any fever or chills. He is compliant with his medication. \par \par 6/14/22: Pt kept UNNA boots on the for the past week. He still has significant pain in his right heel. He states he gets a shooting pain in the foot when he puts pressure on the foot to stand up. He tries to stay off the foot as much as possible. He denies any fever or chills. He is compliant with his medication. \par \par 7/12/22: Pt had BLE UNNA boots applied 7/5/22. He has an appointment tomorrow for custom shoe fitting and cannot have UNNA boots on. He denies any new symptoms. \par \par 7/26/22: Pt kept ACE wraps on mid calves for the past two weeks and now has significant weeping of his upper calves bilaterally. He denies any fever or chills. He has pain in the BLE. \par \par 8/2/22: Pt kept BLE UNNA boots on for the past week. He still has circumferential weeping ulcers from his mid calves bilaterally. He denies any fever or chills. He has pain in the BLE. \par \par 8/9/22: Pt kept BLE UNNA boots on for the past week. He still has circumferential weeping ulcers from his mid calves bilaterally. He still has significant pain in his right heel. He denies any fever or chills. He has pain in the BLE.\par \par 8/17/22: Pt kept BLE UNNA boots on for the past week. He no longer has circumferential weeping ulcers, they have closed and healed with UNNA boot treatment . He still has significant pain in his right heel. He denies any fever or chills. He has pain in the BLE. \par \par 8/23/22: Pt kept BLE UNNA boots on for the past week. He no longer has circumferential weeping ulcers, they have closed and healed with UNNA boot treatment . He still has significant pain in his right heel. He denies any fever or chills. He has pain in the BLE. \par \par 8/30/22: Pt kept BLE UNNA boots on for the past week. Pt has gotten his custom fitted shoes and started to wear them daily. He still has significant pain in his right heel. He denies any fever or chills. He has pain in the BLE. \par \par 9/6/22: Pt kept BLE UNNA boots on for the past week. Pt has gotten his custom fitted shoes and started to wear them daily. He still has significant pain in his right heel. He denies any fever or chills. He has pain in the BLE. \par \par 9/13/22: Pt kept BLE UNNA boots on for the past week. Pt picked up his custom fitted shoes and started to wear them daily. He still has significant pain in his right heel. He denies any fever or chills. \par \par 9/20/22: Pt kept BLE UNNA boots on for the past week. Pt picked up his custom fitted shoes and started to wear them daily. He still has significant pain in his right heel. He denies any fever or chills. \par \par 10/4/22: Pt kept BLE UNNA boots on for the past two weeks. The bandages slid down to mid calf and he has been having severe weeping from his right leg.  He still has significant pain in his right heel. He denies any fever or chills. \par \par 10/10/22: Pt kept BLE UNNA boots on for the past week. He has weeping from his right posterior calf.  He still has significant pain in his right heel. He denies any fever or chills. \par \par 10/18/22: Pt kept BLE UNNA boots on for the past week. He has weeping from his right posterior calf.  He still has significant pain in his right heel. He denies any fever or chills. \par \par 10/25/22: Pt kept BLE UNNA boots on for the past week. He no longer has any weeping from the calves.  He still has significant pain in his right heel. He denies any fever or chills. \par \par 10/26/22: Pt states his RLE UNNA boot slid down last night and he has increased weeping from the posterior calf. He denies any fever or chills. \par \par 11/1/22: Pt kept BLE UNNA boots on for the past week. He has weeping from the posterior calves bilaterally.  He still has significant pain in his right heel. He denies any fever or chills. \par \par 11/8/22: Pt kept BLE UNNA boots on for the past week. He has weeping from the left posterior calf still. He still has significant pain in his right heel. He denies any fever or chills. \par \par 11/22/22: Pt kept BLE UNNA boots on for the past week. He no longer has any weeping from the calf. He has bilateral posterior thigh wounds for the past several weeks.  He still has significant pain in his right heel. He denies any fever or chills. \par \par 11/29/22: Pt kept BLE UNNA boots on for the past week. He has weeping from his right posterior calf. He has a right posterior thigh wound that is very painful.  He still has significant pain in his right heel. He denies any fever or chills. \par \par 12/6/22: Pt kept BLE UNNA boots on for the past week. He no longer has any weeping from his legs. He denies any fever or chills. \par \par 12/13/22: Pt kept BLE UNNA boots on for the past week. his RLE UNNA boot rolled down and he has blisters on posterior calf.  He no longer has any weeping from his left leg. He denies any fever or chills. \par \par 12/20/22: Pt kept BLE UNNA boots on for the past week. His RLE  blisters on posterior calf are improving.  He no longer has any weeping from his left leg. He denies any fever or chills. \par \par 12/27/22: Pt kept BLE UNNA boots on for the past week. His RLE  blisters on posterior calf are improving.  He no longer has any weeping from his left leg. He denies any fever or chills. \par \par 1/10/23: Pt kept BLE UNNA boots on for the past two weeks. His RLE  blisters on posterior calf are improving.  He no longer has any weeping from his left leg. He denies any fever or chills. \par \par 1/17/23: Pt kept BLE UNNA boots on for the past week. His RLE  blisters on posterior calf are improving.  He no longer has any weeping from his left leg. He denies any fever or chills.

## 2023-01-24 ENCOUNTER — APPOINTMENT (OUTPATIENT)
Dept: VASCULAR SURGERY | Facility: CLINIC | Age: 68
End: 2023-01-24
Payer: MEDICARE

## 2023-01-24 VITALS
DIASTOLIC BLOOD PRESSURE: 72 MMHG | HEART RATE: 85 BPM | RESPIRATION RATE: 14 BRPM | SYSTOLIC BLOOD PRESSURE: 149 MMHG | TEMPERATURE: 97.3 F | OXYGEN SATURATION: 96 %

## 2023-01-24 PROCEDURE — 99213 OFFICE O/P EST LOW 20 MIN: CPT

## 2023-01-24 NOTE — HISTORY OF PRESENT ILLNESS
[FreeTextEntry1] : 5/17/22: 67 yo male with BLE lymphedema formerly being treated by Dr. Brown. Pt had BL UNNA boot on for the past week. He complains of pain in the right plantar lateral heel area where he has a small ulcer. He feels the area around the wound is tender. He has had this wound for several months. He denies any fever or chills. Pt is on ASA, Eliquis, and Gabapentin. \par \par 5/24/22: Pt kept UNNA boots on the for the past week. He still has severe pain in his right heel. He states he gets a shooting pain in the foot when he puts pressure on the foot to stand up. He tries to stay off the foot as much as possible. He denies any fever or chills. He is compliant with his medication. \par \par 5/31/22: Pt kept UNNA boots on the for the past week. He still has severe pain in his right heel. He states he gets a shooting pain in the foot when he puts pressure on the foot to stand up. He tries to stay off the foot as much as possible. He denies any fever or chills. He is compliant with his medication. \par \par 6/7/22: Pt kept UNNA boots on the for the past week. He still has less pain in his right heel. He states he gets a shooting pain in the foot when he puts pressure on the foot to stand up. He tries to stay off the foot as much as possible. He denies any fever or chills. He is compliant with his medication. \par \par 6/14/22: Pt kept UNNA boots on the for the past week. He still has significant pain in his right heel. He states he gets a shooting pain in the foot when he puts pressure on the foot to stand up. He tries to stay off the foot as much as possible. He denies any fever or chills. He is compliant with his medication. \par \par 7/12/22: Pt had BLE UNNA boots applied 7/5/22. He has an appointment tomorrow for custom shoe fitting and cannot have UNNA boots on. He denies any new symptoms. \par \par 7/26/22: Pt kept ACE wraps on mid calves for the past two weeks and now has significant weeping of his upper calves bilaterally. He denies any fever or chills. He has pain in the BLE. \par \par 8/2/22: Pt kept BLE UNNA boots on for the past week. He still has circumferential weeping ulcers from his mid calves bilaterally. He denies any fever or chills. He has pain in the BLE. \par \par 8/9/22: Pt kept BLE UNNA boots on for the past week. He still has circumferential weeping ulcers from his mid calves bilaterally. He still has significant pain in his right heel. He denies any fever or chills. He has pain in the BLE.\par \par 8/17/22: Pt kept BLE UNNA boots on for the past week. He no longer has circumferential weeping ulcers, they have closed and healed with UNNA boot treatment . He still has significant pain in his right heel. He denies any fever or chills. He has pain in the BLE. \par \par 8/23/22: Pt kept BLE UNNA boots on for the past week. He no longer has circumferential weeping ulcers, they have closed and healed with UNNA boot treatment . He still has significant pain in his right heel. He denies any fever or chills. He has pain in the BLE. \par \par 8/30/22: Pt kept BLE UNNA boots on for the past week. Pt has gotten his custom fitted shoes and started to wear them daily. He still has significant pain in his right heel. He denies any fever or chills. He has pain in the BLE. \par \par 9/6/22: Pt kept BLE UNNA boots on for the past week. Pt has gotten his custom fitted shoes and started to wear them daily. He still has significant pain in his right heel. He denies any fever or chills. He has pain in the BLE. \par \par 9/13/22: Pt kept BLE UNNA boots on for the past week. Pt picked up his custom fitted shoes and started to wear them daily. He still has significant pain in his right heel. He denies any fever or chills. \par \par 9/20/22: Pt kept BLE UNNA boots on for the past week. Pt picked up his custom fitted shoes and started to wear them daily. He still has significant pain in his right heel. He denies any fever or chills. \par \par 10/4/22: Pt kept BLE UNNA boots on for the past two weeks. The bandages slid down to mid calf and he has been having severe weeping from his right leg.  He still has significant pain in his right heel. He denies any fever or chills. \par \par 10/10/22: Pt kept BLE UNNA boots on for the past week. He has weeping from his right posterior calf.  He still has significant pain in his right heel. He denies any fever or chills. \par \par 10/18/22: Pt kept BLE UNNA boots on for the past week. He has weeping from his right posterior calf.  He still has significant pain in his right heel. He denies any fever or chills. \par \par 10/25/22: Pt kept BLE UNNA boots on for the past week. He no longer has any weeping from the calves.  He still has significant pain in his right heel. He denies any fever or chills. \par \par 10/26/22: Pt states his RLE UNNA boot slid down last night and he has increased weeping from the posterior calf. He denies any fever or chills. \par \par 11/1/22: Pt kept BLE UNNA boots on for the past week. He has weeping from the posterior calves bilaterally.  He still has significant pain in his right heel. He denies any fever or chills. \par \par 11/8/22: Pt kept BLE UNNA boots on for the past week. He has weeping from the left posterior calf still. He still has significant pain in his right heel. He denies any fever or chills. \par \par 11/22/22: Pt kept BLE UNNA boots on for the past week. He no longer has any weeping from the calf. He has bilateral posterior thigh wounds for the past several weeks.  He still has significant pain in his right heel. He denies any fever or chills. \par \par 11/29/22: Pt kept BLE UNNA boots on for the past week. He has weeping from his right posterior calf. He has a right posterior thigh wound that is very painful.  He still has significant pain in his right heel. He denies any fever or chills. \par \par 12/6/22: Pt kept BLE UNNA boots on for the past week. He no longer has any weeping from his legs. He denies any fever or chills. \par \par 12/13/22: Pt kept BLE UNNA boots on for the past week. his RLE UNNA boot rolled down and he has blisters on posterior calf.  He no longer has any weeping from his left leg. He denies any fever or chills. \par \par 12/20/22: Pt kept BLE UNNA boots on for the past week. His RLE  blisters on posterior calf are improving.  He no longer has any weeping from his left leg. He denies any fever or chills. \par \par 12/27/22: Pt kept BLE UNNA boots on for the past week. His RLE  blisters on posterior calf are improving.  He no longer has any weeping from his left leg. He denies any fever or chills. \par \par 1/10/23: Pt kept BLE UNNA boots on for the past two weeks. His RLE  blisters on posterior calf are improving.  He no longer has any weeping from his left leg. He denies any fever or chills. \par \par 1/17/23: Pt kept BLE UNNA boots on for the past week. His RLE  blisters on posterior calf are improving.  He no longer has any weeping from his left leg. He denies any fever or chills. \par \par 1/24/23: Pt kept BLE UNNA boots on for the past week. His RLE  blisters on posterior calf are still present. He has severe pain in his right posterior thigh area. He is on oral abx prescribed by PCP.  He no longer has any weeping from his left leg. He denies any fever or chills.

## 2023-01-24 NOTE — PHYSICAL EXAM
[0] : left 0 [2+] : left 2+ [Ankle Swelling (On Exam)] : present [Ankle Swelling Bilaterally] : severe [] : bilaterally [Ankle Swelling On The Left] : moderate [Varicose Veins Of Lower Extremities] : not present [de-identified] : NAD. Obese.  [FreeTextEntry1] : PT non-palpable due to edema. \par right posterior calf blisters with weeping \par right heel ulcer closed. Tender to palpation \par large right posterior thigh tender mass about 15 cm x 15 cm, erythema, flaking skin, looks like abscess \par

## 2023-01-25 ENCOUNTER — APPOINTMENT (OUTPATIENT)
Dept: PAIN MANAGEMENT | Facility: CLINIC | Age: 68
End: 2023-01-25
Payer: MEDICARE

## 2023-01-25 VITALS — BODY MASS INDEX: 43.16 KG/M2 | WEIGHT: 275 LBS | HEIGHT: 67 IN

## 2023-01-25 DIAGNOSIS — I87.2 VENOUS INSUFFICIENCY (CHRONIC) (PERIPHERAL): ICD-10-CM

## 2023-01-25 PROCEDURE — 99213 OFFICE O/P EST LOW 20 MIN: CPT | Mod: 95

## 2023-01-25 NOTE — DISCUSSION/SUMMARY
[Medication Risks Reviewed] : Medication risks reviewed [de-identified] : Prescriptions renewed. Opioid agreement/obtained on chart [place]  reviewed and appropriate. SOAPP-R completed on chart. The patient's medications are documented to the best of their ability. Quality of life and functional ability improved on medications. The patient is showing no aberrant behavior or evidence of diversion. The patient was advised not to use narcotic medication while operating an automobile or heavy machinery due to potential sedation or dizziness. The patient was educated to the risks associated with potential opioid dependence and addiction. Urine toxicology screens as per office protocol. Use of multimodal analgesia used prn.\par Follow up six weeks.

## 2023-01-25 NOTE — HISTORY OF PRESENT ILLNESS
[Lower back] : lower back [Gradual] : gradual [7] : 7 [Dull/Aching] : dull/aching [Localized] : localized [Stabbing] : stabbing [Constant] : constant [Household chores] : household chores [Work] : work [Sleep] : sleep [Rest] : rest [Meds] : meds [Sitting] : sitting [Standing] : standing [Walking] : walking [Bending forward] : bending forward [Physical therapy] : physical therapy [Disabled] : Work status: disabled [FreeTextEntry1] : Chronic low back and bilateral  leg pain. He continues weekly visits to wound care and  compression wraps  applied. States ambulation is very limited. His legs swell despite treatment. Pain meds helpful.  [] : This patient has had an injection before: no

## 2023-01-25 NOTE — REASON FOR VISIT
[Follow-Up Visit] : a follow-up pain management visit [Home] : at home, [unfilled] , at the time of the visit. [Medical Office: ([facility])___] : at the medical office located in  [Patient] : the patient [Self] : self [FreeTextEntry2] : Back and leg pain

## 2023-01-27 NOTE — H&P PST ADULT - NS MD HP INPLANTS MED DEV
[No Abdominal Bruit] : a ~M bruit was not heard ~T in the abdomen [No Edema] : there was no peripheral edema [Coordination Grossly Intact] : coordination grossly intact [No Focal Deficits] : no focal deficits [Normal Gait] : normal gait [Normal] : affect was normal and insight and judgment were intact [Right Foot Was Examined] : Right foot ~C was examined [Left Foot Was Examined] : left foot ~C was examined [] : left foot [___] : left foot points [unfilled] artificial left hip

## 2023-01-30 ENCOUNTER — APPOINTMENT (OUTPATIENT)
Dept: VASCULAR SURGERY | Facility: CLINIC | Age: 68
End: 2023-01-30
Payer: MEDICARE

## 2023-01-30 VITALS
TEMPERATURE: 97.9 F | RESPIRATION RATE: 16 BRPM | DIASTOLIC BLOOD PRESSURE: 69 MMHG | OXYGEN SATURATION: 100 % | HEIGHT: 67 IN | WEIGHT: 266 LBS | SYSTOLIC BLOOD PRESSURE: 170 MMHG | HEART RATE: 82 BPM | BODY MASS INDEX: 41.75 KG/M2

## 2023-01-30 PROCEDURE — 99213 OFFICE O/P EST LOW 20 MIN: CPT

## 2023-01-30 NOTE — ASSESSMENT
[FreeTextEntry1] : 68 yo male with BLE lymphedema. Pt's weeping from legs has resolved. Pt still has large right posterior thigh abscess but it is now less painful. \par \par Pt counseled on diagnosis of lymphedema and Charcot foot deformity \par BLE UNNA boot applied \par Pt has apt with Dr. Smith on 2/6/23 to address right posterior thigh abscess \par RTC in 1 week for bandage change and to monitor wounds \par \par A total of 20 minutes was spent with patient and coordinating care.\par

## 2023-01-30 NOTE — HISTORY OF PRESENT ILLNESS
[FreeTextEntry1] : 5/17/22: 65 yo male with BLE lymphedema formerly being treated by Dr. Brown. Pt had BL UNNA boot on for the past week. He complains of pain in the right plantar lateral heel area where he has a small ulcer. He feels the area around the wound is tender. He has had this wound for several months. He denies any fever or chills. Pt is on ASA, Eliquis, and Gabapentin. \par \par 5/24/22: Pt kept UNNA boots on the for the past week. He still has severe pain in his right heel. He states he gets a shooting pain in the foot when he puts pressure on the foot to stand up. He tries to stay off the foot as much as possible. He denies any fever or chills. He is compliant with his medication. \par \par 5/31/22: Pt kept UNNA boots on the for the past week. He still has severe pain in his right heel. He states he gets a shooting pain in the foot when he puts pressure on the foot to stand up. He tries to stay off the foot as much as possible. He denies any fever or chills. He is compliant with his medication. \par \par 6/7/22: Pt kept UNNA boots on the for the past week. He still has less pain in his right heel. He states he gets a shooting pain in the foot when he puts pressure on the foot to stand up. He tries to stay off the foot as much as possible. He denies any fever or chills. He is compliant with his medication. \par \par 6/14/22: Pt kept UNNA boots on the for the past week. He still has significant pain in his right heel. He states he gets a shooting pain in the foot when he puts pressure on the foot to stand up. He tries to stay off the foot as much as possible. He denies any fever or chills. He is compliant with his medication. \par \par 7/12/22: Pt had BLE UNNA boots applied 7/5/22. He has an appointment tomorrow for custom shoe fitting and cannot have UNNA boots on. He denies any new symptoms. \par \par 7/26/22: Pt kept ACE wraps on mid calves for the past two weeks and now has significant weeping of his upper calves bilaterally. He denies any fever or chills. He has pain in the BLE. \par \par 8/2/22: Pt kept BLE UNNA boots on for the past week. He still has circumferential weeping ulcers from his mid calves bilaterally. He denies any fever or chills. He has pain in the BLE. \par \par 8/9/22: Pt kept BLE UNNA boots on for the past week. He still has circumferential weeping ulcers from his mid calves bilaterally. He still has significant pain in his right heel. He denies any fever or chills. He has pain in the BLE.\par \par 8/17/22: Pt kept BLE UNNA boots on for the past week. He no longer has circumferential weeping ulcers, they have closed and healed with UNNA boot treatment . He still has significant pain in his right heel. He denies any fever or chills. He has pain in the BLE. \par \par 8/23/22: Pt kept BLE UNNA boots on for the past week. He no longer has circumferential weeping ulcers, they have closed and healed with UNNA boot treatment . He still has significant pain in his right heel. He denies any fever or chills. He has pain in the BLE. \par \par 8/30/22: Pt kept BLE UNNA boots on for the past week. Pt has gotten his custom fitted shoes and started to wear them daily. He still has significant pain in his right heel. He denies any fever or chills. He has pain in the BLE. \par \par 9/6/22: Pt kept BLE UNNA boots on for the past week. Pt has gotten his custom fitted shoes and started to wear them daily. He still has significant pain in his right heel. He denies any fever or chills. He has pain in the BLE. \par \par 9/13/22: Pt kept BLE UNNA boots on for the past week. Pt picked up his custom fitted shoes and started to wear them daily. He still has significant pain in his right heel. He denies any fever or chills. \par \par 9/20/22: Pt kept BLE UNNA boots on for the past week. Pt picked up his custom fitted shoes and started to wear them daily. He still has significant pain in his right heel. He denies any fever or chills. \par \par 10/4/22: Pt kept BLE UNNA boots on for the past two weeks. The bandages slid down to mid calf and he has been having severe weeping from his right leg.  He still has significant pain in his right heel. He denies any fever or chills. \par \par 10/10/22: Pt kept BLE UNNA boots on for the past week. He has weeping from his right posterior calf.  He still has significant pain in his right heel. He denies any fever or chills. \par \par 10/18/22: Pt kept BLE UNNA boots on for the past week. He has weeping from his right posterior calf.  He still has significant pain in his right heel. He denies any fever or chills. \par \par 10/25/22: Pt kept BLE UNNA boots on for the past week. He no longer has any weeping from the calves.  He still has significant pain in his right heel. He denies any fever or chills. \par \par 10/26/22: Pt states his RLE UNNA boot slid down last night and he has increased weeping from the posterior calf. He denies any fever or chills. \par \par 11/1/22: Pt kept BLE UNNA boots on for the past week. He has weeping from the posterior calves bilaterally.  He still has significant pain in his right heel. He denies any fever or chills. \par \par 11/8/22: Pt kept BLE UNNA boots on for the past week. He has weeping from the left posterior calf still. He still has significant pain in his right heel. He denies any fever or chills. \par \par 11/22/22: Pt kept BLE UNNA boots on for the past week. He no longer has any weeping from the calf. He has bilateral posterior thigh wounds for the past several weeks.  He still has significant pain in his right heel. He denies any fever or chills. \par \par 11/29/22: Pt kept BLE UNNA boots on for the past week. He has weeping from his right posterior calf. He has a right posterior thigh wound that is very painful.  He still has significant pain in his right heel. He denies any fever or chills. \par \par 12/6/22: Pt kept BLE UNNA boots on for the past week. He no longer has any weeping from his legs. He denies any fever or chills. \par \par 12/13/22: Pt kept BLE UNNA boots on for the past week. his RLE UNNA boot rolled down and he has blisters on posterior calf.  He no longer has any weeping from his left leg. He denies any fever or chills. \par \par 12/20/22: Pt kept BLE UNNA boots on for the past week. His RLE  blisters on posterior calf are improving.  He no longer has any weeping from his left leg. He denies any fever or chills. \par \par 12/27/22: Pt kept BLE UNNA boots on for the past week. His RLE  blisters on posterior calf are improving.  He no longer has any weeping from his left leg. He denies any fever or chills. \par \par 1/10/23: Pt kept BLE UNNA boots on for the past two weeks. His RLE  blisters on posterior calf are improving.  He no longer has any weeping from his left leg. He denies any fever or chills. \par \par 1/17/23: Pt kept BLE UNNA boots on for the past week. His RLE  blisters on posterior calf are improving.  He no longer has any weeping from his left leg. He denies any fever or chills. \par \par 1/24/23: Pt kept BLE UNNA boots on for the past week. His RLE  blisters on posterior calf are still present. He has severe pain in his right posterior thigh area. He is on oral abx prescribed by PCP.  He no longer has any weeping from his left leg. He denies any fever or chills. \par \par 1/30/23: Pt kept BLE UNNA boots on for the past week. His RLE  blisters have healed. He has less pain in his right posterior thigh area. He is on oral abx. He no longer has any weeping from his left leg. He denies any fever or chills.

## 2023-01-30 NOTE — PHYSICAL EXAM
[0] : left 0 [2+] : left 2+ [Ankle Swelling (On Exam)] : present [Ankle Swelling Bilaterally] : severe [] : bilaterally [Ankle Swelling On The Left] : moderate [Varicose Veins Of Lower Extremities] : not present [de-identified] : NAD. Obese.  [FreeTextEntry1] : PT non-palpable due to edema. \par right posterior calf blisters with weeping \par right heel ulcer closed. Tender to palpation \par large right posterior thigh tender mass about 15 cm x 15 cm, erythema, flaking skin, looks like abscess \par

## 2023-02-06 ENCOUNTER — APPOINTMENT (OUTPATIENT)
Dept: VASCULAR SURGERY | Facility: CLINIC | Age: 68
End: 2023-02-06
Payer: MEDICARE

## 2023-02-06 ENCOUNTER — EMERGENCY (EMERGENCY)
Facility: HOSPITAL | Age: 68
LOS: 1 days | Discharge: HOME | End: 2023-02-06
Attending: STUDENT IN AN ORGANIZED HEALTH CARE EDUCATION/TRAINING PROGRAM
Payer: COMMERCIAL

## 2023-02-06 ENCOUNTER — APPOINTMENT (OUTPATIENT)
Dept: SURGERY | Facility: CLINIC | Age: 68
End: 2023-02-06
Payer: MEDICARE

## 2023-02-06 VITALS
OXYGEN SATURATION: 97 % | TEMPERATURE: 97.1 F | SYSTOLIC BLOOD PRESSURE: 122 MMHG | RESPIRATION RATE: 14 BRPM | BODY MASS INDEX: 43.32 KG/M2 | HEART RATE: 81 BPM | DIASTOLIC BLOOD PRESSURE: 65 MMHG | HEIGHT: 67 IN | WEIGHT: 276 LBS

## 2023-02-06 VITALS
HEIGHT: 68 IN | RESPIRATION RATE: 18 BRPM | WEIGHT: 270.07 LBS | SYSTOLIC BLOOD PRESSURE: 161 MMHG | TEMPERATURE: 98 F | DIASTOLIC BLOOD PRESSURE: 81 MMHG | OXYGEN SATURATION: 99 % | HEART RATE: 73 BPM

## 2023-02-06 DIAGNOSIS — Z96.642 PRESENCE OF LEFT ARTIFICIAL HIP JOINT: Chronic | ICD-10-CM

## 2023-02-06 DIAGNOSIS — H26.9 UNSPECIFIED CATARACT: Chronic | ICD-10-CM

## 2023-02-06 DIAGNOSIS — Z95.828 PRESENCE OF OTHER VASCULAR IMPLANTS AND GRAFTS: Chronic | ICD-10-CM

## 2023-02-06 LAB
ALBUMIN SERPL ELPH-MCNC: 3.4 G/DL — SIGNIFICANT CHANGE UP (ref 3.3–5.2)
ALP SERPL-CCNC: 102 U/L — SIGNIFICANT CHANGE UP (ref 40–120)
ALT FLD-CCNC: 19 U/L — SIGNIFICANT CHANGE UP
ANION GAP SERPL CALC-SCNC: 11 MMOL/L — SIGNIFICANT CHANGE UP (ref 5–17)
APTT BLD: 27.3 SEC — LOW (ref 27.5–35.5)
AST SERPL-CCNC: 29 U/L — SIGNIFICANT CHANGE UP
BASOPHILS # BLD AUTO: 0.1 K/UL — SIGNIFICANT CHANGE UP (ref 0–0.2)
BASOPHILS NFR BLD AUTO: 1.6 % — SIGNIFICANT CHANGE UP (ref 0–2)
BILIRUB SERPL-MCNC: 0.4 MG/DL — SIGNIFICANT CHANGE UP (ref 0.4–2)
BUN SERPL-MCNC: 15.7 MG/DL — SIGNIFICANT CHANGE UP (ref 8–20)
CALCIUM SERPL-MCNC: 8.6 MG/DL — SIGNIFICANT CHANGE UP (ref 8.4–10.5)
CHLORIDE SERPL-SCNC: 101 MMOL/L — SIGNIFICANT CHANGE UP (ref 96–108)
CK MB CFR SERPL CALC: 6.5 NG/ML — SIGNIFICANT CHANGE UP (ref 0–6.7)
CK SERPL-CCNC: 250 U/L — HIGH (ref 30–200)
CO2 SERPL-SCNC: 25 MMOL/L — SIGNIFICANT CHANGE UP (ref 22–29)
CREAT SERPL-MCNC: 0.89 MG/DL — SIGNIFICANT CHANGE UP (ref 0.5–1.3)
EGFR: 93 ML/MIN/1.73M2 — SIGNIFICANT CHANGE UP
EOSINOPHIL # BLD AUTO: 0.11 K/UL — SIGNIFICANT CHANGE UP (ref 0–0.5)
EOSINOPHIL NFR BLD AUTO: 1.7 % — SIGNIFICANT CHANGE UP (ref 0–6)
FLUAV AG NPH QL: SIGNIFICANT CHANGE UP
FLUBV AG NPH QL: SIGNIFICANT CHANGE UP
GLUCOSE SERPL-MCNC: 77 MG/DL — SIGNIFICANT CHANGE UP (ref 70–99)
HCT VFR BLD CALC: 35.9 % — LOW (ref 39–50)
HGB BLD-MCNC: 11 G/DL — LOW (ref 13–17)
IMM GRANULOCYTES NFR BLD AUTO: 0.2 % — SIGNIFICANT CHANGE UP (ref 0–0.9)
INR BLD: 1.26 RATIO — HIGH (ref 0.88–1.16)
LYMPHOCYTES # BLD AUTO: 2.09 K/UL — SIGNIFICANT CHANGE UP (ref 1–3.3)
LYMPHOCYTES # BLD AUTO: 33.2 % — SIGNIFICANT CHANGE UP (ref 13–44)
MCHC RBC-ENTMCNC: 25.2 PG — LOW (ref 27–34)
MCHC RBC-ENTMCNC: 30.6 GM/DL — LOW (ref 32–36)
MCV RBC AUTO: 82.3 FL — SIGNIFICANT CHANGE UP (ref 80–100)
MONOCYTES # BLD AUTO: 0.47 K/UL — SIGNIFICANT CHANGE UP (ref 0–0.9)
MONOCYTES NFR BLD AUTO: 7.5 % — SIGNIFICANT CHANGE UP (ref 2–14)
NEUTROPHILS # BLD AUTO: 3.51 K/UL — SIGNIFICANT CHANGE UP (ref 1.8–7.4)
NEUTROPHILS NFR BLD AUTO: 55.8 % — SIGNIFICANT CHANGE UP (ref 43–77)
PLATELET # BLD AUTO: 182 K/UL — SIGNIFICANT CHANGE UP (ref 150–400)
POTASSIUM SERPL-MCNC: 5.4 MMOL/L — HIGH (ref 3.5–5.3)
POTASSIUM SERPL-SCNC: 5.4 MMOL/L — HIGH (ref 3.5–5.3)
PROT SERPL-MCNC: 8.1 G/DL — SIGNIFICANT CHANGE UP (ref 6.6–8.7)
PROTHROM AB SERPL-ACNC: 14.6 SEC — HIGH (ref 10.5–13.4)
RBC # BLD: 4.36 M/UL — SIGNIFICANT CHANGE UP (ref 4.2–5.8)
RBC # FLD: 15.9 % — HIGH (ref 10.3–14.5)
RSV RNA NPH QL NAA+NON-PROBE: SIGNIFICANT CHANGE UP
SARS-COV-2 RNA SPEC QL NAA+PROBE: SIGNIFICANT CHANGE UP
SODIUM SERPL-SCNC: 136 MMOL/L — SIGNIFICANT CHANGE UP (ref 135–145)
WBC # BLD: 6.29 K/UL — SIGNIFICANT CHANGE UP (ref 3.8–10.5)
WBC # FLD AUTO: 6.29 K/UL — SIGNIFICANT CHANGE UP (ref 3.8–10.5)

## 2023-02-06 PROCEDURE — 99213 OFFICE O/P EST LOW 20 MIN: CPT

## 2023-02-06 PROCEDURE — 72170 X-RAY EXAM OF PELVIS: CPT | Mod: 26

## 2023-02-06 PROCEDURE — 73701 CT LOWER EXTREMITY W/DYE: CPT | Mod: 26,LT,MA

## 2023-02-06 PROCEDURE — 71045 X-RAY EXAM CHEST 1 VIEW: CPT | Mod: 26

## 2023-02-06 PROCEDURE — 73562 X-RAY EXAM OF KNEE 3: CPT | Mod: 26,50

## 2023-02-06 PROCEDURE — 99223 1ST HOSP IP/OBS HIGH 75: CPT

## 2023-02-06 RX ORDER — GABAPENTIN 400 MG/1
600 CAPSULE ORAL
Refills: 0 | Status: DISCONTINUED | OUTPATIENT
Start: 2023-02-06 | End: 2023-02-14

## 2023-02-06 RX ORDER — METHADONE HYDROCHLORIDE 40 MG/1
10 TABLET ORAL
Refills: 0 | Status: COMPLETED | OUTPATIENT
Start: 2023-02-06 | End: 2023-02-13

## 2023-02-06 RX ADMIN — METHADONE HYDROCHLORIDE 10 MILLIGRAM(S): 40 TABLET ORAL at 23:01

## 2023-02-06 RX ADMIN — GABAPENTIN 600 MILLIGRAM(S): 400 CAPSULE ORAL at 23:01

## 2023-02-06 NOTE — ED PROVIDER NOTE - PHYSICAL EXAMINATION
Gen: AAOx3, NAD, well nourished  HEENT: Normocephalic atraumatic. EOMI. No scleral icterus. Moist mucus membranes.  CV: RRR. Audible S1 and S2. No murmurs. 2+ radial and PT pulses   Pulm: Clear to auscultation bilaterally. No wheezes, rales, or rhonchi. No accessory muscle use or respiratory distress.  Abdomen: soft, normoactive BS, non distended, nontender, no rebound, no guarding  Musculoskeletal:  Moving all extremities equally. No gross deformity. No tenderness to palpation.  Skin: 2x4.5cm superficial abscess with flutuance to posterior medial R thigh, inferior to gluteal fold with surrounding induration and woody discoloration.   Neurologic: No focal neurological deficits. CN II-XII grossly intact.  : no CVA tenderness  Psych: Appropriate mood and affect. Cooperative. Gen: AAOx3, NAD, well nourished  HEENT: Normocephalic atraumatic. EOMI. No scleral icterus. Moist mucus membranes.  CV: RRR. Audible S1 and S2. No murmurs. 2+ radial and PT pulses   Pulm: Clear to auscultation bilaterally. No wheezes, rales, or rhonchi. No accessory muscle use or respiratory distress.  Abdomen: soft, normoactive BS, non distended, nontender, no rebound, no guarding  Musculoskeletal:  Moving all extremities equally. No gross deformity. No tenderness to palpation.  Skin: 2x4.5cm superficial abscess with fluctuance to posterior medial R thigh, inferior to gluteal fold with surrounding induration and woody discoloration.   Neurologic: No focal neurological deficits. CN II-XII grossly intact.  : no CVA tenderness  Psych: Appropriate mood and affect. Cooperative.

## 2023-02-06 NOTE — ED CDU PROVIDER INITIAL DAY NOTE - PHYSICAL EXAMINATION
Gen: AAOx3, NAD, well nourished  HEENT: Normocephalic atraumatic. EOMI. No scleral icterus. Moist mucus membranes.  CV: RRR. Audible S1 and S2. No murmurs. 2+ radial and PT pulses   Pulm: Clear to auscultation bilaterally. No wheezes, rales, or rhonchi. No accessory muscle use or respiratory distress.  Abdomen: soft, normoactive BS, non distended, nontender, no rebound, no guarding  Musculoskeletal:  Moving all extremities equally. No gross deformity. No tenderness to palpation.  Skin: 2x4.5cm superficial abscess with flutuance to posterior medial R thigh, inferior to gluteal fold with surrounding induration and woody discoloration.   Neurologic: No focal neurological deficits. CN II-XII grossly intact.  : no CVA tenderness  Psych: Appropriate mood and affect. Cooperative.

## 2023-02-06 NOTE — PHYSICAL EXAM
[JVD] : no jugular venous distention  [Normal Breath Sounds] : Normal breath sounds [Normal Heart Sounds] : normal heart sounds [Normal Rate and Rhythm] : normal rate and rhythm [No Rash or Lesion] : No rash or lesion [Alert] : alert [Oriented to Person] : oriented to person [Oriented to Place] : oriented to place [Oriented to Time] : oriented to time [Calm] : calm [de-identified] : Well-appearing, no acute distress [de-identified] : SANAZ [de-identified] : Soft, nondistended, nontender, obese [de-identified] : No CVA tenderness [de-identified] : Right posterior thigh abscess, 6 cm diameter of erythema

## 2023-02-06 NOTE — ED ADULT NURSE NOTE - NSIMPLEMENTINTERV_GEN_ALL_ED
Implemented All Fall Risk Interventions:  [place] to call system. Call bell, personal items and telephone within reach. Instruct patient to call for assistance. Room bathroom lighting operational. Non-slip footwear when patient is off stretcher. Physically safe environment: no spills, clutter or unnecessary equipment. Stretcher in lowest position, wheels locked, appropriate side rails in place. Provide visual cue, wrist band, yellow gown, etc. Monitor gait and stability. Monitor for mental status changes and reorient to person, place, and time. Review medications for side effects contributing to fall risk. Reinforce activity limits and safety measures with patient and family.

## 2023-02-06 NOTE — REVIEW OF SYSTEMS
[Fever] : no fever [Chills] : no chills [Eye Pain] : no eye pain [Red Eyes] : eyes not red [Earache] : no earache [Loss Of Hearing] : no hearing loss [Heart Rate Is Slow] : the heart rate was not slow [Heart Rate Is Fast] : the heart rate was not fast [Chest Pain] : no chest pain [Palpitations] : no palpitations [Shortness Of Breath] : no shortness of breath [Wheezing] : no wheezing [Cough] : no cough [Abdominal Pain] : no abdominal pain [Vomiting] : no vomiting [Constipation] : no constipation [Diarrhea] : no diarrhea [Dysuria] : no dysuria [Incontinence] : no incontinence [Joint Swelling] : no joint swelling [Joint Stiffness] : no joint stiffness [Skin Lesions] : no skin lesions [Skin Wound] : no skin wound [Confused] : no confusion [Convulsions] : no convulsions [Suicidal] : not suicidal [Sleep Disturbances] : no sleep disturbances [Proptosis] : no proptosis [Hot Flashes] : no hot flashes [Easy Bleeding] : no tendency for easy bleeding [Easy Bruising] : no tendency for easy bruising [de-identified] : Right posterior thigh abscess

## 2023-02-06 NOTE — ED PROVIDER NOTE - OBJECTIVE STATEMENT
Patient is a 66yo M with PMHx of DM, ankylosing spondylitis who presents to the ED complaining of abscess. Patient has an abscess on his R buttock for the past 2-3 months. Not painful, just itchy. No fevers, chills, nausea, vomiting, abd pain, headaches, or difficulty with urination. Patient also reports R leg weakness for the past few months and says he is looking to go into a rehab.

## 2023-02-06 NOTE — HISTORY OF PRESENT ILLNESS
[FreeTextEntry1] : 5/17/22: 67 yo male with BLE lymphedema formerly being treated by Dr. Brown. Pt had BL UNNA boot on for the past week. He complains of pain in the right plantar lateral heel area where he has a small ulcer. He feels the area around the wound is tender. He has had this wound for several months. He denies any fever or chills. Pt is on ASA, Eliquis, and Gabapentin. \par \par 5/24/22: Pt kept UNNA boots on the for the past week. He still has severe pain in his right heel. He states he gets a shooting pain in the foot when he puts pressure on the foot to stand up. He tries to stay off the foot as much as possible. He denies any fever or chills. He is compliant with his medication. \par \par 5/31/22: Pt kept UNNA boots on the for the past week. He still has severe pain in his right heel. He states he gets a shooting pain in the foot when he puts pressure on the foot to stand up. He tries to stay off the foot as much as possible. He denies any fever or chills. He is compliant with his medication. \par \par 6/7/22: Pt kept UNNA boots on the for the past week. He still has less pain in his right heel. He states he gets a shooting pain in the foot when he puts pressure on the foot to stand up. He tries to stay off the foot as much as possible. He denies any fever or chills. He is compliant with his medication. \par \par 6/14/22: Pt kept UNNA boots on the for the past week. He still has significant pain in his right heel. He states he gets a shooting pain in the foot when he puts pressure on the foot to stand up. He tries to stay off the foot as much as possible. He denies any fever or chills. He is compliant with his medication. \par \par 7/12/22: Pt had BLE UNNA boots applied 7/5/22. He has an appointment tomorrow for custom shoe fitting and cannot have UNNA boots on. He denies any new symptoms. \par \par 7/26/22: Pt kept ACE wraps on mid calves for the past two weeks and now has significant weeping of his upper calves bilaterally. He denies any fever or chills. He has pain in the BLE. \par \par 8/2/22: Pt kept BLE UNNA boots on for the past week. He still has circumferential weeping ulcers from his mid calves bilaterally. He denies any fever or chills. He has pain in the BLE. \par \par 8/9/22: Pt kept BLE UNNA boots on for the past week. He still has circumferential weeping ulcers from his mid calves bilaterally. He still has significant pain in his right heel. He denies any fever or chills. He has pain in the BLE.\par \par 8/17/22: Pt kept BLE UNNA boots on for the past week. He no longer has circumferential weeping ulcers, they have closed and healed with UNNA boot treatment . He still has significant pain in his right heel. He denies any fever or chills. He has pain in the BLE. \par \par 8/23/22: Pt kept BLE UNNA boots on for the past week. He no longer has circumferential weeping ulcers, they have closed and healed with UNNA boot treatment . He still has significant pain in his right heel. He denies any fever or chills. He has pain in the BLE. \par \par 8/30/22: Pt kept BLE UNNA boots on for the past week. Pt has gotten his custom fitted shoes and started to wear them daily. He still has significant pain in his right heel. He denies any fever or chills. He has pain in the BLE. \par \par 9/6/22: Pt kept BLE UNNA boots on for the past week. Pt has gotten his custom fitted shoes and started to wear them daily. He still has significant pain in his right heel. He denies any fever or chills. He has pain in the BLE. \par \par 9/13/22: Pt kept BLE UNNA boots on for the past week. Pt picked up his custom fitted shoes and started to wear them daily. He still has significant pain in his right heel. He denies any fever or chills. \par \par 9/20/22: Pt kept BLE UNNA boots on for the past week. Pt picked up his custom fitted shoes and started to wear them daily. He still has significant pain in his right heel. He denies any fever or chills. \par \par 10/4/22: Pt kept BLE UNNA boots on for the past two weeks. The bandages slid down to mid calf and he has been having severe weeping from his right leg.  He still has significant pain in his right heel. He denies any fever or chills. \par \par 10/10/22: Pt kept BLE UNNA boots on for the past week. He has weeping from his right posterior calf.  He still has significant pain in his right heel. He denies any fever or chills. \par \par 10/18/22: Pt kept BLE UNNA boots on for the past week. He has weeping from his right posterior calf.  He still has significant pain in his right heel. He denies any fever or chills. \par \par 10/25/22: Pt kept BLE UNNA boots on for the past week. He no longer has any weeping from the calves.  He still has significant pain in his right heel. He denies any fever or chills. \par \par 10/26/22: Pt states his RLE UNNA boot slid down last night and he has increased weeping from the posterior calf. He denies any fever or chills. \par \par 11/1/22: Pt kept BLE UNNA boots on for the past week. He has weeping from the posterior calves bilaterally.  He still has significant pain in his right heel. He denies any fever or chills. \par \par 11/8/22: Pt kept BLE UNNA boots on for the past week. He has weeping from the left posterior calf still. He still has significant pain in his right heel. He denies any fever or chills. \par \par 11/22/22: Pt kept BLE UNNA boots on for the past week. He no longer has any weeping from the calf. He has bilateral posterior thigh wounds for the past several weeks.  He still has significant pain in his right heel. He denies any fever or chills. \par \par 11/29/22: Pt kept BLE UNNA boots on for the past week. He has weeping from his right posterior calf. He has a right posterior thigh wound that is very painful.  He still has significant pain in his right heel. He denies any fever or chills. \par \par 12/6/22: Pt kept BLE UNNA boots on for the past week. He no longer has any weeping from his legs. He denies any fever or chills. \par \par 12/13/22: Pt kept BLE UNNA boots on for the past week. his RLE UNNA boot rolled down and he has blisters on posterior calf.  He no longer has any weeping from his left leg. He denies any fever or chills. \par \par 12/20/22: Pt kept BLE UNNA boots on for the past week. His RLE  blisters on posterior calf are improving.  He no longer has any weeping from his left leg. He denies any fever or chills. \par \par 12/27/22: Pt kept BLE UNNA boots on for the past week. His RLE  blisters on posterior calf are improving.  He no longer has any weeping from his left leg. He denies any fever or chills. \par \par 1/10/23: Pt kept BLE UNNA boots on for the past two weeks. His RLE  blisters on posterior calf are improving.  He no longer has any weeping from his left leg. He denies any fever or chills. \par \par 1/17/23: Pt kept BLE UNNA boots on for the past week. His RLE  blisters on posterior calf are improving.  He no longer has any weeping from his left leg. He denies any fever or chills. \par \par 1/24/23: Pt kept BLE UNNA boots on for the past week. His RLE  blisters on posterior calf are still present. He has severe pain in his right posterior thigh area. He is on oral abx prescribed by PCP.  He no longer has any weeping from his left leg. He denies any fever or chills. \par \par 1/30/23: Pt kept BLE UNNA boots on for the past week. His RLE  blisters have healed. He has less pain in his right posterior thigh area. He is on oral abx. He no longer has any weeping from his left leg. He denies any fever or chills. \par \par 2/6/23: Pt kept BLE UNNA boots on for the past week. His RLE  blisters have healed. He still has pain in his right posterior thigh area. He no longer has any weeping from his left leg. He denies any fever or chills.

## 2023-02-06 NOTE — ASSESSMENT
[FreeTextEntry1] : 66 yo male with BLE lymphedema. Pt's weeping from legs has resolved. Pt still has large right posterior thigh abscess \par \par Pt counseled on diagnosis of lymphedema, Charcot foot deformity, and posterior thigh abscess. \par BLE UNNA boot applied \par Right posterior thigh abscess no improvement on abx. Pt sent to ED for incision and drainage of abscess, possible wound vac. \par \par A total of 20 minutes was spent with patient and coordinating care.\par

## 2023-02-06 NOTE — HISTORY OF PRESENT ILLNESS
[de-identified] : 67-year-old male with a history of chronic venous insufficiency, ankylosing spondylitis, and morbid obesity who I previously saw for evaluation for bariatric surgery who has now presented with a right posterior thigh abscess.  The patient reports being treated with antibiotics by his PCP that have been unsuccessful.  Denies fever/chills.  Reports that the area is worsening

## 2023-02-06 NOTE — ED ADULT NURSE NOTE - NSICDXPASTMEDICALHX_GEN_ALL_CORE_FT
PAST MEDICAL HISTORY:  Anemia iron def treats with DR. Dcikey    Ankylosing spondylitis of site in spine     Back pain pain management in past    Cellulitis, leg bilateral    Chronic pain disorder     Chronic venous stasis dermatitis     Deep vein thrombosis (DVT) 25 years ago  pt had THR was on coumadin for 6 months  also noted in Nov 2019 on sono    Diabetes     Diabetic neuropathy     Diabetic ulcer of right foot treats weekly with vascular doc dressing changed and uni boot    External iliac artery occlusion     Former smoker     Ischemic ulcer diabetic foot left toe    Lupus     Obesity     Peripheral vascular disease

## 2023-02-06 NOTE — ED PROVIDER NOTE - ATTENDING CONTRIBUTION TO CARE
66yo M with PMHx of DM, ankylosing spondylitis who presents to the ED complaining of abscess over rt buttock x months; denies fever, chills, nausea or vomting;  pe awake alert heent ncat neck supple cor s1s2 lung clear abd soft nontender neuro nonfocal  skin  rt posterior thigh  2 x 4.5cm area fluctuance;  induration   dx thigh wound eval abscess;/hematoma; needle aspiration;   observation for abx,  s

## 2023-02-06 NOTE — ASSESSMENT
[FreeTextEntry1] : 67-year-old male with numerous medical comorbidities were previously saw for evaluation for bariatric surgery who now presents for a right posterior thigh abscess.  Due to the patient's chronic lymphedema, any incision in this area will be very slow to heal.  I recommend presenting to the ED for proper drainage and possible VAC placement

## 2023-02-06 NOTE — ED CDU PROVIDER INITIAL DAY NOTE - ATTENDING APP SHARED VISIT CONTRIBUTION OF CARE
AJM: Pt with chronic wound to posterior leg now with difficulty walking. Seen by outpatient surgery. will obtain ct leg, surg consult, PT eval if no intervention needed.

## 2023-02-06 NOTE — PHYSICAL EXAM
[0] : left 0 [2+] : left 2+ [Ankle Swelling (On Exam)] : present [Ankle Swelling Bilaterally] : severe [] : bilaterally [Ankle Swelling On The Left] : moderate [Varicose Veins Of Lower Extremities] : not present [de-identified] : NAD. Obese.  [FreeTextEntry1] : PT non-palpable due to edema. \par right posterior calf blisters with weeping \par right heel ulcer closed. Tender to palpation \par large right posterior thigh tender mass about 15 cm x 15 cm, erythema, flaking skin, looks like abscess \par

## 2023-02-06 NOTE — ED PROVIDER NOTE - NS ED ROS FT
General: No fever, chills.  Respiratory: No SOB  Cardiac: No chest pain  GI: No abdominal pain, nausea, vomiting  Neuro: No headache  MSK: see hpi  Psych: No known mental health issues.  Endocrine: No heat/cold intolerance, no polyuria/polydipsia.  Heme: No easy bruising or bleeding.  Allergic: No pruritis, dermatitis, or environmental allergies.

## 2023-02-06 NOTE — ED PROVIDER NOTE - CLINICAL SUMMARY MEDICAL DECISION MAKING FREE TEXT BOX
Patient is a 68yo M with PMHx of DM, ankylosing spondylitis who presents to the ED complaining of abscess. Patient is a 66yo M with PMHx of DM, ankylosing spondylitis who presents to the ED complaining of abscess and inability to walk.

## 2023-02-06 NOTE — ED CDU PROVIDER INITIAL DAY NOTE - WR ORDER ID 1
Problem: NICU 36+ weeks: Day of Life 5 to Discharge  Goal: Off Pathway (Use only if patient is Off Pathway)  Outcome: Progressing Towards Goal  Goal: Activity/Safety  Outcome: Progressing Towards Goal  Goal: Consults, if ordered  Outcome: Progressing Towards Goal  Goal: Diagnostic Test/Procedures  Outcome: Progressing Towards Goal  Goal: Nutrition/Diet  Outcome: Progressing Towards Goal  Goal: Medications  Outcome: Progressing Towards Goal  Goal: Respiratory  Outcome: Progressing Towards Goal  Goal: Treatments/Interventions/Procedures  Outcome: Progressing Towards Goal  Goal: *Absence of infection signs and symptoms  Outcome: Progressing Towards Goal  Goal: *Demonstrates behavior appropriate to gestational age  Outcome: Progressing Towards Goal  Goal: *Family participates in care and asks appropriate questions  Outcome: Progressing Towards Goal  Goal: *Body weight gain 10-15 gm/kg/day  Outcome: Progressing Towards Goal  Goal: *Oxygen saturation within defined limits  Outcome: Progressing Towards Goal  Goal: *Tolerating diet  Outcome: Progressing Towards Goal  Goal: *Labs within defined limits  Outcome: Progressing Towards Goal [id number]

## 2023-02-06 NOTE — ED CDU PROVIDER INITIAL DAY NOTE - DETAILS
Pt with wound to back of leg was placed in obs for dc planing and continued eval of found, found to have been seen by Gen Lenny cleary pt [facility] resident called to eval pt at time of intake to OBS.

## 2023-02-07 LAB
GLUCOSE BLDC GLUCOMTR-MCNC: 105 MG/DL — HIGH (ref 70–99)
GLUCOSE BLDC GLUCOMTR-MCNC: 114 MG/DL — HIGH (ref 70–99)
GLUCOSE BLDC GLUCOMTR-MCNC: 165 MG/DL — HIGH (ref 70–99)
GLUCOSE BLDC GLUCOMTR-MCNC: 85 MG/DL — SIGNIFICANT CHANGE UP (ref 70–99)

## 2023-02-07 PROCEDURE — 99232 SBSQ HOSP IP/OBS MODERATE 35: CPT

## 2023-02-07 RX ORDER — DEXTROSE 50 % IN WATER 50 %
25 SYRINGE (ML) INTRAVENOUS ONCE
Refills: 0 | Status: DISCONTINUED | OUTPATIENT
Start: 2023-02-07 | End: 2023-02-14

## 2023-02-07 RX ORDER — LIDOCAINE HYDROCHLORIDE AND EPINEPHRINE 10; 10 MG/ML; UG/ML
20 INJECTION, SOLUTION INFILTRATION; PERINEURAL ONCE
Refills: 0 | Status: COMPLETED | OUTPATIENT
Start: 2023-02-07 | End: 2023-02-07

## 2023-02-07 RX ORDER — GLUCAGON INJECTION, SOLUTION 0.5 MG/.1ML
1 INJECTION, SOLUTION SUBCUTANEOUS ONCE
Refills: 0 | Status: DISCONTINUED | OUTPATIENT
Start: 2023-02-07 | End: 2023-02-14

## 2023-02-07 RX ORDER — DEXTROSE 50 % IN WATER 50 %
12.5 SYRINGE (ML) INTRAVENOUS ONCE
Refills: 0 | Status: DISCONTINUED | OUTPATIENT
Start: 2023-02-07 | End: 2023-02-14

## 2023-02-07 RX ORDER — ASPIRIN/CALCIUM CARB/MAGNESIUM 324 MG
81 TABLET ORAL DAILY
Refills: 0 | Status: DISCONTINUED | OUTPATIENT
Start: 2023-02-07 | End: 2023-02-14

## 2023-02-07 RX ORDER — DEXTROSE 50 % IN WATER 50 %
15 SYRINGE (ML) INTRAVENOUS ONCE
Refills: 0 | Status: DISCONTINUED | OUTPATIENT
Start: 2023-02-07 | End: 2023-02-14

## 2023-02-07 RX ORDER — SODIUM CHLORIDE 9 MG/ML
1000 INJECTION, SOLUTION INTRAVENOUS
Refills: 0 | Status: DISCONTINUED | OUTPATIENT
Start: 2023-02-07 | End: 2023-02-14

## 2023-02-07 RX ORDER — INSULIN LISPRO 100/ML
VIAL (ML) SUBCUTANEOUS
Refills: 0 | Status: DISCONTINUED | OUTPATIENT
Start: 2023-02-07 | End: 2023-02-14

## 2023-02-07 RX ADMIN — GABAPENTIN 600 MILLIGRAM(S): 400 CAPSULE ORAL at 17:34

## 2023-02-07 RX ADMIN — METHADONE HYDROCHLORIDE 10 MILLIGRAM(S): 40 TABLET ORAL at 05:24

## 2023-02-07 RX ADMIN — Medication 81 MILLIGRAM(S): at 13:37

## 2023-02-07 RX ADMIN — LIDOCAINE HYDROCHLORIDE AND EPINEPHRINE 20 MILLILITER(S): 10; 10 INJECTION, SOLUTION INFILTRATION; PERINEURAL at 01:22

## 2023-02-07 RX ADMIN — Medication 100 MILLIGRAM(S): at 17:34

## 2023-02-07 RX ADMIN — METHADONE HYDROCHLORIDE 10 MILLIGRAM(S): 40 TABLET ORAL at 13:37

## 2023-02-07 RX ADMIN — GABAPENTIN 600 MILLIGRAM(S): 400 CAPSULE ORAL at 05:24

## 2023-02-07 RX ADMIN — GABAPENTIN 600 MILLIGRAM(S): 400 CAPSULE ORAL at 23:00

## 2023-02-07 RX ADMIN — METHADONE HYDROCHLORIDE 10 MILLIGRAM(S): 40 TABLET ORAL at 20:36

## 2023-02-07 RX ADMIN — Medication 2: at 17:34

## 2023-02-07 RX ADMIN — Medication 100 MILLIGRAM(S): at 05:23

## 2023-02-07 RX ADMIN — GABAPENTIN 600 MILLIGRAM(S): 400 CAPSULE ORAL at 13:36

## 2023-02-07 NOTE — CONSULT NOTE ADULT - ASSESSMENT
68 yo M with 2 months of R buttock/upper thigh lession concerning for abcess especially on diabetic patient.  Patient evaluated in ED noted to have fluctuant area and agreeable for I&D. After drainage no evidence of pus was found and cultures were taken.  Started on Doxy by the ED.  Patient is currently in Obs in the ED.  Will re-assess in the morning and evaluate dressing change.   Monitor WBC  F/U Culture from the wound

## 2023-02-07 NOTE — CHART NOTE - NSCHARTNOTEFT_GEN_A_CORE
SW Note: Per P/T brown, recc for pt is CARLY to regain strength and mobility. Worker met with pt at bedside. Pt A&Ox4, pleasant and cooperative, stating "do what you gotta do, I just want to be able to walk". Worker inquired if he would like pts partner on f/s (Madie) to be involved. Pt reports that he will handle all questions and concerns regarding placement. Pt in agreement with placement w/n 10 mile radius of [place]. Once BENJY is created it will be circulated for facilities in above mentioned geographical range. Will need auth ([facility]). SW continues to follow.

## 2023-02-07 NOTE — CHART NOTE - NSCHARTNOTEFT_GEN_A_CORE
Pt re evaluated at bedside by myself and resident Ju. Comfortable, no complaints.     Physical Exam:  Constitutional: NAD  Respiratory: Respirations non-labored, no accessory muscle use  Gastrointestinal: Soft, non-tender, non-distended  Extremities: Bilateral LE in unna boots   Neurological: A&O x 3; without gross deficit  Buttock: R buttock still ecchytmotic, 2x2cm fluctuant area, non tender, wound looks to be closing off.     A/P: Patient is a 68 yo M with a R buttock/R thigh lesion, an abscess was of concern considering diabetic pt, he is s/p I&D, where no evidence of pus was seen, culture taken and pending, started on doxy in the ED, wound looks to be closed off. no packing indicated.   Dress with 4x4s and abd over, Monitor WBC, follow up wound culture from the wound Pt re evaluated at bedside by myself and resident Ju. Comfortable, no complaints.     Physical Exam:  Constitutional: NAD  Respiratory: Respirations non-labored, no accessory muscle use  Gastrointestinal: Soft, non-tender, non-distended  Extremities: Bilateral LE in unna boots   Neurological: A&O x 3; without gross deficit  Buttock: R buttock still ecchytmotic, 2x2cm fluctuant area, non tender, wound looks to be closing off.     A/P: Patient is a 66 yo M with a R buttock/R thigh lesion, an abscess was of concern considering diabetic pt, he is s/p I&D, where no evidence of pus was seen, culture taken and pending, started on doxy in the ED, wound looks to be closed off. no packing indicated.   Dress with 4x4s and abd over, Monitor WBC, follow up wound culture from the wound. No acute surgical intervention indicated at this time.

## 2023-02-07 NOTE — PHYSICAL THERAPY INITIAL EVALUATION ADULT - ADDITIONAL COMMENTS
Pt lives alone in a 1 story house with 3 steps to enter. Girlfriend assisted pt, but is currently away for a month.  PTA, amb with RW and required assist with ADLs and self care.

## 2023-02-07 NOTE — ED CDU PROVIDER SUBSEQUENT DAY NOTE - PROGRESS NOTE DETAILS
wound assessed, bloody drainage noted, no purulent drainage. dressing changed. PT recommending CARLY.

## 2023-02-07 NOTE — ED CDU PROVIDER SUBSEQUENT DAY NOTE - ATTENDING APP SHARED VISIT CONTRIBUTION OF CARE
pt in obs for right buttocks thigh hematoma/ ? abscess - was drained by surgical team overnight, cultures pending. pt remains stable, pending cultures at this time, placed on doxy due to cellulitic changes    no complaints this morning denies any symptoms Denies f/c/n/v/cp/sob/palpitations/ cough/rash/headache/dizziness/abd.pain/d/c/dysuria/hematuria    right buttocks: wound dressing changed bleeding a little at the site no redness no fluctuance to pus    surgery following ; PT recomds CARLY

## 2023-02-07 NOTE — PROVIDER CONTACT NOTE (OTHER) - ACTION/TREATMENT ORDERED:
PT Goals( to achieve in 2 weeks);Pt independent with bed mobility. Pt mod. independent with transfers.  Pt mod I with amb with RW X 100 feet 4

## 2023-02-07 NOTE — CONSULT NOTE ADULT - SUBJECTIVE AND OBJECTIVE BOX
SURGERY CONSULT    HPI: 67y Male with PMH of DM, ankylosing spondylitis chronic lymphedema and venostasis using UNNA boots presents to the ED to evaluate a R buttock abscess. Patient reports feeling a lump that's itchy for a couple of months now, intermittently tender over the R buttock.   Denies fevers, chills, nausea, emesis.  Denies chest pain, dyspnea.  Denies constipation, diarrhea. Denies headaches, dizziness, changes in vision.       PAST MEDICAL & SURGICAL HISTORY:  Diabetes      Lupus      Ankylosing spondylitis of site in spine      Chronic venous stasis dermatitis      Cellulitis, leg  bilateral      Chronic pain disorder      Diabetic neuropathy      Obesity      Former smoker      Peripheral vascular disease      Ischemic ulcer diabetic foot  left toe      External iliac artery occlusion      Deep vein thrombosis (DVT)  25 years ago  pt had THR was on coumadin for 6 months  also noted in Nov 2019 on sono      Diabetic ulcer of right foot  treats weekly with vascular doc dressing changed and uni boot      Anemia  iron def treats with DR. Dickey      Back pain  pain management in past      History of hip replacement, total, left  x2 surgeries      H/O aorto-femoral bypass      Cataract        Home Meds: Home Medications:  aspirin 81 mg oral tablet, chewable: 1 tab(s) orally once a day (10 Jul 2020 16:44)  furosemide 40 mg oral tablet: 1 tab(s) orally once a day (10 Jul 2020 16:44)  gabapentin 400 mg oral capsule: 2 cap(s) orally 3 times a day (10 Jul 2020 16:44)  glimepiride 4 mg oral tablet: 2 tab(s) orally once a day (10 Jul 2020 16:44)  HYDROmorphone 4 mg oral tablet: orally every 8 hours (10 Jul 2020 16:44)  lisinopril 2.5 mg oral tablet: 1 tab(s) orally once a day (10 Jul 2020 14:04)  metFORMIN 1000 mg oral tablet: 1 tab(s) orally 2 times a day  RESUME IN 2 days on 10/14/19 (10 Jul 2020 16:44)  methadone 10 mg oral tablet: orally 4 times a day (10 Jul 2020 16:44)  pioglitazone 30 mg oral tablet: 1 tab(s) orally once a day (10 Jul 2020 16:44)  sertraline 100 mg oral tablet: 1 tab(s) orally once a day (10 Jul 2020 16:44)  simvastatin 20 mg oral tablet: 1 tab(s) orally once a day (at bedtime) (10 Jul 2020 16:44)  traZODone 50 mg oral tablet: 1 tab(s) orally once a day (at bedtime) (10 Jul 2020 16:44)    Allergies: Allergies    No Known Allergies    Intolerances      Soc:   Advanced Directives: Presumed Full Code     CURRENT MEDICATIONS:   --------------------------------------------------------------------------------------  Neurologic Medications  gabapentin 600 milliGRAM(s) Oral four times a day  methadone    Tablet 10 milliGRAM(s) Oral four times a day    Respiratory Medications    Cardiovascular Medications    Gastrointestinal Medications    Genitourinary Medications    Hematologic/Oncologic Medications    Antimicrobial/Immunologic Medications  doxycycline monohydrate Capsule 100 milliGRAM(s) Oral every 12 hours    Endocrine/Metabolic Medications    Topical/Other Medications  lidocaine 2%/epinephrine 1:100,000 Inj 20 milliLiter(s) Local Injection once    --------------------------------------------------------------------------------------    VITAL SIGNS, INS/OUTS (last 24 hours):  --------------------------------------------------------------------------------------  ICU Vital Signs Last 24 Hrs  T(C): 36.6 (06 Feb 2023 23:23), Max: 36.7 (06 Feb 2023 19:27)  T(F): 97.8 (06 Feb 2023 23:23), Max: 98 (06 Feb 2023 19:27)  HR: 75 (06 Feb 2023 23:23) (73 - 79)  BP: 130/70 (06 Feb 2023 23:23) (130/70 - 161/81)  BP(mean): --  ABP: --  ABP(mean): --  RR: 17 (06 Feb 2023 23:23) (17 - 18)  SpO2: 98% (06 Feb 2023 23:23) (98% - 100%)    O2 Parameters below as of 06 Feb 2023 19:27  Patient On (Oxygen Delivery Method): room air          I&O's Summary    --------------------------------------------------------------------------------------    PHYSICAL EXAM:  GENERAL: NAD,  HEAD:  Atraumatic, Normocephalic  EYES: EOMI, PERRLA, conjunctiva and sclera clear  NECK: Supple, No JVD  CHEST/LUNG: non-labored breathing on room air.   HEART: Regular rate and rhythm; S1/S2  ABDOMEN: Soft, Nontender, Nondistended  Buttock: R buttock with ecchymotic are and a 3x2cm area of fluctuance, non-tender to palpation.   EXTREMITIES: BLE wrapped with unna boot.       LABS  --------------------------------------------------------------------------------------  Labs:  CAPILLARY BLOOD GLUCOSE                              11.0   6.29  )-----------( 182      ( 06 Feb 2023 17:00 )             35.9       Auto Neutrophil %: 55.8 % (02-06-23 @ 17:00)  Auto Immature Granulocyte %: 0.2 % (02-06-23 @ 17:00)    02-06    136  |  101  |  15.7  ----------------------------<  77  5.4<H>   |  25.0  |  0.89      Calcium, Total Serum: 8.6 mg/dL (02-06-23 @ 17:00)      LFTs:             8.1  | 0.4  | 29       ------------------[102     ( 06 Feb 2023 17:00 )  3.4  | x    | 19          Lipase:x      Amylase:x             Coags:     14.6   ----< 1.26    ( 06 Feb 2023 17:00 )     27.3        CARDIAC MARKERS ( 06 Feb 2023 17:00 )  x     / x     / 250 U/L / x     / 6.5 ng/mL                --------------------------------------------------------------------------------------    OTHER LABS    IMAGING RESULTS  ****************      ASSESSMENT/ PLAN:  67y Male ***      Attending aware and agrees with plan

## 2023-02-08 ENCOUNTER — APPOINTMENT (OUTPATIENT)
Dept: GASTROENTEROLOGY | Facility: CLINIC | Age: 68
End: 2023-02-08

## 2023-02-08 VITALS
OXYGEN SATURATION: 99 % | RESPIRATION RATE: 18 BRPM | DIASTOLIC BLOOD PRESSURE: 74 MMHG | HEART RATE: 74 BPM | TEMPERATURE: 98 F | SYSTOLIC BLOOD PRESSURE: 144 MMHG

## 2023-02-08 LAB
GLUCOSE BLDC GLUCOMTR-MCNC: 118 MG/DL — HIGH (ref 70–99)
GLUCOSE BLDC GLUCOMTR-MCNC: 93 MG/DL — SIGNIFICANT CHANGE UP (ref 70–99)

## 2023-02-08 PROCEDURE — 82553 CREATINE MB FRACTION: CPT

## 2023-02-08 PROCEDURE — 71045 X-RAY EXAM CHEST 1 VIEW: CPT

## 2023-02-08 PROCEDURE — 99238 HOSP IP/OBS DSCHRG MGMT 30/<: CPT

## 2023-02-08 PROCEDURE — 82962 GLUCOSE BLOOD TEST: CPT

## 2023-02-08 PROCEDURE — G0378: CPT

## 2023-02-08 PROCEDURE — 10060 I&D ABSCESS SIMPLE/SINGLE: CPT

## 2023-02-08 PROCEDURE — 85610 PROTHROMBIN TIME: CPT

## 2023-02-08 PROCEDURE — 87637 SARSCOV2&INF A&B&RSV AMP PRB: CPT

## 2023-02-08 PROCEDURE — 73701 CT LOWER EXTREMITY W/DYE: CPT | Mod: MA

## 2023-02-08 PROCEDURE — 72170 X-RAY EXAM OF PELVIS: CPT

## 2023-02-08 PROCEDURE — 73562 X-RAY EXAM OF KNEE 3: CPT

## 2023-02-08 PROCEDURE — 85730 THROMBOPLASTIN TIME PARTIAL: CPT

## 2023-02-08 PROCEDURE — 82550 ASSAY OF CK (CPK): CPT

## 2023-02-08 PROCEDURE — 80053 COMPREHEN METABOLIC PANEL: CPT

## 2023-02-08 PROCEDURE — 36415 COLL VENOUS BLD VENIPUNCTURE: CPT

## 2023-02-08 PROCEDURE — 85025 COMPLETE CBC W/AUTO DIFF WBC: CPT

## 2023-02-08 PROCEDURE — 99284 EMERGENCY DEPT VISIT MOD MDM: CPT | Mod: 25

## 2023-02-08 PROCEDURE — 87070 CULTURE OTHR SPECIMN AEROBIC: CPT

## 2023-02-08 RX ADMIN — METHADONE HYDROCHLORIDE 10 MILLIGRAM(S): 40 TABLET ORAL at 08:03

## 2023-02-08 RX ADMIN — METHADONE HYDROCHLORIDE 10 MILLIGRAM(S): 40 TABLET ORAL at 02:06

## 2023-02-08 RX ADMIN — Medication 81 MILLIGRAM(S): at 12:30

## 2023-02-08 RX ADMIN — Medication 100 MILLIGRAM(S): at 05:12

## 2023-02-08 RX ADMIN — GABAPENTIN 600 MILLIGRAM(S): 400 CAPSULE ORAL at 05:12

## 2023-02-08 RX ADMIN — METHADONE HYDROCHLORIDE 10 MILLIGRAM(S): 40 TABLET ORAL at 12:31

## 2023-02-08 RX ADMIN — GABAPENTIN 600 MILLIGRAM(S): 400 CAPSULE ORAL at 12:31

## 2023-02-08 NOTE — ED CDU PROVIDER DISPOSITION NOTE - NSFOLLOWUPINSTRUCTIONS_ED_ALL_ED_FT
please continue daily medication   continue taking doxycycline 100mg every 12 hours x for 5 days   follow up with primary care as well and bring the results for further evaluation   we will call you back in regard to the wound culture result if Antibiotic need to be changed   keep the area dry and clean and cover . no packing indicated as per Surgery now   Dress with 4x4s and abd over and keep it cover   follow up with Surgery clinic as need it    Wound Care, Adult    Taking care of your wound properly can help to prevent pain, infection, and scarring. It can also help your wound heal more quickly. Follow instructions from your health care provider about how to care for your wound.  Supplies needed:  •Soap and water.  •Wound cleanser, saline, or germ-free (sterile) water.  •Gauze.  •If needed, a clean bandage (dressing) or other type of wound dressing material to cover or place in the wound. Follow your health care provider's instructions about what dressing supplies to use.  How to care for your wound  Cleaning the wound   Ask your health care provider how to clean the wound. This may include:  •Using mild soap and water, a wound cleanser, saline, or sterile water.  •Using a clean gauze to pat the wound dry after cleaning it. Do not rub or scrub the wound.  Dressing care  •Wash your hands with soap and water for at least 20 seconds before and after you change the dressing. If soap and water are not available, use hand .  •Change your dressing as told by your health care provider. This may include:  •Cleaning or rinsing out (irrigating) the wound.  •Application of cream or topical ointment, if told by your health care provider.  •Covering the wound with an outer dressing.  •Leave stitches (sutures), staples, skin glue, or adhesive strips in place. These skin closures may need to stay in place for 2 weeks or longer. If adhesive strip edges start to loosen and curl up, you may trim the loose edges. Do not remove adhesive strips completely unless your health care provider tells you to do that.  •Ask your health care provider when you can leave the wound uncovered.  Checking for infection  Two stitched wounds. One is normal. The other is red with pus and infected.  Check your wound area every day for signs of infection. Check for:  •More redness, swelling, or pain.  •Fluid or blood.  •Warmth.  •Pus or a bad smell.  Follow these instructions at home  Medicines   If you were prescribed an antibiotic medicine, cream, or ointment, take or apply it as told by your health care provider. Do not stop using the antibiotic even if your condition improves.  •If you were prescribed pain medicine, take it 30 minutes before you do any wound care or as told by your health care provider.  •Take over-the-counter and prescription medicines only as told by your health care provider.  Eating and drinking   •Eat a diet that includes protein, vitamin A, vitamin C, and other nutrient-rich foods to help the wound heal.  •Foods rich in protein include meat, fish, eggs, dairy, beans, and nuts.  •Foods rich in vitamin A include carrots and dark green, leafy vegetables.  •Foods rich in vitamin C include citrus fruits, tomatoes, broccoli, and peppers.  •Drink enough fluid to keep your urine pale yellow.  General instructions   • Do not take baths, swim, or use a hot tub until your health care provider approves. Ask your health care provider if you may take showers. You may only be allowed to take sponge baths.   Do not scratch or pick at the wound. Keep it covered as told by your health care provider.  •Return to your normal activities as told by your health care provider. Ask your health care provider what activities are safe for you.  •Protect your wound from the sun when you are outside for the first 6 months, or for as long as told by your health care provider. Cover up the scar area or apply sunscreen that has an SPF of at least 30.   Do not use any products that contain nicotine or tobacco. These products include cigarettes, chewing tobacco, and vaping devices, such as e-cigarettes. If you need help quitting, ask your health care provider.  •Keep all follow-up visits. This is important.  Contact a health care provider if:  •You received a tetanus shot and you have swelling, severe pain, redness, or bleeding at the injection site.  •Yourpain is not controlled with medicine.  •You have any of these signs of infection:  •More redness, swelling, or pain around the wound.  •Fluid or blood coming from the wound.  •Warmth coming from the wound.  •A fever or chills.  •You are nauseous or you vomit.  •You are dizzy.  •You have a new rash or hardness around the wound.  Get help right away if:  •You have a red streak of skin near the area around your wound.  •Pus or a bad smell coming from the wound.  •Your wound has been closed with staples, sutures, skin glue, or adhesive strips and it begins to open up and separate.  •Your wound is bleeding, and the bleeding does not stop with gentle pressure.  These symptoms may represent a serious problem that is an emergency. Do not wait to see if the symptoms will go away. Get medical help right away. Call your local emergency services (911 in the [place] Do not drive yourself to the hospital.   Summary  •Always wash your hands with soap and water for at least 20 seconds before and after changing your dressing.  •Change your dressing as told by your health care provider.  •To help with healing, eat foods that are rich in protein, vitamin A, vitamin C, and other nutrients.  •Check your wound every day for signs of infection. Contact your health care provider if you think that your wound is infected.  This information is not intended to replace advice given to you by your health care provider. Make sure you discuss any questions you have with your health care provider. please continue daily medication   continue taking doxycycline 100mg every 12 hours x for 7 days   follow up with primary care as well and bring the results for further evaluation   we will call you back in regard to the wound culture result if Antibiotic need to be changed   keep the area dry and clean and cover . no packing indicated as per Surgery now   Dress with 4x4s and abd over and keep it cover   follow up with Surgery clinic as need it    Wound Care, Adult    Taking care of your wound properly can help to prevent pain, infection, and scarring. It can also help your wound heal more quickly. Follow instructions from your health care provider about how to care for your wound.  Supplies needed:  •Soap and water.  •Wound cleanser, saline, or germ-free (sterile) water.  •Gauze.  •If needed, a clean bandage (dressing) or other type of wound dressing material to cover or place in the wound. Follow your health care provider's instructions about what dressing supplies to use.  How to care for your wound  Cleaning the wound   Ask your health care provider how to clean the wound. This may include:  •Using mild soap and water, a wound cleanser, saline, or sterile water.  •Using a clean gauze to pat the wound dry after cleaning it. Do not rub or scrub the wound.  Dressing care  •Wash your hands with soap and water for at least 20 seconds before and after you change the dressing. If soap and water are not available, use hand .  •Change your dressing as told by your health care provider. This may include:  •Cleaning or rinsing out (irrigating) the wound.  •Application of cream or topical ointment, if told by your health care provider.  •Covering the wound with an outer dressing.  •Leave stitches (sutures), staples, skin glue, or adhesive strips in place. These skin closures may need to stay in place for 2 weeks or longer. If adhesive strip edges start to loosen and curl up, you may trim the loose edges. Do not remove adhesive strips completely unless your health care provider tells you to do that.  •Ask your health care provider when you can leave the wound uncovered.  Checking for infection  Two stitched wounds. One is normal. The other is red with pus and infected.  Check your wound area every day for signs of infection. Check for:  •More redness, swelling, or pain.  •Fluid or blood.  •Warmth.  •Pus or a bad smell.  Follow these instructions at home  Medicines   If you were prescribed an antibiotic medicine, cream, or ointment, take or apply it as told by your health care provider. Do not stop using the antibiotic even if your condition improves.  •If you were prescribed pain medicine, take it 30 minutes before you do any wound care or as told by your health care provider.  •Take over-the-counter and prescription medicines only as told by your health care provider.  Eating and drinking   •Eat a diet that includes protein, vitamin A, vitamin C, and other nutrient-rich foods to help the wound heal.  •Foods rich in protein include meat, fish, eggs, dairy, beans, and nuts.  •Foods rich in vitamin A include carrots and dark green, leafy vegetables.  •Foods rich in vitamin C include citrus fruits, tomatoes, broccoli, and peppers.  •Drink enough fluid to keep your urine pale yellow.  General instructions   • Do not take baths, swim, or use a hot tub until your health care provider approves. Ask your health care provider if you may take showers. You may only be allowed to take sponge baths.   Do not scratch or pick at the wound. Keep it covered as told by your health care provider.  •Return to your normal activities as told by your health care provider. Ask your health care provider what activities are safe for you.  •Protect your wound from the sun when you are outside for the first 6 months, or for as long as told by your health care provider. Cover up the scar area or apply sunscreen that has an SPF of at least 30.   Do not use any products that contain nicotine or tobacco. These products include cigarettes, chewing tobacco, and vaping devices, such as e-cigarettes. If you need help quitting, ask your health care provider.  •Keep all follow-up visits. This is important.  Contact a health care provider if:  •You received a tetanus shot and you have swelling, severe pain, redness, or bleeding at the injection site.  •Yourpain is not controlled with medicine.  •You have any of these signs of infection:  •More redness, swelling, or pain around the wound.  •Fluid or blood coming from the wound.  •Warmth coming from the wound.  •A fever or chills.  •You are nauseous or you vomit.  •You are dizzy.  •You have a new rash or hardness around the wound.  Get help right away if:  •You have a red streak of skin near the area around your wound.  •Pus or a bad smell coming from the wound.  •Your wound has been closed with staples, sutures, skin glue, or adhesive strips and it begins to open up and separate.  •Your wound is bleeding, and the bleeding does not stop with gentle pressure.  These symptoms may represent a serious problem that is an emergency. Do not wait to see if the symptoms will go away. Get medical help right away. Call your local emergency services (911 in the [place] Do not drive yourself to the hospital.   Summary  •Always wash your hands with soap and water for at least 20 seconds before and after changing your dressing.  •Change your dressing as told by your health care provider.  •To help with healing, eat foods that are rich in protein, vitamin A, vitamin C, and other nutrients.  •Check your wound every day for signs of infection. Contact your health care provider if you think that your wound is infected.  This information is not intended to replace advice given to you by your health care provider. Make sure you discuss any questions you have with your health care provider.

## 2023-02-08 NOTE — ED CDU PROVIDER DISPOSITION NOTE - ATTENDING CONTRIBUTION TO CARE
67 yom with PMH of DM, ankylosing spondylitis chronic lymphedema and venostasis using UNNA boots presents to the ED to evaluate a R buttock abscess. In obs seen by surgery and planned to transition to PO abx. Planned for CARLY

## 2023-02-08 NOTE — ED ADULT NURSE REASSESSMENT NOTE - NEURO GAIT
Panel Management Review      Patient has the following on his problem list:     Hypertension   Last three blood pressure readings:  BP Readings from Last 3 Encounters:   01/02/18 (!) 170/104   09/01/17 138/90   11/01/16 124/83     Blood pressure: FAILED    HTN Guidelines:  Age 18-59 BP range:  Less than 140/90  Age 60-85 with Diabetes:  Less than 140/90  Age 60-85 without Diabetes:  less than 150/90      Composite cancer screening  Chart review shows that this patient is due/due soon for the following None  Summary:    Patient is due/failing the following:   BP CHECK    Action needed:   Patient needs office visit for htn.    Type of outreach:    hold to see what provider wants to do.    Questions for provider review:  The last office note states  PLAN: patient will get home blood pressures and let me know in a week and if over 140/90 then use amlodipine.  Consider stress test when blood pressure under control.  I do not see where he has done this.  What would you like to do?                                                                                                                                          Leyla Arrington M.A.       Chart routed to Provider .      Follow up with MA or pharmacy    
steady

## 2023-02-08 NOTE — ED CDU PROVIDER SUBSEQUENT DAY NOTE - NS ED ATTENDING STATEMENT MOD
This was a shared visit with the ASHLEY. I reviewed and verified the documentation and independently performed the documented:
This was a shared visit with the ASHLEY. I reviewed and verified the documentation and independently performed the documented:

## 2023-02-08 NOTE — ED ADULT NURSE REASSESSMENT NOTE - NURSING MUSC STRENGTH
Warfarin management, [facility], Savanah Adams.  
hand grasp, leg strength strong and equal bilaterally
limitations in strength

## 2023-02-08 NOTE — ED CDU PROVIDER SUBSEQUENT DAY NOTE - NSICDXFAMILYHX_GEN_ALL_CORE_FT
[FreeTextEntry2] : New Injury 
FAMILY HISTORY:  Family history of diabetes mellitus (DM)    Sibling  Still living? Unknown  Family history of diabetes mellitus (DM), Age at diagnosis: Age Unknown    
FAMILY HISTORY:  Family history of diabetes mellitus (DM)    Sibling  Still living? Unknown  Family history of diabetes mellitus (DM), Age at diagnosis: Age Unknown

## 2023-02-08 NOTE — ED CDU PROVIDER SUBSEQUENT DAY NOTE - CLINICAL SUMMARY MEDICAL DECISION MAKING FREE TEXT BOX
38 yo male sent in by surgery for eval and management of right thigh cellulitis/abscess, seen and I/D preformed by surgical team with cultures pending, placed on doxy by ED for cellulitic management considering underlying hx of diabetes. will continue to monitor, surgical re-evaluation, and PT assessment in am
38 yo male sent in by surgery for eval and management of right thigh cellulitis/abscess, seen and I/D preformed by surgical team with cultures pending, placed on doxy by ED for cellulitic management considering underlying hx of diabetes. will continue to monitor, surgical re-evaluation,  Surgery Following  Pt recommending CARLY  CM/SW for CARLY

## 2023-02-08 NOTE — ED CDU PROVIDER DISPOSITION NOTE - CLINICAL COURSE
67y Male with PMH of DM, ankylosing spondylitis chronic lymphedema and venostasis using UNNA boots presents to the ED to evaluate a R buttock abscess. Patient reports feeling a lump that's itchy for a couple of months now, intermittently tender over the R buttock. Denies fevers, chills, nausea, emesis. pt is kept on observation - seen by surgery team as well and I7d of the wound done dose not need packing - pt on doxy 100mg BID - feeling better - clear by surgery Will dc in [place] . pt agreed cont doxy 67y Male with PMH of DM, ankylosing spondylitis chronic lymphedema and venostasis using UNNA boots presents to the ED to evaluate a R buttock abscess. Patient reports feeling a lump that's itchy for a couple of months now, intermittently tender over the R buttock. Denies fevers, chills, nausea, emesis. pt is kept on observation - seen by surgery team as well and I&d of the wound done dose not need packing - pt on doxy 100mg BID - feeling better - clear by surgery Will dc in [place] . pt agreed cont doxy

## 2023-02-08 NOTE — ED CDU PROVIDER SUBSEQUENT DAY NOTE - PHYSICAL EXAMINATION
Gen: AAOx3, NAD, well nourished  HEENT: Normocephalic atraumatic. EOMI. No scleral icterus. Moist mucus membranes.  CV: RRR. Audible S1 and S2. No murmurs. 2+ radial and PT pulses   Pulm: Clear to auscultation bilaterally. No wheezes, rales, or rhonchi. No accessory muscle use or respiratory distress.  Abdomen: soft, normoactive BS, non distended, nontender, no rebound, no guarding  Musculoskeletal:  Moving all extremities equally. No gross deformity. No tenderness to palpation.  Skin: 2x4.5cm superficial abscess with flutuance to posterior medial R thigh, inferior to gluteal fold with surrounding induration and woody discoloration.   Neurologic: No focal neurological deficits. CN II-XII grossly intact.  : no CVA tenderness  Psych: Appropriate mood and affect. Cooperative.
Gen: AAOx3, NAD, well nourished  HEENT: Normocephalic atraumatic. EOMI. No scleral icterus. Moist mucus membranes.  CV: RRR. Audible S1 and S2. No murmurs. 2+ radial and PT pulses   Pulm: Clear to auscultation bilaterally. No wheezes, rales, or rhonchi. No accessory muscle use or respiratory distress.  Abdomen: soft, normoactive BS, non distended, nontender, no rebound, no guarding  Musculoskeletal:  Moving all extremities equally. No gross deformity. No tenderness to palpation.  Skin: 2x4.5cm superficial abscess with flutuance to posterior medial R thigh, inferior to gluteal fold with surrounding induration and woody discoloration.   Neurologic: No focal neurological deficits. CN II-XII grossly intact.  : no CVA tenderness  Psych: Appropriate mood and affect. Cooperative.

## 2023-02-08 NOTE — ED ADULT NURSE REASSESSMENT NOTE - NS ED NURSE REASSESS COMMENT FT1
Assumed care of pt from previous RN. Pt presents to ED for evaluation of abscess on R buttock. A/O x4. Respirations are even and unlabored on room air. Pt offers no complaints of pain or discomfort at this time. Educated on plan of care and expresses understanding.
Most recent vital signs are stable and as charted. Patient remains free of any distress at this time. dressing is clean, dry and in tact. Hourly rounding performed, all questions addressed and answered by RN. Patient safety maintained, will continue to monitor.
Patient received at 1900 from AM RN. Patient is alert, oriented times four, resting in bed. Patient is any bed status, on room air with no acute distress noted. Patient denies any chest pain or SOB at this time. Most recent vital signs are stable and as charted. Patient B/L LE wrapped, dressing is clean, dry and in tact. Patient has wound to R buttocks, dressing changed, clean dry and in tact. Patient safety maintained, will continue to monitor.
Pt in no acute distress, wound to left upper thigh oozing dark red blood which soaked through pants. SENIA Nixon made aware. Pt undressed and assisted into gown, wound covered with sterile gauze and ABD pad and hospital underwear placed over to hold in place. Pt noted to have blood on leg dressings and vascular is aware and changed the dressings. Pt noted to have deep tissue wounds to left gluteal fold and between thighs and reports he does feel pinching to the areas when he sits for too long at home. Pt was able to stand holding on to walker and slightly lift legs while being changed. Clean pads placed on bed. Fall precautions in place, needs met and safety maintained. Await surgery consult.
Received pt from ED, RN at 2100. A&Ox4, VSS as charted in recent flow sheet. OOB with walker, standby assist, and on RA. Wound on R. buttocks that is spreading around the leg. US guided IV placed in Obs. No signs of acute distress. No c/o pain or discomfort. safety measures in place. Bed in lowest position, wheels locked, call bell within reach and hourly rounded performed. Pt is resting quietly in bed. Plan of being maintained.
Report given to OBS Marianela Freeman. Pt transported to [facility] with walker and belongings.
Report received from outgoing RN at change of shift and assumed care of pt at that time. Pt in no acute distress, alert and oriented x 3, breathing even and unlabored, no complaints, vs as charted, fall precautions in place.
Assumed care of pt at this time. Pt A&O x 4 presents to ED c/o abscess on R buttock. Pt refused to let RN assess, stating "I don't want to move because then it will get bloody." Pt denies other complaints at this time. Denies chills. No N/V. Pt alert, awake and following directions. Bed locked and in lowest position. Call bell within reach. Able to make needs known.

## 2023-02-08 NOTE — ED CDU PROVIDER DISPOSITION NOTE - NSFOLLOWUPCLINICS_GEN_ALL_ED_FT
[facility] General Surgery  Surgery  [address] [place] [address]  Phone: [phone]  Fax:

## 2023-02-08 NOTE — ED ADULT NURSE REASSESSMENT NOTE - GENERAL PATIENT STATE
comfortable appearance/cooperative/smiling/interactive
comfortable appearance/cooperative
cooperative/resting/sleeping

## 2023-02-08 NOTE — ED CDU PROVIDER SUBSEQUENT DAY NOTE - NSICDXPASTMEDICALHX_GEN_ALL_CORE_FT
PAST MEDICAL HISTORY:  Anemia iron def treats with DR. Dickey    Ankylosing spondylitis of site in spine     Back pain pain management in past    Cellulitis, leg bilateral    Chronic pain disorder     Chronic venous stasis dermatitis     Deep vein thrombosis (DVT) 25 years ago  pt had THR was on coumadin for 6 months  also noted in Nov 2019 on sono    Diabetes     Diabetic neuropathy     Diabetic ulcer of right foot treats weekly with vascular doc dressing changed and uni boot    External iliac artery occlusion     Former smoker     Ischemic ulcer diabetic foot left toe    Lupus     Obesity     Peripheral vascular disease     
PAST MEDICAL HISTORY:  Anemia iron def treats with DR. Dickey    Ankylosing spondylitis of site in spine     Back pain pain management in past    Cellulitis, leg bilateral    Chronic pain disorder     Chronic venous stasis dermatitis     Deep vein thrombosis (DVT) 25 years ago  pt had THR was on coumadin for 6 months  also noted in Nov 2019 on sono    Diabetes     Diabetic neuropathy     Diabetic ulcer of right foot treats weekly with vascular doc dressing changed and uni boot    External iliac artery occlusion     Former smoker     Ischemic ulcer diabetic foot left toe    Lupus     Obesity     Peripheral vascular disease

## 2023-02-08 NOTE — ED PEDIATRIC NURSE REASSESSMENT NOTE - NS ED NURSE REASSESS COMMENT FT2
Most recent vital signs stable and as charted. Patient remains on room air with no acute distress noted. Patient denies any pain or discomfort at this time. B/L LE dressings remain clean dry and intact.  Patient safety maintained, will continue to monitor.

## 2023-02-08 NOTE — ED CDU PROVIDER SUBSEQUENT DAY NOTE - NS ED ROS FT
General: No fever, chills.  Respiratory: No SOB  Cardiac: No chest pain  GI: No abdominal pain, nausea, vomiting  Neuro: No headache  MSK: see hpi  Psych: No known mental health issues.  Endocrine: No heat/cold intolerance, no polyuria/polydipsia.  Heme: No easy bruising or bleeding.  Allergic: No pruritis, dermatitis, or environmental allergies.
General: No fever, chills.  Respiratory: No SOB  Cardiac: No chest pain  GI: No abdominal pain, nausea, vomiting  Neuro: No headache  MSK: see hpi  Psych: No known mental health issues.  Endocrine: No heat/cold intolerance, no polyuria/polydipsia.  Heme: No easy bruising or bleeding.  Allergic: No pruritis, dermatitis, or environmental allergies.

## 2023-02-08 NOTE — ED ADULT NURSE REASSESSMENT NOTE - COMFORT CARE
ambulated steadily to and from bathroom using walker/meal provided/plan of care explained/po fluids offered
asssistance OOB with urinal

## 2023-02-08 NOTE — ED CDU PROVIDER DISPOSITION NOTE - PATIENT PORTAL LINK FT
You can access the FollowMyHealth Patient Portal offered by [facility] by registering at the following website: [email or web address]. By joining [employer]’s FollowMyHealth portal, you will also be able to view your health information using other applications (apps) compatible with our system.

## 2023-02-08 NOTE — ED ADULT NURSE REASSESSMENT NOTE - NSIMPLEMENTINTERV_GEN_ALL_ED
Implemented All Fall Risk Interventions:  [place] to call system. Call bell, personal items and telephone within reach. Instruct patient to call for assistance. Room bathroom lighting operational. Non-slip footwear when patient is off stretcher. Physically safe environment: no spills, clutter or unnecessary equipment. Stretcher in lowest position, wheels locked, appropriate side rails in place. Provide visual cue, wrist band, yellow gown, etc. Monitor gait and stability. Monitor for mental status changes and reorient to person, place, and time. Review medications for side effects contributing to fall risk. Reinforce activity limits and safety measures with patient and family.

## 2023-02-08 NOTE — ED CDU PROVIDER SUBSEQUENT DAY NOTE - HISTORY
right buttucks thigh abscess drained by surgical team overnight, cultures pending. pt remains stable, pending cultures at this time, placed on doxy due to cellulitic changes observed both clinically and on CT
Pt in observation without acute complaint.

## 2023-02-10 LAB
CULTURE RESULTS: SIGNIFICANT CHANGE UP
SPECIMEN SOURCE: SIGNIFICANT CHANGE UP

## 2023-02-24 ENCOUNTER — APPOINTMENT (OUTPATIENT)
Dept: PAIN MANAGEMENT | Facility: CLINIC | Age: 68
End: 2023-02-24

## 2023-02-24 NOTE — ED ADULT NURSE NOTE - DOES PATIENT HAVE ADVANCE DIRECTIVE
No
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

## 2023-02-26 NOTE — PHYSICAL EXAM
[Ankle Swelling (On Exam)] : present [Ankle Swelling Bilaterally] : bilaterally  [] : bilaterally [FreeTextEntry1] : No ulcers   show

## 2023-02-27 ENCOUNTER — APPOINTMENT (OUTPATIENT)
Dept: CARDIOLOGY | Facility: CLINIC | Age: 68
End: 2023-02-27

## 2023-03-08 NOTE — ED PROVIDER NOTE - FAMILY HISTORY
O-L Flap Text: The defect edges were debeveled with a #15 scalpel blade.  Given the location of the defect, shape of the defect and the proximity to free margins an O-L flap was deemed most appropriate.  Using a sterile surgical marker, an appropriate advancement flap was drawn incorporating the defect and placing the expected incisions within the relaxed skin tension lines where possible.    The area thus outlined was incised deep to adipose tissue with a #15 scalpel blade.  The skin margins were undermined to an appropriate distance in all directions utilizing iris scissors. Sibling  Still living? Unknown  Family history of diabetes mellitus (DM), Age at diagnosis: Age Unknown

## 2023-03-10 ENCOUNTER — APPOINTMENT (OUTPATIENT)
Dept: PAIN MANAGEMENT | Facility: CLINIC | Age: 68
End: 2023-03-10

## 2023-03-28 NOTE — ASU PATIENT PROFILE, ADULT - MEDICATION ADMINISTRATION INFO, PROFILE
no concerns Erythromycin Pregnancy And Lactation Text: This medication is Pregnancy Category B and is considered safe during pregnancy. It is also excreted in breast milk.

## 2023-03-31 ENCOUNTER — APPOINTMENT (OUTPATIENT)
Dept: PULMONOLOGY | Facility: CLINIC | Age: 68
End: 2023-03-31

## 2023-04-27 ENCOUNTER — APPOINTMENT (OUTPATIENT)
Dept: ORTHOPEDIC SURGERY | Facility: CLINIC | Age: 68
End: 2023-04-27
Payer: MEDICARE

## 2023-04-27 VITALS — WEIGHT: 227 LBS | BODY MASS INDEX: 34.4 KG/M2 | HEIGHT: 68 IN

## 2023-04-27 DIAGNOSIS — M17.11 UNILATERAL PRIMARY OSTEOARTHRITIS, RIGHT KNEE: ICD-10-CM

## 2023-04-27 DIAGNOSIS — M17.12 UNILATERAL PRIMARY OSTEOARTHRITIS, LEFT KNEE: ICD-10-CM

## 2023-04-27 PROCEDURE — 99203 OFFICE O/P NEW LOW 30 MIN: CPT

## 2023-04-27 PROCEDURE — 73564 X-RAY EXAM KNEE 4 OR MORE: CPT | Mod: 50

## 2023-04-27 NOTE — ASSESSMENT
[FreeTextEntry1] : 67M p/w adv vicki knee OA\par \par Discussed r b a of TKA to patient, he has numerous comorbities that will predispose him to infection and given his vascular status this would likely lead to amputation in the setting of infection, I believe the risks of surgical intervention outweigh the benefits in this patient. Offered patient a 2nd opinion with other doctors within the practice or outside and he declines presently

## 2023-04-27 NOTE — REASON FOR VISIT
Patient would like a refill on     gabapentin (NEURONTIN) 300 MG capsule 270 capsule 2 10/22/2018     Sig - Route: Take 1 capsule by mouth 3 times daily. - Oral    Sent to pharmacy as: Gabapentin 300 MG Oral Capsule    Class: Eprescribe      LOV 4/5/19 hyperlipidemia  No upcoming visit  Okay to refill?  Med loaded    
81
[FreeTextEntry2] : new patient

## 2023-04-27 NOTE — PHYSICAL EXAM
[Right] : right knee [Left] : left knee [] : light touch is intact throughout [TWNoteComboBox7] : flexion 100 degrees [de-identified] : extension 15 degrees

## 2023-04-27 NOTE — HISTORY OF PRESENT ILLNESS
[Gradual] : gradual [8] : 8 [4] : 4 [Dull/Aching] : dull/aching [Localized] : localized [Sharp] : sharp [Shooting] : shooting [Stabbing] : stabbing [Constant] : constant [Household chores] : household chores [Sleep] : sleep [Meds] : meds [Retired] : Work status: retired [de-identified] : 4/27/23: 66yo M with longstanding bilateral L>R knee pain for many years that has been gradually worsening. He has seen outside ortho in the past for this issue. Ambulates with walker, using wheelchair today. Unable to do prolonged walking, standing, and stairs due to pain and difficulty. States he had CSIs in the past that did not provide sig relief.  Per chart review patient has chronic venous stasis with ulcers, infected decubiti that required debridement, and multiple medical comorbidities [] : no [FreeTextEntry1] : b/l KNEES  [FreeTextEntry5] : on going pain on b/l knees for a couple of years but the left knee is worse .  [FreeTextEntry6] : clicking  [de-identified] : activity  [de-identified] : primary Dr , rehab

## 2023-05-19 ENCOUNTER — APPOINTMENT (OUTPATIENT)
Dept: PAIN MANAGEMENT | Facility: CLINIC | Age: 68
End: 2023-05-19
Payer: MEDICARE

## 2023-05-19 VITALS — BODY MASS INDEX: 34.4 KG/M2 | HEIGHT: 68 IN | WEIGHT: 227 LBS

## 2023-05-19 DIAGNOSIS — M25.562 PAIN IN LEFT KNEE: ICD-10-CM

## 2023-05-19 DIAGNOSIS — M25.561 PAIN IN RIGHT KNEE: ICD-10-CM

## 2023-05-19 DIAGNOSIS — I89.0 LYMPHEDEMA, NOT ELSEWHERE CLASSIFIED: ICD-10-CM

## 2023-05-19 PROCEDURE — 99214 OFFICE O/P EST MOD 30 MIN: CPT | Mod: 95

## 2023-05-19 NOTE — REASON FOR VISIT
[Home] : at home, [unfilled] , at the time of the visit. [Medical Office: ([facility])___] : at the medical office located in  [Patient] : the patient [Self] : self [FreeTextEntry2] : med refill

## 2023-05-19 NOTE — HISTORY OF PRESENT ILLNESS
[Lower back] : lower back [Gradual] : gradual [8] : 8 [7] : 7 [Dull/Aching] : dull/aching [Localized] : localized [Sharp] : sharp [Stabbing] : stabbing [Constant] : constant [Household chores] : household chores [Work] : work [Sleep] : sleep [Rest] : rest [Meds] : meds [Sitting] : sitting [Standing] : standing [Walking] : walking [Bending forward] : bending forward [Physical therapy] : physical therapy [Disabled] : Work status: disabled [de-identified] : States recently discharged from  rehab. States his upper  body strength has improved. He saw an orthopedist as he had hoped he could have total knee replacements. States he understands he is not a candidate due to his numerous medical comorbidities, vascular issues and risk of infection and amputation. . He uses a rollator to get around his house, States sleeps in a recliner and elevates his legs. States leg wounds from Lymphedema have resolved. Pain meds effective. His wife is supportive. [] : no [FreeTextEntry1] : vicki hips vicki knees  [FreeTextEntry6] : IN KNEES  [de-identified] : 2023-05-01 [de-identified] : PT PERFORMED PHYSICAL THERAPY IN REHAB FOR 3 MONTHS ENDING IN 2023-05-01 7X WEEK

## 2023-05-19 NOTE — DISCUSSION/SUMMARY
[Medication Risks Reviewed] : Medication risks reviewed [de-identified] : Prescriptions renewed. [place]id [place]obtained on [place]  reviewed and appropriate. SOAPP-R completed on chart. The patient's medications are documented to the best of their ability. Quality of life and functional ability improved on medications. The patient is showing no aberrant behavior or evidence of diversion. The patient was advised not to use narcotic medication while operating an automobile or heavy machinery due to potential sedation or dizziness. The patient was educated to the risks associated with potential opioid dependence and addiction. Urine toxicology screens as per office protocol. Use of multimodal analgesia used prn.\par Follow up one month.

## 2023-06-19 ENCOUNTER — APPOINTMENT (OUTPATIENT)
Dept: PAIN MANAGEMENT | Facility: CLINIC | Age: 68
End: 2023-06-19
Payer: MEDICARE

## 2023-06-19 VITALS — BODY MASS INDEX: 34.4 KG/M2 | WEIGHT: 227 LBS | HEIGHT: 68 IN

## 2023-06-19 PROCEDURE — 99213 OFFICE O/P EST LOW 20 MIN: CPT | Mod: 95

## 2023-06-19 RX ORDER — HYDROMORPHONE HYDROCHLORIDE 4 MG/1
4 TABLET ORAL 3 TIMES DAILY
Qty: 75 | Refills: 0 | Status: DISCONTINUED | COMMUNITY
Start: 2023-01-25 | End: 2023-06-19

## 2023-06-19 RX ORDER — METHADONE HYDROCHLORIDE 10 MG/1
10 TABLET ORAL 4 TIMES DAILY
Qty: 120 | Refills: 0 | Status: DISCONTINUED | COMMUNITY
Start: 2023-01-25 | End: 2023-06-19

## 2023-06-19 NOTE — DISCUSSION/SUMMARY
[Medication Risks Reviewed] : Medication risks reviewed [de-identified] : Prescriptions renewed. Op[place]id [place]  reviewed and appropriate. SOAPP-R completed on chart. The patient's medications are documented to the best of their ability. Quality of life and functional ability improved on medications. The patient is showing no aberrant behavior or evidence of diversion. The patient was advised not to use narcotic medication while operating an automobile or heavy machinery due to potential sedation or dizziness. The patient was educated to the risks associated with potential opioid dependence and addiction. Urine toxicology screens as per office protocol. Use of multimodal analgesia used prn.\par Follow up one month.\par

## 2023-06-19 NOTE — ED STATDOCS - CROS ED HEME ALL NEG
Received fax from pharmacy requesting refill on pts medication(s). Pt was last seen in office on 4/14/2023  and has a follow up scheduled for 7/11/2023. Will send request to  [facility]  for authorization.      Requested Prescriptions     Pending Prescriptions Disp Refills    metFORMIN (GLUCOPHAGE) 1000 MG tablet [Pharmacy Med Name: METFORMIN HYDROCHLORIDE 1000 MG Tablet] 90 tablet 3     Sig: TAKE 1 TABLET EVERY DAY WITH BREAKFAST
negative...

## 2023-06-19 NOTE — REASON FOR VISIT
[Home] : at home, [unfilled] , at the time of the visit. [Medical Office: ([facility])___] : at the medical office located in  [Patient] : the patient [Self] : self [FreeTextEntry2] : MED REFILL

## 2023-07-15 NOTE — ED ADULT NURSE NOTE - PAIN RATING/NUMBER SCALE (0-10): REST
Patient: Christine Miguel    Procedure Summary     Date: 07/14/23 Room / Location: [place] [address] / SURGERY [facility]    Anesthesia Start: 1700 Anesthesia Stop: 1840    Procedures:       ROBOTIC PARTIAL SLEEVE GASTRECTOMY (Abdomen)      REPAIR, HERNIA, HIATAL, ROBOT-ASSISTED, LAPAROSCOPIC (Abdomen) Diagnosis: (OBESITY, HIATAL HERNIA, SLEEP APNEA, GERD)    Surgeons: Olivier Oliveros M.D. Responsible Provider: Reagan Conway M.D.    Anesthesia Type: general ASA Status: 3          Final Anesthesia Type: general  Last vitals  BP   Blood Pressure: 131/65    Temp   36.8 °C (98.2 °F)    Pulse   83   Resp   18    SpO2   97 %      Anesthesia Post Evaluation    Patient location during evaluation: PACU  Patient participation: complete - patient participated  Level of consciousness: awake and alert  Pain score: 1    Airway patency: patent  Anesthetic complications: no  Cardiovascular status: hemodynamically stable  Respiratory status: acceptable  Hydration status: euvolemic    PONV: none          No notable events documented.     Nurse Pain Score: 5 (NPRS)        
5

## 2023-07-17 ENCOUNTER — APPOINTMENT (OUTPATIENT)
Dept: PAIN MANAGEMENT | Facility: CLINIC | Age: 68
End: 2023-07-17

## 2023-07-28 ENCOUNTER — APPOINTMENT (OUTPATIENT)
Dept: VASCULAR SURGERY | Facility: CLINIC | Age: 68
End: 2023-07-28
Payer: MEDICARE

## 2023-07-28 VITALS
OXYGEN SATURATION: 95 % | HEART RATE: 107 BPM | SYSTOLIC BLOOD PRESSURE: 117 MMHG | TEMPERATURE: 97.3 F | RESPIRATION RATE: 14 BRPM | HEIGHT: 68 IN | DIASTOLIC BLOOD PRESSURE: 64 MMHG

## 2023-07-28 PROCEDURE — 29580 STRAPPING UNNA BOOT: CPT | Mod: 50

## 2023-08-02 ENCOUNTER — APPOINTMENT (OUTPATIENT)
Dept: PAIN MANAGEMENT | Facility: CLINIC | Age: 68
End: 2023-08-02
Payer: MEDICARE

## 2023-08-02 VITALS — WEIGHT: 227 LBS | BODY MASS INDEX: 34.4 KG/M2 | HEIGHT: 68 IN

## 2023-08-02 PROCEDURE — 99213 OFFICE O/P EST LOW 20 MIN: CPT | Mod: 95

## 2023-08-02 NOTE — HISTORY OF PRESENT ILLNESS
[Lower back] : lower back [Gradual] : gradual [7] : 7 [6] : 6 [Localized] : localized [Sharp] : sharp [Constant] : constant [Household chores] : household chores [Work] : work [Sleep] : sleep [Rest] : rest [Meds] : meds [Sitting] : sitting [Standing] : standing [Walking] : walking [Bending forward] : bending forward [Physical therapy] : physical therapy [Disabled] : Work status: disabled [de-identified] : Chronic pain stable. ADL limited, uses a rollator to ambulate short distances in his home. Pain. meds effective.  [] : no [FreeTextEntry1] : JOHN HIPS JOHN KNEES  [FreeTextEntry6] : NUMBNESS IN JOHN KNEES  [de-identified] : 2023-05-01 [de-identified] : PT PERFORMED PHYSICAL THERAPY IN REHAB FOR 4 MONTHS ENDING IN 2023-08-01 7X WEEK  PT CONTINUING WITH HOME EXERCISES, WALKING WITH WALKER AS TOLERATED

## 2023-08-02 NOTE — DISCUSSION/SUMMARY
[Medication Risks Reviewed] : Medication risks reviewed [de-identified] : Prescriptions renewed. [place] and appropriate. SOAPP-R completed on chart. The patient's medications are documented to the best of their ability. Quality of life and functional ability improved on medications. The patient is showing no aberrant behavior or evidence of diversion. The patient was advised not to use narcotic medication while operating an automobile or heavy machinery due to potential sedation or dizziness. The patient was educated to the risks associated with potential opioid dependence and addiction. Urine toxicology screens as per office protocol. Use of multimodal analgesia used prn. Follow up one month.

## 2023-08-04 ENCOUNTER — APPOINTMENT (OUTPATIENT)
Dept: VASCULAR SURGERY | Facility: CLINIC | Age: 68
End: 2023-08-04
Payer: MEDICARE

## 2023-08-04 VITALS
RESPIRATION RATE: 16 BRPM | HEART RATE: 89 BPM | DIASTOLIC BLOOD PRESSURE: 67 MMHG | SYSTOLIC BLOOD PRESSURE: 148 MMHG | OXYGEN SATURATION: 96 % | TEMPERATURE: 99.2 F

## 2023-08-04 PROCEDURE — 29580 STRAPPING UNNA BOOT: CPT | Mod: 50

## 2023-08-10 ENCOUNTER — APPOINTMENT (OUTPATIENT)
Dept: VASCULAR SURGERY | Facility: CLINIC | Age: 68
End: 2023-08-10
Payer: MEDICARE

## 2023-08-10 VITALS
DIASTOLIC BLOOD PRESSURE: 71 MMHG | OXYGEN SATURATION: 95 % | TEMPERATURE: 97.9 F | HEART RATE: 80 BPM | RESPIRATION RATE: 16 BRPM | SYSTOLIC BLOOD PRESSURE: 116 MMHG

## 2023-08-10 VITALS — HEART RATE: 80 BPM | TEMPERATURE: 97.9 F | OXYGEN SATURATION: 95 % | RESPIRATION RATE: 16 BRPM

## 2023-08-10 DIAGNOSIS — Z95.828 PRESENCE OF OTHER VASCULAR IMPLANTS AND GRAFTS: ICD-10-CM

## 2023-08-10 PROCEDURE — 93930 UPPER EXTREMITY STUDY: CPT

## 2023-08-10 PROCEDURE — 99214 OFFICE O/P EST MOD 30 MIN: CPT | Mod: 25

## 2023-08-10 PROCEDURE — 29580 STRAPPING UNNA BOOT: CPT | Mod: 50

## 2023-08-10 NOTE — HISTORY OF PRESENT ILLNESS
[FreeTextEntry1] : 5/17/22: 67 yo male with BLE lymphedema formerly being treated by Dr. Brown. Pt had BL UNNA boot on for the past week. He complains of pain in the right plantar lateral heel area where he has a small ulcer. He feels the area around the wound is tender. He has had this wound for several months. He denies any fever or chills. Pt is on ASA, Eliquis, and Gabapentin.   5/24/22: Pt kept UNNA boots on the for the past week. He still has severe pain in his right heel. He states he gets a shooting pain in the foot when he puts pressure on the foot to stand up. He tries to stay off the foot as much as possible. He denies any fever or chills. He is compliant with his medication.   5/31/22: Pt kept UNNA boots on the for the past week. He still has severe pain in his right heel. He states he gets a shooting pain in the foot when he puts pressure on the foot to stand up. He tries to stay off the foot as much as possible. He denies any fever or chills. He is compliant with his medication.   6/7/22: Pt kept UNNA boots on the for the past week. He still has less pain in his right heel. He states he gets a shooting pain in the foot when he puts pressure on the foot to stand up. He tries to stay off the foot as much as possible. He denies any fever or chills. He is compliant with his medication.   6/14/22: Pt kept UNNA boots on the for the past week. He still has significant pain in his right heel. He states he gets a shooting pain in the foot when he puts pressure on the foot to stand up. He tries to stay off the foot as much as possible. He denies any fever or chills. He is compliant with his medication.   7/12/22: Pt had BLE UNNA boots applied 7/5/22. He has an appointment tomorrow for custom shoe fitting and cannot have UNNA boots on. He denies any new symptoms.   7/26/22: Pt kept ACE wraps on mid calves for the past two weeks and now has significant weeping of his upper calves bilaterally. He denies any fever or chills. He has pain in the BLE.   8/2/22: Pt kept BLE UNNA boots on for the past week. He still has circumferential weeping ulcers from his mid calves bilaterally. He denies any fever or chills. He has pain in the BLE.   8/9/22: Pt kept BLE UNNA boots on for the past week. He still has circumferential weeping ulcers from his mid calves bilaterally. He still has significant pain in his right heel. He denies any fever or chills. He has pain in the BLE.  8/17/22: Pt kept BLE UNNA boots on for the past week. He no longer has circumferential weeping ulcers, they have closed and healed with UNNA boot treatment . He still has significant pain in his right heel. He denies any fever or chills. He has pain in the BLE.   8/23/22: Pt kept BLE UNNA boots on for the past week. He no longer has circumferential weeping ulcers, they have closed and healed with UNNA boot treatment . He still has significant pain in his right heel. He denies any fever or chills. He has pain in the BLE.   8/30/22: Pt kept BLE UNNA boots on for the past week. Pt has gotten his custom fitted shoes and started to wear them daily. He still has significant pain in his right heel. He denies any fever or chills. He has pain in the BLE.   9/6/22: Pt kept BLE UNNA boots on for the past week. Pt has gotten his custom fitted shoes and started to wear them daily. He still has significant pain in his right heel. He denies any fever or chills. He has pain in the BLE.   9/13/22: Pt kept BLE UNNA boots on for the past week. Pt picked up his custom fitted shoes and started to wear them daily. He still has significant pain in his right heel. He denies any fever or chills.   9/20/22: Pt kept BLE UNNA boots on for the past week. Pt picked up his custom fitted shoes and started to wear them daily. He still has significant pain in his right heel. He denies any fever or chills.   10/4/22: Pt kept BLE UNNA boots on for the past two weeks. The bandages slid down to mid calf and he has been having severe weeping from his right leg.  He still has significant pain in his right heel. He denies any fever or chills.   10/10/22: Pt kept BLE UNNA boots on for the past week. He has weeping from his right posterior calf.  He still has significant pain in his right heel. He denies any fever or chills.   10/18/22: Pt kept BLE UNNA boots on for the past week. He has weeping from his right posterior calf.  He still has significant pain in his right heel. He denies any fever or chills.   10/25/22: Pt kept BLE UNNA boots on for the past week. He no longer has any weeping from the calves.  He still has significant pain in his right heel. He denies any fever or chills.   10/26/22: Pt states his RLE UNNA boot slid down last night and he has increased weeping from the posterior calf. He denies any fever or chills.   11/1/22: Pt kept BLE UNNA boots on for the past week. He has weeping from the posterior calves bilaterally.  He still has significant pain in his right heel. He denies any fever or chills.   11/8/22: Pt kept BLE UNNA boots on for the past week. He has weeping from the left posterior calf still. He still has significant pain in his right heel. He denies any fever or chills.   11/22/22: Pt kept BLE UNNA boots on for the past week. He no longer has any weeping from the calf. He has bilateral posterior thigh wounds for the past several weeks.  He still has significant pain in his right heel. He denies any fever or chills.   11/29/22: Pt kept BLE UNNA boots on for the past week. He has weeping from his right posterior calf. He has a right posterior thigh wound that is very painful.  He still has significant pain in his right heel. He denies any fever or chills.   12/6/22: Pt kept BLE UNNA boots on for the past week. He no longer has any weeping from his legs. He denies any fever or chills.   12/13/22: Pt kept BLE UNNA boots on for the past week. his RLE UNNA boot rolled down and he has blisters on posterior calf.  He no longer has any weeping from his left leg. He denies any fever or chills.   12/20/22: Pt kept BLE UNNA boots on for the past week. His RLE  blisters on posterior calf are improving.  He no longer has any weeping from his left leg. He denies any fever or chills.   12/27/22: Pt kept BLE UNNA boots on for the past week. His RLE  blisters on posterior calf are improving.  He no longer has any weeping from his left leg. He denies any fever or chills.   1/10/23: Pt kept BLE UNNA boots on for the past two weeks. His RLE  blisters on posterior calf are improving.  He no longer has any weeping from his left leg. He denies any fever or chills.   1/17/23: Pt kept BLE UNNA boots on for the past week. His RLE  blisters on posterior calf are improving.  He no longer has any weeping from his left leg. He denies any fever or chills.   1/24/23: Pt kept BLE UNNA boots on for the past week. His RLE  blisters on posterior calf are still present. He has severe pain in his right posterior thigh area. He is on oral abx prescribed by PCP.  He no longer has any weeping from his left leg. He denies any fever or chills.   1/30/23: Pt kept BLE UNNA boots on for the past week. His RLE  blisters have healed. He has less pain in his right posterior thigh area. He is on oral abx. He no longer has any weeping from his left leg. He denies any fever or chills.  [de-identified] : 2019: Patient underwent Right to left femoral-femoral artery bypass for nonhealing great toe wound and CLTI, which healed well. Bypass has remained patent on following up imaging. Patient denies claudication or pain in the LEs.   Now being followed for bilateral LE lymphedema and lymphorrhea. History of stasis ulcers.  Ambulates with rolling walker Tolerating weekly Unna boot wraps to lower extremities

## 2023-08-10 NOTE — ASSESSMENT
[FreeTextEntry1] : Patient underwent Right to left femoral-femoral artery bypass for nonhealing great toe wound and CLTI, which healed well. Bypass has remained patent on following up imaging. Patient denies claudication or pain in the LEs.   Now being followed for bilateral LE lymphedema and lymphorrhea. History of stasis ulcers.  Ambulates with rolling walker Tolerating weekly Unna boot wraps to lower extremities  Arterial duplex in office today demonstrates patent femoral-femoral artery bypass graft without flow limiting stenosis  Plan Strapping Unna boot wraps (3 layers) to bilateral LE applied in office today Follow up in 1 weeks to assess LEs [Arterial/Venous Disease] : arterial/venous disease [Foot care/Footwear] : foot care/footwear

## 2023-08-18 ENCOUNTER — APPOINTMENT (OUTPATIENT)
Dept: VASCULAR SURGERY | Facility: CLINIC | Age: 68
End: 2023-08-18
Payer: MEDICARE

## 2023-08-18 VITALS
HEIGHT: 68 IN | RESPIRATION RATE: 16 BRPM | DIASTOLIC BLOOD PRESSURE: 70 MMHG | SYSTOLIC BLOOD PRESSURE: 138 MMHG | HEART RATE: 83 BPM | TEMPERATURE: 97.5 F | OXYGEN SATURATION: 96 %

## 2023-08-18 PROCEDURE — 29580 STRAPPING UNNA BOOT: CPT | Mod: 50

## 2023-08-18 NOTE — PROCEDURE
[FreeTextEntry1] : b/l unna boot application to b/l lower extremities  [FreeTextEntry2] : b/l lower extrmeity lymphedema [FreeTextEntry3] : application of b/l unna boots - strapping unna boots

## 2023-08-21 NOTE — PROCEDURE
[FreeTextEntry1] : bilateral LE strapping unna boot application [FreeTextEntry2] : lymphedema and stasis ulcers [FreeTextEntry3] : 3 layers of zinc unna boot applied in office today (zinc, ACE wrap and Coban) Follow up in 1 week for evaluation

## 2023-08-25 ENCOUNTER — APPOINTMENT (OUTPATIENT)
Dept: VASCULAR SURGERY | Facility: CLINIC | Age: 68
End: 2023-08-25
Payer: MEDICARE

## 2023-08-25 VITALS
SYSTOLIC BLOOD PRESSURE: 134 MMHG | OXYGEN SATURATION: 95 % | RESPIRATION RATE: 16 BRPM | DIASTOLIC BLOOD PRESSURE: 73 MMHG | HEART RATE: 84 BPM | TEMPERATURE: 98.9 F | HEIGHT: 68 IN

## 2023-08-25 PROCEDURE — 99213 OFFICE O/P EST LOW 20 MIN: CPT

## 2023-08-27 NOTE — ASSESSMENT
[FreeTextEntry1] : Patient with significant edema bilateral lower extremities, multifactorial, has been g which will be easier to get compared to standard compression socks.  This replaced undergoing Unna boot placement.  Now has wrap compression socks in the office today.  She will continue to use the compression wrap and follow-up as needed with Dr. Foster

## 2023-08-27 NOTE — PHYSICAL EXAM
[de-identified] : Appears well [de-identified] : Bilateral lower extremities with improved edema no evidence of large ulcers.

## 2023-08-27 NOTE — HISTORY OF PRESENT ILLNESS
[FreeTextEntry1] : Patient with bilateral lower extremities edema.  Utilizes a wheelchair.  He is currently being treated with Unna boots.  He recently acquired wrap compression stockings.

## 2023-08-30 ENCOUNTER — APPOINTMENT (OUTPATIENT)
Dept: PAIN MANAGEMENT | Facility: CLINIC | Age: 68
End: 2023-08-30
Payer: MEDICARE

## 2023-08-30 DIAGNOSIS — G89.29 OTHER CHRONIC PAIN: ICD-10-CM

## 2023-08-30 PROCEDURE — 99213 OFFICE O/P EST LOW 20 MIN: CPT

## 2023-08-30 RX ORDER — HYDROMORPHONE HYDROCHLORIDE 4 MG/1
4 TABLET ORAL
Qty: 75 | Refills: 0 | Status: DISCONTINUED | COMMUNITY
Start: 2019-02-13 | End: 2023-08-30

## 2023-08-30 RX ORDER — METHADONE HYDROCHLORIDE 5 MG/1
TABLET ORAL
Refills: 0 | Status: DISCONTINUED | COMMUNITY
End: 2023-08-30

## 2023-08-30 RX ORDER — NALOXONE HYDROCHLORIDE 4 MG/.1ML
4 SPRAY NASAL
Qty: 1 | Refills: 0 | Status: ACTIVE | COMMUNITY
Start: 2023-08-30 | End: 1900-01-01

## 2023-08-30 RX ORDER — METHADONE HYDROCHLORIDE 10 MG/1
10 TABLET ORAL 4 TIMES DAILY
Refills: 0 | Status: DISCONTINUED | COMMUNITY
End: 2023-08-30

## 2023-08-30 NOTE — HISTORY OF PRESENT ILLNESS
[Lower back] : lower back [Gradual] : gradual [8] : 8 [Dull/Aching] : dull/aching [Localized] : localized [Sharp] : sharp [Stabbing] : stabbing [Constant] : constant [Household chores] : household chores [Work] : work [Sleep] : sleep [Rest] : rest [Meds] : meds [Sitting] : sitting [Standing] : standing [Walking] : walking [Bending forward] : bending forward [Physical therapy] : physical therapy [Disabled] : Work status: disabled [de-identified] : States his ADL is limited as he  is unable to walk long distances. He uses a walker/rollator to maneuver around his home. States he tries to sleep in his recliner to keep his legs  elevated to decrease swelling to his legs. Meds effective.  [] : no [FreeTextEntry1] : vicki hips vicki knees  [FreeTextEntry6] : IN KNEES  [de-identified] : 2023-05-01 [de-identified] : PT PERFORMED PHYSICAL THERAPY IN REHAB FOR 4 MONTHS ENDING IN 2023-05-01 7X WEEK \par  PT CONTINUING WITH HOME EXERCISES, WALKING WITH WALKER

## 2023-09-01 ENCOUNTER — APPOINTMENT (OUTPATIENT)
Dept: VASCULAR SURGERY | Facility: CLINIC | Age: 68
End: 2023-09-01
Payer: MEDICARE

## 2023-09-01 VITALS
RESPIRATION RATE: 16 BRPM | SYSTOLIC BLOOD PRESSURE: 110 MMHG | HEART RATE: 87 BPM | HEIGHT: 68 IN | OXYGEN SATURATION: 95 % | TEMPERATURE: 98.3 F | DIASTOLIC BLOOD PRESSURE: 66 MMHG

## 2023-09-01 PROCEDURE — 29580 STRAPPING UNNA BOOT: CPT | Mod: LT

## 2023-09-05 NOTE — PROCEDURE
[FreeTextEntry1] : LLE strapping unna boot application [FreeTextEntry2] : lymphedema and blisters; stasis wounds [FreeTextEntry3] : 3 layers applied to LLE Zinc Unna boot, ACE wrap and Coban  No complaints  Plan Follow up in 1 week

## 2023-09-11 ENCOUNTER — APPOINTMENT (OUTPATIENT)
Dept: VASCULAR SURGERY | Facility: CLINIC | Age: 68
End: 2023-09-11

## 2023-09-21 ENCOUNTER — APPOINTMENT (OUTPATIENT)
Dept: VASCULAR SURGERY | Facility: CLINIC | Age: 68
End: 2023-09-21
Payer: MEDICARE

## 2023-09-21 VITALS
DIASTOLIC BLOOD PRESSURE: 68 MMHG | TEMPERATURE: 97.9 F | RESPIRATION RATE: 16 BRPM | SYSTOLIC BLOOD PRESSURE: 125 MMHG | OXYGEN SATURATION: 97 % | HEART RATE: 78 BPM

## 2023-09-21 DIAGNOSIS — I87.2 VENOUS INSUFFICIENCY (CHRONIC) (PERIPHERAL): ICD-10-CM

## 2023-09-21 DIAGNOSIS — I89.8 OTHER SPECIFIED NONINFECTIVE DISORDERS OF LYMPHATIC VESSELS AND LYMPH NODES: ICD-10-CM

## 2023-09-21 PROCEDURE — 29580 STRAPPING UNNA BOOT: CPT | Mod: 50

## 2023-09-27 ENCOUNTER — APPOINTMENT (OUTPATIENT)
Dept: PAIN MANAGEMENT | Facility: CLINIC | Age: 68
End: 2023-09-27
Payer: MEDICARE

## 2023-09-27 VITALS — BODY MASS INDEX: 34.4 KG/M2 | WEIGHT: 227 LBS | HEIGHT: 68 IN

## 2023-09-27 PROCEDURE — 99213 OFFICE O/P EST LOW 20 MIN: CPT | Mod: 95

## 2023-09-28 ENCOUNTER — APPOINTMENT (OUTPATIENT)
Dept: VASCULAR SURGERY | Facility: CLINIC | Age: 68
End: 2023-09-28

## 2023-09-29 ENCOUNTER — APPOINTMENT (OUTPATIENT)
Dept: VASCULAR SURGERY | Facility: CLINIC | Age: 68
End: 2023-09-29

## 2023-10-02 ENCOUNTER — APPOINTMENT (OUTPATIENT)
Dept: VASCULAR SURGERY | Facility: CLINIC | Age: 68
End: 2023-10-02
Payer: MEDICARE

## 2023-10-02 VITALS
TEMPERATURE: 98.2 F | DIASTOLIC BLOOD PRESSURE: 81 MMHG | SYSTOLIC BLOOD PRESSURE: 130 MMHG | OXYGEN SATURATION: 96 % | RESPIRATION RATE: 16 BRPM | HEIGHT: 68 IN | HEART RATE: 78 BPM

## 2023-10-02 DIAGNOSIS — I83.892 VARICOSE VEINS OF LEFT LOWER EXTREMITY WITH OTHER COMPLICATIONS: ICD-10-CM

## 2023-10-02 DIAGNOSIS — E66.01 MORBID (SEVERE) OBESITY DUE TO EXCESS CALORIES: ICD-10-CM

## 2023-10-02 DIAGNOSIS — I83.93 ASYMPTOMATIC VARICOSE VEINS OF BILATERAL LOWER EXTREMITIES: ICD-10-CM

## 2023-10-02 DIAGNOSIS — I73.9 PERIPHERAL VASCULAR DISEASE, UNSPECIFIED: ICD-10-CM

## 2023-10-02 PROCEDURE — 99214 OFFICE O/P EST MOD 30 MIN: CPT

## 2023-10-04 PROBLEM — I83.892 VARICOSE VEINS OF LEFT LOWER EXTREMITY WITH COMPLICATIONS: Status: ACTIVE | Noted: 2023-10-04

## 2023-10-04 PROBLEM — I83.93 VARICOSE VEINS OF LEGS: Status: ACTIVE | Noted: 2018-02-13

## 2023-10-04 PROBLEM — E66.01 MORBID OBESITY: Status: ACTIVE | Noted: 2022-10-14

## 2023-10-04 PROBLEM — I73.9 PAD (PERIPHERAL ARTERY DISEASE): Status: ACTIVE | Noted: 2019-07-01

## 2023-10-05 PROBLEM — I87.2 VENOUS DERMATITIS: Status: ACTIVE | Noted: 2019-03-29

## 2023-10-05 PROBLEM — I89.8 LYMPHORRHEA: Status: ACTIVE | Noted: 2019-04-22

## 2023-10-09 ENCOUNTER — APPOINTMENT (OUTPATIENT)
Dept: VASCULAR SURGERY | Facility: CLINIC | Age: 68
End: 2023-10-09
Payer: MEDICARE

## 2023-10-09 VITALS
SYSTOLIC BLOOD PRESSURE: 110 MMHG | HEART RATE: 88 BPM | DIASTOLIC BLOOD PRESSURE: 60 MMHG | TEMPERATURE: 97.9 F | OXYGEN SATURATION: 96 % | RESPIRATION RATE: 16 BRPM

## 2023-10-09 DIAGNOSIS — I89.0 LYMPHEDEMA, NOT ELSEWHERE CLASSIFIED: ICD-10-CM

## 2023-10-09 PROCEDURE — 99213 OFFICE O/P EST LOW 20 MIN: CPT

## 2023-10-12 NOTE — DISCHARGE NOTE PROVIDER - HOSPITAL COURSE
Patient is a 62 y/o male AxOx3 with chief complaints of chronic left leg pain for the last year. Patient reports to have a blockage to left leg artery. Patient has history chronic pvd. Patient presented on 10/24 to [facility] for a left femoral endarterectomy. During the procedure it was discovered that proximal aspect of artery was atrophic and the femoral artery had bounding pulses due to supply by a collateral artery. At that time it was decided to perform a femora-femoral bypass to provide adequate blood supply to LLE. Patient had palpable DP on LLE after surgery. On 10/25 patient's buchanan was removed and he passed TOV, pulses still palpable. Patient was ambulating and tolerating diet but still in moderate pain in groins. From 10/26 to 10/27 patient continue to ambulate and pain improved. Patient has palpable DP pulses b/l and is ready to be discharged. Dressing: bandage

## 2023-10-16 ENCOUNTER — APPOINTMENT (OUTPATIENT)
Dept: VASCULAR SURGERY | Facility: CLINIC | Age: 68
End: 2023-10-16

## 2023-10-20 NOTE — PROGRESS NOTE ADULT - SUBJECTIVE AND OBJECTIVE BOX
INFECTIOUS DISEASES AND INTERNAL MEDICINE at [facility]  =======================================================  Raoul Johns MD  Diplomates American Board of Internal Medicine and Infectious Diseases  Telephone [phone]  Fax            [phone]  =======================================================    RANDY MARTINEZ [id number]    Follow up: LEG CELLULITIS AND SCROTAL CELLULITIS    Allergies:  No Known Allergies      Medications:  apixaban 5 milliGRAM(s) Oral every 12 hours  aspirin enteric coated 81 milliGRAM(s) Oral daily  dextrose 40% Gel 15 Gram(s) Oral once PRN  dextrose 5%. 1000 milliLiter(s) IV Continuous <Continuous>  dextrose 50% Injectable 12.5 Gram(s) IV Push once  dextrose 50% Injectable 25 Gram(s) IV Push once  dextrose 50% Injectable 25 Gram(s) IV Push once  furosemide    Tablet 40 milliGRAM(s) Oral daily  gabapentin 800 milliGRAM(s) Oral three times a day  glucagon  Injectable 1 milliGRAM(s) IntraMuscular once PRN  insulin lispro (HumaLOG) corrective regimen sliding scale   SubCutaneous three times a day before meals  insulin lispro (HumaLOG) corrective regimen sliding scale   SubCutaneous at bedtime  lisinopril 2.5 milliGRAM(s) Oral daily  methadone    Tablet 10 milliGRAM(s) Oral every 8 hours  sertraline 100 milliGRAM(s) Oral daily  simvastatin 20 milliGRAM(s) Oral at bedtime  traZODone 50 milliGRAM(s) Oral at bedtime  vancomycin  IVPB 1250 milliGRAM(s) IV Intermittent <User Schedule>    SOCIAL       FAMILY   FAMILY HISTORY:  Family history of diabetes mellitus (DM)  Family history of diabetes mellitus (DM) (Sibling)    REVIEW OF SYSTEMS:  CONSTITUTIONAL:  No Fever or chills  HEENT:   No diplopia or blurred vision.  No earache, sore throat or runny nose.  CARDIOVASCULAR:  No pressure, squeezing, strangling, tightness, heaviness or aching about the chest, neck, axilla or epigastrium.  RESPIRATORY:  No cough, shortness of breath, PND or orthopnea.  GASTROINTESTINAL:  No nausea, vomiting or diarrhea.  GENITOURINARY:  No dysuria, frequency or urgency. No Blood in urine  MUSCULOSKELETAL:   AS PER HPI  SKIN:  No change in skin, hair or nails.  NEUROLOGIC:  No paresthesias, fasciculations, seizures or weakness.  PSYCHIATRIC:  No disorder of thought or mood.  ENDOCRINE:  No heat or cold intolerance, polyuria or polydipsia.  HEMATOLOGICAL:  No easy bruising or bleeding.            Physical Exam:  I  Vital Signs Last 24 Hrs  T(C): 36.8 (07 Apr 2020 23:25), Max: 36.8 (07 Apr 2020 23:25)  T(F): 98.3 (07 Apr 2020 23:25), Max: 98.3 (07 Apr 2020 23:25)  HR: 76 (08 Apr 2020 05:15) (73 - 76)  BP: 126/78 (08 Apr 2020 05:15) (126/78 - 135/62)  BP(mean): --  RR: 18 (07 Apr 2020 23:25) (18 - 18)  SpO2: 97% (07 Apr 2020 23:25) (97% - 97%)    GEN: NAD,   HEENT: normocephalic and atraumatic. EOMI. DANNY.    NECK: Supple. No carotid bruits.  No lymphadenopathy or thyromegaly.  LUNGS: Clear to auscultation.  HEART: Regular rate and rhythm without murmur.  ABDOMEN: Soft, nontender, and nondistended.  Positive bowel sounds.    : No CVA tenderness  EXTREMITIES: Without any cyanosis, clubbing, rash, lesions or edema.  MSK: no joint swelling  NEUROLOGIC: Cranial nerves II through XII are grossly intact.  PSYCHIATRIC: Appropriate affect .  SKIN: No ulceration or induration present.        Labs:                          9.6    10.65 )-----------( 354      ( 07 Apr 2020 08:08 )             31.9   04-07    140  |  97<L>  |  17.0  ----------------------------<  158<H>  3.8   |  26.0  |  0.79    Ca    9.1      07 Apr 2020 08:08  Phos  3.5     04-07  Mg     1.8     04-07 Dapsone Pregnancy And Lactation Text: This medication is Pregnancy Category C and is not considered safe during pregnancy or breast feeding.

## 2023-10-25 ENCOUNTER — APPOINTMENT (OUTPATIENT)
Dept: PAIN MANAGEMENT | Facility: CLINIC | Age: 68
End: 2023-10-25
Payer: MEDICARE

## 2023-10-25 VITALS — WEIGHT: 227 LBS | BODY MASS INDEX: 34.4 KG/M2 | HEIGHT: 68 IN

## 2023-10-25 PROCEDURE — 99213 OFFICE O/P EST LOW 20 MIN: CPT | Mod: 95

## 2023-12-06 NOTE — DISCHARGE NOTE NURSING/CASE MANAGEMENT/SOCIAL WORK - NSDCVIVACCINE_GEN_ALL_CORE_FT
Parent/guardian denies any cough, cold, pulmonary symptoms or infections in the past month. No Vaccines Administered.

## 2023-12-13 ENCOUNTER — APPOINTMENT (OUTPATIENT)
Dept: PAIN MANAGEMENT | Facility: CLINIC | Age: 68
End: 2023-12-13
Payer: MEDICARE

## 2023-12-13 VITALS — BODY MASS INDEX: 34.4 KG/M2 | WEIGHT: 227 LBS | HEIGHT: 68 IN

## 2023-12-13 DIAGNOSIS — G89.4 CHRONIC PAIN SYNDROME: ICD-10-CM

## 2023-12-13 PROCEDURE — 99213 OFFICE O/P EST LOW 20 MIN: CPT | Mod: 95

## 2023-12-13 NOTE — DISCUSSION/SUMMARY
[Medication Risks Reviewed] : Medication risks reviewed [de-identified] : Prescriptions renewed. [place] and appropriate. SOAPP-R completed on chart. The patient's medications are documented to the best of their ability. Quality of life and functional ability improved on medications. The patient is showing no aberrant behavior or evidence of diversion. The patient was advised not to use narcotic medication while operating an automobile or heavy machinery due to potential sedation or dizziness. The patient was educated to the risks associated with potential opioid dependence and addiction. Urine toxicology screens as per office protocol. Use of multimodal analgesia used prn. Follow up one month.

## 2023-12-13 NOTE — HISTORY OF PRESENT ILLNESS
[Lower back] : lower back [Gradual] : gradual [7] : 7 [6] : 6 [Localized] : localized [Radiating] : radiating [Sharp] : sharp [Constant] : constant [Household chores] : household chores [Work] : work [Sleep] : sleep [Rest] : rest [Meds] : meds [Sitting] : sitting [Standing] : standing [Walking] : walking [Bending forward] : bending forward [Physical therapy] : physical therapy [Disabled] : Work status: disabled [] : no [FreeTextEntry1] : JOHN HIPS JOHN KNEES  [FreeTextEntry6] : NUMBNESS IN JOHN KNEES /JOHN LEGS  [FreeTextEntry7] : DOWN TO LEGS  [de-identified] : 2023-05-01 [de-identified] : PT PERFORMED PHYSICAL THERAPY IN REHAB FOR 4 MONTHS ENDING IN 2023-08-01 7X WEEK  AS OF 2023-12-13 CONFIRMED PT CONTINUING WITH HOME EXERCISES, WALKING WITH WALKER AS TOLERATED

## 2023-12-13 NOTE — REASON FOR VISIT
[Follow-Up Visit] : a follow-up pain management visit [Home] : at home, [unfilled] , at the time of the visit. [Medical Office: ([facility])___] : at the medical office located in  [Patient] : the patient [Self] : self [FreeTextEntry2] : MED REFILL

## 2024-01-16 ENCOUNTER — INPATIENT (INPATIENT)
Facility: HOSPITAL | Age: 69
LOS: 5 days | Discharge: HOME | DRG: 871 | End: 2024-01-22
Attending: HOSPITALIST | Admitting: HOSPITALIST
Payer: MEDICARE

## 2024-01-16 VITALS
RESPIRATION RATE: 18 BRPM | HEART RATE: 96 BPM | SYSTOLIC BLOOD PRESSURE: 115 MMHG | WEIGHT: 235.01 LBS | TEMPERATURE: 100 F | OXYGEN SATURATION: 93 % | DIASTOLIC BLOOD PRESSURE: 71 MMHG

## 2024-01-16 DIAGNOSIS — Z95.828 PRESENCE OF OTHER VASCULAR IMPLANTS AND GRAFTS: Chronic | ICD-10-CM

## 2024-01-16 DIAGNOSIS — R07.9 CHEST PAIN, UNSPECIFIED: ICD-10-CM

## 2024-01-16 DIAGNOSIS — H26.9 UNSPECIFIED CATARACT: Chronic | ICD-10-CM

## 2024-01-16 DIAGNOSIS — G89.4 CHRONIC PAIN SYNDROME: ICD-10-CM

## 2024-01-16 DIAGNOSIS — R50.9 FEVER, UNSPECIFIED: ICD-10-CM

## 2024-01-16 DIAGNOSIS — Z96.642 PRESENCE OF LEFT ARTIFICIAL HIP JOINT: Chronic | ICD-10-CM

## 2024-01-16 LAB
ALBUMIN SERPL ELPH-MCNC: 3.8 G/DL — SIGNIFICANT CHANGE UP (ref 3.3–5.2)
ALBUMIN SERPL ELPH-MCNC: 3.8 G/DL — SIGNIFICANT CHANGE UP (ref 3.3–5.2)
ALP SERPL-CCNC: 80 U/L — SIGNIFICANT CHANGE UP (ref 40–120)
ALP SERPL-CCNC: 80 U/L — SIGNIFICANT CHANGE UP (ref 40–120)
ALT FLD-CCNC: 10 U/L — SIGNIFICANT CHANGE UP
ALT FLD-CCNC: 10 U/L — SIGNIFICANT CHANGE UP
AMPHET UR-MCNC: NEGATIVE — SIGNIFICANT CHANGE UP
AMPHET UR-MCNC: NEGATIVE — SIGNIFICANT CHANGE UP
ANION GAP SERPL CALC-SCNC: 13 MMOL/L — SIGNIFICANT CHANGE UP (ref 5–17)
ANION GAP SERPL CALC-SCNC: 13 MMOL/L — SIGNIFICANT CHANGE UP (ref 5–17)
APPEARANCE UR: CLEAR — SIGNIFICANT CHANGE UP
APPEARANCE UR: CLEAR — SIGNIFICANT CHANGE UP
APTT BLD: 36 SEC — HIGH (ref 24.5–35.6)
APTT BLD: 36 SEC — HIGH (ref 24.5–35.6)
AST SERPL-CCNC: 11 U/L — SIGNIFICANT CHANGE UP
AST SERPL-CCNC: 11 U/L — SIGNIFICANT CHANGE UP
BACTERIA # UR AUTO: NEGATIVE /HPF — SIGNIFICANT CHANGE UP
BACTERIA # UR AUTO: NEGATIVE /HPF — SIGNIFICANT CHANGE UP
BARBITURATES UR SCN-MCNC: NEGATIVE — SIGNIFICANT CHANGE UP
BARBITURATES UR SCN-MCNC: NEGATIVE — SIGNIFICANT CHANGE UP
BASOPHILS # BLD AUTO: 0.06 K/UL — SIGNIFICANT CHANGE UP (ref 0–0.2)
BASOPHILS # BLD AUTO: 0.06 K/UL — SIGNIFICANT CHANGE UP (ref 0–0.2)
BASOPHILS NFR BLD AUTO: 0.4 % — SIGNIFICANT CHANGE UP (ref 0–2)
BASOPHILS NFR BLD AUTO: 0.4 % — SIGNIFICANT CHANGE UP (ref 0–2)
BENZODIAZ UR-MCNC: NEGATIVE — SIGNIFICANT CHANGE UP
BENZODIAZ UR-MCNC: NEGATIVE — SIGNIFICANT CHANGE UP
BILIRUB SERPL-MCNC: 0.5 MG/DL — SIGNIFICANT CHANGE UP (ref 0.4–2)
BILIRUB SERPL-MCNC: 0.5 MG/DL — SIGNIFICANT CHANGE UP (ref 0.4–2)
BILIRUB UR-MCNC: NEGATIVE — SIGNIFICANT CHANGE UP
BILIRUB UR-MCNC: NEGATIVE — SIGNIFICANT CHANGE UP
BUN SERPL-MCNC: 21.9 MG/DL — HIGH (ref 8–20)
BUN SERPL-MCNC: 21.9 MG/DL — HIGH (ref 8–20)
CALCIUM SERPL-MCNC: 9.3 MG/DL — SIGNIFICANT CHANGE UP (ref 8.4–10.5)
CALCIUM SERPL-MCNC: 9.3 MG/DL — SIGNIFICANT CHANGE UP (ref 8.4–10.5)
CAST: 3 /LPF — SIGNIFICANT CHANGE UP (ref 0–4)
CAST: 3 /LPF — SIGNIFICANT CHANGE UP (ref 0–4)
CHLORIDE SERPL-SCNC: 101 MMOL/L — SIGNIFICANT CHANGE UP (ref 96–108)
CHLORIDE SERPL-SCNC: 101 MMOL/L — SIGNIFICANT CHANGE UP (ref 96–108)
CO2 SERPL-SCNC: 24 MMOL/L — SIGNIFICANT CHANGE UP (ref 22–29)
CO2 SERPL-SCNC: 24 MMOL/L — SIGNIFICANT CHANGE UP (ref 22–29)
COCAINE METAB.OTHER UR-MCNC: NEGATIVE — SIGNIFICANT CHANGE UP
COCAINE METAB.OTHER UR-MCNC: NEGATIVE — SIGNIFICANT CHANGE UP
COLOR SPEC: YELLOW — SIGNIFICANT CHANGE UP
COLOR SPEC: YELLOW — SIGNIFICANT CHANGE UP
CREAT SERPL-MCNC: 1.14 MG/DL — SIGNIFICANT CHANGE UP (ref 0.5–1.3)
CREAT SERPL-MCNC: 1.14 MG/DL — SIGNIFICANT CHANGE UP (ref 0.5–1.3)
DIFF PNL FLD: NEGATIVE — SIGNIFICANT CHANGE UP
DIFF PNL FLD: NEGATIVE — SIGNIFICANT CHANGE UP
EGFR: 70 ML/MIN/1.73M2 — SIGNIFICANT CHANGE UP
EGFR: 70 ML/MIN/1.73M2 — SIGNIFICANT CHANGE UP
EOSINOPHIL # BLD AUTO: 0.01 K/UL — SIGNIFICANT CHANGE UP (ref 0–0.5)
EOSINOPHIL # BLD AUTO: 0.01 K/UL — SIGNIFICANT CHANGE UP (ref 0–0.5)
EOSINOPHIL NFR BLD AUTO: 0.1 % — SIGNIFICANT CHANGE UP (ref 0–6)
EOSINOPHIL NFR BLD AUTO: 0.1 % — SIGNIFICANT CHANGE UP (ref 0–6)
GLUCOSE SERPL-MCNC: 171 MG/DL — HIGH (ref 70–99)
GLUCOSE SERPL-MCNC: 171 MG/DL — HIGH (ref 70–99)
GLUCOSE UR QL: NEGATIVE MG/DL — SIGNIFICANT CHANGE UP
GLUCOSE UR QL: NEGATIVE MG/DL — SIGNIFICANT CHANGE UP
HCT VFR BLD CALC: 30.2 % — LOW (ref 39–50)
HCT VFR BLD CALC: 30.2 % — LOW (ref 39–50)
HGB BLD-MCNC: 9.8 G/DL — LOW (ref 13–17)
HGB BLD-MCNC: 9.8 G/DL — LOW (ref 13–17)
IMM GRANULOCYTES NFR BLD AUTO: 1.2 % — HIGH (ref 0–0.9)
IMM GRANULOCYTES NFR BLD AUTO: 1.2 % — HIGH (ref 0–0.9)
INR BLD: 1.69 RATIO — HIGH (ref 0.85–1.18)
INR BLD: 1.69 RATIO — HIGH (ref 0.85–1.18)
KETONES UR-MCNC: NEGATIVE MG/DL — SIGNIFICANT CHANGE UP
KETONES UR-MCNC: NEGATIVE MG/DL — SIGNIFICANT CHANGE UP
LEUKOCYTE ESTERASE UR-ACNC: NEGATIVE — SIGNIFICANT CHANGE UP
LEUKOCYTE ESTERASE UR-ACNC: NEGATIVE — SIGNIFICANT CHANGE UP
LYMPHOCYTES # BLD AUTO: 0.74 K/UL — LOW (ref 1–3.3)
LYMPHOCYTES # BLD AUTO: 0.74 K/UL — LOW (ref 1–3.3)
LYMPHOCYTES # BLD AUTO: 4.8 % — LOW (ref 13–44)
LYMPHOCYTES # BLD AUTO: 4.8 % — LOW (ref 13–44)
MAGNESIUM SERPL-MCNC: 1.8 MG/DL — SIGNIFICANT CHANGE UP (ref 1.8–2.6)
MAGNESIUM SERPL-MCNC: 1.8 MG/DL — SIGNIFICANT CHANGE UP (ref 1.8–2.6)
MCHC RBC-ENTMCNC: 28.2 PG — SIGNIFICANT CHANGE UP (ref 27–34)
MCHC RBC-ENTMCNC: 28.2 PG — SIGNIFICANT CHANGE UP (ref 27–34)
MCHC RBC-ENTMCNC: 32.5 GM/DL — SIGNIFICANT CHANGE UP (ref 32–36)
MCHC RBC-ENTMCNC: 32.5 GM/DL — SIGNIFICANT CHANGE UP (ref 32–36)
MCV RBC AUTO: 86.8 FL — SIGNIFICANT CHANGE UP (ref 80–100)
MCV RBC AUTO: 86.8 FL — SIGNIFICANT CHANGE UP (ref 80–100)
METHADONE UR-MCNC: POSITIVE
METHADONE UR-MCNC: POSITIVE
MONOCYTES # BLD AUTO: 0.64 K/UL — SIGNIFICANT CHANGE UP (ref 0–0.9)
MONOCYTES # BLD AUTO: 0.64 K/UL — SIGNIFICANT CHANGE UP (ref 0–0.9)
MONOCYTES NFR BLD AUTO: 4.1 % — SIGNIFICANT CHANGE UP (ref 2–14)
MONOCYTES NFR BLD AUTO: 4.1 % — SIGNIFICANT CHANGE UP (ref 2–14)
NEUTROPHILS # BLD AUTO: 13.9 K/UL — HIGH (ref 1.8–7.4)
NEUTROPHILS # BLD AUTO: 13.9 K/UL — HIGH (ref 1.8–7.4)
NEUTROPHILS NFR BLD AUTO: 89.4 % — HIGH (ref 43–77)
NEUTROPHILS NFR BLD AUTO: 89.4 % — HIGH (ref 43–77)
NITRITE UR-MCNC: NEGATIVE — SIGNIFICANT CHANGE UP
NITRITE UR-MCNC: NEGATIVE — SIGNIFICANT CHANGE UP
NT-PROBNP SERPL-SCNC: 658 PG/ML — HIGH (ref 0–300)
NT-PROBNP SERPL-SCNC: 658 PG/ML — HIGH (ref 0–300)
OPIATES UR-MCNC: POSITIVE
OPIATES UR-MCNC: POSITIVE
PCP SPEC-MCNC: SIGNIFICANT CHANGE UP
PCP SPEC-MCNC: SIGNIFICANT CHANGE UP
PCP UR-MCNC: NEGATIVE — SIGNIFICANT CHANGE UP
PCP UR-MCNC: NEGATIVE — SIGNIFICANT CHANGE UP
PH UR: 5.5 — SIGNIFICANT CHANGE UP (ref 5–8)
PH UR: 5.5 — SIGNIFICANT CHANGE UP (ref 5–8)
PLATELET # BLD AUTO: 272 K/UL — SIGNIFICANT CHANGE UP (ref 150–400)
PLATELET # BLD AUTO: 272 K/UL — SIGNIFICANT CHANGE UP (ref 150–400)
POTASSIUM SERPL-MCNC: 4.5 MMOL/L — SIGNIFICANT CHANGE UP (ref 3.5–5.3)
POTASSIUM SERPL-MCNC: 4.5 MMOL/L — SIGNIFICANT CHANGE UP (ref 3.5–5.3)
POTASSIUM SERPL-SCNC: 4.5 MMOL/L — SIGNIFICANT CHANGE UP (ref 3.5–5.3)
POTASSIUM SERPL-SCNC: 4.5 MMOL/L — SIGNIFICANT CHANGE UP (ref 3.5–5.3)
PROT SERPL-MCNC: 8.5 G/DL — SIGNIFICANT CHANGE UP (ref 6.6–8.7)
PROT SERPL-MCNC: 8.5 G/DL — SIGNIFICANT CHANGE UP (ref 6.6–8.7)
PROT UR-MCNC: 30 MG/DL
PROT UR-MCNC: 30 MG/DL
PROTHROM AB SERPL-ACNC: 18.5 SEC — HIGH (ref 9.5–13)
PROTHROM AB SERPL-ACNC: 18.5 SEC — HIGH (ref 9.5–13)
RAPID RVP RESULT: SIGNIFICANT CHANGE UP
RAPID RVP RESULT: SIGNIFICANT CHANGE UP
RBC # BLD: 3.48 M/UL — LOW (ref 4.2–5.8)
RBC # BLD: 3.48 M/UL — LOW (ref 4.2–5.8)
RBC # FLD: 15.2 % — HIGH (ref 10.3–14.5)
RBC # FLD: 15.2 % — HIGH (ref 10.3–14.5)
RBC CASTS # UR COMP ASSIST: 4 /HPF — SIGNIFICANT CHANGE UP (ref 0–4)
RBC CASTS # UR COMP ASSIST: 4 /HPF — SIGNIFICANT CHANGE UP (ref 0–4)
SARS-COV-2 RNA SPEC QL NAA+PROBE: SIGNIFICANT CHANGE UP
SARS-COV-2 RNA SPEC QL NAA+PROBE: SIGNIFICANT CHANGE UP
SODIUM SERPL-SCNC: 138 MMOL/L — SIGNIFICANT CHANGE UP (ref 135–145)
SODIUM SERPL-SCNC: 138 MMOL/L — SIGNIFICANT CHANGE UP (ref 135–145)
SP GR SPEC: 1.02 — SIGNIFICANT CHANGE UP (ref 1–1.03)
SP GR SPEC: 1.02 — SIGNIFICANT CHANGE UP (ref 1–1.03)
SQUAMOUS # UR AUTO: 1 /HPF — SIGNIFICANT CHANGE UP (ref 0–5)
SQUAMOUS # UR AUTO: 1 /HPF — SIGNIFICANT CHANGE UP (ref 0–5)
THC UR QL: NEGATIVE — SIGNIFICANT CHANGE UP
THC UR QL: NEGATIVE — SIGNIFICANT CHANGE UP
TROPONIN T, HIGH SENSITIVITY RESULT: 30 NG/L — SIGNIFICANT CHANGE UP (ref 0–51)
TROPONIN T, HIGH SENSITIVITY RESULT: 30 NG/L — SIGNIFICANT CHANGE UP (ref 0–51)
TROPONIN T, HIGH SENSITIVITY RESULT: 37 NG/L — SIGNIFICANT CHANGE UP (ref 0–51)
UROBILINOGEN FLD QL: 1 MG/DL — SIGNIFICANT CHANGE UP (ref 0.2–1)
UROBILINOGEN FLD QL: 1 MG/DL — SIGNIFICANT CHANGE UP (ref 0.2–1)
WBC # BLD: 15.54 K/UL — HIGH (ref 3.8–10.5)
WBC # BLD: 15.54 K/UL — HIGH (ref 3.8–10.5)
WBC # FLD AUTO: 15.54 K/UL — HIGH (ref 3.8–10.5)
WBC # FLD AUTO: 15.54 K/UL — HIGH (ref 3.8–10.5)
WBC UR QL: 0 /HPF — SIGNIFICANT CHANGE UP (ref 0–5)
WBC UR QL: 0 /HPF — SIGNIFICANT CHANGE UP (ref 0–5)

## 2024-01-16 PROCEDURE — 73522 X-RAY EXAM HIPS BI 3-4 VIEWS: CPT | Mod: 26

## 2024-01-16 PROCEDURE — 99223 1ST HOSP IP/OBS HIGH 75: CPT

## 2024-01-16 PROCEDURE — 93308 TTE F-UP OR LMTD: CPT | Mod: 26

## 2024-01-16 PROCEDURE — 99285 EMERGENCY DEPT VISIT HI MDM: CPT

## 2024-01-16 PROCEDURE — 93321 DOPPLER ECHO F-UP/LMTD STD: CPT | Mod: 26

## 2024-01-16 PROCEDURE — 71045 X-RAY EXAM CHEST 1 VIEW: CPT | Mod: 26

## 2024-01-16 PROCEDURE — 73562 X-RAY EXAM OF KNEE 3: CPT | Mod: 26,50

## 2024-01-16 RX ORDER — DEXTROSE 50 % IN WATER 50 %
15 SYRINGE (ML) INTRAVENOUS ONCE
Refills: 0 | Status: DISCONTINUED | OUTPATIENT
Start: 2024-01-16 | End: 2024-01-21

## 2024-01-16 RX ORDER — SODIUM CHLORIDE 9 MG/ML
3300 INJECTION, SOLUTION INTRAVENOUS ONCE
Refills: 0 | Status: COMPLETED | OUTPATIENT
Start: 2024-01-16 | End: 2024-01-16

## 2024-01-16 RX ORDER — CEFTRIAXONE 500 MG/1
1000 INJECTION, POWDER, FOR SOLUTION INTRAMUSCULAR; INTRAVENOUS ONCE
Refills: 0 | Status: COMPLETED | OUTPATIENT
Start: 2024-01-16 | End: 2024-01-16

## 2024-01-16 RX ORDER — SODIUM CHLORIDE 9 MG/ML
1000 INJECTION, SOLUTION INTRAVENOUS
Refills: 0 | Status: DISCONTINUED | OUTPATIENT
Start: 2024-01-16 | End: 2024-01-21

## 2024-01-16 RX ORDER — DEXTROSE 50 % IN WATER 50 %
25 SYRINGE (ML) INTRAVENOUS ONCE
Refills: 0 | Status: DISCONTINUED | OUTPATIENT
Start: 2024-01-16 | End: 2024-01-21

## 2024-01-16 RX ORDER — METHADONE HYDROCHLORIDE 40 MG/1
10 TABLET ORAL
Refills: 0 | Status: DISCONTINUED | OUTPATIENT
Start: 2024-01-16 | End: 2024-01-22

## 2024-01-16 RX ORDER — ONDANSETRON 8 MG/1
4 TABLET, FILM COATED ORAL EVERY 8 HOURS
Refills: 0 | Status: DISCONTINUED | OUTPATIENT
Start: 2024-01-16 | End: 2024-01-22

## 2024-01-16 RX ORDER — PIOGLITAZONE HYDROCHLORIDE 15 MG/1
1 TABLET ORAL
Qty: 0 | Refills: 0 | DISCHARGE

## 2024-01-16 RX ORDER — APIXABAN 2.5 MG/1
5 TABLET, FILM COATED ORAL EVERY 12 HOURS
Refills: 0 | Status: DISCONTINUED | OUTPATIENT
Start: 2024-01-16 | End: 2024-01-22

## 2024-01-16 RX ORDER — GLIMEPIRIDE 1 MG
2 TABLET ORAL
Qty: 0 | Refills: 0 | DISCHARGE

## 2024-01-16 RX ORDER — INSULIN LISPRO 100/ML
VIAL (ML) SUBCUTANEOUS
Refills: 0 | Status: DISCONTINUED | OUTPATIENT
Start: 2024-01-16 | End: 2024-01-21

## 2024-01-16 RX ORDER — CEFAZOLIN SODIUM 1 G
1000 VIAL (EA) INJECTION EVERY 8 HOURS
Refills: 0 | Status: DISCONTINUED | OUTPATIENT
Start: 2024-01-16 | End: 2024-01-16

## 2024-01-16 RX ORDER — TRAZODONE HCL 50 MG
50 TABLET ORAL ONCE
Refills: 0 | Status: COMPLETED | OUTPATIENT
Start: 2024-01-16 | End: 2024-01-17

## 2024-01-16 RX ORDER — SIMVASTATIN 20 MG/1
1 TABLET, FILM COATED ORAL
Qty: 0 | Refills: 0 | DISCHARGE

## 2024-01-16 RX ORDER — GABAPENTIN 400 MG/1
600 CAPSULE ORAL ONCE
Refills: 0 | Status: COMPLETED | OUTPATIENT
Start: 2024-01-16 | End: 2024-01-17

## 2024-01-16 RX ORDER — METHADONE HYDROCHLORIDE 40 MG/1
1 TABLET ORAL
Refills: 0 | DISCHARGE

## 2024-01-16 RX ORDER — LISINOPRIL 2.5 MG/1
2.5 TABLET ORAL DAILY
Refills: 0 | Status: DISCONTINUED | OUTPATIENT
Start: 2024-01-16 | End: 2024-01-18

## 2024-01-16 RX ORDER — CEFAZOLIN SODIUM 1 G
1000 VIAL (EA) INJECTION EVERY 8 HOURS
Refills: 0 | Status: DISCONTINUED | OUTPATIENT
Start: 2024-01-16 | End: 2024-01-22

## 2024-01-16 RX ORDER — CEFTRIAXONE 500 MG/1
1000 INJECTION, POWDER, FOR SOLUTION INTRAMUSCULAR; INTRAVENOUS ONCE
Refills: 0 | Status: DISCONTINUED | OUTPATIENT
Start: 2024-01-16 | End: 2024-01-16

## 2024-01-16 RX ORDER — GLUCAGON INJECTION, SOLUTION 0.5 MG/.1ML
1 INJECTION, SOLUTION SUBCUTANEOUS ONCE
Refills: 0 | Status: DISCONTINUED | OUTPATIENT
Start: 2024-01-16 | End: 2024-01-21

## 2024-01-16 RX ORDER — SERTRALINE 25 MG/1
100 TABLET, FILM COATED ORAL DAILY
Refills: 0 | Status: DISCONTINUED | OUTPATIENT
Start: 2024-01-16 | End: 2024-01-22

## 2024-01-16 RX ORDER — ASPIRIN/CALCIUM CARB/MAGNESIUM 324 MG
81 TABLET ORAL DAILY
Refills: 0 | Status: DISCONTINUED | OUTPATIENT
Start: 2024-01-16 | End: 2024-01-22

## 2024-01-16 RX ORDER — SIMVASTATIN 20 MG/1
20 TABLET, FILM COATED ORAL AT BEDTIME
Refills: 0 | Status: DISCONTINUED | OUTPATIENT
Start: 2024-01-16 | End: 2024-01-22

## 2024-01-16 RX ORDER — DEXTROSE 50 % IN WATER 50 %
12.5 SYRINGE (ML) INTRAVENOUS ONCE
Refills: 0 | Status: DISCONTINUED | OUTPATIENT
Start: 2024-01-16 | End: 2024-01-21

## 2024-01-16 RX ORDER — LANOLIN ALCOHOL/MO/W.PET/CERES
3 CREAM (GRAM) TOPICAL AT BEDTIME
Refills: 0 | Status: DISCONTINUED | OUTPATIENT
Start: 2024-01-16 | End: 2024-01-22

## 2024-01-16 RX ORDER — ACETAMINOPHEN 500 MG
650 TABLET ORAL EVERY 6 HOURS
Refills: 0 | Status: DISCONTINUED | OUTPATIENT
Start: 2024-01-16 | End: 2024-01-22

## 2024-01-16 RX ORDER — HYDROMORPHONE HYDROCHLORIDE 2 MG/ML
1 INJECTION INTRAMUSCULAR; INTRAVENOUS; SUBCUTANEOUS
Refills: 0 | DISCHARGE

## 2024-01-16 RX ADMIN — Medication 100 MILLIGRAM(S): at 11:45

## 2024-01-16 RX ADMIN — CEFTRIAXONE 1000 MILLIGRAM(S): 500 INJECTION, POWDER, FOR SOLUTION INTRAMUSCULAR; INTRAVENOUS at 11:44

## 2024-01-16 NOTE — ED ADULT TRIAGE NOTE - CHIEF COMPLAINT QUOTE
BIBEMS from home EMS found lending on his bed. As per EMS pt is on methadone due to hx of opiod abuse in the past. Wife say he has chest pain. Pt denies any c/o at this time

## 2024-01-16 NOTE — ED PROVIDER NOTE - OBJECTIVE STATEMENT
Pt aimemolly, wife called for ambulance due to cp this am. Wife and family unable to be contacted despite numerous attempts.    Pt appears to be an unreliable historian - he doesn't know anything about the cp that happened in am and limited knowledge of medical history. He currently does not have chest pain and is not aware of why he is at the hospital. He has subjective chills. Denies sob, nausea/ vomiting, cough, congestion, new focal weakness.    at home, he has limited mobility due to pain and needs a walker. Pt aimemolly, wife called for ambulance due to cp this am. Wife Madie contacted. Per wife, pt says pt did not have cp in am. Pt had increased confusion, increased distraction and non-linear thought process this am; pt normally answers questions and able to do adls independently. When he got up, he was breathing heavy and pale.     Pt appears to be an unreliable historian - he doesn't know anything about the cp that happened in am and limited knowledge of medical history. He currently does not have chest pain and is not aware of why he is at the hospital. He has subjective chills. Denies sob, nausea/ vomiting, cough, congestion, new focal weakness.    Pt takes care of his own meds. He reports taking 2 pills of 4mg dilaudid po this am.     at home, he has limited mobility due to pain and needs a walker. Pt aimemolly, wife called for ambulance due to cp this am. Wife Madie contacted. Prior to today, pt feeling well and at baseline. Per wife, pt says pt did not have cp in am. Pt had increased confusion, increased distraction and non-linear thought process this am; pt normally answers questions and able to do adls independentlyWhen he got up, he was breathing heavy and pale.     Pt appears to be an unreliable historian - he doesn't know anything about the cp that happened in am and limited knowledge of medical history. He currently does not have chest pain and is not aware of why he is at the hospital. He has subjective chills. Denies sob, nausea/ vomiting, cough, congestion, new focal weakness.    Pt takes care of his own meds. He reports taking 2 pills of 4mg dilaudid po this am.     at home, he has limited mobility due to pain and needs a walker. Pt jere, wife called for ambulance. Wife Madie contacted. Prior to today, pt feeling well and at baseline. Per wife, pt says pt did not have cp in am. Pt had increased confusion, increased distraction and non-linear thought process this am; pt normally answers questions and able to do adls independently. When he got up, he was breathing heavy and pale.     Pt appears to be an unreliable historian - he doesn't know anything about the cp that happened in am and limited knowledge of medical history. He currently does not have chest pain and is not aware of why he is at the hospital. He has subjective chills. Denies sob, nausea/ vomiting, cough, congestion, new focal weakness.    Pt takes care of his own meds. He reports taking 2 pills of 4mg dilaudid po this am.     at home, he has limited mobility due to pain and needs a walker.

## 2024-01-16 NOTE — H&P ADULT - ATTENDING COMMENTS
67 y/o male with PMH of PAD s/p fem-fem bypass with PTFE graft 10/24/20 for critical limb ischemia c/b site infection and right DVT (on Eliquis), non-obstructive mild CAD ([facility] 10/19), DMII, HTN, ASHIA (non-compliant with CPAP, sleeps in a recliner), ankylosing spondylitis, chronic lymphedema, lymphorrhea, recurrent ulcers/cellulitis, chronic foot ulcer  who was brought to the ED for chest pain and confusion as reported by wife per ED. Patient reported chronic LLE pain otherwise no acute complaint. Troponin done with a delta 7 (30---->37) cardiology consulted rec TTE done. Found to be febrile in the .8 (T-max); LLE with cellulitis. Blood culture not done prior to antibiotic in the ED. Blood culture ordered but patient declined saying he has been poked many time, but agreed for later. Leukocytosis 15.54 with left shift; patient met sepsis criteria but only 1L of IVF given due to hx of lymphedema. Patient on my evaluation: awake, alert and oriented x 2 (self and place) following commands; LLE erythema, warm to touch. Attempt made to call Madie (patient's significant other) number not in service. Patient confirmed the number has been disconnected. I also tried calling his brother but got voicemail. Medication reconciliation done as per Dr. Roa.     Chest pain cardiology on board., echo done   Acute encephalopathy likely due to medications side effect (patient on Methadone qid, Dilaudid tid, Trazadone, Sertraline) vs infection  LLE cellulitis continue antibiotic   Sepsis likely due to cellulitis   PAD/CAD   DVT on Eliquis   Chronic lymphedema   ASHIA not compliant with CPAP   DM-2   HTN/HLD

## 2024-01-16 NOTE — ED ADULT NURSE NOTE - NS ED NURSE LEVEL OF CONSCIOUSNESS SPEECH
AMI Week 3 Survey      Responses   [facility] patient discharged from?  [facility]   Does the patient have one of the following disease processes/diagnoses(primary or secondary)?  Acute MI (STEMI,NSTEMI)   Week 3 attempt successful?  Yes   Call start time  1447   Call end time  1448   Discharge diagnosis  STEMI, heart cath & stents   Meds reviewed with patient/caregiver?  Yes   Is the patient having any side effects they believe may be caused by any medication additions or changes?  No   Does the patient have all prescriptions related to this admission filled (includes statins,anticoagulants,HTN meds,anti-arrhythmia meds)  Yes   Is the patient taking all medications as directed (includes completed medication regime)?  Yes   Does the patient have a primary care provider?   Yes   Does the patient have an appointment with their PCP,cardiologist,or clinic within 7 days of discharge?  Yes   Has the patient kept scheduled appointments due by today?  Yes   What is the Home health agency?   [facility]   Has home health visited the patient within 72 hours of discharge?  Yes   Psychosocial issues?  No   Did the patient receive a copy of their discharge instructions?  Yes   Nursing interventions  Reviewed instructions with patient   What is the patient's perception of their health status since discharge?  Improving   Nursing interventions  Nurse provided patient education   Is the patient/caregiver able to teach back signs and symptoms of when to call for help immediately:  Sudden chest discomfort, Sudden discomfort in arms, back, neck or jaw, Shortness of breath at any time, Sudden sweating or clammy skin, Nausea or vomiting, Dizziness or lightheadedness, Irregular or rapid heart rate   Nursing interventions  Nurse provided patient education   Is the patient/caregiver able to teach back ways to prevent a second heart attack:  Take medications, Follow up with MD   If the patient is a current smoker, are they able to teach back  resources for cessation?  Not a smoker   Is the patient/caregiver able to teach back the hierarchy of who to call/visit for symptoms/problems? PCP, Specialist, Home health nurse, Urgent Care, ED, 911  Yes   Week 3 call completed?  Yes          Adalberto Ireland RN   Speaking Coherently

## 2024-01-16 NOTE — ED ADULT NURSE NOTE - NSFALLHARMRISKINTERV_ED_ALL_ED
Assistance OOB with selected safe patient handling equipment if applicable/Communicate risk of Fall with Harm to all staff, patient, and family/Provide visual cue: red socks, yellow wristband, yellow gown, etc/Reinforce activity limits and safety measures with patient and family/Bed in lowest position, wheels locked, appropriate side rails in place/Call bell, personal items and telephone in reach/Instruct patient to call for assistance before getting out of bed/chair/stretcher/Non-slip footwear applied when patient is off stretcher/[place] to call system/Physically safe environment - no spills, clutter or unnecessary equipment/Purposeful Proactive Rounding/Room/bathroom lighting operational, light cord in reach Assistance OOB with selected safe patient handling equipment if applicable/Communicate risk of Fall with Harm to all staff, patient, and family/Provide visual cue: red socks, yellow wristband, yellow gown, etc/Reinforce activity limits and safety measures with patient and family/Bed in lowest position, wheels locked, appropriate side rails in place/Call bell, personal items and telephone in reach/Instruct patient to call for assistance before getting out of bed/chair/stretcher/Non-slip footwear applied when patient is off stretcher/Mobile to call system/Physically safe environment - no spills, clutter or unnecessary equipment/Purposeful Proactive Rounding/Room/bathroom lighting operational, light cord in reach

## 2024-01-16 NOTE — ED PROVIDER NOTE - PROGRESS NOTE DETAILS
As per sign-out from Dr. Funes we are awaiting callback from Dr. Morris for admission for management of acute left leg cellulitis.

## 2024-01-16 NOTE — H&P ADULT - TIME BILLING
Chart, labs and imaging reviewed. Physical examination, medication reconciliation and documentation. Discussion with patient, resident and ER nurse.

## 2024-01-16 NOTE — ED PROVIDER NOTE - CLINICAL SUMMARY MEDICAL DECISION MAKING FREE TEXT BOX
Patient is a 66yo M with PMHx of DM, ankylosing spondylitis who presents to the ED for cp. No current cp. Will rule out acs vs uri.   - ekg with twave v4-5 flattening present in past 2020 ekg. low suspicion pe given no symptoms and exam.   - pending bnp, trops  - pending cmp, cbc, and coags Patient is a 68yo M with PMHx of DM, ankylosing spondylitis who presents to the ED for cp. No current cp. Will rule out acs vs uri.   - ekg with twave v4-5 flattening present in past 2020 ekg. low suspicion pe given no symptoms and exam.   - pending bnp, trops  - pending cmp, cbc, and coags Patient is a 68yo M with PMHx of DM, ankylosing spondylitis who presents to the ED for cp. No current cp. Will rule out acs vs uri.   - ekg with twave v4-5 flattening present in past 2020 ekg. low suspicion pe given no symptoms and exam.     Trops is negative, bnp mildly elevated 600 but no signs hf.  rvp pending Patient is a 66yo M with PMHx of DM, ankylosing spondylitis who presents to the ED for cp. No current cp. Will rule out acs vs uri.   - ekg with twave v4-5 flattening present in past 2020 ekg. low suspicion pe given no symptoms and exam.     Trops is negative, bnp mildly elevated 600 but no signs hf.  rvp pending

## 2024-01-16 NOTE — ED ADULT NURSE NOTE - OBJECTIVE STATEMENT
Assumed care of pt at 0815. Pt A&Ox4 c/o not being able to get up from his bed, pt is apparently on Methadone for Opiod abuse according to EMS, pt denies N/V/D/CP/SOB at this time, pt resting comfortably showing no signs of respiratory distress or pain, the pt is calm and cooperative

## 2024-01-16 NOTE — H&P ADULT - ASSESSMENT
Assessment  67 y/o male PMH PAD s/p fem-fem bypass with PTFE graft 10/24/20 for critical limb ischemia c/b site infection and right DVT (on Eliquis), non-obstructive mild CAD ([facility] 10/19), DMII (on insulin), HTN, ASHIA (non-compliant with CPAP, sleeps in a recliner), ankylosing spondylitis, chronic lymphedema, lymphorreha, recurrent ulcers/cellulitis, chronic foot ulcer presents to ED BIBA due to AMS and generalized weakness. Pt is poor historian, feels tired, chills, spiked fever at ED >102. WBC 15k, has chronic lymphedema in both legs both left leg is warm and red from apparent cellulitis Pt looks clinically dry. BP WNL. Xrays reviewed and left Hip replacement noted on the left, small cortical bone discontinuation located at proximal femur diaphysis but final reading from radiologist is pending. UA positive for methadone. COVID and RVP negative. Bcx pending. Denies nausea vomits, CP, palpitations. admitted for LLE cellulitis.     Plan  #Cellulitis LLE  -Recurrent cellulitis   -Fever and increased WBC  -s/p 1 dose doxy and Rocephin  -started on Cefazolin 1g q8hr  -IVF   -Bcx pending, pt refused   -UA negative  -No infiltrates on CXR noted      #DM  -sliding scale  -CC w/ evening snack    #Rt leg DVT  #PAD  -s/p fem-fem bypass  -Eliquis 5mg BID  -Satting well, no CP    #HTN  -Lisinopril  -monitor    #CAD  #HLD  -aspirin 81mg  -Atorvastatin   -Cardiac monitor  -Eliquis 5mg BID  -trending trops 30 -> 37  -elevated   -TTE LVEF 60%  -Cardiac monitor      #ASHIA  -no CPAP at home    #Back pain on Dilaudid 4mg at home  -Gabapentin 600mg Daily  -Methadone 10mg 4 times a day opoid abuse)  -Will consider Pain management consult  -Hold Dilaudid due to pt confusion    Diet CC w/ evening snacks  DVT ppx - Eliquis 5 mg BID       Assessment  69 y/o male PMH PAD s/p fem-fem bypass with PTFE graft 10/24/20 for critical limb ischemia c/b site infection and right DVT (on Eliquis), non-obstructive mild CAD ([facility] 10/19), DMII (on insulin), HTN, ASHIA (non-compliant with CPAP, sleeps in a recliner), ankylosing spondylitis, chronic lymphedema, lymphorreha, recurrent ulcers/cellulitis, chronic foot ulcer presents to ED BIBA due to AMS and generalized weakness. Pt is poor historian, feels tired, chills, spiked fever at ED >102. WBC 15k, has chronic lymphedema in both legs both left leg is warm and red from apparent cellulitis Pt looks clinically dry. BP WNL. Xrays reviewed and left Hip replacement noted on the left, small cortical bone discontinuation located at proximal femur diaphysis but final reading from radiologist is pending. UA positive for methadone. COVID and RVP negative. Bcx pending. Denies nausea vomits, CP, palpitations. Admitted for LLE cellulitis.     Plan    #Cellulitis LLE  -Recurrent cellulitis   -Fever and increased WBC  -s/p 1 dose doxy and Rocephin  -started on Cefazolin 1g q8hr  -IVF   -Bcx pending, pt declined    -UA negative  -No infiltrates on CXR noted      #DM  -sliding scale  -CC w/ evening snack    #Rt leg DVT  #PAD  -s/p fem-fem bypass  -Eliquis 5mg BID  -Satting well, no CP    #HTN  -Lisinopril  -monitor    #CAD  #HLD  -aspirin 81mg  -Atorvastatin   -Cardiac monitor  -Eliquis 5mg BID  -trending trops 30 -> 37  -elevated   -TTE LVEF 60%  -Cardiac monitor      #ASHIA  -no CPAP at home but non-compliance     #Back pain on Dilaudid 4mg at home  -Gabapentin 800mg Daily  -Methadone 10mg 4 times a day opoid abuse)  - Pain management consult as needed   -Hold Dilaudid due to pt confusion    Diet CC w/ evening snacks  DVT ppx - Eliquis 5 mg BID

## 2024-01-16 NOTE — SBIRT NOTE ADULT - NSSBIRTDRGPASSREFTXDET_GEN_A_CORE
. Referral to Treatment Performed. Screening results were   reviewed with the patient and patient was provided information about healthy guidelines and potential negative   consequences associated with level of risk. Motivation and readiness to reduce or stop use was discussed and   goals and activities to make changes were suggested/offered

## 2024-01-16 NOTE — ED PROVIDER NOTE - WET READ LAUNCH FT
There is 1 Wet Read(s) to document. There are no Wet Read(s) to document. There are 3 Wet Read(s) to document.

## 2024-01-16 NOTE — ED PROVIDER NOTE - ATTENDING CONTRIBUTION TO CARE
The patient seen with resident    Chronic Pain Syndrome  L leg cellulitis    I, Trung Funes, performed the initial face to face bedside interview with this patient regarding history of present illness, review of symptoms and relevant past medical, social and family history.  I completed an independent physical examination.  I was the initial provider who evaluated this patient. I have signed out the follow up of any pending tests (i.e. labs, radiological studies) to the resident I have communicated the patient’s plan of care and disposition with the resident

## 2024-01-16 NOTE — H&P ADULT - HISTORY OF PRESENT ILLNESS
67 y/o male PMH PAD s/p fem-fem bypass with PTFE graft 10/24/20 for critical limb ischemia c/b site infection and right DVT (on Eliquis), non-obstructive mild CAD ([facility] 10/19), DMII (on insulin), HTN, ASIHA (non-compliant with CPAP, sleeps in a recliner), ankylosing spondylitis, chronic lymphedema, lymphorreha, recurrent ulcers/cellulitis, chronic foot ulcer presents to ED BIBA due to AMS and generalized weakness. Pt is poor historian, feels tired, chills, spiked fever at ED >102. WBC 15k, has chronic lymphedema in both legs both left leg is warm and red from apparent cellulitis Pt looks clinically dry. BP WNL. Xrays reviewed and left Hip replacement noted on the left, small cortical bone discontinuation located at proximal femur diaphysis but final reading from radiologist is pending. UA positive for methadone. COVID and RVP negative. Bcx pending. Denies nausea vomits, CP, palpitations. 69 y/o male with PMH of PAD s/p fem-fem bypass with PTFE graft 10/24/20 for critical limb ischemia c/b site infection and right DVT (on Eliquis), non-obstructive mild CAD ([facility] 10/19), DMII, HTN, ASHIA (non-compliant with CPAP, sleeps in a recliner), ankylosing spondylitis, chronic lymphedema, lymphorrhea, recurrent ulcers/cellulitis, chronic foot ulcer presents to ED BIBA due to AMS and generalized weakness. Pt is poor historian, feels tired, chills, spiked fever at ED >102. WBC 15k, has chronic lymphedema in both legs both left leg is warm and red from apparent cellulitis Pt looks clinically dry. BP WNL. Xrays reviewed and left Hip replacement noted on the left, small cortical bone discontinuation located at proximal femur diaphysis but final reading from radiologist is pending. UA positive for methadone. COVID and RVP negative. Bcx pending. Denies nausea vomits, CP, palpitations. 69 y/o male with PMH of PAD s/p fem-fem bypass with PTFE graft 10/24/20 for critical limb ischemia c/b site infection and right DVT (on Eliquis), non-obstructive mild CAD ([facility] 10/19), DMII, HTN, ASHIA (non-compliant with CPAP, sleeps in a recliner), ankylosing spondylitis, chronic lymphedema, lymphorrhea, recurrent ulcers/cellulitis, chronic foot ulcer presents to ED BIBA due to AMS and generalized weakness. Pt is poor historian, feels tired, chills, spiked fever at ED >102. WBC 15k, has chronic lymphedema in both legs both left leg is warm and red from apparent cellulitis Pt looks clinically dry. BP WNL. Xrays reviewed and left Hip replacement noted on the left, small cortical bone discontinuation located at proximal femur diaphysis but final reading from radiologist is pending. UA positive for methadone. COVID and RVP negative. Bcx pending. Denies nausea vomits, CP, palpitations.    Patient seen and examined before midnight.

## 2024-01-16 NOTE — ED PROVIDER NOTE - PHYSICAL EXAMINATION
Const: Awake, alert and oriented. In no acute distress. Well appearing.  HEENT: NC/AT. Moist mucous membranes.  Cardiac: Regular rate and regular rhythm. +S1/S2. No murmurs. Peripheral pulses 2+ and symmetric. No LE edema.  Resp: Speaking in full sentences. No evidence of respiratory distress. No wheezes, rales or rhonchi.  Abd: Soft, non-tender, non-distended. Normal bowel sounds in all 4 quadrants. No guarding or rebound. + hernia  Skin: No rashes, abrasions or lacerations.  Ext: extemities symmetrical  Neuro: Awake, alert & oriented x 3. Moves all extremities symmetrically. Const: Awake, alert and oriented. In no acute distress. Well appearing.  HEENT: NC/AT. Moist mucous membranes.  Cardiac: Regular rate and regular rhythm. +S1/S2. No murmurs. Peripheral pulses 2+ and symmetric. No LE edema.  Resp: Speaking in full sentences. No evidence of respiratory distress. No wheezes, rales or rhonchi.  Abd: Soft, non-tender, non-distended. Normal bowel sounds in all 4 quadrants. No guarding or rebound. + hernia  Skin: No rashes, abrasions or lacerations.  Ext: extemities symmetrical L leg erythema seen  Neuro: Awake, alert & oriented x 3. Moves all extremities symmetrically.

## 2024-01-16 NOTE — PROGRESS NOTE ADULT - NS ATTEND AMEND GEN_ALL_CORE FT
67 y/o M with PMH obese M former smoker with a PMHx of PAD s/p fem-fem bypass with PTFE graft 10/24/20 for critical limb ischemia c/b site infection and right DVT (on Eliquis), non-obstructive mild CAD ([facility] 10/19), DMII (on insulin), HTN, ASHIA (non-compliant with CPAP, sleeps in a recliner), ankylosing spondylitis, chronic lymphedema, lymphorreha, recurrent ulcers/cellulitis, chronic foot ulcer presents to ED from home, as per note wife called EMS due to chest pain. Pt is a poor historian, c/o back pain, confused to year, situation. Denies chest pain. As per ED chart, wife states no chest pain this am, increased confusion, breathing heavy and pale this am.     Patient seen and examined by me.    T(C): 36.6 (01-16-24 @ 15:41), Max: 39.3 (01-16-24 @ 13:23)  HR: 93 (01-16-24 @ 15:41) (92 - 96)  BP: 147/66 (01-16-24 @ 15:41) (115/71 - 147/66)  RR: 18 (01-16-24 @ 13:23) (18 - 18)  SpO2: 96% (01-16-24 @ 15:41) (93% - 96%)  Patient alert and awake.  Chest- Bilateral Clear BS  Cardiac- S1 and S2  Abdomen- Soft    Assessment/Plan:  1. Chest pain    Trend trops  Check ECHO  I have discussed my recommendation with the PA which are outlined above.  Will follow. 69 y/o M with PMH obese M former smoker with a PMHx of PAD s/p fem-fem bypass with PTFE graft 10/24/20 for critical limb ischemia c/b site infection and right DVT (on Eliquis), non-obstructive mild CAD ([facility] 10/19), DMII (on insulin), HTN, ASHIA (non-compliant with CPAP, sleeps in a recliner), ankylosing spondylitis, chronic lymphedema, lymphorreha, recurrent ulcers/cellulitis, chronic foot ulcer presents to ED from home, as per note wife called EMS due to chest pain. Pt is a poor historian, c/o back pain, confused to year, situation. Denies chest pain. As per ED chart, wife states no chest pain this am, increased confusion, breathing heavy and pale this am.     Patient seen and examined by me.    T(C): 36.6 (01-16-24 @ 15:41), Max: 39.3 (01-16-24 @ 13:23)  HR: 93 (01-16-24 @ 15:41) (92 - 96)  BP: 147/66 (01-16-24 @ 15:41) (115/71 - 147/66)  RR: 18 (01-16-24 @ 13:23) (18 - 18)  SpO2: 96% (01-16-24 @ 15:41) (93% - 96%)  Patient alert and awake.  Chest- Bilateral Clear BS  Cardiac- S1 and S2  Abdomen- Soft    Assessment/Plan:  1. Chest pain    Trend trops  Check ECHO  I have discussed my recommendation with the PA which are outlined above.  Will follow.

## 2024-01-16 NOTE — H&P ADULT - NSHPREVIEWOFSYSTEMS_GEN_ALL_CORE
Gen: + fever, tired, generalized weakness  ENT: No congestion, no rhinorrhea  Resp: No cough, no trouble breathing  Cardiovascular: No chest pain, no palpitation  Gastrointestinal: No nausea, no vomiting, no diarrhea, denies abdominal pain   :  No change in urine output; no dysuria, no hematuria  MS: B/l LE pain and chronic lymphedema  Skin: Left LE redness and warm to touch  Neuro: No headache; no abnormal movements, denies tingling and paralysis  Remainder negative, except as per the HPI

## 2024-01-16 NOTE — PROGRESS NOTE ADULT - ASSESSMENT
69 y/o M with PMH obese M former smoker with a PMHx of PAD s/p fem-fem bypass with PTFE graft 10/24/20 for critical limb ischemia c/b site infection and right DVT (on Eliquis), non-obstructive mild CAD ([facility] 10/19), DMII (on insulin), HTN, ASHIA (non-compliant with CPAP, sleeps in a recliner), ankylosing spondylitis, chronic lymphedema, lymphorreha, recurrent ulcers/cellulitis, chronic foot ulcer presents to ED from home, as per note wife called EMS due to chest pain. Pt is a poor historian, c/o back pain, confused to year, situation. Denies chest pain. As per ED chart, wife states no chest pain this am, increased confusion, breathing heavy and pale this am.  67 y/o M with PMH obese M former smoker with a PMHx of PAD s/p fem-fem bypass with PTFE graft 10/24/20 for critical limb ischemia c/b site infection and right DVT (on Eliquis), non-obstructive mild CAD ([facility] 10/19), DMII (on insulin), HTN, ASHIA (non-compliant with CPAP, sleeps in a recliner), ankylosing spondylitis, chronic lymphedema, lymphorreha, recurrent ulcers/cellulitis, chronic foot ulcer presents to ED from home, as per note wife called EMS due to chest pain. Pt is a poor historian, c/o back pain, confused to year, situation. Denies chest pain. As per ED chart, wife states no chest pain this am, increased confusion, breathing heavy and pale this am.

## 2024-01-16 NOTE — H&P ADULT - NSHPPHYSICALEXAM_GEN_ALL_CORE
Gen: seems tired, laying in bed   Head: normocephalic, atraumatic  EENT: EOMI, PERRLA, dry mucous membranes, no scleral icterus, no JVD  Lung: no increased work of breathing, clear to auscultation bilaterally, no wheezing, rales, rhonchi, speaking in full sentences  CV: regular rate, regular rhythm, normal s1/s2, 2+ radial pulses bilaterally  Abd: soft, non-tender, non-distended, no rebound tenderness or guarding; no CVA tenderness  MSK: No edema, no visible deformities, full range of motion in all 4 extremities, b/l LE edema with left leg been warm and red  Neuro: Awake, alert, AOx2, preserved visual fields, and sensation, strength 5/5 b/l UE. and 4/5 on LE  Skin: LLE red and warm (cellulitis)  Psych: normal affect, normal speech

## 2024-01-16 NOTE — PROGRESS NOTE ADULT - PROBLEM SELECTOR PLAN 1
.  - EKG unchanged from prior  - Troponin neg x 1, send repeat  - TTE 2019- EF 60-65%  - Cath 2019 normal, no blockages   - NST 12/22 normal, no inducible ischemia   - This Assessment and Plan is valid only upon the attending review, approval and signing.

## 2024-01-16 NOTE — ED PROVIDER NOTE - NS ED ROS FT
Const: Denies fever, + chills  HEENT: Denies blurry vision, sore throat  Resp: Denies coughing, SOB  Cardiovascular: Denies CP, palpitations, LE edema  GI: Denies nausea, vomiting, abdominal pain, diarrhea, constipation, blood in stool  : Denies dysuria  MSK: + chronic back pain  Neuro: Denies dizziness, numbness, weakness

## 2024-01-17 ENCOUNTER — APPOINTMENT (OUTPATIENT)
Dept: PAIN MANAGEMENT | Facility: CLINIC | Age: 69
End: 2024-01-17

## 2024-01-17 LAB
A1C WITH ESTIMATED AVERAGE GLUCOSE RESULT: 5.2 % — SIGNIFICANT CHANGE UP (ref 4–5.6)
ALBUMIN SERPL ELPH-MCNC: 3.6 G/DL — SIGNIFICANT CHANGE UP (ref 3.3–5.2)
ALP SERPL-CCNC: 75 U/L — SIGNIFICANT CHANGE UP (ref 40–120)
ALT FLD-CCNC: 9 U/L — SIGNIFICANT CHANGE UP
ANION GAP SERPL CALC-SCNC: 14 MMOL/L — SIGNIFICANT CHANGE UP (ref 5–17)
APTT BLD: 35.7 SEC — HIGH (ref 24.5–35.6)
AST SERPL-CCNC: 14 U/L — SIGNIFICANT CHANGE UP
BILIRUB SERPL-MCNC: 0.5 MG/DL — SIGNIFICANT CHANGE UP (ref 0.4–2)
BUN SERPL-MCNC: 21.4 MG/DL — HIGH (ref 8–20)
CALCIUM SERPL-MCNC: 9.3 MG/DL — SIGNIFICANT CHANGE UP (ref 8.4–10.5)
CHLORIDE SERPL-SCNC: 98 MMOL/L — SIGNIFICANT CHANGE UP (ref 96–108)
CO2 SERPL-SCNC: 24 MMOL/L — SIGNIFICANT CHANGE UP (ref 22–29)
CREAT SERPL-MCNC: 1.07 MG/DL — SIGNIFICANT CHANGE UP (ref 0.5–1.3)
EGFR: 75 ML/MIN/1.73M2 — SIGNIFICANT CHANGE UP
ESTIMATED AVERAGE GLUCOSE: 103 MG/DL — SIGNIFICANT CHANGE UP (ref 68–114)
GLUCOSE BLDC GLUCOMTR-MCNC: 108 MG/DL — HIGH (ref 70–99)
GLUCOSE BLDC GLUCOMTR-MCNC: 108 MG/DL — HIGH (ref 70–99)
GLUCOSE BLDC GLUCOMTR-MCNC: 116 MG/DL — HIGH (ref 70–99)
GLUCOSE SERPL-MCNC: 117 MG/DL — HIGH (ref 70–99)
HCT VFR BLD CALC: 33 % — LOW (ref 39–50)
HGB BLD-MCNC: 10.2 G/DL — LOW (ref 13–17)
INR BLD: 2.26 RATIO — HIGH (ref 0.85–1.18)
LACTATE SERPL-SCNC: 1.8 MMOL/L — SIGNIFICANT CHANGE UP (ref 0.5–2)
MCHC RBC-ENTMCNC: 26.6 PG — LOW (ref 27–34)
MCHC RBC-ENTMCNC: 30.9 GM/DL — LOW (ref 32–36)
MCV RBC AUTO: 85.9 FL — SIGNIFICANT CHANGE UP (ref 80–100)
PLATELET # BLD AUTO: 270 K/UL — SIGNIFICANT CHANGE UP (ref 150–400)
POTASSIUM SERPL-MCNC: 3.6 MMOL/L — SIGNIFICANT CHANGE UP (ref 3.5–5.3)
POTASSIUM SERPL-SCNC: 3.6 MMOL/L — SIGNIFICANT CHANGE UP (ref 3.5–5.3)
PROT SERPL-MCNC: 8.8 G/DL — HIGH (ref 6.6–8.7)
PROTHROM AB SERPL-ACNC: 24.5 SEC — HIGH (ref 9.5–13)
RBC # BLD: 3.84 M/UL — LOW (ref 4.2–5.8)
RBC # FLD: 15.5 % — HIGH (ref 10.3–14.5)
SODIUM SERPL-SCNC: 136 MMOL/L — SIGNIFICANT CHANGE UP (ref 135–145)
TROPONIN T, HIGH SENSITIVITY RESULT: 42 NG/L — SIGNIFICANT CHANGE UP (ref 0–51)
WBC # BLD: 16.16 K/UL — HIGH (ref 3.8–10.5)
WBC # FLD AUTO: 16.16 K/UL — HIGH (ref 3.8–10.5)

## 2024-01-17 PROCEDURE — 99233 SBSQ HOSP IP/OBS HIGH 50: CPT

## 2024-01-17 PROCEDURE — 99232 SBSQ HOSP IP/OBS MODERATE 35: CPT | Mod: 25

## 2024-01-17 RX ORDER — TRAZODONE HCL 50 MG
50 TABLET ORAL AT BEDTIME
Refills: 0 | Status: DISCONTINUED | OUTPATIENT
Start: 2024-01-17 | End: 2024-01-22

## 2024-01-17 RX ORDER — GABAPENTIN 400 MG/1
800 CAPSULE ORAL DAILY
Refills: 0 | Status: DISCONTINUED | OUTPATIENT
Start: 2024-01-17 | End: 2024-01-22

## 2024-01-17 RX ORDER — LISINOPRIL 2.5 MG/1
1 TABLET ORAL
Refills: 0 | DISCHARGE

## 2024-01-17 RX ORDER — INFLUENZA VIRUS VACCINE 15; 15; 15; 15 UG/.5ML; UG/.5ML; UG/.5ML; UG/.5ML
0.7 SUSPENSION INTRAMUSCULAR ONCE
Refills: 0 | Status: DISCONTINUED | OUTPATIENT
Start: 2024-01-17 | End: 2024-01-22

## 2024-01-17 RX ORDER — METFORMIN HYDROCHLORIDE 850 MG/1
1 TABLET ORAL
Refills: 0 | DISCHARGE

## 2024-01-17 RX ADMIN — Medication 650 MILLIGRAM(S): at 00:34

## 2024-01-17 RX ADMIN — SERTRALINE 100 MILLIGRAM(S): 25 TABLET, FILM COATED ORAL at 14:11

## 2024-01-17 RX ADMIN — Medication 1000 MILLIGRAM(S): at 23:13

## 2024-01-17 RX ADMIN — METHADONE HYDROCHLORIDE 10 MILLIGRAM(S): 40 TABLET ORAL at 07:34

## 2024-01-17 RX ADMIN — GABAPENTIN 600 MILLIGRAM(S): 400 CAPSULE ORAL at 00:34

## 2024-01-17 RX ADMIN — Medication 1000 MILLIGRAM(S): at 14:12

## 2024-01-17 RX ADMIN — METHADONE HYDROCHLORIDE 10 MILLIGRAM(S): 40 TABLET ORAL at 14:12

## 2024-01-17 RX ADMIN — Medication 1000 MILLIGRAM(S): at 07:35

## 2024-01-17 RX ADMIN — Medication 81 MILLIGRAM(S): at 14:12

## 2024-01-17 RX ADMIN — SIMVASTATIN 20 MILLIGRAM(S): 20 TABLET, FILM COATED ORAL at 00:34

## 2024-01-17 RX ADMIN — METHADONE HYDROCHLORIDE 10 MILLIGRAM(S): 40 TABLET ORAL at 23:14

## 2024-01-17 RX ADMIN — APIXABAN 5 MILLIGRAM(S): 2.5 TABLET, FILM COATED ORAL at 07:33

## 2024-01-17 RX ADMIN — METHADONE HYDROCHLORIDE 10 MILLIGRAM(S): 40 TABLET ORAL at 00:34

## 2024-01-17 RX ADMIN — LISINOPRIL 2.5 MILLIGRAM(S): 2.5 TABLET ORAL at 07:34

## 2024-01-17 RX ADMIN — METHADONE HYDROCHLORIDE 10 MILLIGRAM(S): 40 TABLET ORAL at 17:25

## 2024-01-17 RX ADMIN — GABAPENTIN 800 MILLIGRAM(S): 400 CAPSULE ORAL at 14:12

## 2024-01-17 RX ADMIN — Medication 650 MILLIGRAM(S): at 01:45

## 2024-01-17 RX ADMIN — APIXABAN 5 MILLIGRAM(S): 2.5 TABLET, FILM COATED ORAL at 17:25

## 2024-01-17 RX ADMIN — Medication 50 MILLIGRAM(S): at 23:15

## 2024-01-17 RX ADMIN — Medication 50 MILLIGRAM(S): at 00:34

## 2024-01-17 RX ADMIN — SIMVASTATIN 20 MILLIGRAM(S): 20 TABLET, FILM COATED ORAL at 23:15

## 2024-01-17 NOTE — PATIENT PROFILE ADULT - FALL HARM RISK - HARM RISK INTERVENTIONS

## 2024-01-17 NOTE — PROGRESS NOTE ADULT - NS ATTEND AMEND GEN_ALL_CORE FT
67 y/o M with PMH obese M former smoker with a PMHx of PAD s/p fem-fem bypass with PTFE graft 10/24/20 for critical limb ischemia c/b site infection and right DVT (on Eliquis), non-obstructive mild CAD ([facility] 10/19), DMII (on insulin), HTN, ASHIA (non-compliant with CPAP, sleeps in a recliner), ankylosing spondylitis, chronic lymphedema, lymphorreha, recurrent ulcers/cellulitis, chronic foot ulcer presents to ED from home, as per note wife called EMS due to chest pain. Pt is a poor historian, c/o back pain, confused to year, situation. Denies chest pain. As per ED chart, wife states no chest pain this am, increased confusion, breathing heavy and pale this am.     Patient seen and examined by me.    Vital Signs Last 24 Hrs  T(C): 36.9 (01-17-24 @ 15:41), Max: 37.2 (01-17-24 @ 00:10)  T(F): 98.4 (01-17-24 @ 15:41), Max: 99 (01-17-24 @ 00:10)  HR: 65 (01-17-24 @ 15:41) (60 - 78)  BP: 121/71 (01-17-24 @ 15:41) (114/78 - 121/71)  BP(mean): --  RR: 18 (01-17-24 @ 15:41) (18 - 20)  SpO2: 97% (01-17-24 @ 15:41) (95% - 97%)  Patient alert and awake.  Chest- Bilateral Clear BS  Cardiac- S1 and S2  Abdomen- Soft    Assessment/Plan:  1. Chest pain    I tried to call his wife- Phone number listed is not working    For Nuclear Stress Test in AM on 1/18/2024  I have discussed my recommendation with the PA which are outlined above.  Will follow.

## 2024-01-17 NOTE — PROGRESS NOTE ADULT - ASSESSMENT
67 y/o male PMH PAD s/p fem-fem bypass with PTFE graft 10/24/20 for critical limb ischemia c/b site infection and right DVT (on Eliquis), non-obstructive mild CAD ([facility] 10/19), DMII (on insulin), HTN, ASHIA (non-compliant with CPAP, sleeps in a recliner), ankylosing spondylitis, chronic lymphedema, lymphorreha, recurrent ulcers/cellulitis, chronic foot ulcer presents to ED BIBA due to AMS and generalized weakness. Pt is poor historian, feels tired, chills, spiked fever at ED >102. WBC 15k, has chronic lymphedema in both legs both left leg is warm and red from apparent cellulitis Pt looks clinically dry. BP WNL. Xrays reviewed and left Hip replacement noted on the left, small cortical bone discontinuation located at proximal femur diaphysis but final reading from radiologist is pending. UA positive for methadone. COVID and RVP negative. Bcx pending. Denies nausea vomits, CP, palpitations. Admitted for LLE cellulitis.     Plan    #Sepsis 2/2 Cellulitis LLE  WBC 16  TMax 101.2 on 1/17  -Recurrent cellulitis   -no further fevers, WBC trending up slowly  -s/p 1 dose doxy and Rocephin  -c/w Cefazolin 1g q8hr  -IVF   -Pt Declining BCx at this time  -UA negative  -No infiltrates on CXR noted    #Acute metabolic encephalopathy  likely 2/2 sepsis  improved at this time  - monitor mental status  - c/w cellulitis plan as above    #DM  -sliding scale  -CC w/ evening snack    #Rt leg DVT  #PAD  -s/p fem-fem bypass  -Eliquis 5mg BID  -Satting well, no CP    #HTN  -Lisinopril  -monitor    #CAD  #HLD  -aspirin 81mg  -Atorvastatin   -Cardiac monitor  -Eliquis 5mg BID  -trending trops 30 -> 37  -elevated   -TTE LVEF 60%  -Cardiac monitor      #ASHIA  -no CPAP at home but non-compliance     #Pain meds s/p dilaudid  -Gabapentin 800mg Daily  -Methadone 10mg 4 times a day opoid abuse)  -Pain management consult as needed   -Restart home dilaudid if needed, no complaints of pain at bedside today    Diet CC w/ evening snacks  DVT ppx - Eliquis 5 mg BID

## 2024-01-17 NOTE — PROGRESS NOTE ADULT - ASSESSMENT
A/P: 67 y/o M with PMH obese M former smoker with a PMHx of PAD s/p fem-fem bypass with PTFE graft 10/24/20 for critical limb ischemia c/b site infection and right DVT (on Eliquis), non-obstructive mild CAD ([facility] 10/19), DMII (on insulin), HTN, ASHIA (non-compliant with CPAP, sleeps in a recliner), ankylosing spondylitis, chronic lymphedema, lymphorreha, recurrent ulcers/cellulitis, chronic foot ulcer presents to ED from home, as per note wife called EMS due to chest pain. Pt is a poor historian, c/o back pain, confused to year, situation. Denies chest pain. As per ED chart, wife states no chest pain this am, increased confusion, breathing heavy and pale this am.

## 2024-01-17 NOTE — PROGRESS NOTE ADULT - PROBLEM SELECTOR PLAN 1
- Resolved per patient.   - Troponins 30 -> 37 -> 42.   - Echocardiogram with normal EF, no RWMA.   - [facility] 2019 with minor luminal irregularities.   - Plan for nuclear stress test tomorrow.   - NPO after midnight.   - Continue aspirin, eliquis, simvastatin, and lisinopril.

## 2024-01-17 NOTE — PROGRESS NOTE ADULT - SUBJECTIVE AND OBJECTIVE BOX
Hospitalist Progress Note    Chief Complaint:  Cellulitis + AMS    SUBJECTIVE / OVERNIGHT EVENTS:  No events overnight, patient seen at bedside, in NAD, mental status improved, no complaints reported today. Patient denies chest pain, SOB, abd pain, N/V, dysuria or any other complaints. All remainder ROS negative.     MEDICATIONS  (STANDING):  apixaban 5 milliGRAM(s) Oral every 12 hours  aspirin  chewable 81 milliGRAM(s) Oral daily  ceFAZolin  Injectable. 1000 milliGRAM(s) IV Push every 8 hours  dextrose 5%. 1000 milliLiter(s) (100 mL/Hr) IV Continuous <Continuous>  dextrose 5%. 1000 milliLiter(s) (50 mL/Hr) IV Continuous <Continuous>  dextrose 50% Injectable 12.5 Gram(s) IV Push once  dextrose 50% Injectable 25 Gram(s) IV Push once  dextrose 50% Injectable 25 Gram(s) IV Push once  gabapentin 800 milliGRAM(s) Oral daily  glucagon  Injectable 1 milliGRAM(s) IntraMuscular once  insulin lispro (ADMELOG) corrective regimen sliding scale   SubCutaneous three times a day before meals  lisinopril 2.5 milliGRAM(s) Oral daily  methadone    Tablet 10 milliGRAM(s) Oral four times a day  sertraline 100 milliGRAM(s) Oral daily  simvastatin 20 milliGRAM(s) Oral at bedtime  traZODone 50 milliGRAM(s) Oral at bedtime    MEDICATIONS  (PRN):  acetaminophen     Tablet .. 650 milliGRAM(s) Oral every 6 hours PRN Temp greater or equal to 38C (100.4F), Mild Pain (1 - 3)  aluminum hydroxide/magnesium hydroxide/simethicone Suspension 30 milliLiter(s) Oral every 4 hours PRN Dyspepsia  dextrose Oral Gel 15 Gram(s) Oral once PRN Blood Glucose LESS THAN 70 milliGRAM(s)/deciliter  melatonin 3 milliGRAM(s) Oral at bedtime PRN Insomnia  ondansetron Injectable 4 milliGRAM(s) IV Push every 8 hours PRN Nausea and/or Vomiting        I&O's Summary      PHYSICAL EXAM:  Vital Signs Last 24 Hrs  T(C): 36.6 (17 Jan 2024 07:46), Max: 39.3 (16 Jan 2024 13:23)  T(F): 97.8 (17 Jan 2024 07:46), Max: 102.8 (16 Jan 2024 13:23)  HR: 60 (17 Jan 2024 07:46) (60 - 93)  BP: 114/78 (17 Jan 2024 07:46) (114/78 - 147/66)  BP(mean): --  RR: 20 (17 Jan 2024 07:46) (18 - 20)  SpO2: 97% (17 Jan 2024 07:46) (94% - 97%)    Parameters below as of 16 Jan 2024 13:23  Patient On (Oxygen Delivery Method): room air            CONSTITUTIONAL: NAD,  well-groomed  HEENMT: NC/AT, PERRLA, EOMI, Moist oral mucosa, no pharyngeal injection or exudates; normal dentition  RESPIRATORY: BLAE, No adventitious Lungs Sounds  CARDIOVASCULAR: S1/S2+, RRR, no MRG, No LE Edema  ABDOMEN: Soft, NT/ND, BS+, No guarding  MSK:  Moves limbs with purpose and direction; no clubbing or cyanosis of digits; no joint swelling or tenderness to palpation  PSYCH: normal mood and affect  NEUROLOGY: AAOx4, CN 2-12 are intact and symmetric; no gross sensory deficits;   SKIN: LLE red and warm (cellulitis)    LABS:                        10.2   16.16 )-----------( 270      ( 17 Jan 2024 06:44 )             33.0     01-17    136  |  98  |  21.4<H>  ----------------------------<  117<H>  3.6   |  24.0  |  1.07    Ca    9.3      17 Jan 2024 06:44  Mg     1.8     01-16    TPro  8.8<H>  /  Alb  3.6  /  TBili  0.5  /  DBili  x   /  AST  14  /  ALT  9   /  AlkPhos  75  01-17    PT/INR - ( 17 Jan 2024 06:44 )   PT: 24.5 sec;   INR: 2.26 ratio         PTT - ( 17 Jan 2024 06:44 )  PTT:35.7 sec      Urinalysis Basic - ( 17 Jan 2024 06:44 )    Color: x / Appearance: x / SG: x / pH: x  Gluc: 117 mg/dL / Ketone: x  / Bili: x / Urobili: x   Blood: x / Protein: x / Nitrite: x   Leuk Esterase: x / RBC: x / WBC x   Sq Epi: x / Non Sq Epi: x / Bacteria: x        CAPILLARY BLOOD GLUCOSE      POCT Blood Glucose.: 108 mg/dL (17 Jan 2024 11:42)  POCT Blood Glucose.: 108 mg/dL (17 Jan 2024 09:11)        RADIOLOGY & ADDITIONAL TESTS:  Results Reviewed: Y  Imaging Personally Reviewed: N  Electrocardiogram Personally Reviewed: AUBREE

## 2024-01-17 NOTE — PROGRESS NOTE ADULT - SUBJECTIVE AND OBJECTIVE BOX
[facility]                                                         CARDIOLOGY AT [facility]                                                                  [address], [place] [address]                                                         Telephone: [phone]. Fax:[phone]                                                                             PROGRESS NOTE    Reason for follow up: Chest Pain  Update: Patient denies any chest pain at this time. Patient's echocardiogram shows a normal EF, no RWMA. Patient had LHC 2019 that showed minor luminal irregularities.       Review of symptoms:   Cardiac:  No chest pain. No dyspnea. No palpitations.  Respiratory: no cough. No dyspnea  Gastrointestinal: No diarrhea. No abdominal pain. No bleeding.   Neuro: No focal neuro complaints.    Vitals:  T(C): 36.9 (01-17-24 @ 12:13), Max: 37.2 (01-17-24 @ 00:10)  HR: 78 (01-17-24 @ 12:13) (60 - 93)  BP: 121/69 (01-17-24 @ 12:13) (114/78 - 147/66)  RR: 18 (01-17-24 @ 12:13) (18 - 20)  SpO2: 96% (01-17-24 @ 12:13) (95% - 97%)  Wt(kg): --  I&O's Summary    Weight (kg): 106.6 (01-16 @ 07:01)      PHYSICAL EXAM:  Constitutional: Comfortable . No acute distress. obese  HEENT: Atraumatic and normocephalic , neck is supple . no JVD.   CNS: A&Ox2.  No focal deficits.   Respiratory: CTAB, unlabored   Cardiovascular: RRR normal s1 s2. No murmur. No rubs or gallop.  Gastrointestinal: Soft, non-tender. +Bowel sounds.   Extremities: chronic LE lymph edema  Psychiatric: Calm . no agitation.   Skin: Warm and dry, no ulcers on extremities       CURRENT CARDIAC MEDICATIONS:  lisinopril 2.5 milliGRAM(s) Oral daily      CURRENT OTHER MEDICATIONS:  ceFAZolin  Injectable. 1000 milliGRAM(s) IV Push every 8 hours  acetaminophen     Tablet .. 650 milliGRAM(s) Oral every 6 hours PRN Temp greater or equal to 38C (100.4F), Mild Pain (1 - 3)  gabapentin 800 milliGRAM(s) Oral daily  melatonin 3 milliGRAM(s) Oral at bedtime PRN Insomnia  methadone    Tablet 10 milliGRAM(s) Oral four times a day  ondansetron Injectable 4 milliGRAM(s) IV Push every 8 hours PRN Nausea and/or Vomiting  sertraline 100 milliGRAM(s) Oral daily  traZODone 50 milliGRAM(s) Oral at bedtime  aluminum hydroxide/magnesium hydroxide/simethicone Suspension 30 milliLiter(s) Oral every 4 hours PRN Dyspepsia  dextrose 50% Injectable 12.5 Gram(s) IV Push once, Stop order after: 1 Doses  dextrose 50% Injectable 25 Gram(s) IV Push once, Stop order after: 1 Doses  dextrose 50% Injectable 25 Gram(s) IV Push once, Stop order after: 1 Doses  dextrose Oral Gel 15 Gram(s) Oral once, Stop order after: 1 Doses PRN Blood Glucose LESS THAN 70 milliGRAM(s)/deciliter  glucagon  Injectable 1 milliGRAM(s) IntraMuscular once, Stop order after: 1 Doses  insulin lispro (ADMELOG) corrective regimen sliding scale   SubCutaneous three times a day before meals  simvastatin 20 milliGRAM(s) Oral at bedtime  apixaban 5 milliGRAM(s) Oral every 12 hours, Stop order after: 90 Days  aspirin  chewable 81 milliGRAM(s) Oral daily  dextrose 5%. 1000 milliLiter(s) (100 mL/Hr) IV Continuous <Continuous>  dextrose 5%. 1000 milliLiter(s) (50 mL/Hr) IV Continuous <Continuous>      LABS:	 	                            10.2   16.16 )-----------( 270      ( 17 Jan 2024 06:44 )             33.0     01-17    136  |  98  |  21.4<H>  ----------------------------<  117<H>  3.6   |  24.0  |  1.07    Ca    9.3      17 Jan 2024 06:44  Mg     1.8     01-16    TPro  8.8<H>  /  Alb  3.6  /  TBili  0.5  /  DBili  x   /  AST  14  /  ALT  9   /  AlkPhos  75  01-17    PT/INR/PTT ( 17 Jan 2024 06:44 )                       :                       :      24.5         :       35.7                  .        .                   .              .           .       2.26        .                                       Lipid Profile:   HgA1c:   TSH:     TELEMETRY:   ECG:    DIAGNOSTIC TESTING:  [ ] Echocardiogram:   < from: TTE Limited W or WO Ultrasound Enhancing Agent (01.16.24 @ 15:51) >  CONCLUSIONS:      1. Left ventricular cavity is normal. Left ventricular systolic function is normal with an ejection fraction of 60 % by Cantu's method of disks.   2. Normal right ventricular cavity size and normal systolic function.   3. Normal left and right atrial size.   4. No significant valvular disease.   5. The interatrial septum appears intact.   6. Estimated pulmonary artery systolic pressure is 32 mmHg.   7. No pericardial effusion seen.   8. No prior echocardiogram is available for comparison.    < end of copied text >    [ ]  Catheterization:    < from: Cardiac Cath Lab - Adult (10.11.19 @ 11:46) >  VENTRICLES: EF calculated by contrast ventriculography was 65 %.  CORONARY VESSELS: The coronary circulation is right dominant.  LM:   --  LM: The vessel was large sized. Angiography showed mild  atherosclerosis with no flow limiting lesions.  LAD:   --  LAD: The vessel was large sized.  CX:   --  Circumflex: The vessel was large sized. Angiography showed mild  atherosclerosis with no flow limiting lesions.  RCA:   --  RCA: The vessel was large sized (dominant). Angiography showed  mild atherosclerosis with no flow limiting lesions.  COMPLICATIONS: No complications occurred during the cath lab visit.  DIAGNOSTIC IMPRESSIONS: Mild CAD  Normal LV systolic function  DIAGNOSTIC RECOMMENDATIONS: continue secondary prevention for CAD  ASA 81 mg , statins to lower LDL < 70 mg/dl  Prepared and signed by  Simón Villalobos MD  Signed 10/14/2019 17:25:09    < end of copied text >  [ ] Stress Test:    OTHER:

## 2024-01-17 NOTE — ED ADULT NURSE REASSESSMENT NOTE - NS ED NURSE REASSESS COMMENT FT1
MD Burnham aware pt is refusing blood work including blood cultures.
Pt is resting in bed comfortably at this time, no apparent distress noted at this time. pt safety maintained. Pt denies any complaints at this time. pt aware of plan of care. CM and  ongoing.
Pt is resting in bed comfortably at this time, no apparent distress noted at this time. pt safety maintained. Pt denies any complaints at this time. pt aware of plan of care. CM and  ongoing.
Report received from off-going RN at 19:30, VSS, pt resting comfortably in stretcher. Respirations even and unlabored. Patient safety maintained.
pt resting comfortably showing no signs of respiratory distress or pain, the pt is calm and cooperative
pt resting comfortably showing no signs of respiratory distress or pain, the pt is calm and cooperative

## 2024-01-18 LAB
ANION GAP SERPL CALC-SCNC: 10 MMOL/L — SIGNIFICANT CHANGE UP (ref 5–17)
BUN SERPL-MCNC: 22.9 MG/DL — HIGH (ref 8–20)
CALCIUM SERPL-MCNC: 8.9 MG/DL — SIGNIFICANT CHANGE UP (ref 8.4–10.5)
CHLORIDE SERPL-SCNC: 102 MMOL/L — SIGNIFICANT CHANGE UP (ref 96–108)
CO2 SERPL-SCNC: 25 MMOL/L — SIGNIFICANT CHANGE UP (ref 22–29)
CREAT SERPL-MCNC: 1.11 MG/DL — SIGNIFICANT CHANGE UP (ref 0.5–1.3)
EGFR: 72 ML/MIN/1.73M2 — SIGNIFICANT CHANGE UP
GLUCOSE BLDC GLUCOMTR-MCNC: 118 MG/DL — HIGH (ref 70–99)
GLUCOSE BLDC GLUCOMTR-MCNC: 131 MG/DL — HIGH (ref 70–99)
GLUCOSE BLDC GLUCOMTR-MCNC: 191 MG/DL — HIGH (ref 70–99)
GLUCOSE SERPL-MCNC: 92 MG/DL — SIGNIFICANT CHANGE UP (ref 70–99)
HCT VFR BLD CALC: 28.8 % — LOW (ref 39–50)
HGB BLD-MCNC: 9 G/DL — LOW (ref 13–17)
INR BLD: 1.76 RATIO — HIGH (ref 0.85–1.18)
MCHC RBC-ENTMCNC: 27.3 PG — SIGNIFICANT CHANGE UP (ref 27–34)
MCHC RBC-ENTMCNC: 31.3 GM/DL — LOW (ref 32–36)
MCV RBC AUTO: 87.3 FL — SIGNIFICANT CHANGE UP (ref 80–100)
PLATELET # BLD AUTO: 246 K/UL — SIGNIFICANT CHANGE UP (ref 150–400)
POTASSIUM SERPL-MCNC: 4.4 MMOL/L — SIGNIFICANT CHANGE UP (ref 3.5–5.3)
POTASSIUM SERPL-SCNC: 4.4 MMOL/L — SIGNIFICANT CHANGE UP (ref 3.5–5.3)
PROTHROM AB SERPL-ACNC: 19.2 SEC — HIGH (ref 9.5–13)
RBC # BLD: 3.3 M/UL — LOW (ref 4.2–5.8)
RBC # FLD: 15.5 % — HIGH (ref 10.3–14.5)
SODIUM SERPL-SCNC: 137 MMOL/L — SIGNIFICANT CHANGE UP (ref 135–145)
WBC # BLD: 13.7 K/UL — HIGH (ref 3.8–10.5)
WBC # FLD AUTO: 13.7 K/UL — HIGH (ref 3.8–10.5)

## 2024-01-18 PROCEDURE — 93016 CV STRESS TEST SUPVJ ONLY: CPT

## 2024-01-18 PROCEDURE — 93018 CV STRESS TEST I&R ONLY: CPT

## 2024-01-18 PROCEDURE — 99232 SBSQ HOSP IP/OBS MODERATE 35: CPT

## 2024-01-18 PROCEDURE — 78452 HT MUSCLE IMAGE SPECT MULT: CPT | Mod: 26

## 2024-01-18 RX ORDER — LISINOPRIL 2.5 MG/1
5 TABLET ORAL DAILY
Refills: 0 | Status: DISCONTINUED | OUTPATIENT
Start: 2024-01-18 | End: 2024-01-22

## 2024-01-18 RX ADMIN — Medication 81 MILLIGRAM(S): at 11:20

## 2024-01-18 RX ADMIN — SIMVASTATIN 20 MILLIGRAM(S): 20 TABLET, FILM COATED ORAL at 22:50

## 2024-01-18 RX ADMIN — Medication 50 MILLIGRAM(S): at 22:51

## 2024-01-18 RX ADMIN — Medication 1000 MILLIGRAM(S): at 14:09

## 2024-01-18 RX ADMIN — Medication 1000 MILLIGRAM(S): at 05:35

## 2024-01-18 RX ADMIN — APIXABAN 5 MILLIGRAM(S): 2.5 TABLET, FILM COATED ORAL at 17:04

## 2024-01-18 RX ADMIN — METHADONE HYDROCHLORIDE 10 MILLIGRAM(S): 40 TABLET ORAL at 17:04

## 2024-01-18 RX ADMIN — GABAPENTIN 800 MILLIGRAM(S): 400 CAPSULE ORAL at 11:20

## 2024-01-18 RX ADMIN — METHADONE HYDROCHLORIDE 10 MILLIGRAM(S): 40 TABLET ORAL at 11:21

## 2024-01-18 RX ADMIN — Medication 1000 MILLIGRAM(S): at 22:50

## 2024-01-18 RX ADMIN — SERTRALINE 100 MILLIGRAM(S): 25 TABLET, FILM COATED ORAL at 11:20

## 2024-01-18 RX ADMIN — LISINOPRIL 2.5 MILLIGRAM(S): 2.5 TABLET ORAL at 05:35

## 2024-01-18 RX ADMIN — APIXABAN 5 MILLIGRAM(S): 2.5 TABLET, FILM COATED ORAL at 05:36

## 2024-01-18 RX ADMIN — METHADONE HYDROCHLORIDE 10 MILLIGRAM(S): 40 TABLET ORAL at 23:41

## 2024-01-18 RX ADMIN — METHADONE HYDROCHLORIDE 10 MILLIGRAM(S): 40 TABLET ORAL at 05:39

## 2024-01-18 NOTE — PROGRESS NOTE ADULT - SUBJECTIVE AND OBJECTIVE BOX
[facility]                                                         CARDIOLOGY AT [facility]                                                                  [address] [place] [address]                                                         Telephone: [phone]. Fax:[phone]                                                                             PROGRESS NOTE    Reason for follow up: Chest Pain  Update: plan for NST today for further evaluation      Review of symptoms:   Cardiac:  No chest pain. No dyspnea. No palpitations.  Respiratory: no cough. No dyspnea  Gastrointestinal: No diarrhea. No abdominal pain. No bleeding.   Neuro: No focal neuro complaints.    Vitals:  T(C): 36.7 (01-18-24 @ 04:59), Max: 37.2 (01-17-24 @ 19:44)  HR: 68 (01-18-24 @ 04:59) (65 - 78)  BP: 150/80 (01-18-24 @ 04:59) (111/61 - 150/80)  RR: 16 (01-18-24 @ 04:59) (16 - 18)  SpO2: 96% (01-18-24 @ 04:59) (94% - 97%)  Wt(kg): --  I&O's Summary    17 Jan 2024 07:01  -  18 Jan 2024 07:00  --------------------------------------------------------  IN: 180 mL / OUT: 550 mL / NET: -370 mL      Weight (kg): 106.6 (01-16 @ 07:01)    PHYSICAL EXAM:  Appearance: Comfortable. No acute distress  HEENT:  Atraumatic. Normocephalic.  Normal oral mucosa  Neurologic: A & O x 3, no gross focal deficits.  Cardiovascular: RRR S1 S2, No murmur, no rubs/gallops. No JVD  Respiratory: Lungs clear to auscultation, unlabored   Gastrointestinal:  Soft, Non-tender, + BS  Lower Extremities: chronic LE lymphedema  Psychiatry: Patient is calm. No agitation.   Skin: warm and dry.    CURRENT CARDIAC MEDICATIONS:  lisinopril 5 milliGRAM(s) Oral daily      CURRENT OTHER MEDICATIONS:  ceFAZolin  Injectable. 1000 milliGRAM(s) IV Push every 8 hours  acetaminophen     Tablet .. 650 milliGRAM(s) Oral every 6 hours PRN Temp greater or equal to 38C (100.4F), Mild Pain (1 - 3)  gabapentin 800 milliGRAM(s) Oral daily  melatonin 3 milliGRAM(s) Oral at bedtime PRN Insomnia  methadone    Tablet 10 milliGRAM(s) Oral four times a day  ondansetron Injectable 4 milliGRAM(s) IV Push every 8 hours PRN Nausea and/or Vomiting  sertraline 100 milliGRAM(s) Oral daily  traZODone 50 milliGRAM(s) Oral at bedtime  aluminum hydroxide/magnesium hydroxide/simethicone Suspension 30 milliLiter(s) Oral every 4 hours PRN Dyspepsia  dextrose 50% Injectable 12.5 Gram(s) IV Push once, Stop order after: 1 Doses  dextrose 50% Injectable 25 Gram(s) IV Push once, Stop order after: 1 Doses  dextrose 50% Injectable 25 Gram(s) IV Push once, Stop order after: 1 Doses  dextrose Oral Gel 15 Gram(s) Oral once, Stop order after: 1 Doses PRN Blood Glucose LESS THAN 70 milliGRAM(s)/deciliter  glucagon  Injectable 1 milliGRAM(s) IntraMuscular once, Stop order after: 1 Doses  insulin lispro (ADMELOG) corrective regimen sliding scale   SubCutaneous three times a day before meals  simvastatin 20 milliGRAM(s) Oral at bedtime  apixaban 5 milliGRAM(s) Oral every 12 hours, Stop order after: 90 Days  aspirin  chewable 81 milliGRAM(s) Oral daily  dextrose 5%. 1000 milliLiter(s) (50 mL/Hr) IV Continuous <Continuous>  dextrose 5%. 1000 milliLiter(s) (100 mL/Hr) IV Continuous <Continuous>  influenza  Vaccine (HIGH DOSE) 0.7 milliLiter(s) IntraMuscular once      LABS:	 	                            9.0    13.70 )-----------( 246      ( 18 Jan 2024 05:24 )             28.8     01-18    137  |  102  |  22.9<H>  ----------------------------<  92  4.4   |  25.0  |  1.11    Ca    8.9      18 Jan 2024 05:24    TPro  8.8<H>  /  Alb  3.6  /  TBili  0.5  /  DBili  x   /  AST  14  /  ALT  9   /  AlkPhos  75  01-17    PT/INR/PTT ( 18 Jan 2024 05:24 )                       :                       :      19.2         :       X                     .        .                   .              .           .       1.76        .                                       Lipid Profile:   HgA1c:   TSH:     TELEMETRY:   ECG:    DIAGNOSTIC TESTING:  [ ] Echocardiogram:   < from: TTE Limited W or WO Ultrasound Enhancing Agent (01.16.24 @ 15:51) >  CONCLUSIONS:      1. Left ventricular cavity is normal. Left ventricular systolic function is normal with an ejection fraction of 60 % by Cantu's method of disks.   2. Normal right ventricular cavity size and normal systolic function.   3. Normal left and right atrial size.   4. No significant valvular disease.   5. The interatrial septum appears intact.   6. Estimated pulmonary artery systolic pressure is 32 mmHg.   7. No pericardial effusion seen.   8. No prior echocardiogram is available for comparison.    < end of copied text >  [ ]  Catheterization:< from: Cardiac Cath Lab - Adult (10.11.19 @ 11:46) >  VENTRICLES: EF calculated by contrast ventriculography was 65 %.  CORONARY VESSELS: The coronary circulation is right dominant.  LM:   --  LM: The vessel was large sized. Angiography showed mild  atherosclerosis with no flow limiting lesions.  LAD:   --  LAD: The vessel was large sized.  CX:   --  Circumflex: The vessel was large sized. Angiography showed mild  atherosclerosis with no flow limiting lesions.  RCA:   --  RCA: The vessel was large sized (dominant). Angiography showed  mild atherosclerosis with no flow limiting lesions.  COMPLICATIONS: No complications occurred during the cath lab visit.  DIAGNOSTIC IMPRESSIONS: Mild CAD  Normal LV systolic function  DIAGNOSTIC RECOMMENDATIONS: continue secondary prevention for CAD  ASA 81 mg , statins to lower LDL < 70 mg/dl  Prepared and signed by  Simón Villalobos MD  Signed 10/14/2019 17:25:09    < end of copied text >  [ ] Stress Test:    OTHER:

## 2024-01-18 NOTE — PROGRESS NOTE ADULT - SUBJECTIVE AND OBJECTIVE BOX
Jaime Morales MD  [facility]  Contact via Teams or text/call at [phone]    Patient is a 68y old  Male who presents with a chief complaint of Cellulitis LLE + AMS (18 Jan 2024 09:25)      Patient seen and examined at bedside. No overnight events reported.     ALLERGIES:  No Known Allergies    MEDICATIONS  (STANDING):  apixaban 5 milliGRAM(s) Oral every 12 hours  aspirin  chewable 81 milliGRAM(s) Oral daily  ceFAZolin  Injectable. 1000 milliGRAM(s) IV Push every 8 hours  dextrose 5%. 1000 milliLiter(s) (50 mL/Hr) IV Continuous <Continuous>  dextrose 5%. 1000 milliLiter(s) (100 mL/Hr) IV Continuous <Continuous>  dextrose 50% Injectable 25 Gram(s) IV Push once  dextrose 50% Injectable 12.5 Gram(s) IV Push once  dextrose 50% Injectable 25 Gram(s) IV Push once  gabapentin 800 milliGRAM(s) Oral daily  glucagon  Injectable 1 milliGRAM(s) IntraMuscular once  influenza  Vaccine (HIGH DOSE) 0.7 milliLiter(s) IntraMuscular once  insulin lispro (ADMELOG) corrective regimen sliding scale   SubCutaneous three times a day before meals  lisinopril 5 milliGRAM(s) Oral daily  methadone    Tablet 10 milliGRAM(s) Oral four times a day  sertraline 100 milliGRAM(s) Oral daily  simvastatin 20 milliGRAM(s) Oral at bedtime  traZODone 50 milliGRAM(s) Oral at bedtime    MEDICATIONS  (PRN):  acetaminophen     Tablet .. 650 milliGRAM(s) Oral every 6 hours PRN Temp greater or equal to 38C (100.4F), Mild Pain (1 - 3)  aluminum hydroxide/magnesium hydroxide/simethicone Suspension 30 milliLiter(s) Oral every 4 hours PRN Dyspepsia  dextrose Oral Gel 15 Gram(s) Oral once PRN Blood Glucose LESS THAN 70 milliGRAM(s)/deciliter  melatonin 3 milliGRAM(s) Oral at bedtime PRN Insomnia  ondansetron Injectable 4 milliGRAM(s) IV Push every 8 hours PRN Nausea and/or Vomiting    Vital Signs Last 24 Hrs  T(F): 98.3 (18 Jan 2024 10:25), Max: 99 (17 Jan 2024 19:44)  HR: 71 (18 Jan 2024 10:25) (65 - 78)  BP: 145/69 (18 Jan 2024 10:25) (111/61 - 150/80)  RR: 16 (18 Jan 2024 10:25) (16 - 18)  SpO2: 93% (18 Jan 2024 10:25) (93% - 97%)  I&O's Summary    17 Jan 2024 07:01  -  18 Jan 2024 07:00  --------------------------------------------------------  IN: 180 mL / OUT: 550 mL / NET: -370 mL      PHYSICAL EXAM:  General: NAD, A/O x 3  ENT: No gross hearing impairment, Moist mucous membranes, no thrush  Neck: Supple, No JVD  Lungs: Clear to auscultation bilaterally, good air entry, non-labored breathing  Cardio: RRR, S1/S2, No murmur  Abdomen: Soft, Nontender, Nondistended; Bowel sounds present  Extremities: No calf tenderness, No cyanosis, No pitting edema  Psych: Appropriate mood and affect    LABS:                        9.0    13.70 )-----------( 246      ( 18 Jan 2024 05:24 )             28.8     01-18    137  |  102  |  22.9  ----------------------------<  92  4.4   |  25.0  |  1.11    Ca    8.9      18 Jan 2024 05:24  Mg     1.8     01-16    TPro  8.8  /  Alb  3.6  /  TBili  0.5  /  DBili  x   /  AST  14  /  ALT  9   /  AlkPhos  75  01-17          PT/INR - ( 18 Jan 2024 05:24 )   PT: 19.2 sec;   INR: 1.76 ratio       PTT - ( 17 Jan 2024 06:44 )  PTT:35.7 sec  Lactate, Blood: 1.8 mmol/L (01-17 @ 06:44)    POCT Blood Glucose.: 116 mg/dL (17 Jan 2024 17:14)  POCT Blood Glucose.: 108 mg/dL (17 Jan 2024 11:42)    Urinalysis Basic - ( 18 Jan 2024 05:24 )    Color: x / Appearance: x / SG: x / pH: x  Gluc: 92 mg/dL / Ketone: x  / Bili: x / Urobili: x   Blood: x / Protein: x / Nitrite: x   Leuk Esterase: x / RBC: x / WBC x   Sq Epi: x / Non Sq Epi: x / Bacteria: x    RADIOLOGY & ADDITIONAL TESTS:    Care Discussed with Consultants/Other Providers:

## 2024-01-18 NOTE — PROGRESS NOTE ADULT - ASSESSMENT
A/P: 67 y/o M with PMH obese M former smoker with a PMHx of PAD s/p fem-fem bypass with PTFE graft 10/24/20 for critical limb ischemia c/b site infection and right DVT (on Eliquis), non-obstructive mild CAD ([facility] 10/19), DMII (on insulin), HTN, ASHIA (non-compliant with CPAP, sleeps in a recliner), ankylosing spondylitis, chronic lymphedema, lymphorreha, recurrent ulcers/cellulitis, chronic foot ulcer presents to ED from home, as per note wife called EMS due to chest pain. Pt is a poor historian, c/o back pain, confused to year, situation. Denies chest pain. As per ED chart, wife states no chest pain this am, increased confusion, breathing heavy and pale this am.

## 2024-01-18 NOTE — PROGRESS NOTE ADULT - PROBLEM SELECTOR PLAN 1
- Resolved per patient.   - Troponins 30 -> 37 -> 42.   - Echocardiogram with normal EF, no RWMA.   - [facility] 2019 with minor luminal irregularities.   - Plan for nuclear stress test today  - Continue aspirin, eliquis, simvastatin, and lisinopril.  - further workup pending results of NST - Resolved per patient.   - Troponins 30 -> 37 -> 42.   - Echocardiogram with normal EF, no RWMA.   - [facility] 2019 with minor luminal irregularities.   - Plan for nuclear stress test today  - Continue aspirin, eliquis, simvastatin, and lisinopril.  - further workup pending results of NST      **ADDENDUM 1335**  - NST without inducible ischemia or infarct.   - No further inpatient cardiac work up at this time. Patient can follow up with primary cardiologist or Dr. Anne as OP in 4 weeks   Will sign off.  Please reconsult if needed.

## 2024-01-18 NOTE — PROGRESS NOTE ADULT - ASSESSMENT
69 y/o male PMH PAD s/p fem-fem bypass with PTFE graft 10/24/20 for critical limb ischemia c/b site infection and right DVT (on Eliquis), non-obstructive mild CAD ([facility] 10/19), DMII (on insulin), HTN, ASHIA (non-compliant with CPAP, sleeps in a recliner), ankylosing spondylitis, chronic lymphedema, lymphorreha, recurrent ulcers/cellulitis, chronic foot ulcer presents to ED BIBA due to AMS and generalized weakness. Pt is poor historian, feels tired, chills, spiked fever at ED >102. WBC 15k, has chronic lymphedema in both legs both left leg is warm and red from apparent cellulitis Pt looks clinically dry. BP WNL. Xrays reviewed and left Hip replacement noted on the left, small cortical bone discontinuation located at proximal femur diaphysis but final reading from radiologist is pending. UA positive for methadone. COVID and RVP negative. Bcx pending. Denies nausea vomits, CP, palpitations. Admitted for LLE cellulitis.     Sepsis 2/2 Cellulitis LLE  - WBC improving  - c/w Cefazolin 1g q8hr  - UA negative  - No infiltrates on CXR noted    Acute metabolic encephalopathy  - suspect secondary to sepsis  - improved at this time    DM2  - sliding scale  - CC w/ evening snack    Rt leg DVT  PAD  - s/p fem-fem bypass  - Eliquis 5mg BID    HTN  -Lisinopril  -monitor    CAD  HLD  - aspirin 81mg  - Atorvastatin   - Cardiac monitor  - Eliquis 5mg BID  -TTE LVEF 60%  -Cardiac monitor  - for nuclear stress test today     ASHIA  -no CPAP at home but non-compliance     Pain meds s/p dilaudid  - Gabapentin 800mg Daily  - Methadone 10mg 4 times a day opoid abuse)  - Pain management consult as needed   - Restart home dilaudid if needed, no complaints of pain at bedside today    Diet CC w/ evening snacks  DVT ppx - Eliquis 5 mg BID

## 2024-01-18 NOTE — PROGRESS NOTE ADULT - NS ATTEND AMEND GEN_ALL_CORE FT
67 y/o M with PMH obese M former smoker with a PMHx of PAD s/p fem-fem bypass with PTFE graft 10/24/20 for critical limb ischemia c/b site infection and right DVT (on Eliquis), non-obstructive mild CAD ([facility] 10/19), DMII (on insulin), HTN, ASHIA (non-compliant with CPAP, sleeps in a recliner), ankylosing spondylitis, chronic lymphedema, lymphorreha, recurrent ulcers/cellulitis, chronic foot ulcer presents to ED from home, as per note wife called EMS due to chest pain. Pt is a poor historian, c/o back pain, confused to year, situation. Denies chest pain. As per ED chart, wife states no chest pain this am, increased confusion, breathing heavy and pale this am.     1) Chest Pain, likely non cardiac   - NST done shows no evidence of ischemia or infarct   - Continue aspirin, Eliquis, simvastatin, and lisinopril  - Echocardiogram with normal EF, no RWMA.     2) HTN   - blood pressure uncontrolled in the AM   - increased Lisinopril to 5mg po q daily     3) Dyslipdiemia   - on Zocor    can be discharged home and follow up in the office     Aline Anne D.O. [facility]  Cardiology/Vascular Cardiology -[facility] Cardiology   Telephone # [phone]

## 2024-01-19 ENCOUNTER — TRANSCRIPTION ENCOUNTER (OUTPATIENT)
Age: 69
End: 2024-01-19

## 2024-01-19 LAB
GLUCOSE BLDC GLUCOMTR-MCNC: 103 MG/DL — HIGH (ref 70–99)
GLUCOSE BLDC GLUCOMTR-MCNC: 122 MG/DL — HIGH (ref 70–99)
GLUCOSE BLDC GLUCOMTR-MCNC: 154 MG/DL — HIGH (ref 70–99)
GLUCOSE BLDC GLUCOMTR-MCNC: 171 MG/DL — HIGH (ref 70–99)

## 2024-01-19 PROCEDURE — 99232 SBSQ HOSP IP/OBS MODERATE 35: CPT

## 2024-01-19 RX ORDER — SENNA PLUS 8.6 MG/1
2 TABLET ORAL AT BEDTIME
Refills: 0 | Status: DISCONTINUED | OUTPATIENT
Start: 2024-01-19 | End: 2024-01-22

## 2024-01-19 RX ORDER — POLYETHYLENE GLYCOL 3350 17 G/17G
17 POWDER, FOR SOLUTION ORAL ONCE
Refills: 0 | Status: COMPLETED | OUTPATIENT
Start: 2024-01-19 | End: 2024-01-19

## 2024-01-19 RX ADMIN — APIXABAN 5 MILLIGRAM(S): 2.5 TABLET, FILM COATED ORAL at 05:54

## 2024-01-19 RX ADMIN — LISINOPRIL 5 MILLIGRAM(S): 2.5 TABLET ORAL at 05:54

## 2024-01-19 RX ADMIN — Medication 50 MILLIGRAM(S): at 21:22

## 2024-01-19 RX ADMIN — Medication 81 MILLIGRAM(S): at 12:35

## 2024-01-19 RX ADMIN — Medication 1000 MILLIGRAM(S): at 21:22

## 2024-01-19 RX ADMIN — METHADONE HYDROCHLORIDE 10 MILLIGRAM(S): 40 TABLET ORAL at 23:38

## 2024-01-19 RX ADMIN — METHADONE HYDROCHLORIDE 10 MILLIGRAM(S): 40 TABLET ORAL at 05:54

## 2024-01-19 RX ADMIN — SENNA PLUS 2 TABLET(S): 8.6 TABLET ORAL at 23:38

## 2024-01-19 RX ADMIN — SERTRALINE 100 MILLIGRAM(S): 25 TABLET, FILM COATED ORAL at 12:35

## 2024-01-19 RX ADMIN — POLYETHYLENE GLYCOL 3350 17 GRAM(S): 17 POWDER, FOR SOLUTION ORAL at 23:38

## 2024-01-19 RX ADMIN — GABAPENTIN 800 MILLIGRAM(S): 400 CAPSULE ORAL at 12:35

## 2024-01-19 RX ADMIN — Medication 1: at 12:34

## 2024-01-19 RX ADMIN — METHADONE HYDROCHLORIDE 10 MILLIGRAM(S): 40 TABLET ORAL at 12:35

## 2024-01-19 RX ADMIN — SIMVASTATIN 20 MILLIGRAM(S): 20 TABLET, FILM COATED ORAL at 21:22

## 2024-01-19 RX ADMIN — Medication 1000 MILLIGRAM(S): at 05:54

## 2024-01-19 RX ADMIN — METHADONE HYDROCHLORIDE 10 MILLIGRAM(S): 40 TABLET ORAL at 18:05

## 2024-01-19 RX ADMIN — APIXABAN 5 MILLIGRAM(S): 2.5 TABLET, FILM COATED ORAL at 18:05

## 2024-01-19 RX ADMIN — Medication 1000 MILLIGRAM(S): at 14:34

## 2024-01-19 NOTE — PHYSICAL THERAPY INITIAL EVALUATION ADULT - ADDITIONAL COMMENTS
Pt reports he lives in a private home with his significant other.  Pts home has 3 +1 KENDELL with 0HR.  Pts home has 0 interior stairs. Pt reports that prior to this hospitalization he was only ambulating short distances with his rollator and was able to ascend/descend the 3 KENDELL by holding onto the door frame.  Pt reports he has fallen on the stairs on numerous occasions.  pt reports his significant other will not assist him with any mobility or ADL needs.  Pt reports that when he cannot use his rollator he grabs furniture or items nearby to ambulate.   DME: pt has a rollator and grabbing/reaching tool

## 2024-01-19 NOTE — DISCHARGE NOTE NURSING/CASE MANAGEMENT/SOCIAL WORK - NSDCPETBCESMAN_GEN_ALL_CORE
If you are a smoker, it is important for your health to stop smoking. Please be aware that second hand smoke is also harmful.
 used

## 2024-01-19 NOTE — PHYSICAL THERAPY INITIAL EVALUATION ADULT - CRITERIA FOR SKILLED THERAPEUTIC INTERVENTIONS
CARLY/impairments found/functional limitations in following categories/anticipated discharge recommendation

## 2024-01-19 NOTE — PHYSICAL THERAPY INITIAL EVALUATION ADULT - DIAGNOSIS, PT EVAL
Pt demonstrates functional limitations in transfers and ambulation due to decreased strength, ROM, and balance.

## 2024-01-19 NOTE — PHYSICAL THERAPY INITIAL EVALUATION ADULT - PERTINENT HX OF CURRENT PROBLEM, REHAB EVAL
PMH per EMR: PAD s/p fem-fem bypass with PTFE graft 10/24/20 for critical limb ischemia c/b site infection and right DVT (on Eliquis), non-obstructive mild CAD ([facility] 10/19), DMII (on insulin), HTN, ASHIA (non-compliant with CPAP, sleeps in a recliner), ankylosing spondylitis, chronic lymphedema, lymphorreha, recurrent ulcers/cellulitis, chronic foot ulcer     Dx: cellulitis to LLE and AMS, acute metabolic encephalopathy

## 2024-01-19 NOTE — PHYSICAL THERAPY INITIAL EVALUATION ADULT - MANUAL MUSCLE TESTING RESULTS, REHAB EVAL
RLE: grossly 3/5 to hip flexors, knee extensors, ankle dorsiflexors.  LLE: hip flexors 3/5, knee extensors at least 1/5, ankle dorsiflexors 3/5

## 2024-01-19 NOTE — PROGRESS NOTE ADULT - ASSESSMENT
67 y/o male PMH PAD s/p fem-fem bypass with PTFE graft 10/24/20 for critical limb ischemia c/b site infection and right DVT (on Eliquis), non-obstructive mild CAD ([facility] 10/19), DMII (on insulin), HTN, ASHIA (non-compliant with CPAP, sleeps in a recliner), ankylosing spondylitis, chronic lymphedema, lymphorreha, recurrent ulcers/cellulitis, chronic foot ulcer presents to ED BIBA due to AMS and generalized weakness. Pt is poor historian, feels tired, chills, spiked fever at ED >102. WBC 15k, has chronic lymphedema in both legs both left leg is warm and red from apparent cellulitis Pt looks clinically dry. BP WNL. Xrays reviewed and left Hip replacement noted on the left, small cortical bone discontinuation located at proximal femur diaphysis but final reading from radiologist is pending. UA positive for methadone. COVID and RVP negative. Bcx pending. Denies nausea vomits, CP, palpitations. Admitted for LLE cellulitis.     Sepsis 2/2 Cellulitis LLE with bilateral venous stasis  - c/w Cefazolin 1g q8hr  - suspect will need 1-2 more days on IV then can switch to oral  - UA negative  - No infiltrates on CXR noted  - PT consult     Acute metabolic encephalopathy  - suspect secondary to sepsis  - improved at this time    DM2  - sliding scale  - CC w/ evening snack    Rt leg DVT  PAD  - s/p fem-fem bypass  - Eliquis 5mg BID    HTN  -Lisinopril  -monitor    CAD  HLD  - aspirin 81mg  - Atorvastatin   - Cardiac monitor  - Eliquis 5mg BID  -TTE LVEF 60%  -Cardiac monitor  - stress test negative     ASHIA  -no CPAP at home but non-compliance     Pain meds s/p dilaudid  - Gabapentin 800mg Daily  - Methadone 10mg 4 times a day opoid abuse)  - Pain management consult as needed   - Restart home dilaudid if needed, no complaints of pain at bedside today    Diet CC w/ evening snacks  DVT ppx - Eliquis 5 mg BID    Dispo: patient agreeable to CARLY - will order PT consult

## 2024-01-19 NOTE — PHYSICAL THERAPY INITIAL EVALUATION ADULT - GAIT DEVIATIONS NOTED, PT EVAL
left foot drag, LLE maintained in external rotation with knee valgus, and pt rolls over medial aspect of left foot during terminal stance./decreased do/increased time in double stance/decreased step length/decreased weight-shifting ability

## 2024-01-19 NOTE — PHYSICAL THERAPY INITIAL EVALUATION ADULT - RANGE OF MOTION EXAMINATION, REHAB EVAL
BLE ROM limited: hip extension limited to approximately -15 degrees from neutral, knee extension limited to approximately -20 degrees from neutral, ankle plantarflexion limited to approximately 5 degrees. Note: left hip fixed in external rotation and left knee in valgus position in standing./bilateral upper extremity ROM was WNL (within normal limits)

## 2024-01-19 NOTE — DISCHARGE NOTE NURSING/CASE MANAGEMENT/SOCIAL WORK - PATIENT PORTAL LINK FT
You can access the FollowMyHealth Patient Portal offered by [facility] by registering at the following website: [email or web address]. By joining [facility]’s FollowMyHealth portal, you will also be able to view your health information using other applications (apps) compatible with our system.

## 2024-01-19 NOTE — PROGRESS NOTE ADULT - SUBJECTIVE AND OBJECTIVE BOX
Jaime Morales MD  [facility] Medicine  Contact via Teams or text/call at [phone]    Patient is a 68y old  Male who presents with a chief complaint of Cellulitis LLE + AMS (18 Jan 2024 11:21)    Feels better.  pain less in legs.     Patient seen and examined at bedside. No overnight events reported.     ALLERGIES:  No Known Allergies    MEDICATIONS  (STANDING):  apixaban 5 milliGRAM(s) Oral every 12 hours  aspirin  chewable 81 milliGRAM(s) Oral daily  ceFAZolin  Injectable. 1000 milliGRAM(s) IV Push every 8 hours  dextrose 5%. 1000 milliLiter(s) (50 mL/Hr) IV Continuous <Continuous>  dextrose 5%. 1000 milliLiter(s) (100 mL/Hr) IV Continuous <Continuous>  dextrose 50% Injectable 25 Gram(s) IV Push once  dextrose 50% Injectable 12.5 Gram(s) IV Push once  dextrose 50% Injectable 25 Gram(s) IV Push once  gabapentin 800 milliGRAM(s) Oral daily  glucagon  Injectable 1 milliGRAM(s) IntraMuscular once  influenza  Vaccine (HIGH DOSE) 0.7 milliLiter(s) IntraMuscular once  insulin lispro (ADMELOG) corrective regimen sliding scale   SubCutaneous three times a day before meals  lisinopril 5 milliGRAM(s) Oral daily  methadone    Tablet 10 milliGRAM(s) Oral four times a day  sertraline 100 milliGRAM(s) Oral daily  simvastatin 20 milliGRAM(s) Oral at bedtime  traZODone 50 milliGRAM(s) Oral at bedtime    MEDICATIONS  (PRN):  acetaminophen     Tablet .. 650 milliGRAM(s) Oral every 6 hours PRN Temp greater or equal to 38C (100.4F), Mild Pain (1 - 3)  aluminum hydroxide/magnesium hydroxide/simethicone Suspension 30 milliLiter(s) Oral every 4 hours PRN Dyspepsia  dextrose Oral Gel 15 Gram(s) Oral once PRN Blood Glucose LESS THAN 70 milliGRAM(s)/deciliter  melatonin 3 milliGRAM(s) Oral at bedtime PRN Insomnia  ondansetron Injectable 4 milliGRAM(s) IV Push every 8 hours PRN Nausea and/or Vomiting    Vital Signs Last 24 Hrs  T(F): 98.7 (19 Jan 2024 07:38), Max: 98.7 (18 Jan 2024 22:00)  HR: 63 (19 Jan 2024 07:38) (63 - 77)  BP: 142/73 (19 Jan 2024 07:38) (106/61 - 142/73)  RR: 20 (19 Jan 2024 07:38) (17 - 20)  SpO2: 95% (19 Jan 2024 07:38) (94% - 96%)  I&O's Summary    18 Jan 2024 07:01  -  19 Jan 2024 07:00  --------------------------------------------------------  IN: 740 mL / OUT: 650 mL / NET: 90 mL    19 Jan 2024 07:01  -  19 Jan 2024 13:06  --------------------------------------------------------  IN: 400 mL / OUT: 775 mL / NET: -375 mL      PHYSICAL EXAM:  General: NAD, A/O x 3  ENT: No gross hearing impairment, Moist mucous membranes, no thrush  Neck: Supple, No JVD  Lungs: Clear to auscultation bilaterally, good air entry, non-labored breathing  Cardio: RRR, S1/S2, No murmur  Abdomen: Soft, Nontender, Nondistended; Bowel sounds present  Extremities: bilateral edema and erythema noted left worse than right up to knees on left, flaky dry skin throughout bilateral legs  Psych: Appropriate mood and affect    LABS:                        9.0    13.70 )-----------( 246      ( 18 Jan 2024 05:24 )             28.8     01-18    137  |  102  |  22.9  ----------------------------<  92  4.4   |  25.0  |  1.11    Ca    8.9      18 Jan 2024 05:24    TPro  8.8  /  Alb  3.6  /  TBili  0.5  /  DBili  x   /  AST  14  /  ALT  9   /  AlkPhos  75  01-17          PT/INR - ( 18 Jan 2024 05:24 )   PT: 19.2 sec;   INR: 1.76 ratio         PTT - ( 17 Jan 2024 06:44 )  PTT:35.7 sec  Lactate, Blood: 1.8 mmol/L (01-17 @ 06:44)    POCT Blood Glucose.: 154 mg/dL (19 Jan 2024 12:24)  POCT Blood Glucose.: 122 mg/dL (19 Jan 2024 08:11)  POCT Blood Glucose.: 191 mg/dL (18 Jan 2024 22:56)  POCT Blood Glucose.: 118 mg/dL (18 Jan 2024 17:15)    Urinalysis Basic - ( 18 Jan 2024 05:24 )    Color: x / Appearance: x / SG: x / pH: x  Gluc: 92 mg/dL / Ketone: x  / Bili: x / Urobili: x   Blood: x / Protein: x / Nitrite: x   Leuk Esterase: x / RBC: x / WBC x   Sq Epi: x / Non Sq Epi: x / Bacteria: x        Culture - Blood (collected 17 Jan 2024 06:49)  Source: .Blood Blood-Peripheral  Preliminary Report (19 Jan 2024 13:02):    No growth at 48 Hours    Culture - Blood (collected 17 Jan 2024 06:44)  Source: .Blood Blood-Peripheral  Preliminary Report (19 Jan 2024 13:02):    No growth at 48 Hours        RADIOLOGY & ADDITIONAL TESTS:    Care Discussed with Consultants/Other Providers:

## 2024-01-20 ENCOUNTER — TRANSCRIPTION ENCOUNTER (OUTPATIENT)
Age: 69
End: 2024-01-20

## 2024-01-20 LAB
ANION GAP SERPL CALC-SCNC: 11 MMOL/L — SIGNIFICANT CHANGE UP (ref 5–17)
BUN SERPL-MCNC: 25.2 MG/DL — HIGH (ref 8–20)
CALCIUM SERPL-MCNC: 8.7 MG/DL — SIGNIFICANT CHANGE UP (ref 8.4–10.5)
CHLORIDE SERPL-SCNC: 104 MMOL/L — SIGNIFICANT CHANGE UP (ref 96–108)
CO2 SERPL-SCNC: 25 MMOL/L — SIGNIFICANT CHANGE UP (ref 22–29)
CREAT SERPL-MCNC: 1 MG/DL — SIGNIFICANT CHANGE UP (ref 0.5–1.3)
EGFR: 81 ML/MIN/1.73M2 — SIGNIFICANT CHANGE UP
GLUCOSE BLDC GLUCOMTR-MCNC: 107 MG/DL — HIGH (ref 70–99)
GLUCOSE BLDC GLUCOMTR-MCNC: 126 MG/DL — HIGH (ref 70–99)
GLUCOSE BLDC GLUCOMTR-MCNC: 158 MG/DL — HIGH (ref 70–99)
GLUCOSE BLDC GLUCOMTR-MCNC: 171 MG/DL — HIGH (ref 70–99)
GLUCOSE SERPL-MCNC: 120 MG/DL — HIGH (ref 70–99)
HCT VFR BLD CALC: 30.4 % — LOW (ref 39–50)
HGB BLD-MCNC: 9.5 G/DL — LOW (ref 13–17)
MCHC RBC-ENTMCNC: 27.2 PG — SIGNIFICANT CHANGE UP (ref 27–34)
MCHC RBC-ENTMCNC: 31.3 GM/DL — LOW (ref 32–36)
MCV RBC AUTO: 87.1 FL — SIGNIFICANT CHANGE UP (ref 80–100)
PLATELET # BLD AUTO: 266 K/UL — SIGNIFICANT CHANGE UP (ref 150–400)
POTASSIUM SERPL-MCNC: 4.4 MMOL/L — SIGNIFICANT CHANGE UP (ref 3.5–5.3)
POTASSIUM SERPL-SCNC: 4.4 MMOL/L — SIGNIFICANT CHANGE UP (ref 3.5–5.3)
RBC # BLD: 3.49 M/UL — LOW (ref 4.2–5.8)
RBC # FLD: 15.4 % — HIGH (ref 10.3–14.5)
SODIUM SERPL-SCNC: 139 MMOL/L — SIGNIFICANT CHANGE UP (ref 135–145)
WBC # BLD: 12 K/UL — HIGH (ref 3.8–10.5)
WBC # FLD AUTO: 12 K/UL — HIGH (ref 3.8–10.5)

## 2024-01-20 PROCEDURE — 99232 SBSQ HOSP IP/OBS MODERATE 35: CPT

## 2024-01-20 RX ORDER — HYDROMORPHONE HYDROCHLORIDE 2 MG/ML
0 INJECTION INTRAMUSCULAR; INTRAVENOUS; SUBCUTANEOUS
Qty: 0 | Refills: 0 | DISCHARGE

## 2024-01-20 RX ORDER — APIXABAN 2.5 MG/1
1 TABLET, FILM COATED ORAL
Refills: 0 | DISCHARGE

## 2024-01-20 RX ORDER — NALOXONE HYDROCHLORIDE 4 MG/.1ML
4 SPRAY NASAL
Refills: 0 | DISCHARGE

## 2024-01-20 RX ORDER — APIXABAN 2.5 MG/1
1 TABLET, FILM COATED ORAL
Qty: 0 | Refills: 0 | DISCHARGE
Start: 2024-01-20

## 2024-01-20 RX ORDER — SERTRALINE 25 MG/1
1 TABLET, FILM COATED ORAL
Refills: 0 | DISCHARGE

## 2024-01-20 RX ORDER — TRAZODONE HCL 50 MG
1 TABLET ORAL
Qty: 0 | Refills: 0 | DISCHARGE
Start: 2024-01-20

## 2024-01-20 RX ORDER — CEPHALEXIN 500 MG
1 CAPSULE ORAL
Qty: 4 | Refills: 0
Start: 2024-01-20 | End: 2024-01-21

## 2024-01-20 RX ORDER — ASPIRIN/CALCIUM CARB/MAGNESIUM 324 MG
1 TABLET ORAL
Qty: 0 | Refills: 0 | DISCHARGE
Start: 2024-01-20

## 2024-01-20 RX ORDER — METHADONE HYDROCHLORIDE 40 MG/1
1 TABLET ORAL
Qty: 0 | Refills: 0 | DISCHARGE
Start: 2024-01-20

## 2024-01-20 RX ORDER — LISINOPRIL 2.5 MG/1
1 TABLET ORAL
Qty: 0 | Refills: 0 | DISCHARGE
Start: 2024-01-20

## 2024-01-20 RX ORDER — GABAPENTIN 400 MG/1
1 CAPSULE ORAL
Refills: 0 | DISCHARGE

## 2024-01-20 RX ORDER — SERTRALINE 25 MG/1
1 TABLET, FILM COATED ORAL
Qty: 0 | Refills: 0 | DISCHARGE
Start: 2024-01-20

## 2024-01-20 RX ORDER — ASPIRIN/CALCIUM CARB/MAGNESIUM 324 MG
1 TABLET ORAL
Qty: 0 | Refills: 0 | DISCHARGE

## 2024-01-20 RX ORDER — METFORMIN HYDROCHLORIDE 850 MG/1
1 TABLET ORAL
Qty: 0 | Refills: 0 | DISCHARGE

## 2024-01-20 RX ORDER — TRAZODONE HCL 50 MG
50 TABLET ORAL
Refills: 0 | DISCHARGE

## 2024-01-20 RX ORDER — SIMVASTATIN 20 MG/1
1 TABLET, FILM COATED ORAL
Refills: 0 | DISCHARGE

## 2024-01-20 RX ORDER — METHADONE HYDROCHLORIDE 40 MG/1
0 TABLET ORAL
Qty: 0 | Refills: 0 | DISCHARGE

## 2024-01-20 RX ORDER — GABAPENTIN 400 MG/1
2 CAPSULE ORAL
Qty: 0 | Refills: 0 | DISCHARGE
Start: 2024-01-20

## 2024-01-20 RX ORDER — SIMVASTATIN 20 MG/1
1 TABLET, FILM COATED ORAL
Qty: 0 | Refills: 0 | DISCHARGE
Start: 2024-01-20

## 2024-01-20 RX ADMIN — SERTRALINE 100 MILLIGRAM(S): 25 TABLET, FILM COATED ORAL at 11:13

## 2024-01-20 RX ADMIN — METHADONE HYDROCHLORIDE 10 MILLIGRAM(S): 40 TABLET ORAL at 23:16

## 2024-01-20 RX ADMIN — APIXABAN 5 MILLIGRAM(S): 2.5 TABLET, FILM COATED ORAL at 06:11

## 2024-01-20 RX ADMIN — Medication 1000 MILLIGRAM(S): at 23:15

## 2024-01-20 RX ADMIN — LISINOPRIL 5 MILLIGRAM(S): 2.5 TABLET ORAL at 06:11

## 2024-01-20 RX ADMIN — Medication 50 MILLIGRAM(S): at 23:15

## 2024-01-20 RX ADMIN — METHADONE HYDROCHLORIDE 10 MILLIGRAM(S): 40 TABLET ORAL at 06:11

## 2024-01-20 RX ADMIN — SENNA PLUS 2 TABLET(S): 8.6 TABLET ORAL at 23:15

## 2024-01-20 RX ADMIN — METHADONE HYDROCHLORIDE 10 MILLIGRAM(S): 40 TABLET ORAL at 18:15

## 2024-01-20 RX ADMIN — METHADONE HYDROCHLORIDE 10 MILLIGRAM(S): 40 TABLET ORAL at 11:12

## 2024-01-20 RX ADMIN — APIXABAN 5 MILLIGRAM(S): 2.5 TABLET, FILM COATED ORAL at 18:15

## 2024-01-20 RX ADMIN — Medication 1000 MILLIGRAM(S): at 06:12

## 2024-01-20 RX ADMIN — Medication 81 MILLIGRAM(S): at 11:12

## 2024-01-20 RX ADMIN — GABAPENTIN 800 MILLIGRAM(S): 400 CAPSULE ORAL at 11:12

## 2024-01-20 RX ADMIN — SIMVASTATIN 20 MILLIGRAM(S): 20 TABLET, FILM COATED ORAL at 23:16

## 2024-01-20 RX ADMIN — Medication 1000 MILLIGRAM(S): at 14:11

## 2024-01-20 NOTE — DISCHARGE NOTE PROVIDER - ATTENDING DISCHARGE PHYSICAL EXAMINATION:
General: NAD, A/O x 3  ENT: No gross hearing impairment, Moist mucous membranes, no thrush  Neck: Supple, No JVD  Lungs: Clear to auscultation bilaterally, good air entry, non-labored breathing  Cardio: RRR, S1/S2, No murmur  Abdomen: Soft, Nontender, Nondistended; Bowel sounds present  Extremities: bilateral edema and erythema noted left worse than right up to knees on left, flaky dry skin throughout bilateral legs  Psych: Appropriate mood and affect

## 2024-01-20 NOTE — PROGRESS NOTE ADULT - SUBJECTIVE AND OBJECTIVE BOX
RANDY MARTINEZ    [id number]    68y      Male    INTERVAL HPI/OVERNIGHT EVENTS:  patient being seen for cellulitis. Patient seen at bedside       REVIEW OF SYSTEMS:    CONSTITUTIONAL: No fever, weight loss, or fatigue  RESPIRATORY: No cough, wheezing, hemoptysis; No shortness of breath  CARDIOVASCULAR: No chest pain, palpitations  GASTROINTESTINAL: No abdominal or epigastric pain. No nausea, vomiting  NEUROLOGICAL: No headaches, memory loss, loss of strength.  MISCELLANEOUS:      Vital Signs Last 24 Hrs  T(C): 36.9 (21 Jan 2024 06:11), Max: 37.1 (20 Jan 2024 22:25)  T(F): 98.5 (21 Jan 2024 06:11), Max: 98.8 (20 Jan 2024 22:25)  HR: 83 (21 Jan 2024 06:11) (79 - 83)  BP: 131/79 (21 Jan 2024 06:11) (101/66 - 153/74)  BP(mean): 78 (20 Jan 2024 17:18) (78 - 78)  RR: 17 (21 Jan 2024 06:11) (17 - 18)  SpO2: 96% (21 Jan 2024 06:11) (94% - 97%)    Parameters below as of 20 Jan 2024 22:25  Patient On (Oxygen Delivery Method): room air        PHYSICAL EXAM:    General: NAD, A/O x 3  ENT: No gross hearing impairment, Moist mucous membranes, no thrush  Neck: Supple, No JVD  Lungs: Clear to auscultation bilaterally, good air entry, non-labored breathing  Cardio: RRR, S1/S2, No murmur  Abdomen: Soft, Nontender, Nondistended; Bowel sounds present  Extremities: bilateral edema and erythema noted left worse than right up to knees on left, flaky dry skin throughout bilateral legs  Psych: Appropriate mood and affect      LABS:                        9.3    12.44 )-----------( 266      ( 21 Jan 2024 06:23 )             29.1     01-21    138  |  101  |  27.1<H>  ----------------------------<  114<H>  4.6   |  25.0  |  1.01    Ca    8.8      21 Jan 2024 06:23  Phos  3.0     01-21  Mg     2.1     01-21    TPro  7.8  /  Alb  3.0<L>  /  TBili  0.3<L>  /  DBili  x   /  AST  19  /  ALT  15  /  AlkPhos  89  01-21      Urinalysis Basic - ( 21 Jan 2024 06:23 )    Color: x / Appearance: x / SG: x / pH: x  Gluc: 114 mg/dL / Ketone: x  / Bili: x / Urobili: x   Blood: x / Protein: x / Nitrite: x   Leuk Esterase: x / RBC: x / WBC x   Sq Epi: x / Non Sq Epi: x / Bacteria: x          MEDICATIONS  (STANDING):  apixaban 5 milliGRAM(s) Oral every 12 hours  aspirin  chewable 81 milliGRAM(s) Oral daily  ceFAZolin  Injectable. 1000 milliGRAM(s) IV Push every 8 hours  dextrose 5%. 1000 milliLiter(s) (100 mL/Hr) IV Continuous <Continuous>  dextrose 5%. 1000 milliLiter(s) (50 mL/Hr) IV Continuous <Continuous>  dextrose 50% Injectable 12.5 Gram(s) IV Push once  dextrose 50% Injectable 25 Gram(s) IV Push once  dextrose 50% Injectable 25 Gram(s) IV Push once  gabapentin 800 milliGRAM(s) Oral daily  glucagon  Injectable 1 milliGRAM(s) IntraMuscular once  influenza  Vaccine (HIGH DOSE) 0.7 milliLiter(s) IntraMuscular once  insulin lispro (ADMELOG) corrective regimen sliding scale   SubCutaneous three times a day before meals  lisinopril 5 milliGRAM(s) Oral daily  methadone    Tablet 10 milliGRAM(s) Oral four times a day  senna 2 Tablet(s) Oral at bedtime  sertraline 100 milliGRAM(s) Oral daily  simvastatin 20 milliGRAM(s) Oral at bedtime  traZODone 50 milliGRAM(s) Oral at bedtime    MEDICATIONS  (PRN):  acetaminophen     Tablet .. 650 milliGRAM(s) Oral every 6 hours PRN Temp greater or equal to 38C (100.4F), Mild Pain (1 - 3)  aluminum hydroxide/magnesium hydroxide/simethicone Suspension 30 milliLiter(s) Oral every 4 hours PRN Dyspepsia  dextrose Oral Gel 15 Gram(s) Oral once PRN Blood Glucose LESS THAN 70 milliGRAM(s)/deciliter  melatonin 3 milliGRAM(s) Oral at bedtime PRN Insomnia  ondansetron Injectable 4 milliGRAM(s) IV Push every 8 hours PRN Nausea and/or Vomiting      RADIOLOGY & ADDITIONAL TESTS:

## 2024-01-20 NOTE — PROGRESS NOTE ADULT - ASSESSMENT
69 y/o male PMH PAD s/p fem-fem bypass with PTFE graft 10/24/20 for critical limb ischemia c/b site infection and right DVT (on Eliquis), non-obstructive mild CAD ([facility] 10/19), DMII (on insulin), HTN, ASHIA (non-compliant with CPAP, sleeps in a recliner), ankylosing spondylitis, chronic lymphedema, lymphorreha, recurrent ulcers/cellulitis, chronic foot ulcer presents to ED BIBA due to AMS and generalized weakness. Pt is poor historian, feels tired, chills, spiked fever at ED >102. WBC 15k, has chronic lymphedema in both legs both left leg is warm and red from apparent cellulitis Pt looks clinically dry. BP WNL. Xrays reviewed and left Hip replacement noted on the left, small cortical bone discontinuation located at proximal femur diaphysis but final reading from radiologist is pending. UA positive for methadone.    Sepsis 2/2 Cellulitis LLE with bilateral venous stasis  - c/w Cefazolin 1g q8hr    DM2  - sliding scale    Rt leg DVT  PAD  - s/p fem-fem bypass  - Eliquis 5mg BID    HTN  -Lisinopril  -monitor    CAD  HLD  - aspirin 81mg  - Atorvastatin   - Cardiac monitor  - Eliquis 5mg BID  -TTE LVEF 60%  -Cardiac monitor  - stress test negative     ASHIA  -no CPAP at home but non-compliance     Pain meds s/p dilaudid  - Gabapentin 800mg Daily  - Methadone 10mg 4 times a day opoid abuse)    dc to [facility]

## 2024-01-20 NOTE — DISCHARGE NOTE PROVIDER - NSDCMRMEDTOKEN_GEN_ALL_CORE_FT
apixaban 5 mg oral tablet: 1 tab(s) orally every 12 hours  aspirin 81 mg oral tablet, chewable: 1 tab(s) orally once a day  cephalexin 500 mg oral capsule: 1 cap(s) orally 2 times a day  gabapentin 400 mg oral capsule: 2 cap(s) orally once a day  lisinopril 5 mg oral tablet: 1 tab(s) orally once a day  methadone 10 mg oral tablet: 1 tab(s) orally 4 times a day  sertraline 100 mg oral tablet: 1 tab(s) orally once a day  simvastatin 20 mg oral tablet: 1 tab(s) orally once a day (at bedtime)  traZODone 50 mg oral tablet: 1 tab(s) orally once a day (at bedtime)   apixaban 5 mg oral tablet: 1 tab(s) orally every 12 hours  aspirin 81 mg oral tablet, chewable: 1 tab(s) orally once a day  gabapentin 400 mg oral capsule: 2 cap(s) orally once a day  lisinopril 5 mg oral tablet: 1 tab(s) orally once a day  methadone 10 mg oral tablet: 1 tab(s) orally 4 times a day  sertraline 100 mg oral tablet: 1 tab(s) orally once a day  simvastatin 20 mg oral tablet: 1 tab(s) orally once a day (at bedtime)  traZODone 50 mg oral tablet: 1 tab(s) orally once a day (at bedtime)

## 2024-01-20 NOTE — DISCHARGE NOTE PROVIDER - NSDCCPCAREPLAN_GEN_ALL_CORE_FT
PRINCIPAL DISCHARGE DIAGNOSIS  Diagnosis: Chronic pain syndrome  Assessment and Plan of Treatment:       SECONDARY DISCHARGE DIAGNOSES  Diagnosis: Cellulitis  Assessment and Plan of Treatment:

## 2024-01-20 NOTE — DISCHARGE NOTE PROVIDER - HOSPITAL COURSE
67 y/o male PMH PAD s/p fem-fem bypass with PTFE graft 10/24/20 for critical limb ischemia c/b site infection and right DVT (on Eliquis), non-obstructive mild CAD ([place] 10/19), DMII (on insulin), HTN, ASHIA (non-compliant with CPAP, sleeps in a recliner), ankylosing spondylitis, chronic lymphedema, lymphorreha, recurrent ulcers/cellulitis, chronic foot ulcer presents to ED BIBA due to AMS and generalized weakness. Pt is poor historian, feels tired, chills, spiked fever at ED >102. WBC 15k, has chronic lymphedema in both legs both left leg is warm and red from apparent cellulitis Pt looks clinically dry. BP WNL. Xrays reviewed and left Hip replacement noted on the left, small cortical bone discontinuation located at proximal femur diaphysis but final reading from radiologist is pending. UA positive for methadone. COVID and RVP negative. Admitted for LLE cellulitis.     Sepsis 2/2 Cellulitis LLE with bilateral venous stasis  - c/w Cefazolin 1g q8hr  dc with keflex  dc to [facility]    Acute metabolic encephalopathy  resolved    DM2  - a1c well controlled  no need for metformin    Rt leg DVT  PAD  - s/p fem-fem bypass  - Eliquis 5mg BID    HTN  -Lisinopril  -monitor    CAD  HLD  - aspirin 81mg  - statin  - Eliquis 5mg BID  -TTE LVEF 60%  - stress test negative     ASHIA  -no CPAP at home but non-compliance     Pain meds s/p dilaudid  - Gabapentin 800mg Daily  - Methadone 10mg 4 times a day opoid abuse)    Diet CC w/ evening snacks  DVT ppx - Eliquis 5 mg BID    Dispo: patient agreeable to [facility] - 69 y/o male PMH PAD s/p fem-fem bypass with PTFE graft 10/24/20 for critical limb ischemia c/b site infection and right DVT (on Eliquis), non-obstructive mild CAD ([facility] 10/19), DMII (on insulin), HTN, ASHIA (non-compliant with CPAP, sleeps in a recliner), ankylosing spondylitis, chronic lymphedema, lymphorreha, recurrent ulcers/cellulitis, chronic foot ulcer presents to ED BIBA due to AMS and generalized weakness. Pt is poor historian, feels tired, chills, spiked fever at ED >102. WBC 15k, has chronic lymphedema in both legs both left leg is warm and red from apparent cellulitis Pt looks clinically dry. BP WNL. Xrays reviewed and left Hip replacement noted on the left, small cortical bone discontinuation located at proximal femur diaphysis but final reading from radiologist is pending. UA positive for methadone. COVID and RVP negative. Admitted for LLE cellulitis.     Sepsis 2/2 Cellulitis LLE with bilateral venous stasis  - s/p  Cefazolin 1g q8hr  dc to [facility]    Acute metabolic encephalopathy  resolved    DM2  - a1c well controlled  no need for metformin    Rt leg DVT  PAD  - s/p fem-fem bypass  - Eliquis 5mg BID    HTN  -Lisinopril  -monitor    CAD  HLD  - aspirin 81mg  - statin  - Eliquis 5mg BID  -TTE LVEF 60%  - stress test negative     ASHIA  -no CPAP at home but non-compliance     Pain meds s/p dilaudid  - Gabapentin 800mg Daily  - Methadone 10mg 4 times a day opoid abuse)

## 2024-01-21 LAB
ALBUMIN SERPL ELPH-MCNC: 3 G/DL — LOW (ref 3.3–5.2)
ALP SERPL-CCNC: 89 U/L — SIGNIFICANT CHANGE UP (ref 40–120)
ALT FLD-CCNC: 15 U/L — SIGNIFICANT CHANGE UP
ANION GAP SERPL CALC-SCNC: 12 MMOL/L — SIGNIFICANT CHANGE UP (ref 5–17)
AST SERPL-CCNC: 19 U/L — SIGNIFICANT CHANGE UP
BASOPHILS # BLD AUTO: 0.12 K/UL — SIGNIFICANT CHANGE UP (ref 0–0.2)
BASOPHILS NFR BLD AUTO: 1 % — SIGNIFICANT CHANGE UP (ref 0–2)
BILIRUB SERPL-MCNC: 0.3 MG/DL — LOW (ref 0.4–2)
BLD GP AB SCN SERPL QL: SIGNIFICANT CHANGE UP
BUN SERPL-MCNC: 27.1 MG/DL — HIGH (ref 8–20)
CALCIUM SERPL-MCNC: 8.8 MG/DL — SIGNIFICANT CHANGE UP (ref 8.4–10.5)
CHLORIDE SERPL-SCNC: 101 MMOL/L — SIGNIFICANT CHANGE UP (ref 96–108)
CO2 SERPL-SCNC: 25 MMOL/L — SIGNIFICANT CHANGE UP (ref 22–29)
CREAT SERPL-MCNC: 1.01 MG/DL — SIGNIFICANT CHANGE UP (ref 0.5–1.3)
EGFR: 81 ML/MIN/1.73M2 — SIGNIFICANT CHANGE UP
EOSINOPHIL # BLD AUTO: 0.32 K/UL — SIGNIFICANT CHANGE UP (ref 0–0.5)
EOSINOPHIL NFR BLD AUTO: 2.6 % — SIGNIFICANT CHANGE UP (ref 0–6)
GLUCOSE BLDC GLUCOMTR-MCNC: 132 MG/DL — HIGH (ref 70–99)
GLUCOSE BLDC GLUCOMTR-MCNC: 135 MG/DL — HIGH (ref 70–99)
GLUCOSE SERPL-MCNC: 114 MG/DL — HIGH (ref 70–99)
HCT VFR BLD CALC: 29.1 % — LOW (ref 39–50)
HGB BLD-MCNC: 9.3 G/DL — LOW (ref 13–17)
IMM GRANULOCYTES NFR BLD AUTO: 1.6 % — HIGH (ref 0–0.9)
LYMPHOCYTES # BLD AUTO: 2.57 K/UL — SIGNIFICANT CHANGE UP (ref 1–3.3)
LYMPHOCYTES # BLD AUTO: 20.7 % — SIGNIFICANT CHANGE UP (ref 13–44)
MAGNESIUM SERPL-MCNC: 2.1 MG/DL — SIGNIFICANT CHANGE UP (ref 1.6–2.6)
MCHC RBC-ENTMCNC: 27.5 PG — SIGNIFICANT CHANGE UP (ref 27–34)
MCHC RBC-ENTMCNC: 32 GM/DL — SIGNIFICANT CHANGE UP (ref 32–36)
MCV RBC AUTO: 86.1 FL — SIGNIFICANT CHANGE UP (ref 80–100)
MONOCYTES # BLD AUTO: 0.83 K/UL — SIGNIFICANT CHANGE UP (ref 0–0.9)
MONOCYTES NFR BLD AUTO: 6.7 % — SIGNIFICANT CHANGE UP (ref 2–14)
NEUTROPHILS # BLD AUTO: 8.4 K/UL — HIGH (ref 1.8–7.4)
NEUTROPHILS NFR BLD AUTO: 67.4 % — SIGNIFICANT CHANGE UP (ref 43–77)
PHOSPHATE SERPL-MCNC: 3 MG/DL — SIGNIFICANT CHANGE UP (ref 2.4–4.7)
PLATELET # BLD AUTO: 266 K/UL — SIGNIFICANT CHANGE UP (ref 150–400)
POTASSIUM SERPL-MCNC: 4.6 MMOL/L — SIGNIFICANT CHANGE UP (ref 3.5–5.3)
POTASSIUM SERPL-SCNC: 4.6 MMOL/L — SIGNIFICANT CHANGE UP (ref 3.5–5.3)
PROT SERPL-MCNC: 7.8 G/DL — SIGNIFICANT CHANGE UP (ref 6.6–8.7)
RBC # BLD: 3.38 M/UL — LOW (ref 4.2–5.8)
RBC # FLD: 15.4 % — HIGH (ref 10.3–14.5)
SODIUM SERPL-SCNC: 138 MMOL/L — SIGNIFICANT CHANGE UP (ref 135–145)
WBC # BLD: 12.44 K/UL — HIGH (ref 3.8–10.5)
WBC # FLD AUTO: 12.44 K/UL — HIGH (ref 3.8–10.5)

## 2024-01-21 PROCEDURE — 99232 SBSQ HOSP IP/OBS MODERATE 35: CPT

## 2024-01-21 RX ORDER — POLYETHYLENE GLYCOL 3350 17 G/17G
17 POWDER, FOR SOLUTION ORAL DAILY
Refills: 0 | Status: DISCONTINUED | OUTPATIENT
Start: 2024-01-21 | End: 2024-01-22

## 2024-01-21 RX ADMIN — APIXABAN 5 MILLIGRAM(S): 2.5 TABLET, FILM COATED ORAL at 17:16

## 2024-01-21 RX ADMIN — LISINOPRIL 5 MILLIGRAM(S): 2.5 TABLET ORAL at 06:25

## 2024-01-21 RX ADMIN — METHADONE HYDROCHLORIDE 10 MILLIGRAM(S): 40 TABLET ORAL at 23:28

## 2024-01-21 RX ADMIN — METHADONE HYDROCHLORIDE 10 MILLIGRAM(S): 40 TABLET ORAL at 17:16

## 2024-01-21 RX ADMIN — METHADONE HYDROCHLORIDE 10 MILLIGRAM(S): 40 TABLET ORAL at 12:36

## 2024-01-21 RX ADMIN — SENNA PLUS 2 TABLET(S): 8.6 TABLET ORAL at 21:27

## 2024-01-21 RX ADMIN — GABAPENTIN 800 MILLIGRAM(S): 400 CAPSULE ORAL at 12:36

## 2024-01-21 RX ADMIN — Medication 81 MILLIGRAM(S): at 12:36

## 2024-01-21 RX ADMIN — Medication 1000 MILLIGRAM(S): at 21:26

## 2024-01-21 RX ADMIN — Medication 1000 MILLIGRAM(S): at 14:57

## 2024-01-21 RX ADMIN — SIMVASTATIN 20 MILLIGRAM(S): 20 TABLET, FILM COATED ORAL at 21:27

## 2024-01-21 RX ADMIN — APIXABAN 5 MILLIGRAM(S): 2.5 TABLET, FILM COATED ORAL at 06:25

## 2024-01-21 RX ADMIN — Medication 1000 MILLIGRAM(S): at 06:25

## 2024-01-21 RX ADMIN — POLYETHYLENE GLYCOL 3350 17 GRAM(S): 17 POWDER, FOR SOLUTION ORAL at 17:16

## 2024-01-21 RX ADMIN — Medication 50 MILLIGRAM(S): at 21:27

## 2024-01-21 RX ADMIN — METHADONE HYDROCHLORIDE 10 MILLIGRAM(S): 40 TABLET ORAL at 06:24

## 2024-01-21 RX ADMIN — SERTRALINE 100 MILLIGRAM(S): 25 TABLET, FILM COATED ORAL at 12:37

## 2024-01-21 NOTE — PROGRESS NOTE ADULT - REASON FOR ADMISSION
Cellulitis LLE + AMS

## 2024-01-21 NOTE — PROGRESS NOTE ADULT - SUBJECTIVE AND OBJECTIVE BOX
RANDY MARTINEZ    [id number]    68y      Male    INTERVAL HPI/OVERNIGHT EVENTS: patient being seen for cellulitis. patient seen at bedside and denies any complaints    REVIEW OF SYSTEMS:    CONSTITUTIONAL: No fever, weight loss, or fatigue  RESPIRATORY: No cough, wheezing, hemoptysis; No shortness of breath  CARDIOVASCULAR: No chest pain, palpitations  GASTROINTESTINAL: No abdominal or epigastric pain. No nausea, vomiting  NEUROLOGICAL: No headaches, memory loss, loss of strength.  MISCELLANEOUS:      Vital Signs Last 24 Hrs  T(C): 36.7 (21 Jan 2024 09:47), Max: 37.1 (20 Jan 2024 22:25)  T(F): 98.1 (21 Jan 2024 09:47), Max: 98.8 (20 Jan 2024 22:25)  HR: 77 (21 Jan 2024 09:47) (77 - 83)  BP: 113/68 (21 Jan 2024 09:47) (101/66 - 153/74)  BP(mean): 78 (20 Jan 2024 17:18) (78 - 78)  RR: 17 (21 Jan 2024 09:47) (17 - 18)  SpO2: 97% (21 Jan 2024 09:47) (96% - 97%)    Parameters below as of 21 Jan 2024 09:47  Patient On (Oxygen Delivery Method): room air        PHYSICAL EXAM:      General: NAD, A/O x 3  ENT: No gross hearing impairment, Moist mucous membranes, no thrush  Neck: Supple, No JVD  Lungs: Clear to auscultation bilaterally, good air entry, non-labored breathing  Cardio: RRR, S1/S2, No murmur  Abdomen: Soft, Nontender, Nondistended; Bowel sounds present  Extremities: bilateral edema and erythema noted left worse than right up to knees on left, flaky dry skin throughout bilateral legs  Psych: Appropriate mood and affect    LABS:                        9.3    12.44 )-----------( 266      ( 21 Jan 2024 06:23 )             29.1     01-21    138  |  101  |  27.1<H>  ----------------------------<  114<H>  4.6   |  25.0  |  1.01    Ca    8.8      21 Jan 2024 06:23  Phos  3.0     01-21  Mg     2.1     01-21    TPro  7.8  /  Alb  3.0<L>  /  TBili  0.3<L>  /  DBili  x   /  AST  19  /  ALT  15  /  AlkPhos  89  01-21      Urinalysis Basic - ( 21 Jan 2024 06:23 )    Color: x / Appearance: x / SG: x / pH: x  Gluc: 114 mg/dL / Ketone: x  / Bili: x / Urobili: x   Blood: x / Protein: x / Nitrite: x   Leuk Esterase: x / RBC: x / WBC x   Sq Epi: x / Non Sq Epi: x / Bacteria: x          MEDICATIONS  (STANDING):  apixaban 5 milliGRAM(s) Oral every 12 hours  aspirin  chewable 81 milliGRAM(s) Oral daily  ceFAZolin  Injectable. 1000 milliGRAM(s) IV Push every 8 hours  dextrose 5%. 1000 milliLiter(s) (100 mL/Hr) IV Continuous <Continuous>  dextrose 5%. 1000 milliLiter(s) (50 mL/Hr) IV Continuous <Continuous>  dextrose 50% Injectable 12.5 Gram(s) IV Push once  dextrose 50% Injectable 25 Gram(s) IV Push once  dextrose 50% Injectable 25 Gram(s) IV Push once  gabapentin 800 milliGRAM(s) Oral daily  glucagon  Injectable 1 milliGRAM(s) IntraMuscular once  influenza  Vaccine (HIGH DOSE) 0.7 milliLiter(s) IntraMuscular once  insulin lispro (ADMELOG) corrective regimen sliding scale   SubCutaneous three times a day before meals  lisinopril 5 milliGRAM(s) Oral daily  methadone    Tablet 10 milliGRAM(s) Oral four times a day  senna 2 Tablet(s) Oral at bedtime  sertraline 100 milliGRAM(s) Oral daily  simvastatin 20 milliGRAM(s) Oral at bedtime  traZODone 50 milliGRAM(s) Oral at bedtime    MEDICATIONS  (PRN):  acetaminophen     Tablet .. 650 milliGRAM(s) Oral every 6 hours PRN Temp greater or equal to 38C (100.4F), Mild Pain (1 - 3)  aluminum hydroxide/magnesium hydroxide/simethicone Suspension 30 milliLiter(s) Oral every 4 hours PRN Dyspepsia  dextrose Oral Gel 15 Gram(s) Oral once PRN Blood Glucose LESS THAN 70 milliGRAM(s)/deciliter  melatonin 3 milliGRAM(s) Oral at bedtime PRN Insomnia  ondansetron Injectable 4 milliGRAM(s) IV Push every 8 hours PRN Nausea and/or Vomiting      RADIOLOGY & ADDITIONAL TESTS:

## 2024-01-21 NOTE — CHART NOTE - NSCHARTNOTEFT_GEN_A_CORE
PA NOTE-MEDICINE    Called by RN due to Pt experiencing 5 Beats of V-Tach.  Pt asymptomatic VSS   Electrolytes ordered for AM  Continue to monitor Pt  Recall PA for any reoccurrence or for any changes in Pt status  Will sign out to AM Team

## 2024-01-21 NOTE — PROGRESS NOTE ADULT - PROVIDER SPECIALTY LIST ADULT
Internal Medicine
Hospitalist
Hospitalist
Internal Medicine
Cardiology
Hospitalist
Cardiology
Cardiology

## 2024-01-21 NOTE — PROGRESS NOTE ADULT - ASSESSMENT
69 y/o male PMH PAD s/p fem-fem bypass with PTFE graft 10/24/20 for critical limb ischemia c/b site infection and right DVT (on Eliquis), non-obstructive mild CAD ([facility] 10/19), DMII (on insulin), HTN, ASHIA (non-compliant with CPAP, sleeps in a recliner), ankylosing spondylitis, chronic lymphedema, lymphorreha, recurrent ulcers/cellulitis, chronic foot ulcer presents to ED BIBA due to AMS and generalized weakness. Pt is poor historian, feels tired, chills, spiked fever at ED >102. WBC 15k, has chronic lymphedema in both legs both left leg is warm and red from apparent cellulitis Pt looks clinically dry. BP WNL. Xrays reviewed and left Hip replacement noted on the left, small cortical bone discontinuation located at proximal femur diaphysis but final reading from radiologist is pending. UA positive for methadone.    Sepsis 2/2 Cellulitis LLE with bilateral venous stasis  - c/w Cefazolin 1g q8hr    DM2  - sliding scale    Rt leg DVT  PAD  - s/p fem-fem bypass  - Eliquis 5mg BID    HTN  -Lisinopril  -monitor    CAD  HLD  - aspirin 81mg  - Atorvastatin   - Cardiac monitor  - Eliquis 5mg BID  -TTE LVEF 60%  -Cardiac monitor  - stress test negative     ASHIA  -no CPAP at home but non-compliance     Pain meds s/p dilaudid  - Gabapentin 800mg Daily  - Methadone 10mg 4 times a day opoid abuse)    dc to [facility]

## 2024-01-22 VITALS
OXYGEN SATURATION: 94 % | TEMPERATURE: 99 F | HEART RATE: 77 BPM | RESPIRATION RATE: 18 BRPM | DIASTOLIC BLOOD PRESSURE: 58 MMHG | SYSTOLIC BLOOD PRESSURE: 108 MMHG

## 2024-01-22 PROCEDURE — 86850 RBC ANTIBODY SCREEN: CPT

## 2024-01-22 PROCEDURE — 83880 ASSAY OF NATRIURETIC PEPTIDE: CPT

## 2024-01-22 PROCEDURE — 80307 DRUG TEST PRSMV CHEM ANLYZR: CPT

## 2024-01-22 PROCEDURE — 93017 CV STRESS TEST TRACING ONLY: CPT

## 2024-01-22 PROCEDURE — 81001 URINALYSIS AUTO W/SCOPE: CPT

## 2024-01-22 PROCEDURE — 86900 BLOOD TYPING SEROLOGIC ABO: CPT

## 2024-01-22 PROCEDURE — 83735 ASSAY OF MAGNESIUM: CPT

## 2024-01-22 PROCEDURE — 71045 X-RAY EXAM CHEST 1 VIEW: CPT

## 2024-01-22 PROCEDURE — 78452 HT MUSCLE IMAGE SPECT MULT: CPT

## 2024-01-22 PROCEDURE — 86901 BLOOD TYPING SEROLOGIC RH(D): CPT

## 2024-01-22 PROCEDURE — 85027 COMPLETE CBC AUTOMATED: CPT

## 2024-01-22 PROCEDURE — 85730 THROMBOPLASTIN TIME PARTIAL: CPT

## 2024-01-22 PROCEDURE — 80048 BASIC METABOLIC PNL TOTAL CA: CPT

## 2024-01-22 PROCEDURE — 72190 X-RAY EXAM OF PELVIS: CPT

## 2024-01-22 PROCEDURE — 82962 GLUCOSE BLOOD TEST: CPT

## 2024-01-22 PROCEDURE — 83036 HEMOGLOBIN GLYCOSYLATED A1C: CPT

## 2024-01-22 PROCEDURE — 73560 X-RAY EXAM OF KNEE 1 OR 2: CPT

## 2024-01-22 PROCEDURE — 0225U NFCT DS DNA&RNA 21 SARSCOV2: CPT

## 2024-01-22 PROCEDURE — 36415 COLL VENOUS BLD VENIPUNCTURE: CPT

## 2024-01-22 PROCEDURE — 85025 COMPLETE CBC W/AUTO DIFF WBC: CPT

## 2024-01-22 PROCEDURE — 73522 X-RAY EXAM HIPS BI 3-4 VIEWS: CPT

## 2024-01-22 PROCEDURE — 73562 X-RAY EXAM OF KNEE 3: CPT

## 2024-01-22 PROCEDURE — 99285 EMERGENCY DEPT VISIT HI MDM: CPT

## 2024-01-22 PROCEDURE — 96374 THER/PROPH/DIAG INJ IV PUSH: CPT

## 2024-01-22 PROCEDURE — 83605 ASSAY OF LACTIC ACID: CPT

## 2024-01-22 PROCEDURE — A9500: CPT

## 2024-01-22 PROCEDURE — 80053 COMPREHEN METABOLIC PANEL: CPT

## 2024-01-22 PROCEDURE — 84100 ASSAY OF PHOSPHORUS: CPT

## 2024-01-22 PROCEDURE — 93321 DOPPLER ECHO F-UP/LMTD STD: CPT

## 2024-01-22 PROCEDURE — 87040 BLOOD CULTURE FOR BACTERIA: CPT

## 2024-01-22 PROCEDURE — 93308 TTE F-UP OR LMTD: CPT

## 2024-01-22 PROCEDURE — 84484 ASSAY OF TROPONIN QUANT: CPT

## 2024-01-22 PROCEDURE — 93005 ELECTROCARDIOGRAM TRACING: CPT

## 2024-01-22 PROCEDURE — 85610 PROTHROMBIN TIME: CPT

## 2024-01-22 PROCEDURE — 96375 TX/PRO/DX INJ NEW DRUG ADDON: CPT

## 2024-01-22 PROCEDURE — 99239 HOSP IP/OBS DSCHRG MGMT >30: CPT

## 2024-01-22 PROCEDURE — 97163 PT EVAL HIGH COMPLEX 45 MIN: CPT

## 2024-01-22 RX ADMIN — METHADONE HYDROCHLORIDE 10 MILLIGRAM(S): 40 TABLET ORAL at 11:33

## 2024-01-22 RX ADMIN — SERTRALINE 100 MILLIGRAM(S): 25 TABLET, FILM COATED ORAL at 11:33

## 2024-01-22 RX ADMIN — POLYETHYLENE GLYCOL 3350 17 GRAM(S): 17 POWDER, FOR SOLUTION ORAL at 11:35

## 2024-01-22 RX ADMIN — Medication 1000 MILLIGRAM(S): at 05:52

## 2024-01-22 RX ADMIN — LISINOPRIL 5 MILLIGRAM(S): 2.5 TABLET ORAL at 05:52

## 2024-01-22 RX ADMIN — Medication 1000 MILLIGRAM(S): at 11:33

## 2024-01-22 RX ADMIN — GABAPENTIN 800 MILLIGRAM(S): 400 CAPSULE ORAL at 11:33

## 2024-01-22 RX ADMIN — Medication 81 MILLIGRAM(S): at 11:33

## 2024-01-22 RX ADMIN — APIXABAN 5 MILLIGRAM(S): 2.5 TABLET, FILM COATED ORAL at 05:52

## 2024-01-22 RX ADMIN — METHADONE HYDROCHLORIDE 10 MILLIGRAM(S): 40 TABLET ORAL at 05:52

## 2024-01-25 NOTE — PATIENT PROFILE ADULT - NUMBER OF YRS
[facility]      Pain Infusion Aftercare Instructions      1. It is normal to feel sedated, tired and low in energy after a pain infusion. DO NOT DRIVE, OPERATE ANY MACHINERY, OR MAKE ANY IMPORTANT DECISIONS FOR AT LEAST 24 HOURS AFTER THE INFUSION.     2. Call the pain center at [phone] with any problems, questions, or concerns.     3. Eat light after the infusion. If you feel queasy or sick to your stomach, laying down with your eyes closed may help. When you resume eating start with something mild like clear liquids, yogurt, applesauce, crackers, etc… Gradually advance to a regular diet.     4. Do not leave your house alone the evening of your pain infusion.     5. No alcohol or sedative medications, such as sleeping pills, for 24 hours after your pain infusion.     6. Resume all other prescribed medications unless directed otherwise by you physician.     7. If you have any medical emergencies, call 911 or go directly to the closest emergency room.   15

## 2024-02-14 NOTE — ASU PATIENT PROFILE, ADULT - ARRIVAL TIME
"Maintain strict fluid restriction  Optimize blood sugar control as a degree of \"pseudohyponatremia\" is likely contributory  Sodium 132- will follow up tomorrow   " 05:45

## 2024-02-28 NOTE — ED ADULT NURSE NOTE - ATTEMPT TO OOB
no
Alert and oriented, no focal deficits, no motor or sensory deficits. Gait is normal.  DTRs +2 throughout.

## 2024-04-15 ENCOUNTER — APPOINTMENT (OUTPATIENT)
Dept: PAIN MANAGEMENT | Facility: CLINIC | Age: 69
End: 2024-04-15
Payer: MEDICARE

## 2024-04-15 PROCEDURE — 99213 OFFICE O/P EST LOW 20 MIN: CPT

## 2024-04-15 NOTE — DISCUSSION/SUMMARY
[Medication Risks Reviewed] : Medication risks reviewed [de-identified] : Prescriptions renewed. [place] and appropriate. SOAPP-R completed on chart. The patient's medications are documented to the best of their ability. Quality of life and functional ability improved on medications. The patient is showing no aberrant behavior or evidence of diversion. The patient was advised not to use narcotic medication while operating an automobile or heavy machinery due to potential sedation or dizziness. The patient was educated to the risks associated with potential opioid dependence and addiction. Urine toxicology screens as per office protocol. Use of multimodal analgesia used prn. Follow up one month.

## 2024-04-15 NOTE — HISTORY OF PRESENT ILLNESS
[Lower back] : lower back [Gradual] : gradual [8] : 8 [Dull/Aching] : dull/aching [Localized] : localized [Sharp] : sharp [Stabbing] : stabbing [Constant] : constant [Household chores] : household chores [Work] : work [Sleep] : sleep [Rest] : rest [Meds] : meds [Sitting] : sitting [Standing] : standing [Walking] : walking [Bending forward] : bending forward [Physical therapy] : physical therapy [Disabled] : Work status: disabled [] : no [FreeTextEntry1] : vicki hips vicki knees  [FreeTextEntry6] : IN KNEES  [de-identified] : 2023-05-01 [de-identified] : PT PERFORMED PHYSICAL THERAPY IN REHAB FOR 4 MONTHS ENDING IN 2023-05-01 7X WEEK \par  PT CONTINUING WITH HOME EXERCISES, WALKING WITH WALKER

## 2024-04-15 NOTE — REASON FOR VISIT
[Follow-Up Visit] : a follow-up pain management visit [Home] : at home, [unfilled] , at the time of the visit. [Medical Office: ([facility])___] : at the medical office located in  [Patient] : the patient [Self] : self

## 2024-05-15 ENCOUNTER — APPOINTMENT (OUTPATIENT)
Dept: PAIN MANAGEMENT | Facility: CLINIC | Age: 69
End: 2024-05-15
Payer: MEDICARE

## 2024-05-15 DIAGNOSIS — M45.6 ANKYLOSING SPONDYLITIS LUMBAR REGION: ICD-10-CM

## 2024-05-15 PROCEDURE — 99213 OFFICE O/P EST LOW 20 MIN: CPT

## 2024-05-15 RX ORDER — METHADONE HYDROCHLORIDE 10 MG/1
10 TABLET ORAL
Qty: 120 | Refills: 0 | Status: ACTIVE | COMMUNITY
Start: 2023-05-19 | End: 1900-01-01

## 2024-05-15 NOTE — DISCUSSION/SUMMARY
[Medication Risks Reviewed] : Medication risks reviewed [de-identified] : Prescriptions renewed. [place] and appropriate. SOAPP-R completed on chart. The patient's medications are documented to the best of their ability. Quality of life and functional ability improved on medications. The patient is showing no aberrant behavior or evidence of diversion. The patient was advised not to use narcotic medication while operating an automobile or heavy machinery due to potential sedation or dizziness. The patient was educated to the risks associated with potential opioid dependence and addiction. Urine toxicology screens as per office protocol. Use of multimodal analgesia used prn. Follow up one month.

## 2024-05-15 NOTE — HISTORY OF PRESENT ILLNESS
[Lower back] : lower back [10] : 10 [Constant] : constant [Rest] : rest [Meds] : meds [Nothing helps with pain getting better] : Nothing helps with pain getting better [Standing] : standing [Walking] : walking [Retired] : Work status: retired [FreeTextEntry1] : [employer] [de-identified] : BRACES FOR THE ANKLES THAT ARE COLLAPSING DUE TO ANKLIOTIS SPINOLITIS

## 2024-05-15 NOTE — REASON FOR VISIT
[Follow-Up Visit] : a follow-up pain management visit [Home] : at home, [unfilled] , at the time of the visit. [Medical Office: ([facility])___] : at the medical office located in  [Patient] : the patient [Self] : self

## 2024-05-21 RX ORDER — OXYCODONE 10 MG/1
10 TABLET ORAL
Qty: 75 | Refills: 0 | Status: ACTIVE | COMMUNITY
Start: 2024-05-21 | End: 1900-01-01

## 2024-05-21 RX ORDER — HYDROMORPHONE HYDROCHLORIDE 4 MG/1
4 TABLET ORAL
Qty: 75 | Refills: 0 | Status: DISCONTINUED | COMMUNITY
Start: 2023-05-19 | End: 2024-05-21

## 2024-06-14 NOTE — DISCHARGE NOTE NURSING/CASE MANAGEMENT/SOCIAL WORK - NSDCPEELIQUIS_GEN_ALL_CORE
Detail Level: Detailed Apixaban/Eliquis - Potential for adverse drug reactions and interactions/Apixaban/Eliquis - Follow up monitoring/Apixaban/Eliquis - Compliance/Apixaban/Eliquis - Dietary Advice

## 2024-08-22 NOTE — HISTORY OF PRESENT ILLNESS
Phoned patient and note is waiting up front.   [Lower back] : lower back [Gradual] : gradual [8] : 8 [Dull/Aching] : dull/aching [Localized] : localized [Sharp] : sharp [Stabbing] : stabbing [Constant] : constant [Household chores] : household chores [Work] : work [Sleep] : sleep [Rest] : rest [Meds] : meds [Sitting] : sitting [Standing] : standing [Walking] : walking [Bending forward] : bending forward [Physical therapy] : physical therapy [Disabled] : Work status: disabled [de-identified] : States his ADL is limited as he  is unable to walk long distances. He uses a walker/rollator to maneuver around his home. States he tries to sleep in his recliner to keep his legs  elevated to decrease swelling to his legs. Meds effective.  [] : no [FreeTextEntry1] : vicki hips vicki knees  [FreeTextEntry6] : IN KNEES  [de-identified] : 2023-05-01 [de-identified] : PT PERFORMED PHYSICAL THERAPY IN REHAB FOR 4 MONTHS ENDING IN 2023-05-01 7X WEEK \par PT CONTINUING WITH HOME EXERCISES, WALKING WITH WALKER

## 2024-11-04 NOTE — DIETITIAN INITIAL EVALUATION ADULT. - NUTRITIONGOAL OUTCOME1
Patient has a past history of IV drug abuse when he was younger.  Recently he came from [place] about 2 months ago.  He was abusing Suboxone.  He has recently been seeing a methadone clinic with Dr. Dill in [place].  Orders:    CBC and differential; Future    TSH, 3rd generation with Free T4 reflex; Future     Pt will continue to consume >75% of estimated nutrient needs

## 2024-12-08 NOTE — PHYSICAL THERAPY INITIAL EVALUATION ADULT - IMPAIRED TRANSFERS: SIT/STAND, REHAB EVAL
Continue Metoprolol 50 mg   Continue Entresto.  Continue under the care of cardiology  Continue Jardiance   impaired balance/decreased strength